# Patient Record
Sex: FEMALE | Race: WHITE | NOT HISPANIC OR LATINO | Employment: OTHER | URBAN - METROPOLITAN AREA
[De-identification: names, ages, dates, MRNs, and addresses within clinical notes are randomized per-mention and may not be internally consistent; named-entity substitution may affect disease eponyms.]

---

## 2017-10-26 ENCOUNTER — OFFICE VISIT - HEALTHEAST (OUTPATIENT)
Dept: UROLOGY | Facility: CLINIC | Age: 72
End: 2017-10-26

## 2017-10-26 DIAGNOSIS — N20.9 URINARY CALCULUS: ICD-10-CM

## 2017-10-26 DIAGNOSIS — N20.9 URINARY TRACT STONES: ICD-10-CM

## 2017-10-26 DIAGNOSIS — N20.0 CALCULUS OF KIDNEY: ICD-10-CM

## 2018-09-17 ENCOUNTER — OFFICE VISIT - HEALTHEAST (OUTPATIENT)
Dept: UROLOGY | Facility: CLINIC | Age: 73
End: 2018-09-17

## 2018-09-17 ENCOUNTER — HOSPITAL ENCOUNTER (OUTPATIENT)
Dept: CT IMAGING | Facility: CLINIC | Age: 73
Discharge: HOME | End: 2018-09-17
Attending: UROLOGY

## 2018-09-17 DIAGNOSIS — N20.9 URINARY TRACT STONES: ICD-10-CM

## 2018-09-17 DIAGNOSIS — N20.9 URINARY CALCULUS: ICD-10-CM

## 2018-09-17 DIAGNOSIS — N20.0 CALCULUS OF KIDNEY: ICD-10-CM

## 2018-09-17 LAB
ALBUMIN UR-MCNC: ABNORMAL MG/DL
APPEARANCE UR: ABNORMAL
BILIRUB UR QL STRIP: NEGATIVE
COLOR UR AUTO: YELLOW
GLUCOSE UR STRIP-MCNC: NEGATIVE MG/DL
HGB UR QL STRIP: ABNORMAL
KETONES UR STRIP-MCNC: NEGATIVE MG/DL
LEUKOCYTE ESTERASE UR QL STRIP: ABNORMAL
NITRATE UR QL: NEGATIVE
PH UR STRIP: 5.5 [PH] (ref 5–8)
SP GR UR STRIP: 1.01 (ref 1–1.03)
UROBILINOGEN UR STRIP-ACNC: ABNORMAL

## 2018-09-19 ENCOUNTER — COMMUNICATION - HEALTHEAST (OUTPATIENT)
Dept: UROLOGY | Facility: CLINIC | Age: 73
End: 2018-09-19

## 2018-09-19 DIAGNOSIS — N39.0 UTI (URINARY TRACT INFECTION): ICD-10-CM

## 2018-09-19 LAB — BACTERIA SPEC CULT: ABNORMAL

## 2018-10-05 ENCOUNTER — COMMUNICATION - HEALTHEAST (OUTPATIENT)
Dept: UROLOGY | Facility: CLINIC | Age: 73
End: 2018-10-05

## 2018-10-05 DIAGNOSIS — N39.0 UTI (URINARY TRACT INFECTION): ICD-10-CM

## 2018-10-05 DIAGNOSIS — N20.0 CALCULUS OF KIDNEY: ICD-10-CM

## 2018-10-12 ENCOUNTER — COMMUNICATION - HEALTHEAST (OUTPATIENT)
Dept: UROLOGY | Facility: CLINIC | Age: 73
End: 2018-10-12

## 2018-10-25 ENCOUNTER — ANESTHESIA - HEALTHEAST (OUTPATIENT)
Dept: SURGERY | Facility: CLINIC | Age: 73
End: 2018-10-25

## 2018-10-26 ENCOUNTER — SURGERY - HEALTHEAST (OUTPATIENT)
Dept: SURGERY | Facility: CLINIC | Age: 73
End: 2018-10-26

## 2018-10-26 ASSESSMENT — MIFFLIN-ST. JEOR: SCORE: 1641.68

## 2018-10-29 ENCOUNTER — COMMUNICATION - HEALTHEAST (OUTPATIENT)
Dept: UROLOGY | Facility: CLINIC | Age: 73
End: 2018-10-29

## 2018-11-05 ENCOUNTER — AMBULATORY - HEALTHEAST (OUTPATIENT)
Dept: UROLOGY | Facility: CLINIC | Age: 73
End: 2018-11-05

## 2018-11-05 DIAGNOSIS — N20.0 CALCULUS OF KIDNEY: ICD-10-CM

## 2018-11-05 DIAGNOSIS — N20.9 CALCULUS OF URINARY TRACT: ICD-10-CM

## 2018-11-05 DIAGNOSIS — N30.01 ACUTE CYSTITIS WITH HEMATURIA: ICD-10-CM

## 2018-11-05 LAB
ALBUMIN UR-MCNC: ABNORMAL MG/DL
APPEARANCE UR: ABNORMAL
BILIRUB UR QL STRIP: NEGATIVE
COLOR UR AUTO: YELLOW
GLUCOSE UR STRIP-MCNC: NEGATIVE MG/DL
HGB UR QL STRIP: ABNORMAL
KETONES UR STRIP-MCNC: NEGATIVE MG/DL
LEUKOCYTE ESTERASE UR QL STRIP: ABNORMAL
NITRATE UR QL: POSITIVE
PH UR STRIP: 5.5 [PH] (ref 5–8)
SP GR UR STRIP: 1.02 (ref 1–1.03)
UROBILINOGEN UR STRIP-ACNC: ABNORMAL

## 2018-11-06 ENCOUNTER — COMMUNICATION - HEALTHEAST (OUTPATIENT)
Dept: UROLOGY | Facility: CLINIC | Age: 73
End: 2018-11-06

## 2018-11-07 LAB — BACTERIA SPEC CULT: ABNORMAL

## 2018-11-12 ENCOUNTER — AMBULATORY - HEALTHEAST (OUTPATIENT)
Dept: UROLOGY | Facility: CLINIC | Age: 73
End: 2018-11-12

## 2018-11-12 DIAGNOSIS — N20.9 URINARY TRACT STONES: ICD-10-CM

## 2018-11-12 DIAGNOSIS — N20.0 CALCULUS OF KIDNEY: ICD-10-CM

## 2018-11-12 LAB
ALBUMIN UR-MCNC: ABNORMAL MG/DL
APPEARANCE UR: ABNORMAL
BILIRUB UR QL STRIP: NEGATIVE
COLOR UR AUTO: YELLOW
GLUCOSE UR STRIP-MCNC: NEGATIVE MG/DL
HGB UR QL STRIP: ABNORMAL
KETONES UR STRIP-MCNC: NEGATIVE MG/DL
LEUKOCYTE ESTERASE UR QL STRIP: ABNORMAL
NITRATE UR QL: NEGATIVE
PH UR STRIP: 5 [PH] (ref 5–8)
SP GR UR STRIP: 1.02 (ref 1–1.03)
UROBILINOGEN UR STRIP-ACNC: ABNORMAL

## 2018-11-13 LAB — BACTERIA SPEC CULT: NORMAL

## 2019-01-10 ENCOUNTER — OFFICE VISIT (OUTPATIENT)
Dept: URBAN - METROPOLITAN AREA CLINIC 88 | Facility: CLINIC | Age: 74
End: 2019-01-10
Payer: MEDICARE

## 2019-01-10 DIAGNOSIS — H25.13 AGE-RELATED NUCLEAR CATARACT, BILATERAL: ICD-10-CM

## 2019-01-10 DIAGNOSIS — E11.9 TYPE 2 DIABETES MELLITUS WITHOUT COMPLICATIONS: Primary | ICD-10-CM

## 2019-01-10 PROCEDURE — 99203 OFFICE O/P NEW LOW 30 MIN: CPT | Performed by: OPHTHALMOLOGY

## 2019-01-10 ASSESSMENT — KERATOMETRY
OD: 44.38
OS: 45.50

## 2019-01-10 ASSESSMENT — VISUAL ACUITY
OD: 20/25
OS: 20/25

## 2019-01-10 ASSESSMENT — INTRAOCULAR PRESSURE
OS: 14
OD: 14

## 2019-01-10 NOTE — IMPRESSION/PLAN
Impression: Age-related nuclear cataract, bilateral: H25.13. OU. Condition: stable. Plan: Discussed diagnosis in detail with patient. No treatment is required at this time. The patient will monitor vision changes and contact us with any decrease in vision.

## 2019-01-10 NOTE — IMPRESSION/PLAN
Impression: Type 2 diabetes mellitus without complications: I20.8. Condition: stable. Symptoms: will continue to monitor. Plan: Diabetes type II: no background retinopathy, no signs of neovascularization noted. Discussed ocular and systemic benefits of blood sugar control.

## 2020-02-01 ENCOUNTER — TRANSFERRED RECORDS (OUTPATIENT)
Dept: HEALTH INFORMATION MANAGEMENT | Facility: CLINIC | Age: 75
End: 2020-02-01

## 2021-01-26 ENCOUNTER — OFFICE VISIT (OUTPATIENT)
Dept: FAMILY MEDICINE | Facility: CLINIC | Age: 76
End: 2021-01-26
Payer: MEDICARE

## 2021-01-26 VITALS
SYSTOLIC BLOOD PRESSURE: 123 MMHG | TEMPERATURE: 99 F | OXYGEN SATURATION: 97 % | WEIGHT: 245 LBS | HEART RATE: 105 BPM | DIASTOLIC BLOOD PRESSURE: 73 MMHG

## 2021-01-26 DIAGNOSIS — E11.9 TYPE 2 DIABETES MELLITUS WITHOUT COMPLICATION, WITHOUT LONG-TERM CURRENT USE OF INSULIN (H): ICD-10-CM

## 2021-01-26 DIAGNOSIS — F33.42 RECURRENT MAJOR DEPRESSIVE DISORDER, IN FULL REMISSION (H): ICD-10-CM

## 2021-01-26 DIAGNOSIS — N39.0 RECURRENT UTI: ICD-10-CM

## 2021-01-26 DIAGNOSIS — K21.9 GASTROESOPHAGEAL REFLUX DISEASE WITHOUT ESOPHAGITIS: ICD-10-CM

## 2021-01-26 DIAGNOSIS — G50.0 TRIGEMINAL NEURALGIA: ICD-10-CM

## 2021-01-26 DIAGNOSIS — R29.6 FALLS FREQUENTLY: ICD-10-CM

## 2021-01-26 DIAGNOSIS — R42 DIZZINESS: Primary | ICD-10-CM

## 2021-01-26 PROBLEM — Z85.42 HISTORY OF UTERINE CANCER: Status: ACTIVE | Noted: 2021-01-26

## 2021-01-26 LAB — HBA1C MFR BLD: 6.2 % (ref 0–5.6)

## 2021-01-26 PROCEDURE — 83036 HEMOGLOBIN GLYCOSYLATED A1C: CPT | Performed by: NURSE PRACTITIONER

## 2021-01-26 PROCEDURE — 99204 OFFICE O/P NEW MOD 45 MIN: CPT | Performed by: NURSE PRACTITIONER

## 2021-01-26 PROCEDURE — 82043 UR ALBUMIN QUANTITATIVE: CPT | Performed by: NURSE PRACTITIONER

## 2021-01-26 PROCEDURE — 80053 COMPREHEN METABOLIC PANEL: CPT | Performed by: NURSE PRACTITIONER

## 2021-01-26 PROCEDURE — 36415 COLL VENOUS BLD VENIPUNCTURE: CPT | Performed by: NURSE PRACTITIONER

## 2021-01-26 RX ORDER — BUPROPION HYDROCHLORIDE 150 MG/1
150 TABLET ORAL EVERY MORNING
COMMUNITY
End: 2021-02-25

## 2021-01-26 RX ORDER — PANTOPRAZOLE SODIUM 40 MG/1
40 TABLET, DELAYED RELEASE ORAL
Qty: 180 TABLET | Status: SHIPPED | OUTPATIENT
Start: 2021-01-26 | End: 2021-09-03

## 2021-01-26 RX ORDER — GLIPIZIDE 5 MG/1
5 TABLET ORAL DAILY
COMMUNITY
End: 2021-05-25

## 2021-01-26 RX ORDER — VENLAFAXINE 75 MG/1
75 TABLET ORAL 2 TIMES DAILY WITH MEALS
COMMUNITY
End: 2021-02-25

## 2021-01-26 RX ORDER — GABAPENTIN 800 MG/1
800 TABLET ORAL 2 TIMES DAILY
Qty: 180 TABLET | Status: ON HOLD | OUTPATIENT
Start: 2021-01-26 | End: 2021-07-03

## 2021-01-26 RX ORDER — GABAPENTIN 800 MG/1
800 TABLET ORAL 2 TIMES DAILY
COMMUNITY
End: 2021-01-26

## 2021-01-26 RX ORDER — ALPRAZOLAM 0.5 MG
0.5 TABLET ORAL 3 TIMES DAILY PRN
COMMUNITY
End: 2021-03-22

## 2021-01-26 RX ORDER — PANTOPRAZOLE SODIUM 40 MG/1
40 TABLET, DELAYED RELEASE ORAL
COMMUNITY
End: 2021-01-26

## 2021-01-26 NOTE — LETTER
January 27, 2021      Zeynep Martinez  [address] [address]   [place] [address]        Dear MsMaria LuisaJuan,    We are writing to inform you of your test results.    A1c looks good.    Resulted Orders   Hemoglobin A1c   Result Value Ref Range    Hemoglobin A1C 6.2 (H) 0 - 5.6 %      Comment:      Normal <5.7% Prediabetes 5.7-6.4%  Diabetes 6.5% or higher - adopted from ADA   consensus guidelines.         If you have any questions or concerns, please call the clinic at the number listed above.       Sincerely,      Shayy Rivas, JAVON/nr

## 2021-01-27 LAB
ALBUMIN SERPL-MCNC: 3.3 G/DL (ref 3.4–5)
ALP SERPL-CCNC: 58 U/L (ref 40–150)
ALT SERPL W P-5'-P-CCNC: 16 U/L (ref 0–50)
ANION GAP SERPL CALCULATED.3IONS-SCNC: 6 MMOL/L (ref 3–14)
AST SERPL W P-5'-P-CCNC: 14 U/L (ref 0–45)
BILIRUB SERPL-MCNC: 0.2 MG/DL (ref 0.2–1.3)
BUN SERPL-MCNC: 27 MG/DL (ref 7–30)
CALCIUM SERPL-MCNC: 9.4 MG/DL (ref 8.5–10.1)
CHLORIDE SERPL-SCNC: 108 MMOL/L (ref 94–109)
CO2 SERPL-SCNC: 24 MMOL/L (ref 20–32)
CREAT SERPL-MCNC: 1.01 MG/DL (ref 0.52–1.04)
CREAT UR-MCNC: 132 MG/DL
GFR SERPL CREATININE-BSD FRML MDRD: 55 ML/MIN/{1.73_M2}
GLUCOSE SERPL-MCNC: 213 MG/DL (ref 70–99)
MICROALBUMIN UR-MCNC: 129 MG/L
MICROALBUMIN/CREAT UR: 97.73 MG/G CR (ref 0–25)
POTASSIUM SERPL-SCNC: 5.2 MMOL/L (ref 3.4–5.3)
PROT SERPL-MCNC: 7.2 G/DL (ref 6.8–8.8)
SODIUM SERPL-SCNC: 138 MMOL/L (ref 133–144)

## 2021-02-03 ENCOUNTER — OFFICE VISIT (OUTPATIENT)
Dept: UROLOGY | Facility: CLINIC | Age: 76
End: 2021-02-03
Attending: OBSTETRICS & GYNECOLOGY
Payer: COMMERCIAL

## 2021-02-03 VITALS
HEART RATE: 94 BPM | BODY MASS INDEX: 41.3 KG/M2 | HEIGHT: 64 IN | SYSTOLIC BLOOD PRESSURE: 130 MMHG | DIASTOLIC BLOOD PRESSURE: 70 MMHG | WEIGHT: 241.9 LBS

## 2021-02-03 DIAGNOSIS — E66.01 MORBID OBESITY (H): ICD-10-CM

## 2021-02-03 DIAGNOSIS — N20.0 KIDNEY STONE: Primary | ICD-10-CM

## 2021-02-03 DIAGNOSIS — N39.0 RECURRENT UTI: ICD-10-CM

## 2021-02-03 PROCEDURE — 99203 OFFICE O/P NEW LOW 30 MIN: CPT | Performed by: OBSTETRICS & GYNECOLOGY

## 2021-02-03 PROCEDURE — G0463 HOSPITAL OUTPT CLINIC VISIT: HCPCS

## 2021-02-03 ASSESSMENT — PAIN SCALES - GENERAL: PAINLEVEL: NO PAIN (0)

## 2021-02-03 ASSESSMENT — MIFFLIN-ST. JEOR: SCORE: 1579.31

## 2021-02-03 NOTE — LETTER
2/3/2021       RE: Zeynep Martinez  [address]  [place] [address]     Dear Colleague,    Thank you for referring your patient, Zeynep Martinez, to the [facility] [place] [facility]. Please see a copy of my visit note below.    February 3, 2021    Referring Provider: Shayy Rivas, JAVON  [address] [place] [address]    Primary Care Provider: Shayy Rivas    CC: recurrent UTIs    HPI:  Zeynep Martinez is a 75 year old female who presents for evaluation of her pelvic floor symptoms.  She has a history recurrent UTIs for the past 18 months. She is on cefoxitin prophylaxis for these, but she states that she will get a UTI every 4 months that will require IV antibiotics. All of her treatment has occurred in [place] and records are not available for review. Her PMHx is significant for a h/o kidney stones that predates her recurrent UTIs that required lithotripsy.    Past Medical History:   Diagnosis Date     Calculus of kidney     multiple, uric acid and calcium     Frequent UTI      Hyperlipidemia LDL goal <100      Type 2 diabetes mellitus without complication, without long-term current use of insulin (H)        Past Surgical History:   Procedure Laterality Date     ABDOMINOPLASTY       APPENDECTOMY       EXTRACORPOREAL SHOCK WAVE LITHOTRIPSY (ESWL)       EXTRACORPOREAL SHOCK WAVE LITHOTRIPSY (ESWL)       galbaldder       GASTRIC BYPASS       HYSTERECTOMY TOTAL ABDOMINAL, BILATERAL SALPINGO-OOPHORECTOMY, COMBINED      no cervix       Social History     Socioeconomic History     Marital status:      Spouse name: Not on file     Number of children: Not on file     Years of education: Not on file     Highest education level: Not on file   Occupational History     Not on file   Social Needs     Financial resource strain: Not on file     Food insecurity     Worry: Not on file     Inability: Not on file  "    Transportation needs     Medical: Not on file     Non-medical: Not on file   Tobacco Use     Smoking status: Former Smoker     Quit date: 1970     Years since quittin.1     Smokeless tobacco: Never Used   Substance and Sexual Activity     Alcohol use: Not Currently     Drug use: Never     Sexual activity: Not Currently   Lifestyle     Physical activity     Days per week: Not on file     Minutes per session: Not on file     Stress: Not on file   Relationships     Social connections     Talks on phone: Not on file     Gets together: Not on file     Attends [employer] service: Not on file     Active member of club or organization: Not on file     Attends meetings of clubs or organizations: Not on file     Relationship status: Not on file     Intimate partner violence     Fear of current or ex partner: Not on file     Emotionally abused: Not on file     Physically abused: Not on file     Forced sexual activity: Not on file   Other Topics Concern     Not on file   Social History Narrative     Not on file       Family History   Problem Relation Age of Onset     Multiple Sclerosis Mother      Cancer Maternal Grandmother      Diabetes Maternal Grandmother      Hypertension Maternal Grandmother      Multiple Sclerosis Cousin      Multiple Sclerosis Cousin        ROS    Allergies   Allergen Reactions     Hmg-Coa-R Inhibitors Other (See Comments)     confusion     Penicillins Rash       Current Outpatient Medications   Medication     ALPRAZolam (XANAX) 0.5 MG tablet     buPROPion (WELLBUTRIN XL) 150 MG 24 hr tablet     cephALEXin (KEFLEX) 250 MG capsule     gabapentin (NEURONTIN) 800 MG tablet     glipiZIDE (GLUCOTROL) 5 MG tablet     metFORMIN (GLUCOPHAGE) 1000 MG tablet     pantoprazole (PROTONIX) 40 MG EC tablet     STATIN NOT PRESCRIBED (INTENTIONAL)     venlafaxine (EFFEXOR) 75 MG tablet     No current facility-administered medications for this visit.        /70   Pulse 94   Ht 1.613 m (5' 3.5\")   Wt " 109.7 kg (241 lb 14.4 oz)   BMI 42.18 kg/m   No LMP recorded. Patient is postmenopausal. Body mass index is 42.18 kg/m .  Ms. Martinez is alert, comfortable in no acute distress, non-labored breathing.     A/P: Zeynep Martinez is a 75 year old F with recurrent UTIs    Pt needs referral to urology to evaluate for possible continued kidney stones. Pt states that she would prefer to see her previous urologist.    A total of 30 minutes were spent with the patient today, > 50% in counseling and coordination of care    Adam Joseph MD  Professor, OB/GYN  Urogynecologist  CC  Patient Care Team:  Shayy Rivas NP as PCP - General (Nurse Practitioner - Family)  Shayy Rivas NP as Assigned PCP  Shayy Rivas NP as Referring Physician (Nurse Practitioner - Family)  Adam Joseph MD as MD (OB/Gyn)  SHAYY RIVAS

## 2021-02-03 NOTE — PROGRESS NOTES
February 3, 2021    Referring Provider: Shayy Rivas, JAVON  [address] [place] [address]    Primary Care Provider: Shayy Rivas    CC: recurrent UTIs    HPI:  Zeynep Martinez is a 75 year old female who presents for evaluation of her pelvic floor symptoms.  She has a history recurrent UTIs for the past 18 months. She is on cefoxitin prophylaxis for these, but she states that she will get a UTI every 4 months that will require IV antibiotics. All of her treatment has occurred in [place] and records are not available for review. Her PMHx is significant for a h/o kidney stones that predates her recurrent UTIs that required lithotripsy.    Past Medical History:   Diagnosis Date     Calculus of kidney     multiple, uric acid and calcium     Frequent UTI      Hyperlipidemia LDL goal <100      Type 2 diabetes mellitus without complication, without long-term current use of insulin (H)        Past Surgical History:   Procedure Laterality Date     ABDOMINOPLASTY       APPENDECTOMY       EXTRACORPOREAL SHOCK WAVE LITHOTRIPSY (ESWL)       EXTRACORPOREAL SHOCK WAVE LITHOTRIPSY (ESWL)       galbaldder       GASTRIC BYPASS       HYSTERECTOMY TOTAL ABDOMINAL, BILATERAL SALPINGO-OOPHORECTOMY, COMBINED      no cervix       Social History     Socioeconomic History     Marital status:      Spouse name: Not on file     Number of children: Not on file     Years of education: Not on file     Highest education level: Not on file   Occupational History     Not on file   Social Needs     Financial resource strain: Not on file     Food insecurity     Worry: Not on file     Inability: Not on file     Transportation needs     Medical: Not on file     Non-medical: Not on file   Tobacco Use     Smoking status: Former Smoker     Quit date: 1970     Years since quittin.1     Smokeless tobacco: Never Used   Substance and Sexual Activity     Alcohol use: Not Currently     Drug use: Never     Sexual  "activity: Not Currently   Lifestyle     Physical activity     Days per week: Not on file     Minutes per session: Not on file     Stress: Not on file   Relationships     Social connections     Talks on phone: Not on file     Gets together: Not on file     Attends [employer] service: Not on file     Active member of club or organization: Not on file     Attends meetings of clubs or organizations: Not on file     Relationship status: Not on file     Intimate partner violence     Fear of current or ex partner: Not on file     Emotionally abused: Not on file     Physically abused: Not on file     Forced sexual activity: Not on file   Other Topics Concern     Not on file   Social History Narrative     Not on file       Family History   Problem Relation Age of Onset     Multiple Sclerosis Mother      Cancer Maternal Grandmother      Diabetes Maternal Grandmother      Hypertension Maternal Grandmother      Multiple Sclerosis Cousin      Multiple Sclerosis Cousin        ROS    Allergies   Allergen Reactions     Hmg-Coa-R Inhibitors Other (See Comments)     confusion     Penicillins Rash       Current Outpatient Medications   Medication     ALPRAZolam (XANAX) 0.5 MG tablet     buPROPion (WELLBUTRIN XL) 150 MG 24 hr tablet     cephALEXin (KEFLEX) 250 MG capsule     gabapentin (NEURONTIN) 800 MG tablet     glipiZIDE (GLUCOTROL) 5 MG tablet     metFORMIN (GLUCOPHAGE) 1000 MG tablet     pantoprazole (PROTONIX) 40 MG EC tablet     STATIN NOT PRESCRIBED (INTENTIONAL)     venlafaxine (EFFEXOR) 75 MG tablet     No current facility-administered medications for this visit.        /70   Pulse 94   Ht 1.613 m (5' 3.5\")   Wt 109.7 kg (241 lb 14.4 oz)   BMI 42.18 kg/m   No LMP recorded. Patient is postmenopausal. Body mass index is 42.18 kg/m .  Ms. Martinez is alert, comfortable in no acute distress, non-labored breathing.     A/P: Zeynep Martinez is a 75 year old F with recurrent UTIs    Pt needs referral to urology to " evaluate for possible continued kidney stones. Pt states that she would prefer to see her previous urologist.    A total of 30 minutes were spent with the patient today, > 50% in counseling and coordination of care    Adam Joseph MD  Professor, OB/GYN  Urogynecologist  CC  Patient Care Team:  Shayy Rivas NP as PCP - General (Nurse Practitioner - Family)  Shayy Rivas NP as Assigned PCP  Shayy Rivas NP as Referring Physician (Nurse Practitioner - Family)  Adam Joseph MD as MD (OB/Gyn)  SHAYY RIVAS

## 2021-02-08 ENCOUNTER — OFFICE VISIT - HEALTHEAST (OUTPATIENT)
Dept: UROLOGY | Facility: CLINIC | Age: 76
End: 2021-02-08

## 2021-02-08 DIAGNOSIS — E66.01 MORBID OBESITY (H): ICD-10-CM

## 2021-02-08 DIAGNOSIS — N20.0 CALCULUS OF KIDNEY: ICD-10-CM

## 2021-02-08 DIAGNOSIS — N39.0 URINARY TRACT INFECTION WITHOUT HEMATURIA, SITE UNSPECIFIED: ICD-10-CM

## 2021-02-15 ENCOUNTER — HOSPITAL ENCOUNTER (OUTPATIENT)
Dept: CT IMAGING | Facility: CLINIC | Age: 76
Discharge: HOME | End: 2021-02-15
Attending: UROLOGY

## 2021-02-15 ENCOUNTER — AMBULATORY - HEALTHEAST (OUTPATIENT)
Dept: LAB | Facility: CLINIC | Age: 76
End: 2021-02-15

## 2021-02-15 ENCOUNTER — ANCILLARY PROCEDURE (OUTPATIENT)
Dept: MRI IMAGING | Facility: CLINIC | Age: 76
End: 2021-02-15
Attending: NURSE PRACTITIONER
Payer: COMMERCIAL

## 2021-02-15 DIAGNOSIS — R29.6 FALLS FREQUENTLY: ICD-10-CM

## 2021-02-15 DIAGNOSIS — N39.0 URINARY TRACT INFECTION WITHOUT HEMATURIA, SITE UNSPECIFIED: ICD-10-CM

## 2021-02-15 DIAGNOSIS — R42 DIZZINESS: ICD-10-CM

## 2021-02-15 PROCEDURE — 70553 MRI BRAIN STEM W/O & W/DYE: CPT | Mod: GC | Performed by: RADIOLOGY

## 2021-02-15 PROCEDURE — A9585 GADOBUTROL INJECTION: HCPCS | Performed by: RADIOLOGY

## 2021-02-15 RX ORDER — GADOBUTROL 604.72 MG/ML
10 INJECTION INTRAVENOUS ONCE
Status: COMPLETED | OUTPATIENT
Start: 2021-02-15 | End: 2021-02-15

## 2021-02-15 RX ADMIN — GADOBUTROL 10 ML: 604.72 INJECTION INTRAVENOUS at 11:38

## 2021-02-15 NOTE — DISCHARGE INSTRUCTIONS
MRI Contrast Discharge Instructions    The IV contrast you received today will pass out of your body in your  urine. This will happen in the next 24 hours. You will not feel this process.  Your urine will not change color.    Drink at least 4 extra glasses of water or juice today (unless your doctor  has restricted your fluids). This reduces the stress on your kidneys.  You may take your regular medicines.    If you are on dialysis: It is best to have dialysis today.    If you have a reaction: Most reactions happen right away. If you have  any new symptoms after leaving the hospital (such as hives or swelling),  call your hospital at the correct number below. Or call your family doctor.  If you have breathing distress or wheezing, call 911.    Special instructions: ***    I have read and understand the above information.    Signature:______________________________________ Date:___________    Staff:__________________________________________ Date:___________     Time:__________    [facility] Departments:    ___[place]: [phone]  ___[facility]: [phone]  ___[place]: [phone] ___[facility]: [phone]  ___[facility]: [phone]  ___[facility]: [phone]  ___[phone]  ___[facility]: [phone]  ___[phone]

## 2021-02-17 ENCOUNTER — TELEPHONE (OUTPATIENT)
Dept: NEUROLOGY | Facility: CLINIC | Age: 76
End: 2021-02-17

## 2021-02-17 ENCOUNTER — OFFICE VISIT (OUTPATIENT)
Dept: NEUROSURGERY | Facility: CLINIC | Age: 76
End: 2021-02-17
Attending: PSYCHIATRY & NEUROLOGY
Payer: COMMERCIAL

## 2021-02-17 VITALS
HEIGHT: 63 IN | OXYGEN SATURATION: 97 % | HEART RATE: 90 BPM | WEIGHT: 240 LBS | BODY MASS INDEX: 42.52 KG/M2 | DIASTOLIC BLOOD PRESSURE: 80 MMHG | SYSTOLIC BLOOD PRESSURE: 130 MMHG | TEMPERATURE: 98.7 F

## 2021-02-17 DIAGNOSIS — R41.9 COGNITIVE COMPLAINTS: Primary | ICD-10-CM

## 2021-02-17 DIAGNOSIS — R29.818 DIFFICULTY BALANCING: ICD-10-CM

## 2021-02-17 DIAGNOSIS — E11.42 DIABETIC PERIPHERAL NEUROPATHY (H): ICD-10-CM

## 2021-02-17 DIAGNOSIS — R29.6 FREQUENT FALLS: ICD-10-CM

## 2021-02-17 DIAGNOSIS — G50.0 TRIGEMINAL NEURALGIA: ICD-10-CM

## 2021-02-17 PROCEDURE — G2212 PROLONG OUTPT/OFFICE VIS: HCPCS | Performed by: PSYCHIATRY & NEUROLOGY

## 2021-02-17 PROCEDURE — 99205 OFFICE O/P NEW HI 60 MIN: CPT | Performed by: PSYCHIATRY & NEUROLOGY

## 2021-02-17 ASSESSMENT — MIFFLIN-ST. JEOR: SCORE: 1562.76

## 2021-02-17 NOTE — PATIENT INSTRUCTIONS
AFTER VISIT SUMMARY (AVS):    At today's visit we thoroughly discussed various diagnostic possibilities for your symptoms, necessary evaluation, and the plan, which includes:  Orders Placed This Encounter   Procedures     PHYSICAL THERAPY REFERRAL     No new medications, but we discussed that Wellbutrin dose should be reduced to see if symptoms improve.    We also discussed occipital nerve block if right-sided occipital pain continues after adjustment of head positioning during sleep.    Additional recommendations after the work-up.    Next follow-up appointment is in the next 4 weeks or earlier if needed.    Please do not hesitate to call me with any questions or concerns.    Thanks.
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
## 2021-02-18 ENCOUNTER — OFFICE VISIT - HEALTHEAST (OUTPATIENT)
Dept: UROLOGY | Facility: CLINIC | Age: 76
End: 2021-02-18

## 2021-02-18 DIAGNOSIS — N30.01 ACUTE CYSTITIS WITH HEMATURIA: ICD-10-CM

## 2021-02-19 ENCOUNTER — AMBULATORY - HEALTHEAST (OUTPATIENT)
Dept: UROLOGY | Facility: CLINIC | Age: 76
End: 2021-02-19

## 2021-02-19 ENCOUNTER — COMMUNICATION - HEALTHEAST (OUTPATIENT)
Dept: UROLOGY | Facility: CLINIC | Age: 76
End: 2021-02-19

## 2021-02-19 DIAGNOSIS — N30.01 ACUTE CYSTITIS WITH HEMATURIA: ICD-10-CM

## 2021-02-19 LAB
BACTERIA SPEC CULT: ABNORMAL
BACTERIA SPEC CULT: ABNORMAL

## 2021-02-24 ENCOUNTER — TELEPHONE (OUTPATIENT)
Dept: FAMILY MEDICINE | Facility: CLINIC | Age: 76
End: 2021-02-24

## 2021-02-24 ENCOUNTER — TELEPHONE (OUTPATIENT)
Dept: CARE COORDINATION | Facility: CLINIC | Age: 76
End: 2021-02-24

## 2021-02-24 NOTE — TELEPHONE ENCOUNTER
Pt called to see what she needed. She was out of her metformin but now did get it at the pharmacy. She did have glipizide at home and was supplementing that.     When asked if she wanted an RN to come out she felt she does not need that as has her BS back in control . She feels she has a good understanding of how to control her meds/diabetes    This relayed to Jeanie at Homecare as well. Therapy going out tomorrow    Naima Bravo, RN, BSN

## 2021-02-24 NOTE — TELEPHONE ENCOUNTER
Reason for Call:  Other call back    Detailed comments: [facility] states the patient was referred for PT due to neuropathy but PT called to schedule and patient did not want a visit as she has been out of her diabetic meds for 5 days and not feeling well. FYI. Wondering about getting her a nursing start of care. Please follow up. Thanks!    Phone Number Patient can be reached at: Jeanie  [phone]    Best Time: Any    Can we leave a detailed message on this number? YES    Call taken on 2/24/2021 at 10:46 AM by Ana Ramirez

## 2021-02-24 NOTE — TELEPHONE ENCOUNTER
Dear Shayy Rivas,  I called the patient and she has her diabetic meds and is doing well.  Her BGL was 120 last evening.  She denies need for Home Care Nurse to come in.    Thank you  Jeanie Holley, RN, BSN  [facility]  RN Care Coordinator  [phone]

## 2021-02-24 NOTE — TELEPHONE ENCOUNTER
Dear Dr. Cordero,  Medicare Home Health regulations requires [facility] to provide an initial assessment visit either within 48 hours of the patient's return home.     There will be a delay in the Initial Assessment for Zeynep Martinez; MRN [id number].  The patient is requesting 2/25 or 2/26.    Please reply to this message as it will be considered a verbal order.    Thank you  Jeanie Holley, RN, BSN  [facility]  RN Care Coordinator  [phone]

## 2021-02-25 ENCOUNTER — VIRTUAL VISIT (OUTPATIENT)
Dept: FAMILY MEDICINE | Facility: CLINIC | Age: 76
End: 2021-02-25
Payer: COMMERCIAL

## 2021-02-25 DIAGNOSIS — F33.42 RECURRENT MAJOR DEPRESSIVE DISORDER, IN FULL REMISSION (H): Primary | ICD-10-CM

## 2021-02-25 PROCEDURE — 99443 PR PHYSICIAN TELEPHONE EVALUATION 21-30 MIN: CPT | Mod: 95 | Performed by: NURSE PRACTITIONER

## 2021-02-25 RX ORDER — VENLAFAXINE HYDROCHLORIDE 75 MG/1
75 TABLET, EXTENDED RELEASE ORAL 2 TIMES DAILY
Qty: 180 TABLET | Refills: 3 | Status: SHIPPED | OUTPATIENT
Start: 2021-02-25 | End: 2021-10-01

## 2021-02-25 RX ORDER — DOXYCYCLINE HYCLATE 100 MG
100 TABLET ORAL
COMMUNITY
Start: 2021-02-19 | End: 2021-03-05

## 2021-02-25 RX ORDER — BUPROPION HYDROCHLORIDE 150 MG/1
150 TABLET ORAL EVERY MORNING
Status: CANCELLED | OUTPATIENT
Start: 2021-02-25

## 2021-02-25 ASSESSMENT — ANXIETY QUESTIONNAIRES
7. FEELING AFRAID AS IF SOMETHING AWFUL MIGHT HAPPEN: NOT AT ALL
1. FEELING NERVOUS, ANXIOUS, OR ON EDGE: SEVERAL DAYS
2. NOT BEING ABLE TO STOP OR CONTROL WORRYING: NOT AT ALL
6. BECOMING EASILY ANNOYED OR IRRITABLE: SEVERAL DAYS
5. BEING SO RESTLESS THAT IT IS HARD TO SIT STILL: NOT AT ALL
3. WORRYING TOO MUCH ABOUT DIFFERENT THINGS: NOT AT ALL
GAD7 TOTAL SCORE: 3

## 2021-02-25 ASSESSMENT — PATIENT HEALTH QUESTIONNAIRE - PHQ9
SUM OF ALL RESPONSES TO PHQ QUESTIONS 1-9: 8
5. POOR APPETITE OR OVEREATING: SEVERAL DAYS

## 2021-02-25 NOTE — PROGRESS NOTES
"Zeynep is a 75 year old who is being evaluated via a billable telephone visit.      What phone number would you like to be contacted at? [phone]  How would you like to obtain your AVS? MyChart    Assessment & Plan     Recurrent major depressive disorder, in full remission (H)  Stop Wellbutrin and continue on current dose of Effexor.  - venlafaxine (EFFEXOR-ER) 75 MG 24 hr tablet; Take 1 tablet (75 mg) by mouth 2 times daily      23 minutes spent on the date of the encounter doing chart review, patient visit and documentation        BMI:   Estimated body mass index is 42.51 kg/m  as calculated from the following:    Height as of 2/17/21: 1.6 m (5' 3\").    Weight as of 2/17/21: 108.9 kg (240 lb).           No follow-ups on file.    Shayy Rivas NP  [facility]    Vic Adhikari is a 75 year old who presents for the following health issues   Patient has been out of buPROPion (WELLBUTRIN XL) 150 MG 24 hr tablet  HPI   Neurologist recommendation to lower buPROPion (WELLBUTRIN XL) 150 MG 24 hr tablet    PHQ 2/25/2021   PHQ-9 Total Score 8   Q9: Thoughts of better off dead/self-harm past 2 weeks Not at all     NELLIE-7 SCORE 2/25/2021   Total Score 3     She was on 150 mg of Wellbutrin, stopped it a couple days ago and is feeling well.  She is having less of the neurological symptoms that she was having.     Her mood is good, feels a lot of her depression was related to her isolation and now that she is moved back up here and near family, this has helped.      Review of Systems         Objective       Mammogram in [place] January 2020: normal     Vitals:  No vitals were obtained today due to virtual visit.    Physical Exam   healthy, alert and no distress  PSYCH: Alert and oriented times 3; coherent speech, normal   rate and volume, able to articulate logical thoughts, able   to abstract reason, no tangential thoughts, no hallucinations   or delusions  Her affect is normal  RESP: " No cough, no audible wheezing, able to talk in full sentences  Remainder of exam unable to be completed due to telephone visits                Phone call duration: 22 minutes

## 2021-02-26 ENCOUNTER — MEDICAL CORRESPONDENCE (OUTPATIENT)
Dept: HEALTH INFORMATION MANAGEMENT | Facility: CLINIC | Age: 76
End: 2021-02-26

## 2021-02-26 ENCOUNTER — COMMUNICATION - HEALTHEAST (OUTPATIENT)
Dept: UROLOGY | Facility: CLINIC | Age: 76
End: 2021-02-26

## 2021-02-26 DIAGNOSIS — N39.0 URINARY TRACT INFECTION WITHOUT HEMATURIA, SITE UNSPECIFIED: ICD-10-CM

## 2021-02-26 ASSESSMENT — ANXIETY QUESTIONNAIRES: GAD7 TOTAL SCORE: 3

## 2021-03-01 ENCOUNTER — TELEPHONE (OUTPATIENT)
Dept: CARE COORDINATION | Facility: CLINIC | Age: 76
End: 2021-03-01

## 2021-03-01 NOTE — TELEPHONE ENCOUNTER
Dear Dr. Cordero,  [facility] process is that all ordered disciplines will be involved in the development of the plan of care.  The following disciplines were unable to see Zeynep Martinez; MRN [id number] within the 5 day evaluation window.  There will be a delay in the evalation visit by PT Start of Care due to difficulty reaching the patient.     Requesting updated orders.  Please respond as this will be verbal order.    Thank you  Jeanie Holley, RN, BSN  [facility]  RN Care Coordinator  [phone]

## 2021-03-02 ENCOUNTER — TELEPHONE (OUTPATIENT)
Dept: CARE COORDINATION | Facility: CLINIC | Age: 76
End: 2021-03-02

## 2021-03-02 NOTE — TELEPHONE ENCOUNTER
Dr Cordero,  After many attempts to schedule a PT Start of Care visit, the patient has declined Home Care services.    Thank you  Jeanie Holley, RN, BSN  [facility]  RN Care Coordinator  [phone]

## 2021-03-14 ENCOUNTER — HEALTH MAINTENANCE LETTER (OUTPATIENT)
Age: 76
End: 2021-03-14

## 2021-03-17 ENCOUNTER — TELEPHONE (OUTPATIENT)
Dept: NEUROLOGY | Facility: CLINIC | Age: 76
End: 2021-03-17

## 2021-03-17 NOTE — TELEPHONE ENCOUNTER
Patient called to cancel appointment, not feeling well.  Patient will call back when feeling better.

## 2021-03-18 ENCOUNTER — VIRTUAL VISIT (OUTPATIENT)
Dept: FAMILY MEDICINE | Facility: CLINIC | Age: 76
End: 2021-03-18
Payer: COMMERCIAL

## 2021-03-18 DIAGNOSIS — M25.562 LEFT KNEE PAIN, UNSPECIFIED CHRONICITY: ICD-10-CM

## 2021-03-18 DIAGNOSIS — Z12.11 SPECIAL SCREENING FOR MALIGNANT NEOPLASMS, COLON: ICD-10-CM

## 2021-03-18 DIAGNOSIS — F33.1 MODERATE EPISODE OF RECURRENT MAJOR DEPRESSIVE DISORDER (H): Primary | ICD-10-CM

## 2021-03-18 DIAGNOSIS — G47.00 INSOMNIA, UNSPECIFIED TYPE: ICD-10-CM

## 2021-03-18 DIAGNOSIS — R26.89 BALANCE PROBLEMS: ICD-10-CM

## 2021-03-18 DIAGNOSIS — R29.6 FALLS FREQUENTLY: ICD-10-CM

## 2021-03-18 PROCEDURE — 99215 OFFICE O/P EST HI 40 MIN: CPT | Mod: 95 | Performed by: NURSE PRACTITIONER

## 2021-03-18 RX ORDER — VENLAFAXINE HYDROCHLORIDE 150 MG/1
150 CAPSULE, EXTENDED RELEASE ORAL DAILY
Qty: 90 CAPSULE | Refills: 1 | Status: SHIPPED | OUTPATIENT
Start: 2021-03-18 | End: 2021-05-10

## 2021-03-18 ASSESSMENT — ANXIETY QUESTIONNAIRES
2. NOT BEING ABLE TO STOP OR CONTROL WORRYING: SEVERAL DAYS
GAD7 TOTAL SCORE: 6
5. BEING SO RESTLESS THAT IT IS HARD TO SIT STILL: NOT AT ALL
7. FEELING AFRAID AS IF SOMETHING AWFUL MIGHT HAPPEN: NOT AT ALL
3. WORRYING TOO MUCH ABOUT DIFFERENT THINGS: SEVERAL DAYS
6. BECOMING EASILY ANNOYED OR IRRITABLE: SEVERAL DAYS
1. FEELING NERVOUS, ANXIOUS, OR ON EDGE: MORE THAN HALF THE DAYS

## 2021-03-18 ASSESSMENT — PATIENT HEALTH QUESTIONNAIRE - PHQ9
SUM OF ALL RESPONSES TO PHQ QUESTIONS 1-9: 22
5. POOR APPETITE OR OVEREATING: SEVERAL DAYS

## 2021-03-18 NOTE — PROGRESS NOTES
Zeynep is a 75 year old who is being evaluated via a billable telephone visit.      What phone number would you like to be contacted at? [phone]  How would you like to obtain your AVS? Tamar    Assessment & Plan     Moderate episode of recurrent major depressive disorder (H)  Will increase her Effexor back to 150, since she has only been taking 75 mg and have her take this in the AM.  Referral for therapist and psychiatrist given.   Depression Screening Follow Up    PHQ 3/18/2021   PHQ-9 Total Score 22   Q9: Thoughts of better off dead/self-harm past 2 weeks Several days                 Follow Up    Follow Up Actions Taken  Crisis resource information provided in the After Visit Summary  Mental Health Referral placed    Discussed the following ways the patient can remain in a safe environment:  be around others      - MENTAL HEALTH REFERRAL  - Adult; Outpatient Treatment, Psychiatry; Individual/Couples/Family/Group Therapy/Health Psychology; [facility]: [facility] [phone]; We will contact you to schedule the appointment or please call with any ...    Insomnia, unspecified type  Will monitor her sleep, since right now insomnia is not bothersome. If this returns, she will follow up with me to discuss medication options.   - MENTAL HEALTH REFERRAL  - Adult; Outpatient Treatment, Psychiatry; Individual/Couples/Family/Group Therapy/Health Psychology; [facility]: [facility] [phone]; We will contact you to schedule the appointment or please call with any ...    Special screening for malignant neoplasms, colon    - COLOGDARCY(EXACT SCIENCES)    Falls frequently  Will refer for home care for PT since her depression is interfering with her ability to leave her home and get services.   - HOME CARE NURSING REFERRAL    Balance problems  See above.   - HOME CARE NURSING REFERRAL    Left knee pain, unspecified chronicity  See above.   - HOME CARE NURSING REFERRAL      46 minutes spent on the  "date of the encounter doing chart review, patient visit and documentation        BMI:   Estimated body mass index is 42.51 kg/m  as calculated from the following:    Height as of 2/17/21: 1.6 m (5' 3\").    Weight as of 2/17/21: 108.9 kg (240 lb).       Depression Screening Follow Up    PHQ 3/18/2021   PHQ-9 Total Score 22   Q9: Thoughts of better off dead/self-harm past 2 weeks Several days       Shayy Rivas NP  [facility]    Vic Adhikari is a 75 year old who presents for the following health issues     HPI   Referral or options to get back into therapy  She feels that her food addiction is out of control.  She thinks she has gained 15# in the past month.  She had gastric bypass in 2003.  She thinks she has regained 30# since her gastric bypass.  Was in therapy in [place].  Her depression has been worse.  She went off Wellbutrin a month ago due to falls, tremor and nightmares.  She does feel her depression has been worse since going off of this medication, but she doesn't want to go back on.   She admits that has only been taking one capsule of Venlaxafine for the past month, to see if this helps with her nightmares, which it has.  She had been prescribed to take Effexor BID when she was in [place].     She has been having difficulty sleeping at night until the past week, now is sleeping 16 hours a day.  She did have her COVID vaccine a week ago.    She tends to get more anxious at night, especially the longer it takes her to fall asleep.    She has had some episodes of nocturnal enuresis.  She will wake up completely soaked, which is new for her (just since she got the vaccine).  She has a history of chronic UTIs, but has never had this as a symptom.  She denies any other symptoms.  She is scheduled for a UC on Monday through her urologist.    PHQ 2/25/2021 3/18/2021   PHQ-9 Total Score 8 22   Q9: Thoughts of better off dead/self-harm past 2 weeks Not at all Several " days     NELLIE-7 SCORE 2/25/2021 3/18/2021   Total Score 3 6         Cologuard discussed, Patient will do. No family history of colon cancer     Mammogram done some time around 2/2020 at [facility] (sent to abstraction)    Diabetic eye exam [facility] eye clinic (sent to abstraction)     MA went over fall risk assessment. Patient reports she has fallen more than 7 times within the past 12 months. (Added fall prevention to patient instructions)           Review of Systems         Objective           Vitals:  No vitals were obtained today due to virtual visit.    Physical Exam   healthy, alert and no distress  PSYCH: Alert and oriented times 3; coherent speech, normal   rate and volume, able to articulate logical thoughts, able   to abstract reason, no tangential thoughts, no hallucinations   or delusions  Her affect is normal  RESP: No cough, no audible wheezing, able to talk in full sentences  Remainder of exam unable to be completed due to telephone visits                Phone call duration: 36 minutes

## 2021-03-18 NOTE — PATIENT INSTRUCTIONS
My Depression Action Plan  Name: Zeynep Martinez   Date of Birth 1945  Date: 3/18/2021    My doctor: Shayy Rivas   My clinic: [facility] [address] [place] [address]  [phone]          GREEN    ZONE   Good Control    What it looks like:     Things are going generally well. You have normal ups and downs. You may even feel depressed from time to time, but bad moods usually last less than a day.   What you need to do:  1. Continue to care for yourself (see self care plan)  2. Check your depression survival kit and update it as needed  3. Follow your physician s recommendations including any medication.  4. Do not stop taking medication unless you consult with your physician first.           YELLOW         ZONE Getting Worse    What it looks like:     Depression is starting to interfere with your life.     It may be hard to get out of bed; you may be starting to isolate yourself from others.    Symptoms of depression are starting to last most all day and this has happened for several days.     You may have suicidal thoughts but they are not constant.   What you need to do:     1. Call your care team. Your response to treatment will improve if you keep your care team informed of your progress. Yellow periods are signs an adjustment may need to be made.     2. Continue your self-care.  Just get dressed and ready for the day.  Don't give yourself time to talk yourself out of it.    3. Talk to someone in your support network.    4. Open up your Depression Self-Care Plan/Wellness Kit.           RED    ZONE Medical Alert - Get Help    What it looks like:     Depression is seriously interfering with your life.     You may experience these or other symptoms: You can t get out of bed most days, can t work or engage in other necessary activities, you have trouble taking care of basic hygiene, or basic responsibilities, thoughts of suicide or death that will not go  away, self-injurious behavior.     What you need to do:  1. Call your care team and request a same-day appointment. If they are not available (weekends or after hours) call your local crisis line, emergency room or 911.          Depression Self-Care Plan / Wellness Kit    Many people find that medication and therapy are helpful treatments for managing depression. In addition, making small changes to your everyday life can help to boost your mood and improve your wellbeing. Below are some tips for you to consider. Be sure to talk with your medical provider and/or behavioral health consultant if your symptoms are worsening or not improving.     Sleep   Sleep hygiene  means all of the habits that support good, restful sleep. It includes maintaining a consistent bedtime and wake time, using your bedroom only for sleeping or sex, and keeping the bedroom dark and free of distractions like a computer, smartphone, or television.     Develop a Healthy Routine  Maintain good hygiene. Get out of bed in the morning, make your bed, brush your teeth, take a shower, and get dressed. Don t spend too much time viewing media that makes you feel stressed. Find time to relax each day.    Exercise  Get some form of exercise every day. This will help reduce pain and release endorphins, the  feel good  chemicals in your brain. It can be as simple as just going for a walk or doing some gardening, anything that will get you moving.      Diet  Strive to eat healthy foods, including fruits and vegetables. Drink plenty of water. Avoid excessive sugar, caffeine, alcohol, and other mood-altering substances.     Stay Connected with Others  Stay in touch with friends and family members.    Manage Your Mood  Try deep breathing, massage therapy, biofeedback, or meditation. Take part in fun activities when you can. Try to find something to smile about each day.     Psychotherapy  Be open to working with a therapist if your provider recommends it.      Medication  Be sure to take your medication as prescribed. Most anti-depressants need to be taken every day. It usually takes several weeks for medications to work. Not all medicines work for all people. It is important to follow-up with your provider to make sure you have a treatment plan that is working for you. Do not stop your medication abruptly without first discussing it with your provider.    Crisis Resources   These hotlines are for both adults and children. They and are open 24 hours a day, 7 days a week unless noted otherwise.      National Suicide Prevention Lifeline   7-199-495-TALK (4074)      Crisis Text Line    www.crisistextline.org  Text HOME to 837270 from anywhere in the [place], anytime, about any type of crisis. A live, trained crisis counselor will receive the text and respond quickly.      [facility] Lifeline for LGBTQ Youth  A national crisis intervention and suicide lifeline for LGBTQ youth under 25. Provides a safe place to talk without judgement. Call 1-695.512.8332; text START to 388197 or visit www.theCloudDockvorproject.org to talk to a trained counselor.      For [facility] crisis numbers, visit the [facility] website at:  https://[place].gov/dhs/people-we-serve/adults/health-care/mental-health/resources/crisis-contacts.jsp                      At your visit today, we discussed your risk for falls and preventive options.    Fall Prevention  Falls often occur due to slipping, tripping or losing your balance. Millions of people fall every year and injure themselves. Here are ways to reduce your risk of falling again.     Think about your fall, was there anything that caused your fall that can be fixed, removed, or replaced?    Make your home safe by keeping walkways clear of objects you may trip over, such as electric cords.    Use non-slip pads under rugs. Don't use area rugs or small throw rugs.    Use non-slip mats in bathtubs and showers.    Install handrails and lights on staircases. The  handrails should be on both sides of the stairs.    Don't walk in poorly lit areas.    Don't stand on chairs or wobbly ladders.    Use caution when reaching overhead or looking upward. This position can cause a loss of balance.    Be sure your shoes fit properly, have non-slip bottoms and are in good condition.     Wear shoes both inside and out. Don't go barefoot or wear slippers.    Be cautious when going up and down stairs, curbs, and when walking on uneven sidewalks.    If your balance is poor, consider using a cane or walker.    If your fall was related to alcohol use, stop or limit alcohol intake.     If your fall was related to use of sleeping medicines, talk to your healthcare provider about this. You may need to reduce your dosage at bedtime if you awaken during the night to go to the bathroom.      To reduce the need for nighttime bathroom trips:  ? Don't drink fluids for several hours before going to bed  ? Empty your bladder before going to bed  ? Men can keep a urinal at the bedside    Stay as active as you can. Balance, flexibility, strength, and endurance all come from exercise. They all play a role in preventing falls. Ask your healthcare provider which types of activity are right for you.    Get your vision checked on a regular basis.    If you have pets, know where they are before you stand up or walk so you don't trip over them.    Use night lights.    Go over all your medicines with a pharmacist or other healthcare provider to see if any of them could make you more likely to fall.  [employer] last reviewed this educational content on 4/1/2018 2000-2020 [employer]. All rights reserved. This information is not intended as a substitute for professional medical care. Always follow your healthcare professional's instructions.

## 2021-03-18 NOTE — Clinical Note
Please abstract the following data from this visit with this patient into the appropriate field in Epic:    Tests that can be patient reported without a hard copy:    Mammogram done on this date: Mammogram done some time around 2/2020 at [facility]. Results were normal    Other Tests found in the patient's chart through Chart Review/Care Everywhere:    Eye exam with ophthalmology on this date: Diabetic eye exam [facility] eye clinic     Note to Abstraction: If this section is blank, no results were found via Chart Review/Care Everywhere.      Kerline Miguel MA

## 2021-03-19 ASSESSMENT — ANXIETY QUESTIONNAIRES: GAD7 TOTAL SCORE: 6

## 2021-03-21 ENCOUNTER — MYC MEDICAL ADVICE (OUTPATIENT)
Dept: FAMILY MEDICINE | Facility: CLINIC | Age: 76
End: 2021-03-21

## 2021-03-22 ENCOUNTER — OFFICE VISIT (OUTPATIENT)
Dept: FAMILY MEDICINE | Facility: CLINIC | Age: 76
End: 2021-03-22
Payer: COMMERCIAL

## 2021-03-22 VITALS
HEART RATE: 106 BPM | DIASTOLIC BLOOD PRESSURE: 88 MMHG | WEIGHT: 259 LBS | BODY MASS INDEX: 45.88 KG/M2 | TEMPERATURE: 98.8 F | SYSTOLIC BLOOD PRESSURE: 153 MMHG | OXYGEN SATURATION: 97 % | RESPIRATION RATE: 20 BRPM

## 2021-03-22 DIAGNOSIS — R60.0 PERIPHERAL EDEMA: Primary | ICD-10-CM

## 2021-03-22 DIAGNOSIS — F41.9 ANXIETY: ICD-10-CM

## 2021-03-22 DIAGNOSIS — N39.0 RECURRENT UTI: ICD-10-CM

## 2021-03-22 LAB
ERYTHROCYTE [DISTWIDTH] IN BLOOD BY AUTOMATED COUNT: 14 % (ref 10–15)
HCT VFR BLD AUTO: 37.1 % (ref 35–47)
HGB BLD-MCNC: 12.3 G/DL (ref 11.7–15.7)
MCH RBC QN AUTO: 28.7 PG (ref 26.5–33)
MCHC RBC AUTO-ENTMCNC: 33.2 G/DL (ref 31.5–36.5)
MCV RBC AUTO: 87 FL (ref 78–100)
NT-PROBNP SERPL-MCNC: 380 PG/ML (ref 0–125)
PLATELET # BLD AUTO: 379 10E9/L (ref 150–450)
RBC # BLD AUTO: 4.28 10E12/L (ref 3.8–5.2)
WBC # BLD AUTO: 12.6 10E9/L (ref 4–11)

## 2021-03-22 PROCEDURE — 85027 COMPLETE CBC AUTOMATED: CPT | Performed by: NURSE PRACTITIONER

## 2021-03-22 PROCEDURE — 87088 URINE BACTERIA CULTURE: CPT | Performed by: NURSE PRACTITIONER

## 2021-03-22 PROCEDURE — 84443 ASSAY THYROID STIM HORMONE: CPT | Performed by: NURSE PRACTITIONER

## 2021-03-22 PROCEDURE — 87186 SC STD MICRODIL/AGAR DIL: CPT | Performed by: NURSE PRACTITIONER

## 2021-03-22 PROCEDURE — 80053 COMPREHEN METABOLIC PANEL: CPT | Performed by: NURSE PRACTITIONER

## 2021-03-22 PROCEDURE — 83880 ASSAY OF NATRIURETIC PEPTIDE: CPT | Performed by: NURSE PRACTITIONER

## 2021-03-22 PROCEDURE — 87086 URINE CULTURE/COLONY COUNT: CPT | Performed by: NURSE PRACTITIONER

## 2021-03-22 PROCEDURE — 99214 OFFICE O/P EST MOD 30 MIN: CPT | Performed by: NURSE PRACTITIONER

## 2021-03-22 PROCEDURE — 36415 COLL VENOUS BLD VENIPUNCTURE: CPT | Performed by: NURSE PRACTITIONER

## 2021-03-22 RX ORDER — ALPRAZOLAM 0.5 MG
0.5 TABLET ORAL 3 TIMES DAILY PRN
Qty: 30 TABLET | Refills: 0 | Status: ON HOLD | OUTPATIENT
Start: 2021-03-22 | End: 2021-07-04

## 2021-03-22 RX ORDER — FUROSEMIDE 20 MG
20 TABLET ORAL DAILY
Qty: 30 TABLET | Refills: 0 | Status: SHIPPED | OUTPATIENT
Start: 2021-03-22 | End: 2021-05-10

## 2021-03-22 NOTE — PROGRESS NOTES
"    Assessment & Plan     Peripheral edema  Differential includes HF, hypothyroidism, renal and hepatic dysfunction, anemia  Will treat with 7  Days of Lasix.  Discussed the use and indication of this medication as well as potential side effects.   - BNP-N terminal pro  - TSH with free T4 reflex  - Comprehensive metabolic panel (BMP + Alb, Alk Phos, ALT, AST, Total. Bili, TP)  - CBC with platelets  - furosemide (LASIX) 20 MG tablet; Take 1 tablet (20 mg) by mouth daily    Anxiety  Refill given.  She is scheduled to see a therapist this week.   - ALPRAZolam (XANAX) 0.5 MG tablet; Take 1 tablet (0.5 mg) by mouth 3 times daily as needed for anxiety    Recurrent UTI  Lab ordered by urology.   - Urine Culture Aerobic Bacterial      35 minutes spent on the date of the encounter doing patient visit and documentation        BMI:   Estimated body mass index is 45.88 kg/m  as calculated from the following:    Height as of 2/17/21: 1.6 m (5' 3\").    Weight as of this encounter: 117.5 kg (259 lb).   Weight management plan: Discussed healthy diet and exercise guidelines        No follow-ups on file.    Shayy Rivas NP  [facility] WYATT Adhikari is a 75 year old who presents for the following health issues     HPI       Musculoskeletal problem/pain  Onset/Duration: Since Friday 3/19  3/11/2021 VIOLETA Covid-vaccine. Sick for 5 days after getting Covid-19 vaccine   Description  Location: Friday night feet started hurting   Saturday morning feet were so swollen pt could not get shoes on.  Hands started burning as well. Went out with daughter and ran some errands Saturday  Came home hands were swollen; could not make a fist   Joint Swelling: YES- Knee pain, hand pain, neck pain   Right thigh pain since last night   Redness: YES- left side, front lower ankle  Pain: YES  Intensity:  severe  Progression of Symptoms:  Improving.   Accompanying signs and symptoms:   Fevers: " "no  Numbness/tingling/weakness: YES  History  Trauma to the area: no  Recent illness:  no  Previous similar problem: no  Previous evaluation:  no  Therapies tried and outcome: elevated feet on Sunday.  tiger balm on joints that hurt  Lotion applied to feet, soaked feet w/sea salt    Both feet were swollen, unable to get shoes on.  Fingers were swollen on both hands, difficult to make a fist.   She is continuing to gain weight.   She denies any shortness of breath.      Blood sugars have been controlled.    Anxiety levels is \"through the roof\".  She has an appointment with a therapist this week.            Review of Systems         Objective    BP (!) 164/80   Pulse 106   Temp 98.8  F (37.1  C) (Tympanic)   Resp 20   Wt 117.5 kg (259 lb)   SpO2 97%   BMI 45.88 kg/m    Body mass index is 45.88 kg/m .  Physical Exam   GENERAL: healthy, alert and no distress  NECK: no adenopathy, no asymmetry, masses, or scars and thyroid normal to palpation  RESP: lungs clear to auscultation - no rales, rhonchi or wheezes  CV: regular rate and rhythm, normal S1 S2, no S3 or S4, no murmur, click or rub, no peripheral edema and peripheral pulses strong  MS: generalized edema of hands, feet and ankles bilaterally  SKIN: no suspicious lesions or rashes  PSYCH: mentation appears normal, affect normal/bright                  "

## 2021-03-22 NOTE — TELEPHONE ENCOUNTER
Attempted to reach, unable. Left message  to call back.  Make her appt at 2:20 to see Daisy.    See note to her    (she does have 3:30 appt in lab today as well)    Naima Bravo RN

## 2021-03-23 LAB
ALBUMIN SERPL-MCNC: 3.5 G/DL (ref 3.4–5)
ALP SERPL-CCNC: 77 U/L (ref 40–150)
ALT SERPL W P-5'-P-CCNC: 19 U/L (ref 0–50)
ANION GAP SERPL CALCULATED.3IONS-SCNC: 7 MMOL/L (ref 3–14)
AST SERPL W P-5'-P-CCNC: 16 U/L (ref 0–45)
BILIRUB SERPL-MCNC: 0.3 MG/DL (ref 0.2–1.3)
BUN SERPL-MCNC: 19 MG/DL (ref 7–30)
CALCIUM SERPL-MCNC: 8.3 MG/DL (ref 8.5–10.1)
CHLORIDE SERPL-SCNC: 106 MMOL/L (ref 94–109)
CO2 SERPL-SCNC: 25 MMOL/L (ref 20–32)
CREAT SERPL-MCNC: 0.95 MG/DL (ref 0.52–1.04)
GFR SERPL CREATININE-BSD FRML MDRD: 59 ML/MIN/{1.73_M2}
GLUCOSE SERPL-MCNC: 126 MG/DL (ref 70–99)
POTASSIUM SERPL-SCNC: 4.9 MMOL/L (ref 3.4–5.3)
PROT SERPL-MCNC: 7.5 G/DL (ref 6.8–8.8)
SODIUM SERPL-SCNC: 138 MMOL/L (ref 133–144)
TSH SERPL DL<=0.005 MIU/L-ACNC: 2.88 MU/L (ref 0.4–4)

## 2021-03-26 ENCOUNTER — COMMUNICATION - HEALTHEAST (OUTPATIENT)
Dept: UROLOGY | Facility: CLINIC | Age: 76
End: 2021-03-26

## 2021-03-26 ENCOUNTER — TELEPHONE (OUTPATIENT)
Dept: FAMILY MEDICINE | Facility: CLINIC | Age: 76
End: 2021-03-26

## 2021-03-26 DIAGNOSIS — N39.0 URINARY TRACT INFECTION WITHOUT HEMATURIA, SITE UNSPECIFIED: ICD-10-CM

## 2021-03-26 NOTE — TELEPHONE ENCOUNTER
[facility] calling for lab results on UA dropped off 3/22/21  Faxed copy of preliminary results [facility] to [facility] at [phone].  Leatha Clemons RN  [facility]

## 2021-03-27 LAB
BACTERIA SPEC CULT: ABNORMAL
Lab: ABNORMAL
SPECIMEN SOURCE: ABNORMAL

## 2021-04-07 ENCOUNTER — MEDICAL CORRESPONDENCE (OUTPATIENT)
Dept: HEALTH INFORMATION MANAGEMENT | Facility: CLINIC | Age: 76
End: 2021-04-07

## 2021-04-08 ENCOUNTER — TELEPHONE (OUTPATIENT)
Dept: FAMILY MEDICINE | Facility: CLINIC | Age: 76
End: 2021-04-08

## 2021-04-08 NOTE — TELEPHONE ENCOUNTER
Home health care forms have been placed in pcp forms folder     Marianne Breaux CMA on 4/8/2021 at 10:26 AM

## 2021-04-09 DIAGNOSIS — Z53.9 DIAGNOSIS NOT YET DEFINED: Primary | ICD-10-CM

## 2021-04-09 PROCEDURE — G0180 MD CERTIFICATION HHA PATIENT: HCPCS | Performed by: NURSE PRACTITIONER

## 2021-04-13 DIAGNOSIS — Z53.9 DIAGNOSIS NOT YET DEFINED: Primary | ICD-10-CM

## 2021-04-13 PROCEDURE — G0180 MD CERTIFICATION HHA PATIENT: HCPCS | Performed by: NURSE PRACTITIONER

## 2021-04-19 NOTE — TELEPHONE ENCOUNTER
Forms faxed and transfer to Shayy Rivas 's faxed folder.    Diana Perez MA on 4/19/2021 at 11:06 AM

## 2021-04-30 ENCOUNTER — COMMUNICATION - HEALTHEAST (OUTPATIENT)
Dept: UROLOGY | Facility: CLINIC | Age: 76
End: 2021-04-30

## 2021-05-06 ENCOUNTER — MYC MEDICAL ADVICE (OUTPATIENT)
Dept: FAMILY MEDICINE | Facility: CLINIC | Age: 76
End: 2021-05-06

## 2021-05-06 ENCOUNTER — AMBULATORY - HEALTHEAST (OUTPATIENT)
Dept: LAB | Facility: CLINIC | Age: 76
End: 2021-05-06

## 2021-05-06 DIAGNOSIS — N39.0 URINARY TRACT INFECTION WITHOUT HEMATURIA, SITE UNSPECIFIED: ICD-10-CM

## 2021-05-06 NOTE — TELEPHONE ENCOUNTER
Writer responded via [employer].    MIRNA LanN, RN  [facility]

## 2021-05-06 NOTE — TELEPHONE ENCOUNTER
Writer responded via [employer].    MIRNA LanN, RN  [facility]

## 2021-05-08 LAB — BACTERIA SPEC CULT: ABNORMAL

## 2021-05-09 ENCOUNTER — HEALTH MAINTENANCE LETTER (OUTPATIENT)
Age: 76
End: 2021-05-09

## 2021-05-10 ENCOUNTER — VIRTUAL VISIT (OUTPATIENT)
Dept: FAMILY MEDICINE | Facility: CLINIC | Age: 76
End: 2021-05-10
Payer: COMMERCIAL

## 2021-05-10 DIAGNOSIS — E11.42 DIABETIC POLYNEUROPATHY ASSOCIATED WITH TYPE 2 DIABETES MELLITUS (H): ICD-10-CM

## 2021-05-10 DIAGNOSIS — F33.1 MODERATE EPISODE OF RECURRENT MAJOR DEPRESSIVE DISORDER (H): ICD-10-CM

## 2021-05-10 PROCEDURE — 99214 OFFICE O/P EST MOD 30 MIN: CPT | Mod: 95 | Performed by: NURSE PRACTITIONER

## 2021-05-10 RX ORDER — VENLAFAXINE HYDROCHLORIDE 75 MG/1
75 CAPSULE, EXTENDED RELEASE ORAL DAILY
Qty: 90 CAPSULE | Refills: 1 | Status: SHIPPED | OUTPATIENT
Start: 2021-05-10 | End: 2021-07-09

## 2021-05-10 ASSESSMENT — ANXIETY QUESTIONNAIRES
6. BECOMING EASILY ANNOYED OR IRRITABLE: NEARLY EVERY DAY
2. NOT BEING ABLE TO STOP OR CONTROL WORRYING: NEARLY EVERY DAY
1. FEELING NERVOUS, ANXIOUS, OR ON EDGE: MORE THAN HALF THE DAYS
3. WORRYING TOO MUCH ABOUT DIFFERENT THINGS: NEARLY EVERY DAY
GAD7 TOTAL SCORE: 14
7. FEELING AFRAID AS IF SOMETHING AWFUL MIGHT HAPPEN: NOT AT ALL
5. BEING SO RESTLESS THAT IT IS HARD TO SIT STILL: NOT AT ALL
IF YOU CHECKED OFF ANY PROBLEMS ON THIS QUESTIONNAIRE, HOW DIFFICULT HAVE THESE PROBLEMS MADE IT FOR YOU TO DO YOUR WORK, TAKE CARE OF THINGS AT HOME, OR GET ALONG WITH OTHER PEOPLE: SOMEWHAT DIFFICULT

## 2021-05-10 ASSESSMENT — PATIENT HEALTH QUESTIONNAIRE - PHQ9
5. POOR APPETITE OR OVEREATING: NEARLY EVERY DAY
SUM OF ALL RESPONSES TO PHQ QUESTIONS 1-9: 21

## 2021-05-10 NOTE — PROGRESS NOTES
Zeynep is a 76 year old who is being evaluated via a billable telephone visit.      What phone number would you like to be contacted at? [phone]  How would you like to obtain your AVS? MyChart    Assessment & Plan     Moderate episode of recurrent major depressive disorder (H)  Worsening  Will increase Venlafaxine to 225 mg and refer to psychiatry.  Follow up in one month.   - venlafaxine (EFFEXOR-XR) 75 MG 24 hr capsule; Take 1 capsule (75 mg) by mouth daily Take in addition to 150 mg  - MENTAL HEALTH REFERRAL  - Adult; Psychiatry; Psychiatry; [facility]: Collaborative Care Psychiatry Service/Bridge to Long-Term Psychiatry as indicated ([phone]); Yes; Chronic Mental Health without improvement; Yes; We will contact you to schedule ...    Diabetic polyneuropathy associated with type 2 diabetes mellitus (H)  Disability parking permit form completed.           No follow-ups on file.    Shayy Rivas NP  [facility]   Zeynep is a 76 year old who presents for the following health issues     HPI     GAD7 and PHQ9 complete.    Depression and Anxiety Follow-Up    How are you doing with your depression since your last visit? Worsened     How are you doing with your anxiety since your last visit?  Worsened     Are you having other symptoms that might be associated with depression or anxiety? No    Have you had a significant life event? No     Do you have any concerns with your use of alcohol or other drugs? No    Social History     Tobacco Use     Smoking status: Former Smoker     Quit date: 1970     Years since quittin.3     Smokeless tobacco: Never Used   Substance Use Topics     Alcohol use: Yes     Comment: once per month      Drug use: Never     PHQ 2021 3/18/2021   PHQ-9 Total Score 8 22   Q9: Thoughts of better off dead/self-harm past 2 weeks Not at all Several days     NELLIE-7 SCORE 2021 3/18/2021   Total Score 3 6         Suicide Assessment  Five-step Evaluation and Treatment (SAFE-T)      How many servings of fruits and vegetables do you eat daily?  2-3    On average, how many sweetened beverages do you drink each day (Examples: soda, juice, sweet tea, etc.  Do NOT count diet or artificially sweetened beverages)?   0    How many days per week do you exercise enough to make your heart beat faster? 3 or less    How many minutes a day do you exercise enough to make your heart beat faster? 9 or less    How many days per week do you miss taking your medication? 0    Her depression has continued to be bad, worse over the past week.  She is seeing a therapist.   Her dose of Effexor was increased to 150 mg of the XR back in March.       She does need a handicap permit.  She has diabetic neuropathy and uses a walker.     Review of Systems         Objective           Vitals:  No vitals were obtained today due to virtual visit.    Physical Exam   healthy, alert and no distress  PSYCH: Alert and oriented times 3; coherent speech, normal   rate and volume, able to articulate logical thoughts, able   to abstract reason, no tangential thoughts, no hallucinations   or delusions  Her affect is normal  RESP: No cough, no audible wheezing, able to talk in full sentences  Remainder of exam unable to be completed due to telephone visits                Phone call duration: 14 minutes

## 2021-05-11 ENCOUNTER — AMBULATORY - HEALTHEAST (OUTPATIENT)
Dept: UROLOGY | Facility: CLINIC | Age: 76
End: 2021-05-11

## 2021-05-11 ENCOUNTER — COMMUNICATION - HEALTHEAST (OUTPATIENT)
Dept: UROLOGY | Facility: CLINIC | Age: 76
End: 2021-05-11

## 2021-05-11 ENCOUNTER — OFFICE VISIT (OUTPATIENT)
Dept: ENDOCRINOLOGY | Facility: CLINIC | Age: 76
End: 2021-05-11
Payer: COMMERCIAL

## 2021-05-11 ENCOUNTER — TELEPHONE (OUTPATIENT)
Dept: ENDOCRINOLOGY | Facility: CLINIC | Age: 76
End: 2021-05-11

## 2021-05-11 VITALS
BODY MASS INDEX: 45.54 KG/M2 | HEART RATE: 85 BPM | TEMPERATURE: 98.3 F | DIASTOLIC BLOOD PRESSURE: 75 MMHG | OXYGEN SATURATION: 96 % | SYSTOLIC BLOOD PRESSURE: 142 MMHG | WEIGHT: 257 LBS | HEIGHT: 63 IN

## 2021-05-11 DIAGNOSIS — N39.0 URINARY TRACT INFECTION WITH HEMATURIA, SITE UNSPECIFIED: ICD-10-CM

## 2021-05-11 DIAGNOSIS — E11.9 TYPE 2 DIABETES MELLITUS WITHOUT COMPLICATION, WITHOUT LONG-TERM CURRENT USE OF INSULIN (H): ICD-10-CM

## 2021-05-11 DIAGNOSIS — R31.9 URINARY TRACT INFECTION WITH HEMATURIA, SITE UNSPECIFIED: ICD-10-CM

## 2021-05-11 DIAGNOSIS — E11.42 DIABETIC POLYNEUROPATHY ASSOCIATED WITH TYPE 2 DIABETES MELLITUS (H): ICD-10-CM

## 2021-05-11 DIAGNOSIS — E66.01 OBESITY, CLASS III, BMI 40-49.9 (MORBID OBESITY) (H): ICD-10-CM

## 2021-05-11 DIAGNOSIS — Z98.84 S/P GASTRIC BYPASS: ICD-10-CM

## 2021-05-11 DIAGNOSIS — N39.0 URINARY TRACT INFECTION WITHOUT HEMATURIA, SITE UNSPECIFIED: ICD-10-CM

## 2021-05-11 DIAGNOSIS — Z98.84 S/P GASTRIC BYPASS: Primary | ICD-10-CM

## 2021-05-11 DIAGNOSIS — K21.9 GASTROESOPHAGEAL REFLUX DISEASE WITHOUT ESOPHAGITIS: ICD-10-CM

## 2021-05-11 DIAGNOSIS — N20.0 CALCULUS OF KIDNEY: ICD-10-CM

## 2021-05-11 LAB
ANION GAP SERPL CALCULATED.3IONS-SCNC: 7 MMOL/L (ref 3–14)
BUN SERPL-MCNC: 17 MG/DL (ref 7–30)
CALCIUM SERPL-MCNC: 8.8 MG/DL (ref 8.5–10.1)
CHLORIDE SERPL-SCNC: 108 MMOL/L (ref 94–109)
CO2 SERPL-SCNC: 25 MMOL/L (ref 20–32)
CREAT SERPL-MCNC: 0.85 MG/DL (ref 0.52–1.04)
FERRITIN SERPL-MCNC: 23 NG/ML (ref 8–252)
GFR SERPL CREATININE-BSD FRML MDRD: 67 ML/MIN/{1.73_M2}
GLUCOSE SERPL-MCNC: 178 MG/DL (ref 70–99)
HBA1C MFR BLD: 7.3 % (ref 0–5.6)
POTASSIUM SERPL-SCNC: 5.2 MMOL/L (ref 3.4–5.3)
PTH-INTACT SERPL-MCNC: 52 PG/ML (ref 18–80)
SODIUM SERPL-SCNC: 140 MMOL/L (ref 133–144)
VIT B12 SERPL-MCNC: 727 PG/ML (ref 193–986)

## 2021-05-11 PROCEDURE — 83970 ASSAY OF PARATHORMONE: CPT | Mod: 90 | Performed by: PATHOLOGY

## 2021-05-11 PROCEDURE — 84590 ASSAY OF VITAMIN A: CPT | Mod: 90 | Performed by: PATHOLOGY

## 2021-05-11 PROCEDURE — 82607 VITAMIN B-12: CPT | Performed by: PATHOLOGY

## 2021-05-11 PROCEDURE — 82728 ASSAY OF FERRITIN: CPT | Performed by: PATHOLOGY

## 2021-05-11 PROCEDURE — 82306 VITAMIN D 25 HYDROXY: CPT | Mod: 90 | Performed by: PATHOLOGY

## 2021-05-11 PROCEDURE — 80048 BASIC METABOLIC PNL TOTAL CA: CPT | Performed by: PATHOLOGY

## 2021-05-11 PROCEDURE — 36415 COLL VENOUS BLD VENIPUNCTURE: CPT | Performed by: PATHOLOGY

## 2021-05-11 PROCEDURE — 83036 HEMOGLOBIN GLYCOSYLATED A1C: CPT | Performed by: PATHOLOGY

## 2021-05-11 PROCEDURE — 99205 OFFICE O/P NEW HI 60 MIN: CPT | Performed by: NURSE PRACTITIONER

## 2021-05-11 ASSESSMENT — PAIN SCALES - GENERAL: PAINLEVEL: NO PAIN (0)

## 2021-05-11 ASSESSMENT — MIFFLIN-ST. JEOR: SCORE: 1634.87

## 2021-05-11 ASSESSMENT — ANXIETY QUESTIONNAIRES: GAD7 TOTAL SCORE: 14

## 2021-05-11 NOTE — ASSESSMENT & PLAN NOTE
Well controlled with protonix daily. Does have hx of ulcer seen on EGD in 2012. No current symptoms. No signs of active bleeding.

## 2021-05-11 NOTE — ASSESSMENT & PLAN NOTE
A1C 6.2 1/2021 with metformin and glipizide. Glipizide does have some weight gaining properties so I would recommending stopping this. Would like to try ozempic in addition to the metformin to help with DMII in addition to earlier satiety and weight loss.

## 2021-05-11 NOTE — TELEPHONE ENCOUNTER
Called and left message for patient to check if she was on her way to the clinic for her in person appointment with Linsey Gay. No answer, left direct call back number.

## 2021-05-11 NOTE — LETTER
"2021       RE: Zeynep Martinez  [address]  [place] [address]     Dear Colleague,    Thank you for referring your patient, Zeynep Martinez, to the [facility] [place] at [facility]. Please see a copy of my visit note below.    New Re-establish Bariatric Surgery Consultation Note    May 11, 2021    RE: Zeynep Martinez  MR#: [id number]  : 1945      Referring provider: No flowsheet data found.    Chief Complaint/Reason for visit: re-establish bariatric care    Dear Shayy Rivas NP (General),    I had the pleasure of seeing your patient, Zeynep Martinez, to re-establish bariatric care. As you know, Zeynep Martinez is 76 year old.  She has a height of 5' 3\", a weight of 257 lbs 0 oz, and calculated Body mass index is 45.53 kg/m .. She has a history of RNY in  at [facility].     Assessment & Plan   Problem List Items Addressed This Visit        Nervous and Auditory    Diabetic polyneuropathy associated with type 2 diabetes mellitus (H)     A1C 6.2 2021 with metformin and glipizide. Glipizide does have some weight gaining properties so I would recommending stopping this. Would like to try ozempic in addition to the metformin to help with DMII in addition to earlier satiety and weight loss.          Relevant Medications    Semaglutide,0.25 or 0.5MG/DOS, 2 MG/1.5ML SOPN       Digestive    Gastroesophageal reflux disease without esophagitis     Well controlled with protonix daily. Does have hx of ulcer seen on EGD in . No current symptoms. No signs of active bleeding.          Obesity, Class III, BMI 40-49.9 (morbid obesity) (H)     Up 27lbs from her lowest weight as an adult. This has occurred with isolation and depression during covid along with a move to [place]. She notes she has been struggling with weight regain for years since surgery. She would ideally like to be at 160 but hasn't been " there in 53 years.     Describes binge episodes triggered by stress and depression where she'll eat very large portions of whatever she can find. These are weekly but not daily. No specific time of day. Not associated with excessive hunger. But, feels like if she couldn't over eat, she could manage them better. Was evaluated by [facility] but the wait for treatment was long. Now, she is working on CBT with a therapist for her depression.     Disorganized eating patterns. Rarely hungry. Will sometimes not eat most of the day and other days will eat portions of her meal every 2 hours all day.     Recent bump in GFR and protein in urine, watching kidney function. Hx of kidney stones. Would like to avoid topiramate. Would like to try ozempic instead of glipizide to help with blood sugars but also restriction.     Discussed high risk and minimal benefits with additional surgery to make stomach smaller again. Patient understand that the recommendation is medical weight management.              Relevant Medications    Semaglutide,0.25 or 0.5MG/DOS, 2 MG/1.5ML SOPN    Other Relevant Orders    Hemoglobin A1c (Completed)    Basic metabolic panel (Completed)    MED THERAPY MANAGE REFERRAL    Ferritin (Completed)    Parathyroid Hormone Intact (Completed)    Vitamin A (Completed)    Vitamin B12 (Completed)    Vitamin D Deficiency (Completed)    S/P gastric bypass - Primary     EGD in 2012 shows dehiscence from pouch to remnant and ulceration. No abdominal pain or reflux when taking PPI. Will get burping and epigastric discomfort without PPI x 4 days. Recommending continuing. Could consider EGD for epigastric pain or reflux in the future.     Diarrhea, not necessarily dumping. Mixed with constipation. Chronically. Also has recurrent UTI. Recommend benefiber to help control constipation.     No hx of hypoglycemia. No recent bariatric labs, will check today     Recommend RD follow up as well.   Bariatric labs ordered today.     "      Relevant Medications    Semaglutide,0.25 or 0.5MG/DOS, 2 MG/1.5ML SOPN    Other Relevant Orders    Ferritin (Completed)    Parathyroid Hormone Intact (Completed)    Vitamin A (Completed)    Vitamin B12 (Completed)    Vitamin D Deficiency (Completed)       Endocrine    RESOLVED: Type 2 diabetes mellitus without complication, without long-term current use of insulin (H)    Relevant Medications    Semaglutide,0.25 or 0.5MG/DOS, 2 MG/1.5ML SOPN    Other Relevant Orders    Hemoglobin A1c (Completed)    Basic metabolic panel (Completed)    MED THERAPY MANAGE REFERRAL           Review of external notes as documented above     61 minutes spent on the date of the encounter doing chart review, history and exam, documentation and further activities per the note    Zeynep HARRISNO Martinez is a 76 year old female who presents to clinic today for the following health issues       HISTORY OF PRESENT ILLNESS:  Weight Loss History Reviewed with Patient 5/6/2021   How long have you been overweight? Since late teens through early 20's   What is the most that you have ever weighed? 363   What is the most weight you have lost? 125   I have tried the following methods to lose weight Watching portions or calories, Weight Watchers, Atkins type diet (low carb/high protein), Pre packaged meals ex: Nutrisystem, OTC Medications, Weight Loss Surgery   I have tried the following weight loss medications? (Check all that apply) None   Have you ever had weight loss surgery? Yes   Please select the type of weight loss surgery you had (select all that apply): gastric bypass / Poli-en-Y     RNYGB 2003   PCP yesterday- worsening depression, referral to psychiatr. Increased venlafaxine to 225mg     G-G fistula at some point after surgery, uses omeprazole for \"burping\" which returns after stopping medicine 3-4 days     Just moved back to [place] - Daughter is a patient with our program. Has family here but when not with them she feels isolate. Worsening " depression as of recent.     Very little restriction now. Feels like she can eat whatever she wants.     High 363  Low after surgery -   Regain-     Low as an adult 230 18 months ago - Nutrisystem   Would love to be 160lbs but hasn't been that weight in 53 years. Was 125lb in highschool       Wondering if she could have surgery again to make stomach smaller so she has some restriction again     No hx of hypoglycemia. Some hx of constipation. Does get some dumping but not regularly associated with immediately after meals.     Frequent UTI    CO-MORBIDITIES OF OBESITY INCLUDE:     5/6/2021   I have the following health issues associated with obesity: Type II Diabetes, High Cholesterol, Stress Incontinence, Cancer     DMII- metformin 1000mg twice daily and glipizide   A1C 6.2 1/26/2021 - monitoring kidney function given lower GFR and protein in urine.     PAST MEDICAL HISTORY:  Past Medical History:   Diagnosis Date     Calculus of kidney     multiple, uric acid and calcium     Frequent UTI      Hyperlipidemia LDL goal <100      Type 2 diabetes mellitus without complication, without long-term current use of insulin (H)        PAST SURGICAL HISTORY:  Past Surgical History:   Procedure Laterality Date     ABDOMINOPLASTY       APPENDECTOMY       EXTRACORPOREAL SHOCK WAVE LITHOTRIPSY (ESWL)       EXTRACORPOREAL SHOCK WAVE LITHOTRIPSY (ESWL)       galbaldder       GASTRIC BYPASS       HYSTERECTOMY TOTAL ABDOMINAL, BILATERAL SALPINGO-OOPHORECTOMY, COMBINED      no cervix       FAMILY HISTORY:   Family History   Problem Relation Age of Onset     Multiple Sclerosis Mother      Cancer Maternal Grandmother      Diabetes Maternal Grandmother      Hypertension Maternal Grandmother      Multiple Sclerosis Cousin      Multiple Sclerosis Cousin        SOCIAL HISTORY:   Social History Questions Reviewed With Patient 5/6/2021   Which best describes your employment status (select all that apply) I am retired   Which best describes your  marital status:    Do you have children? Yes   Who do you have in your support network that can be available to help you for the first 2 weeks after surgery? Family   Can you afford 3 meals a day?  Yes   Can you afford 50-60 dollars a month for vitamins? Yes       HABITS:     5/6/2021   How often do you drink alcohol? Monthly or less   Have you ever used any of the following nicotine products? Cigarettes   If you previously used any of these products, what year did you quit? 1992   Have you or are you currently using street drugs or prescription strength medication for which you do not have a prescription for? No   Do you have a history of chemical dependency (alcohol or drug abuse)? No       PSYCHOLOGICAL HISTORY:   Psychological History Reviewed With Patient 5/6/2021   Have you ever attempted suicide? Never.   Have you had thoughts of suicide in the past year? Yes   Have you ever been hospitalized for mental illness or a suicide attempt? Never.   Do you have a history of chronic pain? Yes   Have you ever been diagnosed with fibromyalgia? No   Are you currently being treated for any of the following? (select all that apply) Depression, Anxiety   Are you currently seeing a therapist or counselor?  Yes   Are you currently seeing a psychiatrist? No     Recently diagnosed with [facility] with [facility] but never started program- long wait   Doing CBT - with a therapist - [facility] - just started 3 weeks ago     Introverted - worn out by constant interaction     ROS:     5/6/2021   Skin:  Skin fold rashes (groin or other folds), Leg swelling   HEENT: Headaches, Dizziness/lightheadedness   Musculoskeletal: Joint Pain, Back pain, Limited mobility, Swelling of legs   Cardiovascular: Shortness of breath with activity   Pulmonary: Shortness of breath with activity, Excessively sleep during the day, Experience morning headaches   Gastrointestinal: Diarrhea, Constipation, Ulcers   Genitourinary: Stress incontinence  (losing urine when coughing, sneezing, etc.)   Hematological: None of the above   Neurological: None of the above   Female only: Post-menopausal       EATING BEHAVIORS:     5/6/2021   Have you or anyone else thought that you had an eating disorder? Yes   If you answered yes to the previous eating disorder question, select the types that apply from this list: Binge Eating   Do you currently binge eat (eat a large amount of food in a short time)? Yes   Are you an emotional eater? Yes   Do you get up to eat after falling asleep? No   10am- breakfast- porkchop/ steak (not breakfast food)   7pm - dinner- fruit     Sometimes will spread meals out so she eats every 2-3 hours. Will have the protein and then the vegetable at the next eating episode.     Never hungry but knows she needs to eat. Something gets triggered and something will sound good and need to eat that.     Binging episodes- when nervous/ depressed - not daily, not at specific times of day     Food has always been comfort zone     Strong cravings and thoughts about food- especially when watching tv, can usually talk herself out of the craving     EXERCISE:     5/6/2021   How often do you exercise? Never   What keeps you from being more active?  Pain, I should be more active but I just have not gotten around to it, Too tired       MEDICATIONS:  Current Outpatient Medications   Medication Sig Dispense Refill     ALPRAZolam (XANAX) 0.5 MG tablet Take 1 tablet (0.5 mg) by mouth 3 times daily as needed for anxiety 30 tablet 0     gabapentin (NEURONTIN) 800 MG tablet Take 1 tablet (800 mg) by mouth 2 times daily 180 tablet PRN     glipiZIDE (GLUCOTROL) 5 MG tablet Take 5 mg by mouth daily        metFORMIN (GLUCOPHAGE) 1000 MG tablet Take 1 tablet (1,000 mg) by mouth 2 times daily (with meals) 180 tablet 3     pantoprazole (PROTONIX) 40 MG EC tablet Take 1 tablet (40 mg) by mouth daily with food 180 tablet PRN     Semaglutide,0.25 or 0.5MG/DOS, 2 MG/1.5ML SOPN  Inject 0.25 mg subcutaneously once weekly for 4 weeks then 0.5 mg once weekly. 1.5 mL 1     venlafaxine (EFFEXOR-XR) 75 MG 24 hr capsule Take 1 capsule (75 mg) by mouth daily Take in addition to 150 mg 90 capsule 1     STATIN NOT PRESCRIBED (INTENTIONAL) Please choose reason not prescribed from choices below. (Patient not taking: Reported on 2/25/2021)         ALLERGIES:  Allergies   Allergen Reactions     Adhesive Tape Rash     Hmg-Coa-R Inhibitors Other (See Comments)     confusion     Penicillins Rash       Office Visit on 03/22/2021   Component Date Value Ref Range Status     N-Terminal Pro Bnp 03/22/2021 380* 0 - 125 pg/mL Final    Comment:    Reference range shown and results flagged as abnormal are for the outpatient,   non acute settings. Establishing a baseline value for each individual patient   is useful for follow-up.  Suggested inpatient cut points for confirming diagnosis of CHF in an acute   setting are:   >450 pg/mL (age 18 to less than 50)   >900 pg/mL (age 50 to less than 75)   >1800 pg/mL (75 yrs and older)  An inpatient or emergency department NT-proPBNP <300 pg/mL effectively rules   out acute CHF, with 99% negative predictive value.        TSH 03/22/2021 2.88  0.40 - 4.00 mU/L Final     Sodium 03/22/2021 138  133 - 144 mmol/L Final     Potassium 03/22/2021 4.9  3.4 - 5.3 mmol/L Final     Chloride 03/22/2021 106  94 - 109 mmol/L Final     Carbon Dioxide 03/22/2021 25  20 - 32 mmol/L Final     Anion Gap 03/22/2021 7  3 - 14 mmol/L Final     Glucose 03/22/2021 126* 70 - 99 mg/dL Final     Urea Nitrogen 03/22/2021 19  7 - 30 mg/dL Final     Creatinine 03/22/2021 0.95  0.52 - 1.04 mg/dL Final     GFR Estimate 03/22/2021 59* >60 mL/min/[1.73_m2] Final    Comment: Non  GFR Calc  Starting 12/18/2018, serum creatinine based estimated GFR (eGFR) will be   calculated using the Chronic Kidney Disease Epidemiology Collaboration   (CKD-EPI) equation.       GFR Estimate If Black 03/22/2021 68   >60 mL/min/[1.73_m2] Final    Comment:  GFR Calc  Starting 12/18/2018, serum creatinine based estimated GFR (eGFR) will be   calculated using the Chronic Kidney Disease Epidemiology Collaboration   (CKD-EPI) equation.       Calcium 03/22/2021 8.3* 8.5 - 10.1 mg/dL Final     Bilirubin Total 03/22/2021 0.3  0.2 - 1.3 mg/dL Final     Albumin 03/22/2021 3.5  3.4 - 5.0 g/dL Final     Protein Total 03/22/2021 7.5  6.8 - 8.8 g/dL Final     Alkaline Phosphatase 03/22/2021 77  40 - 150 U/L Final     ALT 03/22/2021 19  0 - 50 U/L Final     AST 03/22/2021 16  0 - 45 U/L Final     WBC 03/22/2021 12.6* 4.0 - 11.0 10e9/L Final     RBC Count 03/22/2021 4.28  3.8 - 5.2 10e12/L Final     Hemoglobin 03/22/2021 12.3  11.7 - 15.7 g/dL Final     Hematocrit 03/22/2021 37.1  35.0 - 47.0 % Final     MCV 03/22/2021 87  78 - 100 fl Final     MCH 03/22/2021 28.7  26.5 - 33.0 pg Final     MCHC 03/22/2021 33.2  31.5 - 36.5 g/dL Final     RDW 03/22/2021 14.0  10.0 - 15.0 % Final     Platelet Count 03/22/2021 379  150 - 450 10e9/L Final     Specimen Description 03/22/2021 Catheterized Urine   Final     Special Requests 03/22/2021 Specimen received in preservative   Final     Culture Micro 03/22/2021 *  Final                    Value:10,000 to 50,000 colonies/mL  Escherichia coli ESBL  ESBL (extended beta lactamase) producing organisms require contact precautions.       Culture Micro 03/22/2021 *  Final                    Value:<10,000 colonies/mL  Enterococcus faecalis       Culture Micro 03/22/2021 *  Final                    Value:<10,000 colonies/mL  Strain 2  Escherichia coli ESBL  ESBL (extended beta lactamase) producing organisms require contact precautions.       EGD 2012   Impression:          - Normal esophagus.                       - Poli-en-Y gastrojejunostomy. The gastrojejunal                        anastomosis had edema, severe stenosis and ulceration.                       - Poli-en-Y gastrojejunostomy.              "          - Normal examined duodenum. This was biopsied.                       - There was dehiscence in the stomach allowing                        examination of the entire stomach and duodenum                       - The examination was otherwise normal.  Recommendation:      - Await pathology results.                       - Refer to a surgeon at appointment to be scheduled.                       - No aspirin, ibuprofen, naproxen, or other                        non-steroidal anti-inflammatory drugs.                       - Use Prilosec (omeprazole) at 20 mg by mouth twice a                        day.                       - Perform an H. pylori serology today.                       - Do an upper GI series at appointment to be                        scheduled.    PHYSICAL EXAM:  Objective    BP (!) 142/75 (BP Location: Left arm, Patient Position: Chair, Cuff Size: Adult Large)   Pulse 85   Temp 98.3  F (36.8  C)   Ht 1.6 m (5' 3\")   Wt 116.6 kg (257 lb)   SpO2 96%   BMI 45.53 kg/m    BP (!) 142/75 (BP Location: Left arm, Patient Position: Chair, Cuff Size: Adult Large)   Pulse 85   Temp 98.3  F (36.8  C)   Ht 1.6 m (5' 3\")   Wt 116.6 kg (257 lb)   SpO2 96%   BMI 45.53 kg/m    Body mass index is 45.53 kg/m .  Physical Exam   GENERAL: Healthy, alert and no distress  EYES: Eyes grossly normal to inspection.  No discharge or erythema, or obvious scleral/conjunctival abnormalities.  RESP: No audible wheeze, cough, or visible cyanosis.  No visible retractions or increased work of breathing.    SKIN: Visible skin clear. No significant rash, abnormal pigmentation or lesions.  NEURO: Cranial nerves grossly intact.  Mentation and speech appropriate for age.  PSYCH: Mentation appears normal, affect normal/bright, judgement and insight intact, normal speech and appearance well-groomed.        In summary, Zeynep Martinez has Class III obesity with a body mass index of Body mass index is 45.53 kg/m . kg/m2 and " the comorbidities stated above. She has a history of RNY in 2003 and is here to re-establish bariatric care and discuss weight regain and possible revision surgery.    MEDICATIONS:        - Start taking ozempic        - stop glipizide       - Continue other medications without change  CONSULTATION/REFERRAL to [facility] pharmacist (Lauren Bloch) and Dietitian   FUTURE LABS:       - Schedule a non-fasting blood draw - today   FUTURE APPOINTMENTS:       - Follow-up visit in 3 months       - [place]tna in 3-4 weeks       Sincerely,     Linsey Gay NP

## 2021-05-11 NOTE — NURSING NOTE
"Chief Complaint   Patient presents with     New Patient     NRB       Vitals:    05/11/21 1008   BP: (!) 142/75   BP Location: Left arm   Patient Position: Chair   Cuff Size: Adult Large   Pulse: 85   Temp: 98.3  F (36.8  C)   SpO2: 96%   Weight: 116.6 kg (257 lb)   Height: 1.6 m (5' 3\")       Body mass index is 45.53 kg/m .                              " needed assist/raised toilet seat

## 2021-05-11 NOTE — PATIENT INSTRUCTIONS
"It was a pleasure meeting with you today.    Thank you for allowing us the privilege of caring for you. We hope we provided you with the excellent service you deserve.   Please let us know if there is anything else we can do for you so that we can be sure you are leaving completely satisfied with your care experience.    To ensure the quality of our services you may be receiving a patient satisfaction survey from an independent patient satisfaction monitoring company.    The greatest compliment you can give is a \"Likely to Recommend\"      Your visit was with Linsey Gay NP today.     Instructions per today's visit:     MEDICATIONS:        - Start taking ozempic        - stop glipizide       - Continue other medications without change  CONSULTATION/REFERRAL to [facility] pharmacist (Lauren Bloch) and Dietitian   FUTURE LABS:       - Schedule a non-fasting blood draw - today   FUTURE APPOINTMENTS:       - Follow-up visit in 3 months       - [place] in 3-4 weeks       To schedule appointments with our team, please call [phone] option #1    Please call during clinic hours Monday through Friday 8:00a - 4:00p if you have questions or you can contact us via [employer] at anytime.      Nurses: [phone]  Fax: [phone]  Surgery Scheduler: [phone]    Please call the hospital at [phone] to speak with our on call MDs if you have urgent needs after hours, during weekends, or holidays.    **Please note if you need to go to the Emergency Room for any reason in the first 90 days after surgery it is important to go to the [facility] on the [place] on [address] off of the [address] off [address]    *For patients who are currently enrolled in our program and have not yet had weight loss surgery please note*:    You must be at your required goal weight at the time of your Anesthesia clinic visit as well as the day of surgery or your surgery will be canceled. You will not be able to obtain a new surgery " date until you have met your goal weight.    Lab results will be communicated through My Chart or letter (if My Chart not used). Please call the clinic if you have not received communication after 1 week or if you have any questions.?    Main follow-up parameters for all bariatric patients     Patients who have undergone Bariatric surgery:    1. Follow-up interval during the first year: ~1 week, 1 month, 3 month, 6 month,12 months.  Every 6 months until 5 years; and then annually thereafter.  The goal of follow-up sessions is to ensure that food intake behavior (choice of food and eating speed) and exercise/activity level are set appropriately.    2. Yearly lab tests ordered by our clinic.      3. The bariatric team should be aware and potentially evaluate all adverse gastrointestinal symptoms (dysphagia, abdominal pain, nausea, vomiting, diarrhea, heartburn, reflux, etc), which can be a sign of complication.  The bariatric surgeons perform general surgery procedures in addition to bariatric surgery (laparoscopic cholecystectomy, etc.)    4. Inability to tolerate textured food (chicken, steak, fish, eggs, beans) is NOT normal and may need to be evaluated by endoscopy performed by the bariatric surgeon.    _____________________________________________________________________________________________________________________________    Meal Replacement Products:    Here is the link to our new e-store where you can purchase our meal replacement products    [facility] [email or web address]    The one week starter kit is a great way to sample a variety of products and see what works for you.    If you want more information about the product go to: [employer]    Free Shipping for orders over $75     Benefits of meal replacements products:    Portion and calorie control  Improved nutrition  Structured eating  Simplified food choices  Avoid contact with trigger  foods  ______________________________________________________________________________________________________________________________    Healthy Lifestyle Support Group   [facility]  Virtual Support Group Now Available    60 minutes of small group guided discussion, support and resources    Led by Health , Dior Deutsch    For questions, and to receive the invite information, contact Dior at [email or web address]    All our welcome!    One Friday per month, 12:30pm to 1:30pm  SELF COMPASSION: July 31st  CREATING MY WELLNESS VISION: August 28th  MINDFULNESS PRACTICES FOR A CALM BODY & MIND: September 25th  MINDFUL EATING TOOLS: October 30th  HEALTHY& HAPPY HOLIDAYS: November 20th  OPEN FORUM: December 18th  _______________________________________________________________________________________________________________________________    Connections: Bariatric Care support group  Due to the Covid-19 restrictions, we will be doing our support group virtually using [employer] Teams. You will need to get an invitation to the group from Kevin Garza, Ph.D., LP at [email or web address] and to learn about using [employer] Teams. The next meeting is on Tuesday, July 14 between 6:30 and 8 PM and there will be no formal speaker.    This group meets the second Tuesday of each month from 6:30 to 8 PM and will be held again at [facility] in the  [facility] [place] (A-B room) when the Covid-19 restrictions are lifted. The group is led by Kevin Garza, Ph.D., Licensed Psychologist, [facility]. There is no cost for group participation and is open to all [facility] (and those external to this program) pre- and post-operative bariatric surgery patients as well as their support system.    _________________________________________________________________________________________________________________________________      Interested in working with a health ?  Health coaches work with you to improve your overall health and wellbeing.  They look at the whole person, and may involve discussion of different areas of life, including, but not limited to the four pillars of health (sleep, exercise, nutrition, and stress management). Discuss with your care team if you would like to start working a health .     Health Coaching-3 Pack:      $99 for three health coaching visits    Visits may be done in person or via phone    Coaching is a partnership between the  and the client; Coaches do not prescribe or diagnose    Coaching helps inspire the client to reach his/her personal goals   __________________________________________________________________________________________________________________________________    [facility] Get Moving Program    Our team of physical therapists is trained to help you understand and take control of your condition. They will perform a thorough evaluation to determine your ability for activity and develop a customized plan to fit your goals and physical ability.  Scheduling: Unsure if the Get Moving program is right for you? Discuss the program with your medical provider or diabetes educator. You can also call us at [phone] to ask questions or schedule an appointment.   [facility] Get Moving Program     MEDICATION STARTED AT THIS APPOINTMENT    We are starting a GLP-1 (Glucagon-like Peptide-1) medication. Examples of this medication include Byetta, Bydureon, or Victoza, etc. The medication chosen will depend on insurance coverage, and can be changed to the medication that is covered under your plan. These medications work the same, in that they can help you lose weight and control your blood sugar. One of the ways it works is by slowing down  the rate that food leaves your stomach. You feel dominguez and will eat less.  It also helps regulate hormones that can help improve your blood sugars.    Usually short acting insulins or oral agents are stopped or decreased by the doctor at the appointment. Low blood sugars are rare but can happen if patients are on insulin or other oral agents. If you notice consistent low sugars or high sugars, your medication may need to be adjusted after your appointment. If this is the case, please call RN and provide her your blood sugar record from the last 3-4 days. The RN will get in touch with the doctor and call you back/Mychart message with recommendations. We tolerate high sugars for a bit, so if sugars are running 180-200, this is ok. As weight starts dropping the blood sugars should too. If readings are consistently over 200 for 1-2 weeks, then you should call the doctor/nurse.    Types of GLP-1 medications include:    Victoza: Starting dose is 0.6 mg for a week, then 1.2 mg for a week, and then 1.8 mg thereafter (if prescribed by doctor). This medication is given daily. Pen needles need to be ordered also.    Ozempic: Starting dose is 0.25 mg once weekly for 4 weeks, and then increase to 0.5 mg weekly. If after 4 weeks of being on 0.5 mg weekly dosing and still wanting additional weight loss and/or blood sugar benefits, check with your doctor to see if they want to increase the dose to 1 mg weekly. Pen needles come in the Ozempic pen box.    Trulicity: Starting dose is 0.75 mg once weekly.  If after 1-3 months of being on 0.75 mg weekly and still wanting additional weight loss and/or blood sugar benefits, check with your doctor to see if they want to increase the dose to 1.5 mg weekly.    Bydureon: Starting dose is 2 mg and is given weekly. The patient should pick one day a week and give the medication on that day. Pen needles need to be ordered also.    Byetta: Starting dose is 5 mcg twice daily. This is given for the  first month. Check with the doctor after a month to see if they want the dose increased to 10 mcg BID. Pen needles need to be ordered also.     Side effects of GLP- Medications include: The most common side effects are all GI related and consist of: nausea, constipation, diarrhea, burping, or gassiness. Patients are advised to eat slowly and less, and nausea typically passes if people can stick it out.     The risk of pancreatitis (inflammation of the pancreas) has been associated with this type of medication, but is very rare.  If you have had pancreatitis in the past, this medication may not be for you. Please let us know about any past history of pancreas problems.      Symptoms of pancreatitis include: Pain in your upper stomach area which  may travel to your back and be worse after eating. Your stomach area may be tender to the touch.  You may have vomiting or nausea and/or have a fever. If you should develop any of these symptoms, stop the medication and contact your primary care doctor. They will do a blood test to check for pancreatitis.         There is a small chance you may have some low blood sugar after taking the medication.   The signs of low blood sugar are:  o Weakness  o Shaky   o Hungry  o Sweating  o Confusion      See below for ways to treat low blood sugar without adding in lots of extra calories.      Treating Low Blood Sugar    If you have symptoms of low blood sugar (sweating, shaking, dizzy, confused) eat 15 grams of carbs and wait 15 minutes:      Glucose Tabs are best for sugars under 70 -  Dex4 or BD Glucose tablets are good, you will need to take 3-4 of these to equal 15 grams.       One small box of raisins    4 oz fruit juice box or   cup fruit juice    1 small apple    1 small banana      cup canned fruit in water      English muffin or a slice of bread with jelly     1 low fat frozen waffle with sugar-free syrup      cup cottage cheese with   cup frozen or fresh blueberries    1 cup  skim or low-fat milk      cup whole grain cereal    4-6 crackers such as Triscuits      This medication is usually covered by insurance for patients with a diagnosis of Type 2 Diabetes. Depending on insurance coverage, the medication may be changed to a different formulary, but they all work in the same way. Sometimes a prior authorization is required, which may take up to 1-2 weeks for an insurance company to make a decision if they will cover the medication. Please be patient, you will be notified either way.     Contact the nurse via [employer] or call [phone] if you have any questions or concerns. (Do not stop taking it if you don't think it's working. For some people it works without them knowing it.)     In order to get refills of this or any medication we prescribe you must be seen in the medical weight mgmt clinic every 2-4 months.

## 2021-05-11 NOTE — ASSESSMENT & PLAN NOTE
Up 27lbs from her lowest weight as an adult. This has occurred with isolation and depression during covid along with a move to [place]. She notes she has been struggling with weight regain for years since surgery. She would ideally like to be at 160 but hasn't been there in 53 years.     Describes binge episodes triggered by stress and depression where she'll eat very large portions of whatever she can find. These are weekly but not daily. No specific time of day. Not associated with excessive hunger. But, feels like if she couldn't over eat, she could manage them better. Was evaluated by Dior yi but the wait for treatment was long. Now, she is working on CBT with a therapist for her depression.     Disorganized eating patterns. Rarely hungry. Will sometimes not eat most of the day and other days will eat portions of her meal every 2 hours all day.     Recent bump in GFR and protein in urine, watching kidney function. Hx of kidney stones. Would like to avoid topiramate. Would like to try ozempic instead of glipizide to help with blood sugars but also restriction.     Discussed high risk and minimal benefits with additional surgery to make stomach smaller again. Patient understand that the recommendation is medical weight management.

## 2021-05-11 NOTE — PROGRESS NOTES
"New Re-establish Bariatric Surgery Consultation Note    May 11, 2021    RE: Zeynep aMrtinez  MR#: [id number]  : 1945      Referring provider: No flowsheet data found.    Chief Complaint/Reason for visit: re-establish bariatric care    Dear Shayy Rivas NP (General),    I had the pleasure of seeing your patient, Zeynep Martinez, to re-establish bariatric care. As you know, Zeynep Martinez is 76 year old.  She has a height of 5' 3\", a weight of 257 lbs 0 oz, and calculated Body mass index is 45.53 kg/m .. [place] a [place] [facility] [place]     Assessment & Plan   Problem List Items Addressed This Visit        Nervous and Auditory    Diabetic polyneuropathy associated with type 2 diabetes mellitus (H)     A1C 6.2 2021 with metformin and glipizide. Glipizide does have some weight gaining properties so I would recommending stopping this. Would like to try ozempic in addition to the metformin to help with DMII in addition to earlier satiety and weight loss.          Relevant Medications    Semaglutide,0.25 or 0.5MG/DOS, 2 MG/1.5ML SOPN       Digestive    Gastroesophageal reflux disease without esophagitis     Well controlled with protonix daily. Does have hx of ulcer seen on EGD in . No current symptoms. No signs of active bleeding.          Obesity, Class III, BMI 40-49.9 (morbid obesity) (H)     Up 27lbs from her lowest weight as an adult. This has occurred with isolation and depression during covid along with a move to [place]. She notes she has been struggling with weight regain for years since surgery. She would ideally like to be at 160 but hasn't been there in 53 years.     Describes binge episodes triggered by stress and depression where she'll eat very large portions of whatever she can find. These are weekly but not daily. No specific time of day. Not associated with excessive hunger. But, feels like if she couldn't over eat, she could manage them better. Was evaluated by [facility] but the wait for treatment was long. Now, she is working on CBT with a therapist for her depression.     Disorganized eating patterns. Rarely hungry. Will sometimes not eat most of the day and other days will eat portions of her meal every 2 hours all day.     Recent bump in GFR and protein in urine, watching kidney function. Hx of kidney stones. Would like to avoid topiramate. Would like to try ozempic instead of glipizide to help with blood sugars but also restriction.     Discussed high risk and minimal benefits with additional surgery to make stomach smaller again. Patient understand that the recommendation is medical weight management.              Relevant Medications    Semaglutide,0.25 or 0.5MG/DOS, 2 MG/1.5ML SOPN    Other Relevant Orders    Hemoglobin A1c (Completed)    Basic metabolic panel (Completed)    MED THERAPY MANAGE REFERRAL    Ferritin (Completed)    Parathyroid Hormone Intact (Completed)    Vitamin A (Completed)    Vitamin B12 (Completed)    Vitamin D Deficiency (Completed)    S/P gastric bypass - Primary     EGD in 2012 shows dehiscence from pouch to remnant and ulceration. No abdominal pain or reflux when taking PPI. Will get burping and epigastric discomfort without PPI x 4 days. Recommending continuing. Could consider EGD for epigastric pain or reflux in the future.     Diarrhea, not necessarily dumping. Mixed with constipation. Chronically. Also has recurrent UTI. Recommend benefiber to help control constipation.     No hx of hypoglycemia. No recent bariatric labs, will check today     Recommend RD follow up as well.   Bariatric labs ordered today.          Relevant Medications    Semaglutide,0.25 or 0.5MG/DOS, 2 MG/1.5ML SOPN    Other Relevant Orders    Ferritin (Completed)    Parathyroid Hormone Intact (Completed)    Vitamin A (Completed)    Vitamin B12 (Completed)    Vitamin D Deficiency (Completed)       Endocrine    RESOLVED: Type 2 diabetes mellitus without complication,  "without long-term current use of insulin (H)    Relevant Medications    Semaglutide,0.25 or 0.5MG/DOS, 2 MG/1.5ML SOPN    Other Relevant Orders    Hemoglobin A1c (Completed)    Basic metabolic panel (Completed)    MED THERAPY MANAGE REFERRAL           Review of external notes as documented above     61 minutes spent on the date of the encounter doing chart review, history and exam, documentation and further activities per the note    Zeynep HARRISON Martinez is a 76 year old female who presents to clinic today for the following health issues       HISTORY OF PRESENT ILLNESS:  Weight Loss History Reviewed with Patient 5/6/2021   How long have you been overweight? Since late teens through early 20's   What is the most that you have ever weighed? 363   What is the most weight you have lost? 125   I have tried the following methods to lose weight Watching portions or calories, Weight Watchers, Atkins type diet (low carb/high protein), Pre packaged meals ex: Nutrisystem, OTC Medications, Weight Loss Surgery   I have tried the following weight loss medications? (Check all that apply) None   Have you ever had weight loss surgery? Yes   Please select the type of weight loss surgery you had (select all that apply): gastric bypass / Poli-en-Y     RNYGB 2003   PCP yesterday- worsening depression, referral to psychiatr. Increased venlafaxine to 225mg     G-G fistula at some point after surgery, uses omeprazole for \"burping\" which returns after stopping medicine 3-4 days     Just moved back to [place] - Daughter is a patient with our program. Has family here but when not with them she feels isolate. Worsening depression as of recent.     Very little restriction now. Feels like she can eat whatever she wants.     High 363  Low after surgery -   Regain-     Low as an adult 230 18 months ago - Nutrisystem   Would love to be 160lbs but hasn't been that weight in 53 years. Was 125lb in highschool       Wondering if she could have surgery again " to make stomach smaller so she has some restriction again     No hx of hypoglycemia. Some hx of constipation. Does get some dumping but not regularly associated with immediately after meals.     Frequent UTI    CO-MORBIDITIES OF OBESITY INCLUDE:     5/6/2021   I have the following health issues associated with obesity: Type II Diabetes, High Cholesterol, Stress Incontinence, Cancer     DMII- metformin 1000mg twice daily and glipizide   A1C 6.2 1/26/2021 - monitoring kidney function given lower GFR and protein in urine.     PAST MEDICAL HISTORY:  Past Medical History:   Diagnosis Date     Calculus of kidney     multiple, uric acid and calcium     Frequent UTI      Hyperlipidemia LDL goal <100      Type 2 diabetes mellitus without complication, without long-term current use of insulin (H)        PAST SURGICAL HISTORY:  Past Surgical History:   Procedure Laterality Date     ABDOMINOPLASTY       APPENDECTOMY       EXTRACORPOREAL SHOCK WAVE LITHOTRIPSY (ESWL)       EXTRACORPOREAL SHOCK WAVE LITHOTRIPSY (ESWL)       galbaldder       GASTRIC BYPASS       HYSTERECTOMY TOTAL ABDOMINAL, BILATERAL SALPINGO-OOPHORECTOMY, COMBINED      no cervix       FAMILY HISTORY:   Family History   Problem Relation Age of Onset     Multiple Sclerosis Mother      Cancer Maternal Grandmother      Diabetes Maternal Grandmother      Hypertension Maternal Grandmother      Multiple Sclerosis Cousin      Multiple Sclerosis Cousin        SOCIAL HISTORY:   Social History Questions Reviewed With Patient 5/6/2021   Which best describes your employment status (select all that apply) I am retired   Which best describes your marital status:    Do you have children? Yes   Who do you have in your support network that can be available to help you for the first 2 weeks after surgery? Family   Can you afford 3 meals a day?  Yes   Can you afford 50-60 dollars a month for vitamins? Yes       HABITS:     5/6/2021   How often do you drink alcohol? Monthly  or less   Have you ever used any of the following nicotine products? Cigarettes   If you previously used any of these products, what year did you quit? 1992   Have you or are you currently using street drugs or prescription strength medication for which you do not have a prescription for? No   Do you have a history of chemical dependency (alcohol or drug abuse)? No       PSYCHOLOGICAL HISTORY:   Psychological History Reviewed With Patient 5/6/2021   Have you ever attempted suicide? Never.   Have you had thoughts of suicide in the past year? Yes   Have you ever been hospitalized for mental illness or a suicide attempt? Never.   Do you have a history of chronic pain? Yes   Have you ever been diagnosed with fibromyalgia? No   Are you currently being treated for any of the following? (select all that apply) Depression, Anxiety   Are you currently seeing a therapist or counselor?  Yes   Are you currently seeing a psychiatrist? No     Recently diagnosed with [facility] with [facility] but never started program- long wait   Doing CBT - with a therapist - [facility] - just started 3 weeks ago     Introverted - worn out by constant interaction     ROS:     5/6/2021   Skin:  Skin fold rashes (groin or other folds), Leg swelling   HEENT: Headaches, Dizziness/lightheadedness   Musculoskeletal: Joint Pain, Back pain, Limited mobility, Swelling of legs   Cardiovascular: Shortness of breath with activity   Pulmonary: Shortness of breath with activity, Excessively sleep during the day, Experience morning headaches   Gastrointestinal: Diarrhea, Constipation, Ulcers   Genitourinary: Stress incontinence (losing urine when coughing, sneezing, etc.)   Hematological: None of the above   Neurological: None of the above   Female only: Post-menopausal       EATING BEHAVIORS:     5/6/2021   Have you or anyone else thought that you had an eating disorder? Yes   If you answered yes to the previous eating disorder question, select the types that  apply from this list: Binge Eating   Do you currently binge eat (eat a large amount of food in a short time)? Yes   Are you an emotional eater? Yes   Do you get up to eat after falling asleep? No   10am- breakfast- porkchop/ steak (not breakfast food)   7pm - dinner- fruit     Sometimes will spread meals out so she eats every 2-3 hours. Will have the protein and then the vegetable at the next eating episode.     Never hungry but knows she needs to eat. Something gets triggered and something will sound good and need to eat that.     Binging episodes- when nervous/ depressed - not daily, not at specific times of day     Food has always been comfort zone     Strong cravings and thoughts about food- especially when watching tv, can usually talk herself out of the craving     EXERCISE:     5/6/2021   How often do you exercise? Never   What keeps you from being more active?  Pain, I should be more active but I just have not gotten around to it, Too tired       MEDICATIONS:  Current Outpatient Medications   Medication Sig Dispense Refill     ALPRAZolam (XANAX) 0.5 MG tablet Take 1 tablet (0.5 mg) by mouth 3 times daily as needed for anxiety 30 tablet 0     gabapentin (NEURONTIN) 800 MG tablet Take 1 tablet (800 mg) by mouth 2 times daily 180 tablet PRN     glipiZIDE (GLUCOTROL) 5 MG tablet Take 5 mg by mouth daily        metFORMIN (GLUCOPHAGE) 1000 MG tablet Take 1 tablet (1,000 mg) by mouth 2 times daily (with meals) 180 tablet 3     pantoprazole (PROTONIX) 40 MG EC tablet Take 1 tablet (40 mg) by mouth daily with food 180 tablet PRN     Semaglutide,0.25 or 0.5MG/DOS, 2 MG/1.5ML SOPN Inject 0.25 mg subcutaneously once weekly for 4 weeks then 0.5 mg once weekly. 1.5 mL 1     venlafaxine (EFFEXOR-XR) 75 MG 24 hr capsule Take 1 capsule (75 mg) by mouth daily Take in addition to 150 mg 90 capsule 1     STATIN NOT PRESCRIBED (INTENTIONAL) Please choose reason not prescribed from choices below. (Patient not taking: Reported on  2/25/2021)         ALLERGIES:  Allergies   Allergen Reactions     Adhesive Tape Rash     Hmg-Coa-R Inhibitors Other (See Comments)     confusion     Penicillins Rash       Office Visit on 03/22/2021   Component Date Value Ref Range Status     N-Terminal Pro Bnp 03/22/2021 380* 0 - 125 pg/mL Final    Comment:    Reference range shown and results flagged as abnormal are for the outpatient,   non acute settings. Establishing a baseline value for each individual patient   is useful for follow-up.  Suggested inpatient cut points for confirming diagnosis of CHF in an acute   setting are:   >450 pg/mL (age 18 to less than 50)   >900 pg/mL (age 50 to less than 75)   >1800 pg/mL (75 yrs and older)  An inpatient or emergency department NT-proPBNP <300 pg/mL effectively rules   out acute CHF, with 99% negative predictive value.        TSH 03/22/2021 2.88  0.40 - 4.00 mU/L Final     Sodium 03/22/2021 138  133 - 144 mmol/L Final     Potassium 03/22/2021 4.9  3.4 - 5.3 mmol/L Final     Chloride 03/22/2021 106  94 - 109 mmol/L Final     Carbon Dioxide 03/22/2021 25  20 - 32 mmol/L Final     Anion Gap 03/22/2021 7  3 - 14 mmol/L Final     Glucose 03/22/2021 126* 70 - 99 mg/dL Final     Urea Nitrogen 03/22/2021 19  7 - 30 mg/dL Final     Creatinine 03/22/2021 0.95  0.52 - 1.04 mg/dL Final     GFR Estimate 03/22/2021 59* >60 mL/min/[1.73_m2] Final    Comment: Non  GFR Calc  Starting 12/18/2018, serum creatinine based estimated GFR (eGFR) will be   calculated using the Chronic Kidney Disease Epidemiology Collaboration   (CKD-EPI) equation.       GFR Estimate If Black 03/22/2021 68  >60 mL/min/[1.73_m2] Final    Comment:  GFR Calc  Starting 12/18/2018, serum creatinine based estimated GFR (eGFR) will be   calculated using the Chronic Kidney Disease Epidemiology Collaboration   (CKD-EPI) equation.       Calcium 03/22/2021 8.3* 8.5 - 10.1 mg/dL Final     Bilirubin Total 03/22/2021 0.3  0.2 - 1.3 mg/dL  Final     Albumin 03/22/2021 3.5  3.4 - 5.0 g/dL Final     Protein Total 03/22/2021 7.5  6.8 - 8.8 g/dL Final     Alkaline Phosphatase 03/22/2021 77  40 - 150 U/L Final     ALT 03/22/2021 19  0 - 50 U/L Final     AST 03/22/2021 16  0 - 45 U/L Final     WBC 03/22/2021 12.6* 4.0 - 11.0 10e9/L Final     RBC Count 03/22/2021 4.28  3.8 - 5.2 10e12/L Final     Hemoglobin 03/22/2021 12.3  11.7 - 15.7 g/dL Final     Hematocrit 03/22/2021 37.1  35.0 - 47.0 % Final     MCV 03/22/2021 87  78 - 100 fl Final     MCH 03/22/2021 28.7  26.5 - 33.0 pg Final     MCHC 03/22/2021 33.2  31.5 - 36.5 g/dL Final     RDW 03/22/2021 14.0  10.0 - 15.0 % Final     Platelet Count 03/22/2021 379  150 - 450 10e9/L Final     Specimen Description 03/22/2021 Catheterized Urine   Final     Special Requests 03/22/2021 Specimen received in preservative   Final     Culture Micro 03/22/2021 *  Final                    Value:10,000 to 50,000 colonies/mL  Escherichia coli ESBL  ESBL (extended beta lactamase) producing organisms require contact precautions.       Culture Micro 03/22/2021 *  Final                    Value:<10,000 colonies/mL  Enterococcus faecalis       Culture Micro 03/22/2021 *  Final                    Value:<10,000 colonies/mL  Strain 2  Escherichia coli ESBL  ESBL (extended beta lactamase) producing organisms require contact precautions.       EGD 2012   Impression:          - Normal esophagus.                       - Poli-en-Y gastrojejunostomy. The gastrojejunal                        anastomosis had edema, severe stenosis and ulceration.                       - Poli-en-Y gastrojejunostomy.                       - Normal examined duodenum. This was biopsied.                       - There was dehiscence in the stomach allowing                        examination of the entire stomach and duodenum                       - The examination was otherwise normal.  Recommendation:      - Await pathology results.                       - Refer  "to a surgeon at appointment to be scheduled.                       - No aspirin, ibuprofen, naproxen, or other                        non-steroidal anti-inflammatory drugs.                       - Use Prilosec (omeprazole) at 20 mg by mouth twice a                        day.                       - Perform an H. pylori serology today.                       - Do an upper GI series at appointment to be                        scheduled.    PHYSICAL EXAM:  Objective    BP (!) 142/75 (BP Location: Left arm, Patient Position: Chair, Cuff Size: Adult Large)   Pulse 85   Temp 98.3  F (36.8  C)   Ht 1.6 m (5' 3\")   Wt 116.6 kg (257 lb)   SpO2 96%   BMI 45.53 kg/m    BP (!) 142/75 (BP Location: Left arm, Patient Position: Chair, Cuff Size: Adult Large)   Pulse 85   Temp 98.3  F (36.8  C)   Ht 1.6 m (5' 3\")   Wt 116.6 kg (257 lb)   SpO2 96%   BMI 45.53 kg/m    Body mass index is 45.53 kg/m .  Physical Exam   GENERAL: Healthy, alert and no distress  EYES: Eyes grossly normal to inspection.  No discharge or erythema, or obvious scleral/conjunctival abnormalities.  RESP: No audible wheeze, cough, or visible cyanosis.  No visible retractions or increased work of breathing.    SKIN: Visible skin clear. No significant rash, abnormal pigmentation or lesions.  NEURO: Cranial nerves grossly intact.  Mentation and speech appropriate for age.  PSYCH: Mentation appears normal, affect normal/bright, judgement and insight intact, normal speech and appearance well-groomed.        In summary, Zeynep Martinez has Class III obesity with a body mass index of Body mass index is 45.53 kg/m . kg/m2 and the comorbidities stated above. She has a history of RNY in 2003 and is here to re-establish bariatric care and discuss weight regain and possible revision surgery.    MEDICATIONS:        - Start taking ozempic        - stop glipizide       - Continue other medications without change  CONSULTATION/REFERRAL to [facility] pharmacist (Chetna" Bloch) and Dietitian   FUTURE LABS:       - Schedule a non-fasting blood draw - today   FUTURE APPOINTMENTS:       - Follow-up visit in 3 months       - Chetna in 3-4 weeks       Sincerely,     Linsey Gay NP

## 2021-05-11 NOTE — ASSESSMENT & PLAN NOTE
EGD in 2012 shows dehiscence from pouch to remnant and ulceration. No abdominal pain or reflux when taking PPI. Will get burping and epigastric discomfort without PPI x 4 days. Recommending continuing. Could consider EGD for epigastric pain or reflux in the future.     Diarrhea, not necessarily dumping. Mixed with constipation. Chronically. Also has recurrent UTI. Recommend benefiber to help control constipation.     No hx of hypoglycemia. No recent bariatric labs, will check today     Recommend RD follow up as well.   Bariatric labs ordered today.

## 2021-05-12 LAB — DEPRECATED CALCIDIOL+CALCIFEROL SERPL-MC: 23 UG/L (ref 20–75)

## 2021-05-13 ENCOUNTER — HOSPITAL ENCOUNTER (OUTPATIENT)
Dept: CT IMAGING | Facility: CLINIC | Age: 76
Discharge: HOME | End: 2021-05-13
Payer: COMMERCIAL

## 2021-05-13 ENCOUNTER — OFFICE VISIT - HEALTHEAST (OUTPATIENT)
Dept: UROLOGY | Facility: CLINIC | Age: 76
End: 2021-05-13

## 2021-05-13 DIAGNOSIS — R31.9 URINARY TRACT INFECTION WITH HEMATURIA, SITE UNSPECIFIED: ICD-10-CM

## 2021-05-13 DIAGNOSIS — N20.0 CALCULUS OF KIDNEY: ICD-10-CM

## 2021-05-13 DIAGNOSIS — N39.0 URINARY TRACT INFECTION WITH HEMATURIA, SITE UNSPECIFIED: ICD-10-CM

## 2021-05-15 LAB
ANNOTATION COMMENT IMP: NORMAL
RETINYL PALMITATE SERPL-MCNC: 0.03 MG/L (ref 0–0.1)
VIT A SERPL-MCNC: 0.83 MG/L (ref 0.3–1.2)

## 2021-05-17 ENCOUNTER — AMBULATORY - HEALTHEAST (OUTPATIENT)
Dept: SURGERY | Facility: AMBULATORY SURGERY CENTER | Age: 76
End: 2021-05-17

## 2021-05-17 ENCOUNTER — COMMUNICATION - HEALTHEAST (OUTPATIENT)
Dept: UROLOGY | Facility: CLINIC | Age: 76
End: 2021-05-17

## 2021-05-17 DIAGNOSIS — Z11.59 ENCOUNTER FOR SCREENING FOR OTHER VIRAL DISEASES: ICD-10-CM

## 2021-05-18 ENCOUNTER — TELEPHONE (OUTPATIENT)
Dept: ENDOCRINOLOGY | Facility: CLINIC | Age: 76
End: 2021-05-18

## 2021-05-18 ENCOUNTER — MYC MEDICAL ADVICE (OUTPATIENT)
Dept: FAMILY MEDICINE | Facility: CLINIC | Age: 76
End: 2021-05-18

## 2021-05-18 NOTE — TELEPHONE ENCOUNTER
Writer responded via [employer].    MIRNA LanN, RN  [facility], [place] [facility]

## 2021-05-18 NOTE — TELEPHONE ENCOUNTER
Daisy,    This should allow enough time for results. Right? Please see his note    Naima Bravo RN, BSN  [facility]

## 2021-05-18 NOTE — TELEPHONE ENCOUNTER
Reason for call:  Other   Patient called regarding (reason for call): call back  Additional comments: pt started Ozempic last weds and has had terrible diarrhea since, would like a call back to discuss    Phone number to reach patient:  Home number on file [phone] (home)    Best Time:      Can we leave a detailed message on this number?  YES    Travel screening: Not Applicable

## 2021-05-19 NOTE — TELEPHONE ENCOUNTER
Called and spoke with patient in regards to Ozempic. Patient states she took her first dose of Ozempic 0.25mg last Wednesday. Diarrhea started on Friday and she had to take an anti-diarrhea medication to stop it. Patient also reports her sugars were 191 when she checked. Advised patient that diarrhea is a common side effect when first starting the medication and should improve with time. Advised patient to drink plenty of fluids to avoid dehydration and continue to monitor sugars. Patient was instructed to call the clinic if sugars are higher than 200 or if diarrhea persists or gets worse. Patient understands, no further questions at this time. Follow up scheduled with Lauren Bloch on 6/9/21.

## 2021-05-20 DIAGNOSIS — Z11.59 ENCOUNTER FOR SCREENING FOR OTHER VIRAL DISEASES: Primary | ICD-10-CM

## 2021-05-25 ENCOUNTER — OFFICE VISIT (OUTPATIENT)
Dept: FAMILY MEDICINE | Facility: CLINIC | Age: 76
End: 2021-05-25
Payer: COMMERCIAL

## 2021-05-25 VITALS
TEMPERATURE: 97.3 F | WEIGHT: 250 LBS | BODY MASS INDEX: 42.68 KG/M2 | DIASTOLIC BLOOD PRESSURE: 74 MMHG | OXYGEN SATURATION: 97 % | RESPIRATION RATE: 16 BRPM | HEART RATE: 104 BPM | HEIGHT: 64 IN | SYSTOLIC BLOOD PRESSURE: 130 MMHG

## 2021-05-25 DIAGNOSIS — N39.0 RECURRENT UTI: ICD-10-CM

## 2021-05-25 DIAGNOSIS — E11.42 TYPE 2 DIABETES MELLITUS WITH DIABETIC POLYNEUROPATHY, WITHOUT LONG-TERM CURRENT USE OF INSULIN (H): ICD-10-CM

## 2021-05-25 DIAGNOSIS — Z11.59 ENCOUNTER FOR SCREENING FOR OTHER VIRAL DISEASES: ICD-10-CM

## 2021-05-25 DIAGNOSIS — Z01.818 PREOP GENERAL PHYSICAL EXAM: Primary | ICD-10-CM

## 2021-05-25 DIAGNOSIS — N20.0 CALCULUS OF KIDNEY: ICD-10-CM

## 2021-05-25 DIAGNOSIS — E66.01 OBESITY, CLASS III, BMI 40-49.9 (MORBID OBESITY) (H): ICD-10-CM

## 2021-05-25 LAB
HGB BLD-MCNC: 13.1 G/DL (ref 11.7–15.7)
SARS-COV-2 RNA RESP QL NAA+PROBE: NORMAL
SPECIMEN SOURCE: NORMAL

## 2021-05-25 PROCEDURE — 85018 HEMOGLOBIN: CPT | Performed by: NURSE PRACTITIONER

## 2021-05-25 PROCEDURE — U0005 INFEC AGEN DETEC AMPLI PROBE: HCPCS | Performed by: UROLOGY

## 2021-05-25 PROCEDURE — 99214 OFFICE O/P EST MOD 30 MIN: CPT | Performed by: NURSE PRACTITIONER

## 2021-05-25 PROCEDURE — 80061 LIPID PANEL: CPT | Performed by: NURSE PRACTITIONER

## 2021-05-25 PROCEDURE — U0003 INFECTIOUS AGENT DETECTION BY NUCLEIC ACID (DNA OR RNA); SEVERE ACUTE RESPIRATORY SYNDROME CORONAVIRUS 2 (SARS-COV-2) (CORONAVIRUS DISEASE [COVID-19]), AMPLIFIED PROBE TECHNIQUE, MAKING USE OF HIGH THROUGHPUT TECHNOLOGIES AS DESCRIBED BY CMS-2020-01-R: HCPCS | Performed by: UROLOGY

## 2021-05-25 PROCEDURE — 36415 COLL VENOUS BLD VENIPUNCTURE: CPT | Performed by: NURSE PRACTITIONER

## 2021-05-25 PROCEDURE — 93000 ELECTROCARDIOGRAM COMPLETE: CPT | Performed by: NURSE PRACTITIONER

## 2021-05-25 RX ORDER — VENLAFAXINE HYDROCHLORIDE 150 MG/1
150 CAPSULE, EXTENDED RELEASE ORAL DAILY
COMMUNITY
Start: 2021-04-09 | End: 2021-07-09

## 2021-05-25 ASSESSMENT — MIFFLIN-ST. JEOR: SCORE: 1611.05

## 2021-05-25 NOTE — PATIENT INSTRUCTIONS

## 2021-05-25 NOTE — PROGRESS NOTES
[facility] [address] [place] [address]  Phone: [phone]  Primary Provider: Shayy Rivas        PREOPERATIVE EVALUATION:  Today's date: 5/25/2021    Zeynep Martinez is a 76 year old female who presents for a preoperative evaluation.    Surgical Information:  Surgery/Procedure: CYSTOURETEROSCOPY, WITH RETROGRADE PYELOGRAM, HOLMIUM LASER LITHOTRIPSY OF URETERAL CALCULUS, AND STENT INSERTION  Surgery Location: [facility]   Surgeon: Long Mansfield MD  Surgery Date: 5/28/2021  Time of Surgery: TBD  Where patient plans to recover: At home with family  Fax number for surgical facility: Note does not need to be faxed, will be available electronically in Epic.    Type of Anesthesia Anticipated: General    Assessment & Plan     The proposed surgical procedure is considered INTERMEDIATE risk.    Preop general physical exam    - Hemoglobin  - EKG 12-lead complete w/read - Clinics    Calculus of kidney      Recurrent UTI      Type 2 diabetes mellitus with diabetic polyneuropathy, without long-term current use of insulin (H)    - Lipid panel reflex to direct LDL Non-fasting    Obesity, Class III, BMI 40-49.9 (morbid obesity) (H)      Risks and Recommendations:  The patient has the following additional risks and recommendations for perioperative complications:   - Morbid obesity (BMI >40)  Diabetes:  - Patient is not on insulin therapy: regular NPO guidelines can be followed.     Medication Instructions:  Patient is to take all scheduled medications on the day of surgery EXCEPT for modifications listed below:  Hold Metformin and Protonix    RECOMMENDATION:  APPROVAL GIVEN to proceed with proposed procedure, without further diagnostic evaluation.        Subjective     HPI related to upcoming procedure: Recurrent UTI, recent evaluation noted ureteral stones    Preop Questions 5/25/2021   1. Have you ever had a heart attack or stroke? No   2. Have you ever had  surgery on your heart or blood vessels, such as a stent placement, a coronary artery bypass, or surgery on an artery in your head, neck, heart, or legs? No   3. Do you have chest pain with activity? No   4. Do you have a history of  heart failure? No   5. Do you currently have a cold, bronchitis or symptoms of other infection? No   6. Do you have a cough, shortness of breath, or wheezing? No   7. Do you or anyone in your family have previous history of blood clots? No   8. Do you or does anyone in your family have a serious bleeding problem such as prolonged bleeding following surgeries or cuts? No   9. Have you ever had problems with anemia or been told to take iron pills? No   10. Have you had any abnormal blood loss such as black, tarry or bloody stools, or abnormal vaginal bleeding? No   11. Have you ever had a blood transfusion? No   12. Are you willing to have a blood transfusion if it is medically needed before, during, or after your surgery? Yes   13. Have you or any of your relatives ever had problems with anesthesia? No   14. Do you have sleep apnea, excessive snoring or daytime drowsiness? YES - daytime drowsiness   14a. Do you have a CPAP machine? No   15. Do you have any artifical heart valves or other implanted medical devices like a pacemaker, defibrillator, or continuous glucose monitor? No   16. Do you have artificial joints? No   17. Are you allergic to latex? YES       Health Care Directive:  Patient does not have a Health Care Directive or Living Will:     Preoperative Review of :   reviewed - controlled substances reflected in medication list.      Status of Chronic Conditions:  DIABETES - Patient has a longstanding history of Diabetes type II . Patient is being treated with diet, oral agents and exercise and denies significant side effects. Control has been good. Complicating factors include but are not limited to: neuropathy.       Review of Systems  CONSTITUTIONAL: NEGATIVE for fever,  chills, change in weight  INTEGUMENTARY/SKIN: NEGATIVE for worrisome rashes, moles or lesions  EYES: NEGATIVE for vision changes or irritation  ENT/MOUTH: NEGATIVE for ear, mouth and throat problems  RESP: NEGATIVE for significant cough or SOB  BREAST: NEGATIVE for masses, tenderness or discharge  CV: NEGATIVE for chest pain, palpitations or peripheral edema  GI: NEGATIVE for nausea, abdominal pain, heartburn, or change in bowel habits  : NEGATIVE for frequency, dysuria, or hematuria  MUSCULOSKELETAL: NEGATIVE for significant arthralgias or myalgia  NEURO: NEGATIVE for weakness, dizziness or paresthesias  ENDOCRINE: NEGATIVE for temperature intolerance, skin/hair changes  HEME: NEGATIVE for bleeding problems  PSYCHIATRIC: NEGATIVE for changes in mood or affect    Patient Active Problem List    Diagnosis Date Noted     S/P gastric bypass 05/11/2021     Priority: Medium     Pre surgery 363lb   Low weight since surgery was 18mo ago - 230lb   Weight regain since covid        Diabetic polyneuropathy associated with type 2 diabetes mellitus (H) 05/10/2021     Priority: Medium     Obesity, Class III, BMI 40-49.9 (morbid obesity) (H) 02/03/2021     Priority: Medium     257lb consult-  Sp RNYGB 18 years   Weight regain        History of uterine cancer 01/26/2021     Priority: Medium     1991       Recurrent major depressive disorder, in full remission (H) 01/26/2021     Priority: Medium     Trigeminal neuralgia 01/26/2021     Priority: Medium     Gastroesophageal reflux disease without esophagitis 01/26/2021     Priority: Medium     Recurrent UTI 01/26/2021     Priority: Medium     Calculus of kidney      Priority: Medium     multiple, uric acid and calcium        Past Medical History:   Diagnosis Date     Calculus of kidney     multiple, uric acid and calcium     Cancer (H) Uterine 2/2001     Frequent UTI      Hyperlipidemia LDL goal <100      Type 2 diabetes mellitus without complication, without long-term current use of  "insulin (H)      Past Surgical History:   Procedure Laterality Date     ABDOMINOPLASTY       APPENDECTOMY       EXTRACORPOREAL SHOCK WAVE LITHOTRIPSY (ESWL)       EXTRACORPOREAL SHOCK WAVE LITHOTRIPSY (ESWL)       galbaldder       GASTRIC BYPASS       HYSTERECTOMY TOTAL ABDOMINAL, BILATERAL SALPINGO-OOPHORECTOMY, COMBINED      no cervix     Current Outpatient Medications   Medication Sig Dispense Refill     ALPRAZolam (XANAX) 0.5 MG tablet Take 1 tablet (0.5 mg) by mouth 3 times daily as needed for anxiety 30 tablet 0     gabapentin (NEURONTIN) 800 MG tablet Take 1 tablet (800 mg) by mouth 2 times daily 180 tablet PRN     metFORMIN (GLUCOPHAGE) 1000 MG tablet Take 1 tablet (1,000 mg) by mouth 2 times daily (with meals) 180 tablet 3     pantoprazole (PROTONIX) 40 MG EC tablet Take 1 tablet (40 mg) by mouth daily with food 180 tablet PRN     Semaglutide,0.25 or 0.5MG/DOS, 2 MG/1.5ML SOPN Inject 0.25 mg subcutaneously once weekly for 4 weeks then 0.5 mg once weekly. 1.5 mL 1     STATIN NOT PRESCRIBED (INTENTIONAL) Please choose reason not prescribed from choices below.       venlafaxine (EFFEXOR-XR) 75 MG 24 hr capsule Take 1 capsule (75 mg) by mouth daily Take in addition to 150 mg 90 capsule 1     venlafaxine (EFFEXOR-XR) 150 MG 24 hr capsule          Allergies   Allergen Reactions     Adhesive Tape Rash     Hmg-Coa-R Inhibitors Other (See Comments)     confusion     Latex      Penicillins Rash        Social History     Tobacco Use     Smoking status: Former Smoker     Packs/day: 0.00     Years: 5.00     Pack years: 0.00     Types: Cigarettes     Quit date: 1970     Years since quittin.4     Smokeless tobacco: Never Used   Substance Use Topics     Alcohol use: Yes     Comment: once per month        History   Drug Use Unknown         Objective     /74   Pulse 104   Temp 97.3  F (36.3  C) (Tympanic)   Resp 16   Ht 1.613 m (5' 3.5\")   Wt 113.4 kg (250 lb)   LMP  (LMP Unknown)   SpO2 97%   " Breastfeeding No   BMI 43.59 kg/m      Physical Exam    GENERAL APPEARANCE: healthy, alert and no distress     EYES: EOMI, PERRL     HENT: ear canals and TM's normal and nose and mouth without ulcers or lesions     NECK: no adenopathy, no asymmetry, masses, or scars and thyroid normal to palpation     RESP: lungs clear to auscultation - no rales, rhonchi or wheezes     CV: regular rates and rhythm, normal S1 S2, no S3 or S4 and no murmur, click or rub     ABDOMEN:  soft, nontender, no HSM or masses and bowel sounds normal     MS: extremities normal- no gross deformities noted, no evidence of inflammation in joints, FROM in all extremities.     SKIN: no suspicious lesions or rashes     NEURO: Normal strength and tone, sensory exam grossly normal, mentation intact and speech normal     PSYCH: mentation appears normal. and affect normal/bright     LYMPHATICS: No cervical adenopathy    Recent Labs   Lab Test 05/11/21  1152 03/22/21  1515 01/26/21  1427   HGB  --  12.3  --    PLT  --  379  --     138 138   POTASSIUM 5.2 4.9 5.2   CR 0.85 0.95 1.01   A1C 7.3*  --  6.2*        Diagnostics:  Recent Results (from the past 24 hour(s))   Hemoglobin    Collection Time: 05/25/21  5:59 PM   Result Value Ref Range    Hemoglobin 13.1 11.7 - 15.7 g/dL          Revised Cardiac Risk Index (RCRI):  The patient has the following serious cardiovascular risks for perioperative complications:   - No serious cardiac risks = 0 points     RCRI Interpretation: 0 points: Class I (very low risk - 0.4% complication rate)           Signed Electronically by: Shayy Rivas NP  Copy of this evaluation report is provided to requesting physician.

## 2021-05-26 LAB
CHOLEST SERPL-MCNC: 175 MG/DL
HDLC SERPL-MCNC: 46 MG/DL
LABORATORY COMMENT REPORT: NORMAL
LDLC SERPL CALC-MCNC: 77 MG/DL
NONHDLC SERPL-MCNC: 129 MG/DL
SARS-COV-2 RNA RESP QL NAA+PROBE: NEGATIVE
SPECIMEN SOURCE: NORMAL
TRIGL SERPL-MCNC: 262 MG/DL

## 2021-05-27 ENCOUNTER — RECORDS - HEALTHEAST (OUTPATIENT)
Dept: ADMINISTRATIVE | Facility: OTHER | Age: 76
End: 2021-05-27

## 2021-05-27 ENCOUNTER — ANESTHESIA - HEALTHEAST (OUTPATIENT)
Dept: SURGERY | Facility: AMBULATORY SURGERY CENTER | Age: 76
End: 2021-05-27

## 2021-05-27 ASSESSMENT — MIFFLIN-ST. JEOR: SCORE: 1603.12

## 2021-05-28 ENCOUNTER — COMMUNICATION - HEALTHEAST (OUTPATIENT)
Dept: SCHEDULING | Facility: CLINIC | Age: 76
End: 2021-05-28

## 2021-05-28 ENCOUNTER — SURGERY - HEALTHEAST (OUTPATIENT)
Dept: SURGERY | Facility: AMBULATORY SURGERY CENTER | Age: 76
End: 2021-05-28
Payer: COMMERCIAL

## 2021-05-31 ENCOUNTER — RECORDS - HEALTHEAST (OUTPATIENT)
Dept: ADMINISTRATIVE | Facility: CLINIC | Age: 76
End: 2021-05-31

## 2021-06-01 ENCOUNTER — RECORDS - HEALTHEAST (OUTPATIENT)
Dept: ADMINISTRATIVE | Facility: CLINIC | Age: 76
End: 2021-06-01

## 2021-06-01 ENCOUNTER — COMMUNICATION - HEALTHEAST (OUTPATIENT)
Dept: UROLOGY | Facility: CLINIC | Age: 76
End: 2021-06-01

## 2021-06-02 ENCOUNTER — RECORDS - HEALTHEAST (OUTPATIENT)
Dept: ADMINISTRATIVE | Facility: CLINIC | Age: 76
End: 2021-06-02

## 2021-06-02 VITALS — HEIGHT: 63 IN | BODY MASS INDEX: 45.8 KG/M2 | WEIGHT: 258.5 LBS

## 2021-06-03 ENCOUNTER — OFFICE VISIT (OUTPATIENT)
Dept: NEUROLOGY | Facility: CLINIC | Age: 76
End: 2021-06-03
Attending: NURSE PRACTITIONER
Payer: COMMERCIAL

## 2021-06-03 ENCOUNTER — RECORDS - HEALTHEAST (OUTPATIENT)
Dept: ADMINISTRATIVE | Facility: CLINIC | Age: 76
End: 2021-06-03

## 2021-06-03 VITALS
BODY MASS INDEX: 41.32 KG/M2 | SYSTOLIC BLOOD PRESSURE: 102 MMHG | HEART RATE: 110 BPM | TEMPERATURE: 98.6 F | WEIGHT: 242 LBS | HEIGHT: 64 IN | OXYGEN SATURATION: 95 % | DIASTOLIC BLOOD PRESSURE: 69 MMHG

## 2021-06-03 DIAGNOSIS — R25.1 TREMOR: Primary | ICD-10-CM

## 2021-06-03 DIAGNOSIS — R53.1 SUBJECTIVE WEAKNESS: ICD-10-CM

## 2021-06-03 DIAGNOSIS — R29.810 FACIAL DROOP: ICD-10-CM

## 2021-06-03 PROCEDURE — 99215 OFFICE O/P EST HI 40 MIN: CPT | Performed by: PSYCHIATRY & NEUROLOGY

## 2021-06-03 PROCEDURE — G0463 HOSPITAL OUTPT CLINIC VISIT: HCPCS

## 2021-06-03 RX ORDER — DOXYCYCLINE HYCLATE 100 MG
100 TABLET ORAL 2 TIMES DAILY
COMMUNITY
Start: 2021-05-13 | End: 2021-07-02

## 2021-06-03 ASSESSMENT — MIFFLIN-ST. JEOR: SCORE: 1574.76

## 2021-06-03 NOTE — NURSING NOTE
"Zeynep Martinez is a 76 year old female who presents for:  Chief Complaint   Patient presents with     Neurologic Problem     neuralgia.         Initial Vitals:  /69 (BP Location: Left arm, Patient Position: Sitting, Cuff Size: Adult Large)   Pulse 110   Temp 98.6  F (37  C) (Oral)   Ht 1.613 m (5' 3.5\")   Wt 109.8 kg (242 lb)   LMP  (LMP Unknown)   SpO2 95%   BMI 42.20 kg/m   Estimated body mass index is 42.2 kg/m  as calculated from the following:    Height as of this encounter: 1.613 m (5' 3.5\").    Weight as of this encounter: 109.8 kg (242 lb).. Body surface area is 2.22 meters squared. BP completed using cuff size: large  Data Unavailable    Nursing Comments: see chief complaint    [facility], [facility]    "

## 2021-06-03 NOTE — PROGRESS NOTES
ESTABLISHED PATIENT NEUROLOGY NOTE    DATE OF VISIT: 6/3/2021  CLINIC LOCATION: [facility]  MRN: [id number]  PATIENT NAME: Zeynep Martinez  YOB: 1945    PCP: Shayy Rivas NP    REASON FOR VISIT:   Chief Complaint   Patient presents with     Neurologic Problem     neuralgia.      SUBJECTIVE:                                                      HISTORY OF PRESENT ILLNESS: Patient, previously seen once for a constellation of symptoms, including balance difficulty, frequent falls, trigeminal neuralgia, confusion/tremor, and right occipital pain, is here to follow up regarding hand tremor (seen previously on her exam), though wants to discuss a few additional symptoms. Please refer to my initial note from 2/17/2021 for further information.  The patient is accompanied by her daughter, who participates in the interview today.    Since the last visit, the patient reports that approximately 2 weeks ago she had an episode of left-sided frontal headache that spontaneously resolved, but the patient noticed drooping of the upper part of the left side of her face, including her upper eyelid.  She took pictures of it and showed me them today.  I did not see any ptosis or pupillary asymmetry.  Her face looked not totally symmetric, but no apparent facial or eyelid droop was seen.  The frontal pain did not recur, but made her worried about possibility of stroke.    In addition, she reports several-year history of asymmetric, left more pronounced than right, postural and action hand tremor that occasionally affects her ability to hold things in her hands.  She needs to use both hands to hold her phone after while.  She reports that her father and her brother had more pronounced tremor.  She does not believe that stopping Wellbutrin made her tremor better.  She denies any resting tremor or additional parkinsonian features.    The patient also reports 20 to 30-year history of left-sided  "weakness that seems to progress over time.  However, on further questioning she states that both of her hands feel weak at times making it difficult for her to open jars, and left hand seem to be more affected due to tremor.  Speaking of her left leg weakness, she points out mainly to her left knee joint stating that she has a lot of pain there.    Her cognitive difficulties/confusion significantly improved after she switched Wellbutrin to Effexor, which she seems to tolerate well.  She occasionally has mild word finding difficulty and forgetfulness, but her daughter did not notice any significant memory decline.    Her right-sided occipital pain has resolved after adjustment of pillows and sleeping position.    She denies interval development of other new focal neurological symptoms.    Her latest hemoglobin A1C was 7.3, and vitamin B12 was in normal range (727).    On review of systems, patient endorses no additional active complaints. Medications, allergies, family and social history were also reviewed. There are no changes reported by patient.  REVIEW OF SYSTEMS:                                                    10-system review was completed. Pertinent positives are included in HPI. The remainder of ROS is negative.  EXAM:                                                    Physical Exam:   Vitals: /69 (BP Location: Left arm, Patient Position: Sitting, Cuff Size: Adult Large)   Pulse 110   Temp 98.6  F (37  C) (Oral)   Ht 1.613 m (5' 3.5\")   Wt 109.8 kg (242 lb)   LMP  (LMP Unknown)   SpO2 95%   BMI 42.20 kg/m       General: pt is in NAD, cooperative.  Skin: normal turgor, moist mucous membranes, no lesions/rashes noticed.  HEENT: ATNC, white sclera, normal conjunctiva.  Respiratory: Symmetric lung excursion, no accessory respiratory muscle use.  Abdomen: Non distended.  Neurological: awake, cooperative, follows commands, no ptosis, funduscopic exam is normal, pupillary reaction is normal and " symmetric, no anisocoria, face is mildly asymmetric, but no convincing facial droop, tongue is midline, strength is 5/5 bilaterally, she has intermittent mild asymmetric (left worse than right) positional and action hand tremor, no dysmetria, walks with a walker.  ASSESSMENT AND PLAN:                                                    Assessment: 75-year-old female patient seen for confusion/cognitive complaints, balance difficulty and frequent falls, presents to discuss her hand tremor and several additional symptoms.  Her confusion/cognitive complaints resolved after stopping Wellbutrin.    We had an extensive discussion with the patient and her daughter regarding her current complaints.  She reported an episode of left upper face drooping following left-sided frontal headache without recurrence.  When I reviewed patient's provided pictures taken during the episode, I did not appreciate any ptosis or anisocoria to suspect dissection.  It is not also present on today's neurological exam.  She was worried about having a stroke, but I have reassured the patient that I do not see any suggestive neurological findings.    In addition, the patient reports slightly worsening chronic left-sided weakness.  However, on detailed questioning it appears that she subjectively feels weak due to positional and action bilateral hand tremor, that is more pronounced on the left side.  I do not appreciate true weakness on her neurological exam today, though positive family history of tremor makes me suspicious of mild form of essential tremor, though asymmetry would be somewhat atypical.  Additional differential possibilities include enhanced physiologic tremor. I do not see any findings that would make me suspect parkinsonism at this point.    She is already on gabapentin that could be used as a second line to treat her tremor.  We reviewed additional options of propranolol, primidone, and Topamax.  We decided to start with hand  therapy.  Referral was placed.    Diagnoses:    ICD-10-CM    1. Tremor  R25.1 OCCUPATIONAL THERAPY REFERRAL   2. Facial droop  R29.810    3. Subjective weakness  R53.1      Plan: At today's visit we thoroughly discussed various diagnostic possibilities for patient's symptoms, necessary evaluation, and the plan, which includes:  Orders Placed This Encounter   Procedures     OCCUPATIONAL THERAPY REFERRAL     No medication changes for now, though we discussed treatment options for tremor.    Next follow-up appointment is in the next 2-3 months or earlier if needed.    Total Time: 50 minutes spent on the date of the encounter doing chart review, history and exam, documentation and further activities as noted above.    Isael Cordero MD  [facility]  (Chart documentation was completed in part with Dragon voice-recognition software. Even though reviewed, some grammatical, spelling, and word errors may remain.)

## 2021-06-03 NOTE — LETTER
6/3/2021         RE: Zeynep Martinez  [address] [place] [address]  [place] [address]        Dear Colleague,    Thank you for referring your patient, Zeynep Martinez, to the [facility] [place]. Please see a copy of my visit note below.    ESTABLISHED PATIENT NEUROLOGY NOTE    DATE OF VISIT: 6/3/2021  CLINIC LOCATION: [facility]  MRN: [id number]  PATIENT NAME: Zeynep Martinez  YOB: 1945    PCP: Shayy Rivas NP    REASON FOR VISIT:   Chief Complaint   Patient presents with     Neurologic Problem     neuralgia.      SUBJECTIVE:                                                      HISTORY OF PRESENT ILLNESS: Patient, previously seen once for a constellation of symptoms, including balance difficulty, frequent falls, trigeminal neuralgia, confusion/tremor, and right occipital pain, is here to follow up regarding hand tremor (seen previously on her exam), though wants to discuss a few additional symptoms. Please refer to my initial note from 2/17/2021 for further information.  The patient is accompanied by her daughter, who participates in the interview today.    Since the last visit, the patient reports that approximately 2 weeks ago she had an episode of left-sided frontal headache that spontaneously resolved, but the patient noticed drooping of the upper part of the left side of her face, including her upper eyelid.  She took pictures of it and showed me them today.  I did not see any ptosis or pupillary asymmetry.  Her face looked not totally symmetric, but no apparent facial or eyelid droop was seen.  The frontal pain did not recur, but made her worried about possibility of stroke.    In addition, she reports several-year history of asymmetric, left more pronounced than right, postural and action hand tremor that occasionally affects her ability to hold things in her hands.  She needs to use both hands to hold her phone after while.  She  "reports that her father and her brother had more pronounced tremor.  She does not believe that stopping Wellbutrin made her tremor better.  She denies any resting tremor or additional parkinsonian features.    The patient also reports 20 to 30-year history of left-sided weakness that seems to progress over time.  However, on further questioning she states that both of her hands feel weak at times making it difficult for her to open jars, and left hand seem to be more affected due to tremor.  Speaking of her left leg weakness, she points out mainly to her left knee joint stating that she has a lot of pain there.    Her cognitive difficulties/confusion significantly improved after she switched Wellbutrin to Effexor, which she seems to tolerate well.  She occasionally has mild word finding difficulty and forgetfulness, but her daughter did not notice any significant memory decline.    Her right-sided occipital pain has resolved after adjustment of pillows and sleeping position.    She denies interval development of other new focal neurological symptoms.    Her latest hemoglobin A1C was 7.3, and vitamin B12 was in normal range (727).    On review of systems, patient endorses no additional active complaints. Medications, allergies, family and social history were also reviewed. There are no changes reported by patient.  REVIEW OF SYSTEMS:                                                    10-system review was completed. Pertinent positives are included in HPI. The remainder of ROS is negative.  EXAM:                                                    Physical Exam:   Vitals: /69 (BP Location: Left arm, Patient Position: Sitting, Cuff Size: Adult Large)   Pulse 110   Temp 98.6  F (37  C) (Oral)   Ht 1.613 m (5' 3.5\")   Wt 109.8 kg (242 lb)   LMP  (LMP Unknown)   SpO2 95%   BMI 42.20 kg/m       General: pt is in NAD, cooperative.  Skin: normal turgor, moist mucous membranes, no lesions/rashes noticed.  HEENT: " ATNC, white sclera, normal conjunctiva.  Respiratory: Symmetric lung excursion, no accessory respiratory muscle use.  Abdomen: Non distended.  Neurological: awake, cooperative, follows commands, no ptosis, funduscopic exam is normal, pupillary reaction is normal and symmetric, no anisocoria, face is mildly asymmetric, but no convincing facial droop, tongue is midline, strength is 5/5 bilaterally, she has intermittent mild asymmetric (left worse than right) positional and action hand tremor, no dysmetria, walks with a walker.  ASSESSMENT AND PLAN:                                                    Assessment: 75-year-old female patient seen for confusion/cognitive complaints, balance difficulty and frequent falls, presents to discuss her hand tremor and several additional symptoms.  Her confusion/cognitive complaints resolved after stopping Wellbutrin.    We had an extensive discussion with the patient and her daughter regarding her current complaints.  She reported an episode of left upper face drooping following left-sided frontal headache without recurrence.  When I reviewed patient's provided pictures taken during the episode, I did not appreciate any ptosis or anisocoria to suspect dissection.  It is not also present on today's neurological exam.  She was worried about having a stroke, but I have reassured the patient that I do not see any suggestive neurological findings.    In addition, the patient reports slightly worsening chronic left-sided weakness.  However, on detailed questioning it appears that she subjectively feels weak due to positional and action bilateral hand tremor, that is more pronounced on the left side.  I do not appreciate true weakness on her neurological exam today, though positive family history of tremor makes me suspicious of mild form of essential tremor, though asymmetry would be somewhat atypical.  Additional differential possibilities include enhanced physiologic tremor. I do not  see any findings that would make me suspect parkinsonism at this point.    She is already on gabapentin that could be used as a second line to treat her tremor.  We reviewed additional options of propranolol, primidone, and Topamax.  We decided to start with hand therapy.  Referral was placed.    Diagnoses:    ICD-10-CM    1. Tremor  R25.1 OCCUPATIONAL THERAPY REFERRAL   2. Facial droop  R29.810    3. Subjective weakness  R53.1      Plan: At today's visit we thoroughly discussed various diagnostic possibilities for patient's symptoms, necessary evaluation, and the plan, which includes:  Orders Placed This Encounter   Procedures     OCCUPATIONAL THERAPY REFERRAL     No medication changes for now, though we discussed treatment options for tremor.    Next follow-up appointment is in the next 2-3 months or earlier if needed.    Total Time: 50 minutes spent on the date of the encounter doing chart review, history and exam, documentation and further activities as noted above.    Isael Cordero MD  [facility] Neurology  (Chart documentation was completed in part with Dragon voice-recognition software. Even though reviewed, some grammatical, spelling, and word errors may remain.)      Again, thank you for allowing me to participate in the care of your patient.        Sincerely,        Isael Cordero MD

## 2021-06-03 NOTE — PATIENT INSTRUCTIONS
AFTER VISIT SUMMARY (AVS):    At today's visit we thoroughly discussed various diagnostic possibilities for your symptoms, necessary evaluation, and the plan, which includes:  Orders Placed This Encounter   Procedures     OCCUPATIONAL THERAPY REFERRAL     No medication changes for now, though we discussed treatment options for tremor.    Next follow-up appointment is in the next 2-3 months or earlier if needed.    Please do not hesitate to call me with any questions or concerns.    Thanks.

## 2021-06-04 ENCOUNTER — COMMUNICATION - HEALTHEAST (OUTPATIENT)
Dept: LAB | Facility: CLINIC | Age: 76
End: 2021-06-04

## 2021-06-04 ENCOUNTER — AMBULATORY - HEALTHEAST (OUTPATIENT)
Dept: UROLOGY | Facility: CLINIC | Age: 76
End: 2021-06-04

## 2021-06-04 DIAGNOSIS — N20.0 CALCULUS OF KIDNEY: ICD-10-CM

## 2021-06-09 ENCOUNTER — TELEPHONE (OUTPATIENT)
Dept: ENDOCRINOLOGY | Facility: CLINIC | Age: 76
End: 2021-06-09

## 2021-06-09 ENCOUNTER — VIRTUAL VISIT (OUTPATIENT)
Dept: PHARMACY | Facility: CLINIC | Age: 76
End: 2021-06-09
Attending: NURSE PRACTITIONER
Payer: COMMERCIAL

## 2021-06-09 ENCOUNTER — VIRTUAL VISIT (OUTPATIENT)
Dept: FAMILY MEDICINE | Facility: CLINIC | Age: 76
End: 2021-06-09
Payer: COMMERCIAL

## 2021-06-09 DIAGNOSIS — N20.0 CALCULUS OF KIDNEY: ICD-10-CM

## 2021-06-09 DIAGNOSIS — F33.42 RECURRENT MAJOR DEPRESSIVE DISORDER, IN FULL REMISSION (H): ICD-10-CM

## 2021-06-09 DIAGNOSIS — G47.00 INSOMNIA, UNSPECIFIED TYPE: Primary | ICD-10-CM

## 2021-06-09 DIAGNOSIS — Z98.84 S/P GASTRIC BYPASS: ICD-10-CM

## 2021-06-09 DIAGNOSIS — E66.01 OBESITY, CLASS III, BMI 40-49.9 (MORBID OBESITY) (H): ICD-10-CM

## 2021-06-09 DIAGNOSIS — N39.0 RECURRENT UTI: ICD-10-CM

## 2021-06-09 DIAGNOSIS — G50.0 TRIGEMINAL NEURALGIA: ICD-10-CM

## 2021-06-09 DIAGNOSIS — E11.69 TYPE 2 DIABETES MELLITUS WITH OTHER SPECIFIED COMPLICATION, WITHOUT LONG-TERM CURRENT USE OF INSULIN (H): Primary | ICD-10-CM

## 2021-06-09 DIAGNOSIS — G47.00 INSOMNIA, UNSPECIFIED TYPE: ICD-10-CM

## 2021-06-09 DIAGNOSIS — R19.7 DIARRHEA, UNSPECIFIED TYPE: ICD-10-CM

## 2021-06-09 DIAGNOSIS — K21.9 GASTROESOPHAGEAL REFLUX DISEASE WITHOUT ESOPHAGITIS: ICD-10-CM

## 2021-06-09 PROCEDURE — 99213 OFFICE O/P EST LOW 20 MIN: CPT | Mod: 95 | Performed by: NURSE PRACTITIONER

## 2021-06-09 PROCEDURE — 99607 MTMS BY PHARM ADDL 15 MIN: CPT | Performed by: PHARMACIST

## 2021-06-09 PROCEDURE — 99605 MTMS BY PHARM NP 15 MIN: CPT | Performed by: PHARMACIST

## 2021-06-09 RX ORDER — TRAZODONE HYDROCHLORIDE 50 MG/1
50 TABLET, FILM COATED ORAL AT BEDTIME
Qty: 30 TABLET | Refills: 0 | Status: SHIPPED | OUTPATIENT
Start: 2021-06-09 | End: 2021-07-09

## 2021-06-09 RX ORDER — METFORMIN HCL 500 MG
1000 TABLET, EXTENDED RELEASE 24 HR ORAL 2 TIMES DAILY WITH MEALS
Qty: 360 TABLET | Refills: 1 | Status: SHIPPED | OUTPATIENT
Start: 2021-06-09 | End: 2021-10-11

## 2021-06-09 NOTE — PROGRESS NOTES
Medication Therapy Management (MTM) Encounter    ASSESSMENT:                            Medication Adherence/Access: No issues identified    History Gastric Bypass: Needs improvement. May benefit from increasing vitamin D to 1000 to 2000 international unit(s) daily due to vitamin D between 20-30. May benefit from starting other supplementation, such as calcium, multivitamin but will hold off on suggestion for now as labs appear normal.     Type 2 Diabetes/Obesity: A1c not at goal <7%. However, AM fasting at goal  mg/dL. Needs improvement - may benefit from changing metformin from IR to ER to see if assists with somewhat assisting issues of chronic diarrhea. Less is known about ER formulations post gastric bypass, but small open label single dose studies do suggest appropriate metformin ER absorption and maximum plasma concentrations post gastric bypass. Due to extent of historical issues of diarrhea, may be worth trying to determine if assists with improvement in this. Due to progressive worsening over the last month, may benefit from holding Ozempic for now. Will hold off on restarting Glipizide for now.     GERD: Stable.     Chronic Diarrhea: Unimproved. See above. Do not see last colonoscopy within system as may have been outside of health system. May benefit from following up with PCP to determine if due for colonoscopy and potential referral to GI if these issues have been ongoing for that length of time. Hopefully some of the above issues with somewhat assist with the most recent worsening.      Hx Kidney Stone/Frequent UTI: Defer to Urology, doxycycline could be playing minor contributing role with diarrhea but worsened prior to starting.     Depression:  Improved. PHQ9 via mychart.     Trigeminal Neuralgia: stable.     Insomnia: Unimproved. Patient to start trazodone nightly for now for sleep as prescribed.     PLAN:                            1. Hold Ozempic for now. Hold off on restarting glipizide  for now. Let me know if your blood sugars get to consistently greater than 150 mg/dL in morning as we may want to consider restarting glipizide at that point.     2. Change metformin IR to metformin  mg tablet: 2 tablet twice daily with meals.     3. Start vitamin D 1,000 -2,000 international unit(s) daily     4. Start Trazodone 50 mg nightly for sleep as prescribed by Shayy Rivas NP.     5. Consider following up with your primary care provider on if you are due for colonoscopy and potential for Gastroenterology referral    6. Pharmacist to send PHQ9 via [employer].     Follow-up: No follow-ups on file.    SUBJECTIVE/OBJECTIVE:                          Zeynep Martinez is a 76 year old female called for an initial visit. She was referred to me from Linsey Gay CNP.      Reason for visit: Comprehensive review of medication.    Allergies/ADRs: Reviewed in chart  Tobacco: She reports that she quit smoking about 51 years ago. Her smoking use included cigarettes. She smoked 0.00 packs per day for 5.00 years. She has never used smokeless tobacco.  Alcohol: not currently using  Caffeine: 0-1 cups/day of coffee  Past Medical History: Reviewed in chart. Frequent UTIs. HX uterine cancer in 2001, cholecystectomy.     Medication Adherence/Access: no issues reported    History Gastric Bypass:   No medications    Surgery in 2003. High 363 lb. Low as adult was 230 lb - 18 months ago when she did [employer]. She reports she has never taken the supplements that were recommended. However, she did recently buy a bottle of vitamin D, unsure of dosing. Patient reports she drinks 30+ oz water daily. Shedoes not have issues of swallowing food/pills. Gets     Hemoglobin   Date Value Ref Range Status   05/25/2021 13.1 11.7 - 15.7 g/dL Final     Ferritin   Date Value Ref Range Status   05/11/2021 23 8 - 252 ng/mL Final     Lab Results   Component Value Date    VITDT 23 05/11/2021      Lab Results   Component Value Date    PTHI 52  "2021      Lab Results   Component Value Date    B12 727 2021      Lab Results   Component Value Date    TONY 0.83 2021     Type 2 Diabetes/Obesity:   Metformin 1000 mg twice daily    - stopped when Ozempic started  Ozempic 0.5 mg weekly - just went up to this dose today.     Patient is experiencing the following side effects: worsening diarrhea. Diarrhea much worse over the last month since starting Ozempic. Reports that it is so bad to the point where she is wearing Depends due to accidents and sometimes not making it to the bathroom, \"messy\". Has intermittent diarrhea long term but not to this point, see below for more information. Had met with Linsey Gay CNP on 21 for new Re-Establish Bariatric Surgery Consult, Ozempic was started to assist with weight loss and glipizide was stopped.  Finds that she has had 27 lb weight increase due to isolation and depression with COVID-19 along with a move to [place]. Has been struggling with weight regain for years since surgery.   Blood sugar monitorin time(s) daily. Ranges (patient reported): Fasting- 110-120s  Symptoms of low blood sugar? none  Symptoms of high blood sugar? None. Dry mouth is a symptom of high sugar for her   Eye exam: due  Foot exam: due  Diet/Eating Habits: Doesn't feel as restricted as she once did with the surgery. Feels like she can eat \"whatever\" she wants if given the opportunity. She has been doing more frozen dinners - Lean Cuisines and Healthy Choice and watching carbohydrate and sodium contents. Meals are consistent with frozen meal for dinner, salad with lunch and fruit or random foods (today was shrimp) for breakfast.   Exercise/Activity: did not discuss today.   Aspirin: Not taking  Statin: No - Statin Allergy - confusion per patient when she has tried different statins before. Does not wish to trial others.   ACEi/ARB: No.   Urine Albumin:   Lab Results   Component Value Date    UMALCR 97.73 (H) 2021      Lab " "Results   Component Value Date    A1C 7.3 05/11/2021    A1C 6.2 01/26/2021     Wt Readings from Last 4 Encounters:   06/03/21 242 lb (109.8 kg)   05/25/21 250 lb (113.4 kg)   05/11/21 257 lb (116.6 kg)   03/22/21 259 lb (117.5 kg)     Estimated body mass index is 42.2 kg/m  as calculated from the following:    Height as of 6/3/21: 5' 3.5\" (1.613 m).    Weight as of 6/3/21: 242 lb (109.8 kg).    Recent Labs   Lab Test 05/25/21  1759   CHOL 175   HDL 46*   LDL 77   TRIG 262*     BP Readings from Last 3 Encounters:   06/03/21 102/69   05/25/21 130/74   05/11/21 (!) 142/75     GERD:   Protonix (pantoprazole) 40 mg once daily.     Pt reports belching and eructation when she doesn't take it.  Patient feels that current regimen is effective. History of ulcer after bypass.      Chronic Diarrhea:   Loperamide 2 mg as needed    Reports this has been a lifelong issue even prior to bariatric surgery.  Has been occurring for many years, ~ 10 years. However, she does report that her diarrhea has worsened over the last month considerably, has been using loperamide every 2-3 days. See above for more information. She was recommended to try fiber, had not gotten around to this.  Hx cholecystectomy and is aware this may play a role. Has been trying to limit fatty foods at times. Hasn't had a colonoscopy in many years, she thinks she is due.     Hx Kidney Stone/Frequent UTI:   Doxycyline 100 mg twice daily    Was started on since kidney stone and surgery/stenting 5/28/21. Was getting frequent UTIs 3-4 times per year prior to this, patient reports that she wonders if the blockage was a contributing factor. Following with Urology and seeing next week for next steps. Hx of uremic and calcium stones.     Depression:    Venlafaxine  mg once daily, recent increase  Alprazolam 0.5 mg as needed.    Works with psychiatrist. Has been dealing with worsening depression due to COVID-19 and isolation, Recent increase of venlafaxine. Also " "recently moved to [place] to be closer to daughter. Sometimes she will use 1-2 times per month. Pt reports that depression symptoms are improved since dose increase.  PHQ-9 SCORE 2/25/2021 3/18/2021 5/10/2021   PHQ-9 Total Score 8 22 21     Trigeminal Neuralgia:   Gabapentin 800 mg twice daily     She reports she \"cannot live without the medication\". Medication side effects: weight gain?. Every once in a while she will get \"jab\" in her head, but no longer for 24+ hours.     Creatinine   Date Value Ref Range Status   05/11/2021 0.85 0.52 - 1.04 mg/dL Final     GFR Estimate   Date Value Ref Range Status   05/11/2021 67 >60 mL/min/[1.73_m2] Final     Comment:     Non  GFR Calc  Starting 12/18/2018, serum creatinine based estimated GFR (eGFR) will be   calculated using the Chronic Kidney Disease Epidemiology Collaboration   (CKD-EPI) equation.       Insomnia:   Trazodone 50 mg nightly at bedtime - hasn't started yet.     Was just prescribed today, so hasn't started yet. Patient reports trouble falling asleep, especially recently. Started after her recent operation. Every other day sometimes feeling her brain will not shut off and will be awake all morning then take nap for 2 hours then be awake for rest of the day. Has been \"so exhausted\".     ----------------  Post Discharge Medication Reconciliation Status: discharge medications reconciled and changed, per note/orders.    I spent 45 minutes with this patient today (an extra 15 minutes was spent creating the Medication Action Plan). All changes were made via collaborative practice agreement with primary care provider and referring provider. A copy of the visit note was provided to the patient's primary care and referring provider.    The patient was sent via [facility] a summary of these recommendations.     Lauren Bloch, PharmD  Medication Therapy Management Pharmacist   [facility] [place]    Telemedicine Visit Details  Type of " service:  Telephone visit  Start Time: 4:00 PM  End Time: 4:45 PM  Originating Location (patient location): Home  Distant Location (provider location):  [facility]      Medication Therapy Recommendations  S/P gastric bypass    Rationale: Untreated condition - Needs additional medication therapy - Indication   Recommendation: Start Medication - vitamin D3 50 mcg (2000 units) tablet - 1 tablet daily   Status: Accepted - no CPA Needed         Type 2 diabetes mellitus with other specified complication, without long-term current use of insulin (H)    Current Medication: Semaglutide,0.25 or 0.5MG/DOS, 2 MG/1.5ML SOPN   Rationale: Undesirable effect - Adverse medication event - Safety   Recommendation: Discontinue Medication - Hold ozempic for now   Status: Accepted per CPA          Rationale: Dosage form inappropriate - Ineffective medication - Effectiveness   Recommendation: Change Medication - metFORMIN 500 MG 24 hr tablet - 2 tablets twice daily   Status: Accepted per CPA

## 2021-06-09 NOTE — TELEPHONE ENCOUNTER
Patient called and left  about medication concerns. Called and spoke with patient in regards to side effects from recent increase of Ozempic. Patient states she has been taking Ozempic 0.25mg weekly for the last month. Blood sugars ranging from 180-200 initially, diarrhea persistent. Today increased dose to 0.5mg and felt dizzy and having severe diarrhea. Advised patient to increase fluids and electrolytes to avoid dehydration. Patient states she has been staying inside and avoiding the heat, as well as getting in plenty of fluids. She states she has been taking anti-diarrhea OTC medications to keep the side effects at bay. Blood sugar checked today as 183. Explained that if sugars are running 180-200 for the first couple weeks, this is ok. Patient is aware to contact the clinic if readings are consistently over 200. Advised patient to go back down to 0.25mg for the next couple weeks until the diarrhea is less severe. May want to consider other medication options. Patient knows to call the clinic if she has questions or concerns.

## 2021-06-09 NOTE — LETTER
"        Date: 2021    Zeynep Martinez  [address]   [facility] [address]    Dear Ms. Martinez,    Thank you for talking with me on 2021 about your health and medications. Medicare s MTM (Medication Therapy Management) program helps you understand your medications and use them safely.      This letter includes an action plan (Medication Action Plan) and medication list (Personal Medication List). The action plan has steps you should take to help you get the best results from your medications. The medication list will help you keep track of your medications and how to use them the right way.       Have your action plan and medication list with you when you talk with your doctors, pharmacists, and other healthcare providers in your care team.     Ask your doctors, pharmacists, and other healthcare providers to update the action plan and medication list at every visit.     Take your medication list with you if you go to the hospital or emergency room.     Give a copy of the action plan and medication list to your family or caregivers.     If you want to talk about this letter or any of the papers with it, please call   [phone].We look forward to working with you, your doctors, and other healthcare providers to help you stay healthy through the [facility] MTM program.    Sincerely,  Lauren T. Bloch, [facility]    Enclosed: Medication Action Plan and Personal Medication List    MEDICATION ACTION PLAN FOR Zeynep Martinez,  1945     This action plan will help you get the best results from your medications if you:   1. Read \"What we talked about.\"   2. Take the steps listed in the \"What I need to do\" boxes.   3. Fill in \"What I did and when I did it.\"   4. Fill in \"My follow-up plan\" and \"Questions I want to ask.\"     Have this action plan with you when you talk with your doctors, pharmacists, and other healthcare providers in your care team. Share this with your family or caregivers too.  DATE " PREPARED: 2021  What we talked about: You are having undesirable side effects since starting Ozempic.                                                   What I need to do: Hold Ozempic for now. Do not restart Glipizide yet.        What I did and when I did it:                                              What we talked about: Having chronic intermittent diarrhea while on metformin immediate release tablet                                                  What I need to do: Switching metformin to extended release tablet to see if helps to decrease diarrhea.        What I did and when I did it:                                               What we talked about: Vitamin D level is between 20-30                                                   What I need to do: Start vitamin D 1,000 to 2,000 international unit(s) daily.        What I did and when I did it:                                               My follow-up plan:                 Questions I want to ask:              If you have any questions about your action plan, call Lauren T. Bloch, [facility], Phone: [phone] , Monday-Friday 8-4:30pm.           PERSONAL MEDICATION LIST FOR Zeynep Martinez,  1945     This medication list was made for you after we talked. We also used information from your doctor's chart.      Use blank rows to add new medications. Then fill in the dates you started using them.    Cross out medications when you no longer use them. Then write the date and why you stopped using them.    Ask your doctors, pharmacists, and other healthcare providers to update this list at every visit. Keep this list up-to-date with:       Prescription medications    Over the counter drugs     Herbals    Vitamins    Minerals      If you go to the hospital or emergency room, take this list with you. Share this with your family or caregivers too.     DATE PREPARED: 2021  Allergies or side effects: Adhesive tape, Latex, Statin drugs [hmg-coa-r  inhibitors], and Penicillins     Medication:  ALPRAZOLAM 0.5 MG PO TABS      How I use it:  Take 1 tablet (0.5 mg) by mouth 3 times daily as needed for anxiety      Why I use it: Anxiety    Prescriber:  Shayy Rivas NP      Date I started using it:       Date I stopped using it:         Why I stopped using it:            Medication:  DOXYCYCLINE HYCLATE 100 MG PO TABS      How I use it:  Take 100 mg by mouth 2 times daily      Why I use it:  kidney stone/ recurrent UTI    Prescriber:  Dr. Mansfield      Date I started using it:       Date I stopped using it:         Why I stopped using it:            Medication:  GABAPENTIN 800 MG PO TABS      How I use it:  Take 1 tablet (800 mg) by mouth 2 times daily      Why I use it: Trigeminal neuralgia    Prescriber:  Shayy Rivas NP      Date I started using it:       Date I stopped using it:         Why I stopped using it:            Medication:  METFORMIN HCL  MG PO TB24      How I use it:  Take 2 tablets (1,000 mg) by mouth 2 times daily (with meals)      Why I use it: Type 2 diabetes mellitus with other specified complication, without long-term current use of insulin (H)    Prescriber:  Shayy Rivas NP      Date I started using it:       Date I stopped using it:         Why I stopped using it:            Medication:  PANTOPRAZOLE SODIUM 40 MG PO TBEC      How I use it:  Take 1 tablet (40 mg) by mouth daily with food      Why I use it: Gastroesophageal reflux disease without esophagitis    Prescriber:  Shayy Rivas NP      Date I started using it:       Date I stopped using it:         Why I stopped using it:            Medication:  SEMAGLUTIDE(0.25 OR 0.5MG/DOS) 2 MG/1.5ML SC SOPN      How I use it:  Inject 0.25 mg subcutaneously once weekly for 4 weeks then 0.5 mg once weekly.      Why I use it: Type 2 diabetes mellitus without complication, without long-term current use of insulin (H)    Prescriber:  Linsey Gay NP      Date I started using  it:       Date I stopped using it:         Why I stopped using it:               Medication:  TRAZODONE HCL 50 MG PO TABS      How I use it:  Take 1 tablet (50 mg) by mouth At Bedtime      Why I use it: Insomnia, unspecified type    Prescriber:  Shayy Rivas NP      Date I started using it:       Date I stopped using it:         Why I stopped using it:            Medication:  VENLAFAXINE HCL  MG PO CP24      How I use it:  Take 150 mg by mouth daily       Why I use it:  Moderate episode of recurrent major depressive disorder (H)    Prescriber:  Shayy Rivas NP      Date I started using it:       Date I stopped using it:         Why I stopped using it:            Medication:  VENLAFAXINE HCL ER 75 MG PO CP24      How I use it:  Take 1 capsule (75 mg) by mouth daily Take in addition to 150 mg      Why I use it: Moderate episode of recurrent major depressive disorder (H)    Prescriber:  Shayy Rivas NP      Date I started using it:       Date I stopped using it:         Why I stopped using it:            Medication:         How I use it:         Why I use it:      Prescriber:         Date I started using it:       Date I stopped using it:         Why I stopped using it:            Medication:         How I use it:         Why I use it:      Prescriber:         Date I started using it:       Date I stopped using it:         Why I stopped using it:            Medication:         How I use it:         Why I use it:      Prescriber:         Date I started using it:       Date I stopped using it:         Why I stopped using it:              Other Information:     If you have any questions about your medication list, call Lauren T. Bloch, [facility], Phone: [phone] , Monday-Friday 8-4:30pm.    According to the Paperwork Reduction Act of 1995, no persons are required to respond to a collection of information unless it displays a valid OMB control number. The valid OMB number for this information  collection is 8218-2111. The time required to complete this information collection is estimated to average 40 minutes per response, including the time to review instructions, searching existing data resources, gather the data needed, and complete and review the information collection. If you have any comments concerning the accuracy of the time estimate(s) or suggestions for improving this form, please write to: CMS, Attn: [employer] Reports Clearance Officer, [address] [place] [phone].

## 2021-06-09 NOTE — Clinical Note
Edilberto Lucas - was referred by a provider at the [facility] at. Had started Ozempic in her and was helping sugars, but good amount of s/e so having her hold for now. She does have longstanding chronic intermittent diarrhea so I did change metformin IR to ER on off chance that it has some effect. Small evidence/studies have shown ok absorption with metformin ER s/p RYGB. I am following up with her in 2 weeks to check in. See note other items discussed. Thank you! Chetna ARAYAM Pharmacist

## 2021-06-09 NOTE — PROGRESS NOTES
Zeynep is a 76 year old who is being evaluated via a billable telephone visit.      What phone number would you like to be contacted at? [phone]   How would you like to obtain your AVS? Tamar    Assessment & Plan     Insomnia, unspecified type  Will try Trazodone short term to see if we can break this cycle.  Discussed the use and indication of this medication as well as potential side effects.   - traZODone (DESYREL) 50 MG tablet; Take 1 tablet (50 mg) by mouth At Bedtime    She does need to change clinics to one closer to home.  Will establish care with Trinh Bello.      23 minutes spent on the date of the encounter doing patient visit and documentation            No follow-ups on file.    Shayy Rivas NP  [facility]   Zeynep is a 76 year old who presents for the following health issues     HPI     She has had difficulty falling asleep for the past 2 weeks.  This started after her surgery.  She has trouble shutting off her mind.   Has tried music, other sleep hygiene measures.  She has tried melatonin.    Her depression is currently stable.        Review of Systems         Objective           Vitals:  No vitals were obtained today due to virtual visit.    Physical Exam   healthy, alert and no distress  PSYCH: Alert and oriented times 3; coherent speech, normal   rate and volume, able to articulate logical thoughts, able   to abstract reason, no tangential thoughts, no hallucinations   or delusions  Her affect is normal  RESP: No cough, no audible wheezing, able to talk in full sentences  Remainder of exam unable to be completed due to telephone visits                Phone call duration: 13 minutes

## 2021-06-09 NOTE — Clinical Note
She was having severe diarrhea almost every day since starting Ozempic - like bought depends and was having multiple accidents. Hasn't been leaving home due to this. So, having her just totally hold for now. Switched her metformin for ER as am aware she has chronic diarrhea issues. I am okay with trying the ER metformin s/p RYGB due to her current status and small trials showing ok absorption. Let me know if you have folllow up questions. I did encourage her to potentially follow up with PCP if she is due for colonoscopy and if this chronic diarrhea has been a decade+ thing maybe GI referral warranted. Chetna BOSTON

## 2021-06-09 NOTE — PATIENT INSTRUCTIONS
Recommendations from today's MTM visit:                                                    MTM (medication therapy management) is a service provided by a clinical pharmacist designed to help you get the most of out of your medicines.   Today we reviewed what your medicines are for, how to know if they are working, that your medicines are safe and how to make your medicine regimen as easy as possible.      1. Hold Ozempic for now. Hold off on restarting glipizide for now. Let me know if your blood sugars get to consistently greater than 150 mg/dL in morning as we may want to consider restarting glipizide at that point.     2. Change metformin immediate release (IR) to metformin extended release (ER) 500 mg tablet: 2 tablets twice daily with meals.     3. Start vitamin D 1,000 -2,000 international unit(s) daily     4. Start Trazodone 50 mg nightly for sleep as prescribed by Shayy Rivas NP.     5. Consider following up with your primary care provider on if you are due for colonoscopy and potential for Gastroenterology referral    6. Would be good to have you retake the questionnaire to check in on your mood since dose increase of venlafaxine. Please complete attached questionnaire.     Follow-up: Return in about 4 weeks (around 7/7/2021) for Medication Therapy Management Pharmacist Visit, via phone, (scheduled today).    It was great to speak with you today.  I value your experience and would be very thankful for your time with providing feedback on our clinic survey. You may receive a survey via email or text message in the next few days.     My Clinical Pharmacist's contact information:                                                      Please feel free to contact me with any questions or concerns you have.      Lauren Bloch, PharmD  Medication Therapy Management Pharmacist   [facility]

## 2021-06-09 NOTE — PROGRESS NOTES
"Hand Therapy Initial Evaluation    Current Date:  6/18/2021    Diagnosis: Tremor (L>R)  DOI: ~2 years (6/3/2021 MD order date)    Referring provider: Isael Cordero MD    Subjective:  Zeynep Martinez is a 76 year old female.    Answers for HPI/ROS submitted by the patient on 6/16/2021   History Reported by Patient  Reason for Visit:: OT on hands  How problem occurred:: Over the past two years  Number scale: 0/10  General health as reported by patient: good  Please check all that apply to your current or past medical history: changes in bowel/bladder, cancer, depression, diabetes, numbness/tingling, overweight, weakness  Medical allergies: adhesives, other  Other Allergies Detail: Penicillin and statins  Medications you are currently taking: anti-depressants, pain medication, sleep medication, other  Other Meds Detail: Diabetic  Occupation:: Retired    Occupational Profile Information:  Right hand dominant  Prior functional level:  independent-all household chores  Patient reports symptoms of pain, stiffness/loss of motion, weakness/loss of strength, numbness, and tremor  Special tests:  None.    Previous treatment: None  Barriers include:transportation  Mobility: Ambulates with aid of 4-wheeled walker  Transportation: unable to drive d/t cognition per pt report  Currently not working due to being retired  Leisure activities/hobbies: crocheting, has difficulties crocheting beyond 1 hour  Other: Pt has difficulties with writing, chopping vegetables/fruit, buttons, tying shoes    Functional Outcome Measure:   Upper Extremity Functional Index Score:  SCORE:   Column Totals: /80: (P) 43   (A lower score indicates greater disability.)    Objective:  Pain Level (Scale 0-10)   6/18/2021   At Rest 0/10   With Use 0/10     Pain Description  Date 6/18/2021   Location hand   Pain Quality Aching, Numb and \"fuzzy\" d/t neuropathy   Frequency intermittent     Pain is worst  daytime   Exacerbated by  fine motor " tasks, gripping, food preparation, drinking out of an open cup, dressing   Relieved by none   Progression Gradually worsening     Edema  Mild    Sensation   Numbness and tingling throughout hand and fingers, particularly L LF    ROM   WNL, pt broke L SF in high school    Nine-Hole Peg Test   6/18/2021   Hand Time in seconds   Right 49   Left 61, compensation with key pinch     Strength   (Measured in pounds)  Pain Report: - none  + mild    ++ moderate    +++ severe    6/18/2021 6/18/2021   Trials R L   1  2  3 35 26   Average 35 26     Lat Pinch 6/18/2021 6/18/2021   Trials R L   1  2  3 10 8   Average 10 8     3 Pt Pinch 6/18/2021 6/18/2021   Trials R L   1  2  3 6 3   Average 6 3     Assessment:  Patient presents with symptoms consistent with diagnosis of bilateral hand  tremor,  with conservative intervention.     Patient's limitations or Problem List includes:  Decreased ROM/motion, Weakness, Decreased , Decreased pinch and Decreased coordination of the bilateral wrist and hand which interferes with the patient's ability to perform Self Care Tasks (dressing, eating, bathing, hygiene/toileting), Sleep Patterns, Recreational Activities and Household Chores as compared to previous level of function.    Rehab Potential:  Good - Return to full activity, some limitations    Patient will benefit from skilled Occupational Therapy to increase overall strength, coordination and dexterity and decrease numbness, tremor to return to previous activity level and resume normal daily tasks and to reach their rehab potential.    Barriers to Learning:  Cognition    Communication Issues:  Patient appears to be able to clearly communicate and understand verbal and written communication and follow directions correctly.  Family member present for session to facilitate follow through of home program.    Chart Review: Chart Review, Brief history including review of medical and/or therapy records relating to the presenting  problem and Simple history review with patient    Identified Performance Deficits: bathing/showering, toileting, dressing, feeding, functional mobility, hygiene and grooming, driving and community mobility, home establishment and management, meal preparation and cleanup, sleep and leisure activities    Assessment of Occupational Performance:  5 or more Performance Deficits    Clinical Decision Making (Complexity): Moderate complexity    Treatment Explanation:  The following has been discussed with the patient:  RX ordered/plan of care  Anticipated outcomes  Possible risks and side effects    Plan:  Frequency:  1 X week, once daily  Duration:  for 6 weeks    Treatment Plan:   Modalities:  US, Fluidotherapy and Paraffin  Therapeutic Exercise:  AROM, AAROM, PROM, Tendon Gliding, Blocking, Reverse Blocking, Place and Hold, Contract Relax, Extensor Tracking, Isotonics, Isometrics and Stabilization  Neuromuscular re-education:  Nerve Gliding, Coordination/Dexterity, Sensory re-education, Desensitization, Kinesthetic Training, Proprioceptive Training, Posture, Kinesiotaping, Isometrics and Stabilization  Manual Techniques:  Coordination/Dexterity, Joint mobilization, Myofascial release and Manual edema mobilization  Orthotic Fabrication:  Static orthosis  Self Care:  Self Care Tasks, Ergonomic Considerations and Community Transportation    Discharge Plan:  Achieve all LTG.  Independent in home treatment program.  Reach maximal therapeutic benefit.    Home Exercise Program:  Exercise Name: Hand Strengthening Gripping, Sets: 1 - Reps: 10 - Sessions: 2  Exercise Name: Hand Strengthening Key Pinch, Sets: 1 - Reps: 10 - Sessions: 2  Exercise Name: Hand Strengthening 3 Point Pinch, Sets: 1 - Reps: 10 - Sessions: 2    Next Visit:  Review/progress HEP  Therapeutic activities - fine motor tasks  Review adaptive equipment

## 2021-06-13 NOTE — PROGRESS NOTES
Assessment/Plan:        Diagnoses and all orders for this visit:    Urinary calculus  -     Patient Stated Goal: Prevent further stones  -     Calcium Oxalate Stone Prevention Education  -     Foods Rich in Sodium Salt Education  -     Low Oxalate Food List Education  -     CT Abdomen Pelvis Without Oral Without IV Contrast; Future; Expected date: 5/14/18    Urinary tract stones  -     Urinalysis Macroscopic    Calculus of kidney    Other orders  -     JANUVIA 100 mg tablet;   -     venlafaxine (EFFEXOR) 37.5 MG tablet;   -     pantoprazole (PROTONIX) 40 MG tablet;   -     HYDROcodone-acetaminophen 5-325 mg per tablet;       Stone Management Plan  [facility] Stone [facility] 4/2/2015 2/25/2016 10/26/2017   Urinary Tract Infection No suspicion of infection No suspicion of infection No suspicion of infection   Renal Colic Asymptomatic at this time Asymptomatic at this time Asymptomatic at this time   Renal Failure No suspicion of renal failure No suspicion of renal failure No suspicion of renal failure   Current CT date 4/2/2015 2/25/2016 7/12/2017   Right sided stones? No No No   R Stone Event No current event No current event No current event   Left sided stones? Yes Yes No   L Number of ureteral stones No ureteral stones No ureteral stones -   L Number of kidney stones  1 1 -   L GSD of kidney stones 2 - 4 2 - 4 -   L Hydronephrosis None None -   L Stone Event No current event No current event No current event   L Current Plan Observe Observe -   Observe rationale Limited stone burden with good prognosis for spontaneous passage Limited stone burden with good prognosis for spontaneous passage -             Subjective:      HPI  Ms. Zeynep Martinez is a 72 y.o.  female returning to the [facility] for follow up of her stone disease.    She has moved to [place] where she has been recently receiving local attention for mid abdominal pain. It appears that the pain may be associated with a  gastric ulcer. It was atypical for stone disease and she has not had hematuria or voiding symptoms.    Imaging reports from [place] are reviewed and appear to demonstrate that she is stone free and has irregular renal cysts which are not suspicious for malignancy.    She is reassured.    She pl;ans to follow up with us in one year for repate CT to keep an eye on her stone disease.     ROS   Review of systems is negative except for HPI.    Past Medical History:   Diagnosis Date     Cancer     uterine     Diabetes mellitus     type 2     Gout      Kidney stone      Ulcer     Ulcer in the anastomosis       Past Surgical History:   Procedure Laterality Date     APPENDECTOMY       HYSTERECTOMY       CT ERCP W/BIOPSY SINGLE/MULTIPLE       CATHERINE-EN-Y PROCEDURE       SKIN SURGERY  2013    15 lbs of skin/adipose removed     STOMACH SURGERY       URETEROSCOPY         Current Outpatient Prescriptions   Medication Sig Dispense Refill     gabapentin (NEURONTIN) 800 MG tablet Take 800 mg by mouth 3 (three) times a day.       HYDROcodone-acetaminophen 5-325 mg per tablet        JANUVIA 100 mg tablet        metFORMIN (GLUCOPHAGE) 500 MG tablet        pantoprazole (PROTONIX) 40 MG tablet        triamcinolone (KENALOG) 0.1 % ointment Use occasionally       venlafaxine (EFFEXOR) 37.5 MG tablet        No current facility-administered medications for this visit.        Allergies   Allergen Reactions     Statins-Hmg-Coa Reductase Inhibitors Other (See Comments)     Confusion     Penicillins Rash       Social History     Social History     Marital status:      Spouse name: N/A     Number of children: N/A     Years of education: N/A     Occupational History     data analysis Retired     Social History Main Topics     Smoking status: Former Smoker     Smokeless tobacco: Not on file     Alcohol use Yes      Comment: occas     Drug use: Not on file     Sexual activity: Not on file     Other Topics Concern     Not on file     Social  History Narrative       Family History   Problem Relation Age of Onset     Diabetes Maternal Aunt      Diabetes Maternal Grandmother      Cancer Maternal Grandmother      cervical     Heart disease Maternal Grandmother      Objective:      Physical Exam  Vitals:    10/26/17 0953   BP: 135/68   Pulse: 75   Temp: 98.2  F (36.8  C)     General - well developed, well nourished, appropriate for age. Appears no distress at this time  Abdomen - morbidly obese soft, non-tender, no hepatosplenomegaly, no masses.   - no flank tenderness, no suprapubic tenderness, kidney and bladder non-palpable  MSK - normal spinal curvature. no spinal tenderness. normal gait. muscular strength intact.  Psych - oriented to time, place, and person, normal mood and affect.        Labs   Urinalysis POC (Office):  Blood UA   Date Value Ref Range Status   02/25/2016 Trace Negative Final   04/02/2015 Negative Negative Final     Nitrite, UA   Date Value Ref Range Status   10/26/2017 Negative Negative Final   02/25/2016 Negative Negative Final   04/02/2015 Negative Negative Final     Leukocytes, UA    Date Value Ref Range Status   02/25/2016 Moderate (!) Negative Final   04/02/2015 Small (!) Negative Final     pH UA   Date Value Ref Range Status   02/25/2016 5.5 4.5 - 8.0 Final   04/02/2015 5.0 4.5 - 8.0 Final       Lab Urinalysis:  Blood, UA   Date Value Ref Range Status   10/26/2017 Small (!) Negative Final     Nitrite, UA   Date Value Ref Range Status   10/26/2017 Negative Negative Final   02/25/2016 Negative Negative Final   04/02/2015 Negative Negative Final     Leukocytes, UA   Date Value Ref Range Status   10/26/2017 Moderate (!) Negative Final     pH, UA   Date Value Ref Range Status   10/26/2017 5.0 5.0 - 8.0 Final

## 2021-06-14 ENCOUNTER — AMBULATORY - HEALTHEAST (OUTPATIENT)
Dept: UROLOGY | Facility: CLINIC | Age: 76
End: 2021-06-14

## 2021-06-14 DIAGNOSIS — N20.0 CALCULUS OF KIDNEY: ICD-10-CM

## 2021-06-14 LAB
ALBUMIN UR-MCNC: ABNORMAL G/DL
APPEARANCE UR: CLEAR
BACTERIA #/AREA URNS HPF: ABNORMAL /[HPF]
BILIRUB UR QL STRIP: NEGATIVE
COLOR UR AUTO: YELLOW
GLUCOSE UR STRIP-MCNC: NEGATIVE MG/DL
HGB UR QL STRIP: NEGATIVE
KETONES UR STRIP-MCNC: NEGATIVE MG/DL
LEUKOCYTE ESTERASE UR QL STRIP: ABNORMAL
NITRATE UR QL: NEGATIVE
PH UR STRIP: 5 [PH] (ref 5–8)
RBC #/AREA URNS AUTO: ABNORMAL HPF
SP GR UR STRIP: 1.02 (ref 1–1.03)
SQUAMOUS #/AREA URNS AUTO: ABNORMAL LPF
UROBILINOGEN UR STRIP-ACNC: ABNORMAL
WBC #/AREA URNS AUTO: ABNORMAL HPF

## 2021-06-15 LAB — BACTERIA SPEC CULT: NO GROWTH

## 2021-06-15 NOTE — TELEPHONE ENCOUNTER
Spoke with patient who is doing well and is still taking the antibiotic for another week.  She will do a repeat urine culture in one month at [facility] and will call with any issues in the interim.  Orders placed.  Joy Horner RN

## 2021-06-15 NOTE — TELEPHONE ENCOUNTER
Spoke with patient and was updated on her urine culture results and the antibiotic ordered.  She will  the medication and will start it.  She will call to let us know if she starts to have infection symptoms or cannot tolerate the medication.  Joy Horner RN

## 2021-06-15 NOTE — PROGRESS NOTES
Assessment/Plan:    Assessment & Plan   Zeynep was seen today for follow-up.    Diagnoses and all orders for this visit:    Acute cystitis with hematuria        Stone Management Plan  [facility] 11/12/2018 2/8/2021 2/18/2021   Urinary Tract Infection No suspicion of infection Possible Infection Suspected Infection   Renal Colic Well controlled symptoms Well controlled symptoms Well controlled symptoms   Renal Failure No suspicion of renal failure No suspicion of renal failure No suspicion of renal failure   Current CT date - - 2/17/2021   Right sided stones? - - Yes   R Number of ureteral stones - - No ureteral stones   R Number of kidney stones  - - 2   R GSD of kidney stones - - 2 - 4   R Hydronephrosis - - None   R Stone Event No current event - No current event   R Current Plan - - Observe   Observe rationale - - Limited stone burden with good prognosis for spontaneous passage   Left sided stones? - - No   L Number of ureteral stones - - -   L Number of kidney stones  - - -   L GSD of kidney stones - - -   L Hydronephrosis - - -   L Stone Event Established event - No current event   Post-op status Stent Removal - -   L Current Plan - - -   Observe rationale - - -             PLAN    No follow-ups on file.    Phone call duration: 10 minutes  15 minutes spent on the date of the encounter doing chart review, history and exam, documentation and further activities as noted above    Long Mansfield MD  [facility]    Subjective:      HPI  Ms. Zeynep Martinez is a 75 y.o.  female who is being evaluated via a billable telephone visit by [facility] for follow up of her suspected UTI.    She has been doing well. Urine remains cloudy but she has no symptoms -- yet.     CT demonstrated a couple of timy right lower pole stones but no hydronephrosis.    Preliminary urine culture is E coli and it may well end up being ESBL as she has  had in the past.     She is willing to wait for sensitivities which will hopefully be available within the next day or 2. Will respond to that and begin to design ongoing strategy.       ROS   Review of systems is negative except for HPI.    Past Medical History:   Diagnosis Date     Bladder infection      Cancer (H)     uterine     Diabetes mellitus (H)     type 2     Gout      Kidney stone      Ulcer     Ulcer in the anastomosis       Past Surgical History:   Procedure Laterality Date     APPENDECTOMY       HYSTERECTOMY       [place] CYSTO/URETERO W/LITHOTRIPSY &INDWELL STENT INSRT Left 10/26/2018    Procedure:  CYSTOSCOPY, LEFT  URETEROSCOPY, LASER LITHOTRIPSY STENT INSERTION;  Surgeon: Long Mansfield MD;  Location: [facility];  Service: Urology     [place] ERCP W/BIOPSY SINGLE/MULTIPLE       CATHERINE-EN-Y PROCEDURE       SKIN SURGERY  2013    15 lbs of skin/adipose removed     STOMACH SURGERY       URETEROSCOPY         Current Outpatient Medications   Medication Sig Dispense Refill     buPROPion (WELLBUTRIN XL) 150 MG 24 hr tablet Take 150 mg by mouth daily.       cephalexin (KEFLEX) 250 MG capsule Take 250 mg by mouth daily.       gabapentin (NEURONTIN) 800 MG tablet Take 800 mg by mouth 2 (two) times a day.        glipiZIDE (GLUCOTROL) 5 MG tablet        metFORMIN (GLUCOPHAGE) 1000 MG tablet Take 1,000 mg by mouth 2 (two) times a day with meals.       pantoprazole (PROTONIX) 40 MG tablet        triamcinolone (KENALOG) 0.1 % ointment Use occasionally       venlafaxine (EFFEXOR) 100 MG tablet Take 100 mg by mouth 2 (two) times a day.       ALPRAZolam (XANAX) 0.5 MG tablet Take 0.5 mg by mouth.       No current facility-administered medications for this visit.        Allergies   Allergen Reactions     Statins-Hmg-Coa Reductase Inhibitors Other (See Comments)     Confusion     Penicillins Rash       Social History     Socioeconomic History     Marital status:      Spouse name: Not on file     Number of  children: Not on file     Years of education: Not on file     Highest education level: Not on file   Occupational History     Occupation: data analysis     Employer: RETIRED   Social Needs     Financial resource strain: Not on file     Food insecurity     Worry: Not on file     Inability: Not on file     Transportation needs     Medical: Not on file     Non-medical: Not on file   Tobacco Use     Smoking status: Former Smoker     Smokeless tobacco: Never Used   Substance and Sexual Activity     Alcohol use: Yes     Comment: occas     Drug use: No     Sexual activity: Not on file   Lifestyle     Physical activity     Days per week: Not on file     Minutes per session: Not on file     Stress: Not on file   Relationships     Social connections     Talks on phone: Not on file     Gets together: Not on file     Attends Episcopal service: Not on file     Active member of club or organization: Not on file     Attends meetings of clubs or organizations: Not on file     Relationship status: Not on file     Intimate partner violence     Fear of current or ex partner: Not on file     Emotionally abused: Not on file     Physically abused: Not on file     Forced sexual activity: Not on file   Other Topics Concern     Not on file   Social History Narrative     Not on file       Family History   Problem Relation Age of Onset     Diabetes Maternal Aunt      Diabetes Maternal Grandmother      Cancer Maternal Grandmother         cervical     Heart disease Maternal Grandmother        Objective:      No vitals or physical exam obtained due to virtual visit  Labs   Urinalysis POC (Office):  Blood UA   Date Value Ref Range Status   02/25/2016 Trace Negative Final   04/02/2015 Negative Negative Final     Nitrite, UA   Date Value Ref Range Status   11/12/2018 Negative Negative Final   11/05/2018 Positive (!) Negative Final   09/17/2018 Negative Negative Final     Leukocytes, UA    Date Value Ref Range Status   02/25/2016 Moderate (!)  Negative Final   04/02/2015 Small (!) Negative Final     pH UA   Date Value Ref Range Status   02/25/2016 5.5 4.5 - 8.0 Final   04/02/2015 5.0 4.5 - 8.0 Final       Lab Urinalysis:  Blood, UA   Date Value Ref Range Status   11/12/2018 Moderate (!) Negative Final   11/05/2018 Moderate (!) Negative Final   09/17/2018 Moderate (!) Negative Final     Nitrite, UA   Date Value Ref Range Status   11/12/2018 Negative Negative Final   11/05/2018 Positive (!) Negative Final   09/17/2018 Negative Negative Final     Leukocytes, UA   Date Value Ref Range Status   11/12/2018 Small (!) Negative Final   11/05/2018 Small (!) Negative Final   09/17/2018 Large (!) Negative Final     pH, UA   Date Value Ref Range Status   11/12/2018 5.0 5.0 - 8.0 Final   11/05/2018 5.5 5.0 - 8.0 Final   09/17/2018 5.5 5.0 - 8.0 Final    and Acute Labs   Urine Culture    Culture   Date Value Ref Range Status   02/15/2021 >100,000 col/ml Escherichia coli (!)  Preliminary   11/12/2018 Mixture of urogenital organisms  Final   11/05/2018 >100,000 col/ml ESBL producing Escherichia coli (!)  Final

## 2021-06-15 NOTE — PROGRESS NOTES
Patient has been started on doxycycline per Dr. Mansfield for ESBL e. Coli UTI.    Our RN discussed care plan with patient

## 2021-06-15 NOTE — PROGRESS NOTES
Assessment/Plan:    Assessment & Plan   Zeynep was seen today for new problem - self referred.    Diagnoses and all orders for this visit:    Urinary tract infection without hematuria, site unspecified  -     Culture, Urine- Future; Future    Morbid obesity (H)    Calculus of kidney  -     CT Abdomen Pelvis Without Oral Without IV Contrast        Stone Management Plan  [facility] Stone [facility] 11/5/2018 11/12/2018 2/8/2021   Urinary Tract Infection Suspected Infection No suspicion of infection Possible Infection   Renal Colic Well controlled symptoms Well controlled symptoms Well controlled symptoms   Renal Failure No suspicion of renal failure No suspicion of renal failure No suspicion of renal failure   Current CT date - - -   Right sided stones? - - -   R Hydronephrosis - - -   R Stone Event No current event No current event -   Left sided stones? - - -   L Number of ureteral stones - - -   L Number of kidney stones  - - -   L GSD of kidney stones - - -   L Hydronephrosis - - -   L Stone Event Established event Established event -   Post-op status Other Stent Removal -   L Current Plan - - -   Observe rationale - - -             PLAN    Return for CT scan, urine culture follow-up.    Phone call duration: 15 minutes  25 minutes spent on the date of the encounter doing chart review, history and exam, documentation and further activities as noted above    Long Mansfield MD  [facility]    Subjective:      HPI  Ms. Zeynep Martinez is a 75 y.o.  female who is being evaluated via a billable telephone visit by [facility] for follow up of stone disease.    Zeynep is returning to the [place] and seeks to re-establish care. She has been troubled by repeat UTI every 4 months which has been managed by daily low dose Keflex and episodic IV antibiotics. Had one episode of life threatening sepsis when she was not promptly treated. Typical  symptoms begin with cloudy urine progressing to dysuria. Has not been bothered by feve or hematuria. Denies flank pain. Has not had any recent CT scan for stone.    She does have remote history of ESBL E coli and did have existing renal stones at last contact in 2018.    She also appears to have an evolving neurologic condition possibly Parkinson's or MS. Seeing Neurologist next week.    Will obtain current urine culture and CT. Will obtain records from her urologist in [place]. Further encounter once data is assembled.       ROS   Review of systems is negative except for HPI.    Past Medical History:   Diagnosis Date     Bladder infection      Cancer (H)     uterine     Diabetes mellitus (H)     type 2     Gout      Kidney stone      Ulcer     Ulcer in the anastomosis       Past Surgical History:   Procedure Laterality Date     APPENDECTOMY       HYSTERECTOMY       [place] CYSTO/URETERO W/LITHOTRIPSY &INDWELL STENT INSRT Left 10/26/2018    Procedure:  CYSTOSCOPY, LEFT  URETEROSCOPY, LASER LITHOTRIPSY STENT INSERTION;  Surgeon: Long Mansfield MD;  Location: [facility];  Service: Urology     [place] ERCP W/BIOPSY SINGLE/MULTIPLE       CATHERINE-EN-Y PROCEDURE       SKIN SURGERY  2013    15 lbs of skin/adipose removed     STOMACH SURGERY       URETEROSCOPY         Current Outpatient Medications   Medication Sig Dispense Refill     buPROPion (WELLBUTRIN XL) 150 MG 24 hr tablet Take 150 mg by mouth daily.       cephalexin (KEFLEX) 250 MG capsule Take 250 mg by mouth daily.       gabapentin (NEURONTIN) 800 MG tablet Take 800 mg by mouth 2 (two) times a day.        glipiZIDE (GLUCOTROL) 5 MG tablet        metFORMIN (GLUCOPHAGE) 1000 MG tablet Take 1,000 mg by mouth 2 (two) times a day with meals.       pantoprazole (PROTONIX) 40 MG tablet        triamcinolone (KENALOG) 0.1 % ointment Use occasionally       venlafaxine (EFFEXOR) 100 MG tablet Take 100 mg by mouth 2 (two) times a day.       No current facility-administered  medications for this visit.        Allergies   Allergen Reactions     Statins-Hmg-Coa Reductase Inhibitors Other (See Comments)     Confusion     Penicillins Rash       Social History     Socioeconomic History     Marital status:      Spouse name: Not on file     Number of children: Not on file     Years of education: Not on file     Highest education level: Not on file   Occupational History     Occupation: data analysis     Employer: RETIRED   Social Needs     Financial resource strain: Not on file     Food insecurity     Worry: Not on file     Inability: Not on file     Transportation needs     Medical: Not on file     Non-medical: Not on file   Tobacco Use     Smoking status: Former Smoker     Smokeless tobacco: Never Used   Substance and Sexual Activity     Alcohol use: Yes     Comment: occas     Drug use: No     Sexual activity: Not on file   Lifestyle     Physical activity     Days per week: Not on file     Minutes per session: Not on file     Stress: Not on file   Relationships     Social connections     Talks on phone: Not on file     Gets together: Not on file     Attends Adventist service: Not on file     Active member of club or organization: Not on file     Attends meetings of clubs or organizations: Not on file     Relationship status: Not on file     Intimate partner violence     Fear of current or ex partner: Not on file     Emotionally abused: Not on file     Physically abused: Not on file     Forced sexual activity: Not on file   Other Topics Concern     Not on file   Social History Narrative     Not on file       Family History   Problem Relation Age of Onset     Diabetes Maternal Aunt      Diabetes Maternal Grandmother      Cancer Maternal Grandmother         cervical     Heart disease Maternal Grandmother        Objective:      No vitals or physical exam obtained due to virtual visit  Labs   Urinalysis POC (Office):  Blood UA   Date Value Ref Range Status   02/25/2016 Trace Negative Final    04/02/2015 Negative Negative Final     Nitrite, UA   Date Value Ref Range Status   11/12/2018 Negative Negative Final   11/05/2018 Positive (!) Negative Final   09/17/2018 Negative Negative Final     Leukocytes, UA    Date Value Ref Range Status   02/25/2016 Moderate (!) Negative Final   04/02/2015 Small (!) Negative Final     pH UA   Date Value Ref Range Status   02/25/2016 5.5 4.5 - 8.0 Final   04/02/2015 5.0 4.5 - 8.0 Final       Lab Urinalysis:  Blood, UA   Date Value Ref Range Status   11/12/2018 Moderate (!) Negative Final   11/05/2018 Moderate (!) Negative Final   09/17/2018 Moderate (!) Negative Final     Nitrite, UA   Date Value Ref Range Status   11/12/2018 Negative Negative Final   11/05/2018 Positive (!) Negative Final   09/17/2018 Negative Negative Final     Leukocytes, UA   Date Value Ref Range Status   11/12/2018 Small (!) Negative Final   11/05/2018 Small (!) Negative Final   09/17/2018 Large (!) Negative Final     pH, UA   Date Value Ref Range Status   11/12/2018 5.0 5.0 - 8.0 Final   11/05/2018 5.5 5.0 - 8.0 Final   09/17/2018 5.5 5.0 - 8.0 Final    and Acute Labs   Urine Culture    Culture   Date Value Ref Range Status   11/12/2018 Mixture of urogenital organisms  Final   11/05/2018 >100,000 col/ml ESBL producing Escherichia coli (!)  Final   10/26/2018 2+ ESBL producing Escherichia coli (!)  Final

## 2021-06-16 NOTE — TELEPHONE ENCOUNTER
Urine culture results received and per provider, antibiotic was sent to patient's pharmacy.  Patient advised and will start the medication today.  Joy Horner RN

## 2021-06-17 NOTE — PROGRESS NOTES
Assessment/Plan:    Assessment & Plan   Zeynep was seen today for follow-up.    Diagnoses and all orders for this visit:    Calculus of kidney  -     Patient Stated Goal: Know what to expect after surgery  -     Case Request: CYSTOURETEROSCOPY, WITH RETROGRADE PYELOGRAM, HOLMIUM LASER LITHOTRIPSY OF URETERAL CALCULUS, AND STENT INSERTION; Standing  -     Case Request: CYSTOURETEROSCOPY, WITH RETROGRADE PYELOGRAM, HOLMIUM LASER LITHOTRIPSY OF URETERAL CALCULUS, AND STENT INSERTION  -     doxycycline (VIBRA-TABS) 100 MG tablet; Take 1 tablet (100 mg total) by mouth 2 (two) times a day.        Stone Management Plan  [facility] [place] Stone Management 2/8/2021 2/18/2021 5/13/2021   Urinary Tract Infection Possible Infection Suspected Infection Suspected Infection   Renal Colic Well controlled symptoms Well controlled symptoms Well controlled symptoms   Renal Failure No suspicion of renal failure No suspicion of renal failure No suspicion of renal failure   Current CT date - 2/17/2021 5/13/2021   Right sided stones? - Yes Yes   R Number of ureteral stones - No ureteral stones No ureteral stones   R Number of kidney stones  - 2 2   R GSD of kidney stones - 2 - 4 2 - 4   R Hydronephrosis - None None   R Stone Event - No current event New event   Diagnosis date - - 5/13/2021   Initial location of primary symptomatic stone - - Renal   Initial GSD of primary symptomatic stone - - 3   R Current Plan - Observe Clear   Clear rationale - - Associated treated infection   Observe rationale - Limited stone burden with good prognosis for spontaneous passage -   Left sided stones? - No No   L Number of ureteral stones - - -   L Number of kidney stones  - - -   L GSD of kidney stones - - -   L Hydronephrosis - - -   L Stone Event - No current event No current event   Post-op status - - -   L Current Plan - - -   Observe rationale - - -             PLAN    No follow-ups on file.    Phone call duration: 10 minutes  15 minutes spent on the date of the  encounter doing chart review, history and exam, documentation and further activities per the note    Long Mansfield MD  [facility]    Subjective:      HPI  Ms. Zeynep Martinez is a 76 y.o.  female who is being evaluated via a billable telephone visit by [facility] [place] for follow up of her stone disease.    Zeynep has been having symptoms consistent with recurrent UTI. 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 Started on doxycycline yesterday without obvious changes to foul cloudy urine yet.    Fresh CT is reviewed and demonstrates stable right lower pole small stones. Only real target for clearing sanctuary infected locus at this time.    Will proceed with ureteroscopic attempt at stone clearance after at least two more weeks of doxycycline. We review significant risks of precipitating sepsis and possibility that ESBL will persist regardless of efforts to clear.       ROS   Review of systems is negative except for HPI.    Past Medical History:   Diagnosis Date     Bladder infection      Cancer (H)     uterine     Diabetes mellitus (H)     type 2     Gout      Kidney stone      Ulcer     Ulcer in the anastomosis       Past Surgical History:   Procedure Laterality Date     APPENDECTOMY       HYSTERECTOMY       NM CYSTO/URETERO W/LITHOTRIPSY &INDWELL STENT INSRT Left 10/26/2018    Procedure:  CYSTOSCOPY, LEFT  URETEROSCOPY, LASER LITHOTRIPSY STENT INSERTION;  Surgeon: Long Mansfield MD;  Location: [facility];  Service: Urology     NM ERCP W/BIOPSY SINGLE/MULTIPLE       CATHERINE-EN-Y PROCEDURE       SKIN SURGERY  2013    15 lbs of skin/adipose removed     STOMACH SURGERY       URETEROSCOPY         Current Outpatient Medications   Medication Sig Dispense Refill     ALPRAZolam (XANAX) 0.5 MG tablet Take 0.5 mg by mouth.       buPROPion (WELLBUTRIN XL) 150 MG 24 hr tablet Take 150 mg by mouth daily.        cephalexin (KEFLEX) 250 MG capsule Take 250 mg by mouth daily.       doxycycline (MONODOX) 100 MG capsule Take 1 capsule (100 mg total) by mouth 2 (two) times a day. 14 capsule 0     furosemide (LASIX) 20 MG tablet        gabapentin (NEURONTIN) 800 MG tablet Take 800 mg by mouth 2 (two) times a day.        glipiZIDE (GLUCOTROL) 5 MG tablet        metFORMIN (GLUCOPHAGE) 1000 MG tablet Take 1,000 mg by mouth 2 (two) times a day with meals.       OZEMPIC 0.25 mg or 0.5 mg(2 mg/1.5 mL) PnIj        pantoprazole (PROTONIX) 40 MG tablet        triamcinolone (KENALOG) 0.1 % ointment Use occasionally       venlafaxine (EFFEXOR) 100 MG tablet Take 100 mg by mouth 2 (two) times a day.       doxycycline (VIBRA-TABS) 100 MG tablet Take 1 tablet (100 mg total) by mouth 2 (two) times a day. 60 tablet 0     No current facility-administered medications for this visit.        Allergies   Allergen Reactions     Statins-Hmg-Coa Reductase Inhibitors Other (See Comments)     Confusion     Penicillins Rash       Social History     Socioeconomic History     Marital status:      Spouse name: Not on file     Number of children: Not on file     Years of education: Not on file     Highest education level: Not on file   Occupational History     Occupation: data analysis     Employer: RETIRED   Social Needs     Financial resource strain: Not on file     Food insecurity     Worry: Not on file     Inability: Not on file     Transportation needs     Medical: Not on file     Non-medical: Not on file   Tobacco Use     Smoking status: Former Smoker     Smokeless tobacco: Never Used   Substance and Sexual Activity     Alcohol use: Yes     Comment: occas     Drug use: No     Sexual activity: Not on file   Lifestyle     Physical activity     Days per week: Not on file     Minutes per session: Not on file     Stress: Not on file   Relationships     Social connections     Talks on phone: Not on file     Gets together: Not on file     Attends  Voodoo service: Not on file     Active member of club or organization: Not on file     Attends meetings of clubs or organizations: Not on file     Relationship status: Not on file     Intimate partner violence     Fear of current or ex partner: Not on file     Emotionally abused: Not on file     Physically abused: Not on file     Forced sexual activity: Not on file   Other Topics Concern     Not on file   Social History Narrative     Not on file       Family History   Problem Relation Age of Onset     Diabetes Maternal Aunt      Diabetes Maternal Grandmother      Cancer Maternal Grandmother         cervical     Heart disease Maternal Grandmother        Objective:      No vitals or physical exam obtained due to virtual visit  Labs   Urinalysis POC (Office):  Blood UA   Date Value Ref Range Status   02/25/2016 Trace Negative Final   04/02/2015 Negative Negative Final     Nitrite, UA   Date Value Ref Range Status   11/12/2018 Negative Negative Final   11/05/2018 Positive (!) Negative Final   09/17/2018 Negative Negative Final     Leukocytes, UA    Date Value Ref Range Status   02/25/2016 Moderate (!) Negative Final   04/02/2015 Small (!) Negative Final     pH UA   Date Value Ref Range Status   02/25/2016 5.5 4.5 - 8.0 Final   04/02/2015 5.0 4.5 - 8.0 Final       Lab Urinalysis:  Blood, UA   Date Value Ref Range Status   11/12/2018 Moderate (!) Negative Final   11/05/2018 Moderate (!) Negative Final   09/17/2018 Moderate (!) Negative Final     Nitrite, UA   Date Value Ref Range Status   11/12/2018 Negative Negative Final   11/05/2018 Positive (!) Negative Final   09/17/2018 Negative Negative Final     Leukocytes, UA   Date Value Ref Range Status   11/12/2018 Small (!) Negative Final   11/05/2018 Small (!) Negative Final   09/17/2018 Large (!) Negative Final     pH, UA   Date Value Ref Range Status   11/12/2018 5.0 5.0 - 8.0 Final   11/05/2018 5.5 5.0 - 8.0 Final   09/17/2018 5.5 5.0 - 8.0 Final    and Acute Labs    Urine Culture    Culture   Date Value Ref Range Status   05/06/2021 >100,000 col/ml ESBL producing Escherichia coli (!)  Final   02/15/2021 Mixture of urogenital organisms with  Final   02/15/2021 >100,000 col/ml ESBL producing Escherichia coli (!)  Final

## 2021-06-17 NOTE — TELEPHONE ENCOUNTER
Per provider, antibiotic sent to pharmacy to treat uti.  Patient advised and will start medication today.  Joy Horner RN

## 2021-06-18 ENCOUNTER — THERAPY VISIT (OUTPATIENT)
Dept: OCCUPATIONAL THERAPY | Facility: CLINIC | Age: 76
End: 2021-06-18
Attending: PSYCHIATRY & NEUROLOGY
Payer: COMMERCIAL

## 2021-06-18 DIAGNOSIS — R25.1 TREMOR OF BOTH HANDS: ICD-10-CM

## 2021-06-18 DIAGNOSIS — R29.898 HAND WEAKNESS: ICD-10-CM

## 2021-06-18 DIAGNOSIS — R25.1 TREMOR: ICD-10-CM

## 2021-06-18 PROCEDURE — 97110 THERAPEUTIC EXERCISES: CPT | Mod: GO | Performed by: OCCUPATIONAL THERAPIST

## 2021-06-18 PROCEDURE — 97530 THERAPEUTIC ACTIVITIES: CPT | Mod: GO | Performed by: OCCUPATIONAL THERAPIST

## 2021-06-18 PROCEDURE — 97166 OT EVAL MOD COMPLEX 45 MIN: CPT | Mod: GO | Performed by: OCCUPATIONAL THERAPIST

## 2021-06-20 NOTE — PROGRESS NOTES
Assessment/Plan:        Diagnoses and all orders for this visit:    Calculus of kidney  -     CT Abdomen Pelvis Without Oral Without IV Contrast; Future; Expected date: 9/17/19    Urinary tract stones  -     Urinalysis Macroscopic  -     Culture, Urine- Future; Future; Expected date: 10/17/18  -     Culture, Urine- Future    Other orders  -     buPROPion (WELLBUTRIN SR) 150 MG 12 hr tablet;   -     amitriptyline (ELAVIL) 10 MG tablet;   -     VOLTAREN 1 % Gel;   -     glipiZIDE (GLUCOTROL) 5 MG tablet;       Stone Management Plan  [facility] Stone Management 2/25/2016 10/26/2017 9/17/2018   Urinary Tract Infection No suspicion of infection No suspicion of infection No suspicion of infection   Renal Colic Asymptomatic at this time Asymptomatic at this time Well controlled symptoms   Renal Failure No suspicion of renal failure No suspicion of renal failure No suspicion of renal failure   Current CT date 2/25/2016 7/12/2017 9/17/2018   Right sided stones? No No -   R Hydronephrosis - - Mild   R Stone Event No current event No current event No current event   Left sided stones? Yes No Yes   L Number of ureteral stones No ureteral stones - -   L Number of kidney stones  1 - 1   L GSD of kidney stones 2 - 4 - 4 - 10   L Hydronephrosis None - -   L Stone Event No current event No current event No current event   L Current Plan Observe - -   Observe rationale Limited stone burden with good prognosis for spontaneous passage - -             Subjective:      HPI  Ms. Zeynep Martinez is a 73 y.o.  female returning to the [facility] [place] for follow up of her stone disease.    She has come up from [facility] for her follow up appointment. She recently had a UTI and has been on a couple of different antiobtics. No fever or chills. She denies flank pain.     CT from today demonstrates new mild right hydronephrosis without obvious stone. There is a left lower pole stone which has increased to 7 mm for 5 mm  last year.    Urine is suspicious for infection today and is sent for culture.    Will respond to urine culture results. Antibiotics per culture. If negative will presume that findings are secondary to passed stone. If positive will treat specifically and look for resolution. If persistent, she should have the left sided stone cleared. The 7 mm left sided stone should probably be cleared electively while it can still be managed ureteroscopically.      ROS   Review of systems is negative except for HPI.    Past Medical History:   Diagnosis Date     Bladder infection      Cancer (H)     uterine     Diabetes mellitus (H)     type 2     Gout      Kidney stone      Ulcer (H)     Ulcer in the anastomosis       Past Surgical History:   Procedure Laterality Date     APPENDECTOMY       HYSTERECTOMY       CT ERCP W/BIOPSY SINGLE/MULTIPLE       CATHERINE-EN-Y PROCEDURE       SKIN SURGERY  2013    15 lbs of skin/adipose removed     STOMACH SURGERY       URETEROSCOPY         Current Outpatient Prescriptions   Medication Sig Dispense Refill     amitriptyline (ELAVIL) 10 MG tablet        buPROPion (WELLBUTRIN SR) 150 MG 12 hr tablet        gabapentin (NEURONTIN) 800 MG tablet Take 800 mg by mouth 3 (three) times a day.       glipiZIDE (GLUCOTROL) 5 MG tablet        HYDROcodone-acetaminophen 5-325 mg per tablet        metFORMIN (GLUCOPHAGE) 500 MG tablet        pantoprazole (PROTONIX) 40 MG tablet        triamcinolone (KENALOG) 0.1 % ointment Use occasionally       VOLTAREN 1 % Gel        No current facility-administered medications for this visit.        Allergies   Allergen Reactions     Statins-Hmg-Coa Reductase Inhibitors Other (See Comments)     Confusion     Penicillins Rash       Social History     Social History     Marital status:      Spouse name: N/A     Number of children: N/A     Years of education: N/A     Occupational History     data analysis Retired     Social History Main Topics     Smoking status: Former Smoker      Smokeless tobacco: Never Used     Alcohol use Yes      Comment: occas     Drug use: Not on file     Sexual activity: Not on file     Other Topics Concern     Not on file     Social History Narrative       Family History   Problem Relation Age of Onset     Diabetes Maternal Aunt      Diabetes Maternal Grandmother      Cancer Maternal Grandmother      cervical     Heart disease Maternal Grandmother      Objective:      Physical Exam  Vitals:    09/17/18 1029   BP: 130/78   Pulse: 85   Temp: 98.6  F (37  C)     General - well developed, well nourished, appropriate for age. Appears no distress at this time  Abdomen - moderately obese soft, non-tender, no hepatosplenomegaly, no masses.   - no flank tenderness, no suprapubic tenderness, kidney and bladder non-palpable  MSK - normal spinal curvature. no spinal tenderness. normal gait. muscular strength intact.  Psych - oriented to time, place, and person, normal mood and affect.      Labs   Urinalysis POC (Office):  Blood UA   Date Value Ref Range Status   02/25/2016 Trace Negative Final   04/02/2015 Negative Negative Final     Nitrite, UA   Date Value Ref Range Status   09/17/2018 Negative Negative Final   10/26/2017 Negative Negative Final   02/25/2016 Negative Negative Final   04/02/2015 Negative Negative Final     Leukocytes, UA    Date Value Ref Range Status   02/25/2016 Moderate (!) Negative Final   04/02/2015 Small (!) Negative Final     pH UA   Date Value Ref Range Status   02/25/2016 5.5 4.5 - 8.0 Final   04/02/2015 5.0 4.5 - 8.0 Final       Lab Urinalysis:  Blood, UA   Date Value Ref Range Status   09/17/2018 Moderate (!) Negative Final   10/26/2017 Small (!) Negative Final     Nitrite, UA   Date Value Ref Range Status   09/17/2018 Negative Negative Final   10/26/2017 Negative Negative Final   02/25/2016 Negative Negative Final   04/02/2015 Negative Negative Final     Leukocytes, UA   Date Value Ref Range Status   09/17/2018 Large (!) Negative Final    10/26/2017 Moderate (!) Negative Final     pH, UA   Date Value Ref Range Status   09/17/2018 5.5 5.0 - 8.0 Final   10/26/2017 5.0 5.0 - 8.0 Final    and Acute Labs   Urine Culture    Culture   Date Value Ref Range Status   09/17/2018 >100,000 col/ml Escherichia coli (!)  Preliminary

## 2021-06-21 NOTE — ANESTHESIA POSTPROCEDURE EVALUATION
Patient: Zeynep Martinez   CYSTOSCOPY, LEFT  URETEROSCOPY, LASER LITHOTRIPSY STENT INSERTION  Anesthesia type: general    Patient location: PACU  Last vitals:   Vitals:    10/26/18 0830   BP: 130/67   Pulse: 72   Resp: 15   Temp:    SpO2: 95%     Post vital signs: stable  Level of consciousness: awake, alert and oriented  Post-anesthesia pain: pain controlled  Post-anesthesia nausea and vomiting: no  Pulmonary: unassisted, return to baseline  Cardiovascular: stable and blood pressure at baseline  Hydration: adequate  Anesthetic events: no    QCDR Measures:  ASA# 11 - Tammy-op Cardiac Arrest: ASA11B - Patient did NOT experience unanticipated cardiac arrest  ASA# 12 - Tammy-op Mortality Rate: ASA12B - Patient did NOT die  ASA# 13 - PACU Re-Intubation Rate: ASA13B - Patient did NOT require a new airway mgmt  ASA# 10 - Composite Anes Safety: ASA10A - No serious adverse event    Additional Notes:

## 2021-06-21 NOTE — PROGRESS NOTES
Patient in office today for cystoscopy left ureteral stent removal procedure.   Routine urine analysis was nitrate positive.  Stent removal was not performed today.

## 2021-06-21 NOTE — ANESTHESIA CARE TRANSFER NOTE
Last vitals:   Vitals:    10/26/18 0815   BP: 149/81   Pulse: 80   Resp: 18   Temp: 36.6  C (97.8  F)   SpO2: 98%     Patient's level of consciousness is awake  Spontaneous respirations: yes  Maintains airway independently: yes  Dentition unchanged: yes  Oropharynx: oropharynx clear of all foreign objects    QCDR Measures:  ASA# 20 - Surgical Safety Checklist: WHO surgical safety checklist completed prior to induction  PQRS# 430 - Adult PONV Prevention: 4558F - Pt received => 2 anti-emetic agents (different classes) preop & intraop  ASA# 8 - Peds PONV Prevention: NA - Not pediatric patient, not GA or 2 or more risk factors NOT present  PQRS# 424 - Tammy-op Temp Management: 4559F - At least one body temp DOCUMENTED => 35.5C or 95.9F within required timeframe  PQRS# 426 - PACU Transfer Protocol: - Transfer of care checklist used  ASA# 14 - Acute Post-op Pain: ASA14B - Patient did NOT experience pain >= 7 out of 10

## 2021-06-21 NOTE — PROGRESS NOTES
Assessment/Plan:        Diagnoses and all orders for this visit:    Calculus of kidney  -     Cystoscopy with Stent Removal Education  -     Patient Stated Goal: Prevent further stones  -     CT Abdomen Pelvis Without Oral Without IV Contrast; Future; Expected date: 12/5/18  -     Discontinue: ciprofloxacin HCl (CIPRO) 500 MG tablet; Take 1 tablet (500 mg total) by mouth 2 (two) times a day for 10 days.  Dispense: 20 tablet; Refill: 0    Calculus of urinary tract  -     Urinalysis Macroscopic  -     Culture, Urine    Acute cystitis with hematuria  -     Discontinue: ciprofloxacin HCl (CIPRO) 500 MG tablet; Take 1 tablet (500 mg total) by mouth 2 (two) times a day for 10 days.  Dispense: 20 tablet; Refill: 0  -     Discontinue: doxycycline (VIBRA-TABS) 100 MG tablet; Take 1 tablet (100 mg total) by mouth 2 (two) times a day for 14 days.  Dispense: 28 tablet; Refill: 0  -     doxycycline (VIBRA-TABS) 100 MG tablet; Take 1 tablet (100 mg total) by mouth 2 (two) times a day for 14 days.  Dispense: 28 tablet; Refill: 0      Stone Management Plan  [facility] Stone Management 10/26/2017 9/17/2018 11/5/2018   Urinary Tract Infection No suspicion of infection No suspicion of infection Suspected Infection   Renal Colic Asymptomatic at this time Well controlled symptoms Well controlled symptoms   Renal Failure No suspicion of renal failure No suspicion of renal failure No suspicion of renal failure   Current CT date 7/12/2017 9/17/2018 -   Right sided stones? No - -   R Hydronephrosis - Mild -   R Stone Event No current event No current event No current event   Left sided stones? No Yes -   L Number of ureteral stones - - -   L Number of kidney stones  - 1 -   L GSD of kidney stones - 4 - 10 -   L Hydronephrosis - - -   L Stone Event No current event No current event Established event   Post-op status - - Other   L Current Plan - - -   Observe rationale - - -             Subjective:      HPI  Ms. Zeynep TERRY Elmer is a 73 y.o.   female returning to the [facility] [place] for early postoperative follow up for anticipated stent removal.     She returns status post left ureteroscopic laser lithotripsy for renal stone. She has had no unanticipated post-operative events.    She is suspicious that she has a UTI. No fever or chills. Dysuria more typical for UTI than stent. Her urine is nitrite positive. Stone culture was positive.    Because of suspicion of active urinary tract infection, stent extraction was deferred today.    She was prescribed  Doxycycline based on prior sensitivities and will return for stent extraction pending urine culture and sensitivities.    This emergent issue may interrupt her scheduled air travel back to [place]     ROS   Review of systems is negative except for HPI.    Past Medical History:   Diagnosis Date     Bladder infection      Cancer (H)     uterine     Diabetes mellitus (H)     type 2     Gout      Kidney stone      Ulcer     Ulcer in the anastomosis       Past Surgical History:   Procedure Laterality Date     APPENDECTOMY       HYSTERECTOMY       PA CYSTO/URETERO W/LITHOTRIPSY &INDWELL STENT INSRT Left 10/26/2018    Procedure:  CYSTOSCOPY, LEFT  URETEROSCOPY, LASER LITHOTRIPSY STENT INSERTION;  Surgeon: Long Mansfield MD;  Location: [facility];  Service: Urology     PA ERCP W/BIOPSY SINGLE/MULTIPLE       CATHERINE-EN-Y PROCEDURE       SKIN SURGERY  2013    15 lbs of skin/adipose removed     STOMACH SURGERY       URETEROSCOPY         Current Outpatient Prescriptions   Medication Sig Dispense Refill     gabapentin (NEURONTIN) 800 MG tablet Take 800 mg by mouth 2 (two) times a day.        glipiZIDE (GLUCOTROL) 5 MG tablet        metFORMIN (GLUCOPHAGE) 1000 MG tablet Take 1,000 mg by mouth 2 (two) times a day with meals.       pantoprazole (PROTONIX) 40 MG tablet        triamcinolone (KENALOG) 0.1 % ointment Use occasionally       VOLTAREN 1 % Gel        doxycycline (VIBRA-TABS)  100 MG tablet Take 1 tablet (100 mg total) by mouth 2 (two) times a day for 14 days. 28 tablet 0     No current facility-administered medications for this visit.        Allergies   Allergen Reactions     Statins-Hmg-Coa Reductase Inhibitors Other (See Comments)     Confusion     Penicillins Rash       Social History     Social History     Marital status:      Spouse name: N/A     Number of children: N/A     Years of education: N/A     Occupational History     data analysis Retired     Social History Main Topics     Smoking status: Former Smoker     Smokeless tobacco: Never Used     Alcohol use Yes      Comment: occas     Drug use: No     Sexual activity: Not on file     Other Topics Concern     Not on file     Social History Narrative       Family History   Problem Relation Age of Onset     Diabetes Maternal Aunt      Diabetes Maternal Grandmother      Cancer Maternal Grandmother      cervical     Heart disease Maternal Grandmother      Objective:      Physical Exam  Vitals:    11/05/18 1058   BP: 124/77   Pulse: 86   Temp: 98.3  F (36.8  C)     General - well developed, well nourished, appropriate for age. Appears no distress at this time  Abdomen - moderately obese soft, non-tender, no hepatosplenomegaly, no masses.   - no flank tenderness, no suprapubic tenderness, kidney and bladder non-palpable  MSK - normal spinal curvature. no spinal tenderness. normal gait. muscular strength intact.  Psych - oriented to time, place, and person, normal mood and affect.      Labs   Urinalysis POC (Office):  Blood UA   Date Value Ref Range Status   02/25/2016 Trace Negative Final   04/02/2015 Negative Negative Final     Nitrite, UA   Date Value Ref Range Status   11/05/2018 Positive (!) Negative Final   09/17/2018 Negative Negative Final   10/26/2017 Negative Negative Final     Leukocytes, UA    Date Value Ref Range Status   02/25/2016 Moderate (!) Negative Final   04/02/2015 Small (!) Negative Final     pH UA   Date  Value Ref Range Status   02/25/2016 5.5 4.5 - 8.0 Final   04/02/2015 5.0 4.5 - 8.0 Final       Lab Urinalysis:  Blood, UA   Date Value Ref Range Status   11/05/2018 Moderate (!) Negative Final   09/17/2018 Moderate (!) Negative Final   10/26/2017 Small (!) Negative Final     Nitrite, UA   Date Value Ref Range Status   11/05/2018 Positive (!) Negative Final   09/17/2018 Negative Negative Final   10/26/2017 Negative Negative Final     Leukocytes, UA   Date Value Ref Range Status   11/05/2018 Small (!) Negative Final   09/17/2018 Large (!) Negative Final   10/26/2017 Moderate (!) Negative Final     pH, UA   Date Value Ref Range Status   11/05/2018 5.5 5.0 - 8.0 Final   09/17/2018 5.5 5.0 - 8.0 Final   10/26/2017 5.0 5.0 - 8.0 Final    and Acute Labs   Urine Culture    Culture   Date Value Ref Range Status   11/05/2018 >100,000 col/ml Escherichia coli (!)  Preliminary   10/26/2018 2+ ESBL producing Escherichia coli (!)  Final   09/17/2018 >100,000 col/ml ESBL producing Escherichia coli (!)  Final

## 2021-06-21 NOTE — ANESTHESIA PREPROCEDURE EVALUATION
Anesthesia Evaluation      Patient summary reviewed   No history of anesthetic complications     Airway   Mallampati: II  Neck ROM: full   Pulmonary - normal exam    breath sounds clear to auscultation  (-) shortness of breath, not a smoker (Former)                         Cardiovascular - negative ROS and normal exam  Exercise tolerance: > or = 4 METS  (-) murmur  Rhythm: regular  Rate: normal,    no murmur      Neuro/Psych    (+) neuromuscular disease (Peripheral neuropathy from DM),      Endo/Other    (+) diabetes mellitus type 2, obesity (BMI 46),      GI/Hepatic/Renal    (+)   chronic renal disease (Renal stone),   (-) GERD    Comments: S/p daniel-en-y gastric bypass          Dental - normal exam                        Anesthesia Plan  Planned anesthetic: general endotracheal  Glidescope    Contact precautions  ASA 3   Induction: intravenous   Anesthetic plan and risks discussed with: patient and child/children  Anesthesia plan special considerations: video-assisted, antiemetics,   Post-op plan: routine recovery

## 2021-06-21 NOTE — PROGRESS NOTES
Assessment/Plan:        Diagnoses and all orders for this visit:    Calculus of kidney  -     Cystoscopy with Stent Removal Education  -     Patient Stated Goal: Prevent further stones  -     CT Abdomen Pelvis Without Oral Without IV Contrast; Future; Expected date: 12/12/2018    Urinary tract stones  -     Urinalysis Macroscopic  -     Culture, Urine- Future; Future; Expected date: 12/12/2018  -     Culture, Urine- Future      Stone Management Plan  [facility] Stone Management 9/17/2018 11/5/2018 11/12/2018   Urinary Tract Infection No suspicion of infection Suspected Infection No suspicion of infection   Renal Colic Well controlled symptoms Well controlled symptoms Well controlled symptoms   Renal Failure No suspicion of renal failure No suspicion of renal failure No suspicion of renal failure   Current CT date 9/17/2018 - -   Right sided stones? - - -   R Hydronephrosis Mild - -   R Stone Event No current event No current event No current event   Left sided stones? Yes - -   L Number of ureteral stones - - -   L Number of kidney stones  1 - -   L GSD of kidney stones 4 - 10 - -   L Hydronephrosis - - -   L Stone Event No current event Established event Established event   Post-op status - Other Stent Removal   L Current Plan - - -   Observe rationale - - -             Subjective:      HPI  Ms. Zeynep Martinez is a 73 y.o.  female returning to the [facility] [place] for early postoperative follow up for anticipated stent removal.     She returns status post left ureteroscopic laser lithotripsy for renal stone. She has had outpatient management of urinary tract infection.    She has had no symptoms suspicious for infection and stent was mildly symptomatic with typical issues of flank discomfort and lower urinary tract irritation. She is feeling much better since her course of antibiotics.    Flexible cystoscopy is performed and indwelling stent is removed without incident.    She will follow up  in the office in one month with imaging.    She should also have follow up urine culture.     ROS   Review of systems is negative except for HPI.    Past Medical History:   Diagnosis Date     Bladder infection      Cancer (H)     uterine     Diabetes mellitus (H)     type 2     Gout      Kidney stone      Ulcer     Ulcer in the anastomosis       Past Surgical History:   Procedure Laterality Date     APPENDECTOMY       HYSTERECTOMY       [place] CYSTO/URETERO W/LITHOTRIPSY &INDWELL STENT INSRT Left 10/26/2018    Procedure:  CYSTOSCOPY, LEFT  URETEROSCOPY, LASER LITHOTRIPSY STENT INSERTION;  Surgeon: Long Mansfield MD;  Location: [facility];  Service: Urology     CO ERCP W/BIOPSY SINGLE/MULTIPLE       CATHERINE-EN-Y PROCEDURE       SKIN SURGERY  2013    15 lbs of skin/adipose removed     STOMACH SURGERY       URETEROSCOPY         Current Outpatient Medications   Medication Sig Dispense Refill     doxycycline (VIBRA-TABS) 100 MG tablet Take 1 tablet (100 mg total) by mouth 2 (two) times a day for 14 days. 28 tablet 0     gabapentin (NEURONTIN) 800 MG tablet Take 800 mg by mouth 2 (two) times a day.        glipiZIDE (GLUCOTROL) 5 MG tablet        metFORMIN (GLUCOPHAGE) 1000 MG tablet Take 1,000 mg by mouth 2 (two) times a day with meals.       pantoprazole (PROTONIX) 40 MG tablet        triamcinolone (KENALOG) 0.1 % ointment Use occasionally       VOLTAREN 1 % Gel        No current facility-administered medications for this visit.        Allergies   Allergen Reactions     Statins-Hmg-Coa Reductase Inhibitors Other (See Comments)     Confusion     Penicillins Rash       Social History     Socioeconomic History     Marital status:      Spouse name: Not on file     Number of children: Not on file     Years of education: Not on file     Highest education level: Not on file   Social Needs     Financial resource strain: Not on file     Food insecurity - worry: Not on file     Food insecurity - inability: Not on file      Transportation needs - medical: Not on file     Transportation needs - non-medical: Not on file   Occupational History     Occupation: data analysis     Employer: RETIRED   Tobacco Use     Smoking status: Former Smoker     Smokeless tobacco: Never Used   Substance and Sexual Activity     Alcohol use: Yes     Comment: occas     Drug use: No     Sexual activity: Not on file   Other Topics Concern     Not on file   Social History Narrative     Not on file       Family History   Problem Relation Age of Onset     Diabetes Maternal Aunt      Diabetes Maternal Grandmother      Cancer Maternal Grandmother         cervical     Heart disease Maternal Grandmother      Objective:      Physical Exam  Vitals:    11/12/18 0901   BP: 149/80   Pulse: 79   Temp: 97.9  F (36.6  C)     General - well developed, well nourished, appropriate for age. Appears no distress at this time  Abdomen - morbidly obese soft, non-tender, no hepatosplenomegaly, no masses.   - no flank tenderness, no suprapubic tenderness, kidney and bladder non-palpable  MSK - normal spinal curvature. no spinal tenderness. normal gait. muscular strength intact.  Psych - oriented to time, place, and person, normal mood and affect.      Labs   Urinalysis POC (Office):  Blood UA   Date Value Ref Range Status   02/25/2016 Trace Negative Final   04/02/2015 Negative Negative Final     Nitrite, UA   Date Value Ref Range Status   11/12/2018 Negative Negative Final   11/05/2018 Positive (!) Negative Final   09/17/2018 Negative Negative Final     Leukocytes, UA    Date Value Ref Range Status   02/25/2016 Moderate (!) Negative Final   04/02/2015 Small (!) Negative Final     pH UA   Date Value Ref Range Status   02/25/2016 5.5 4.5 - 8.0 Final   04/02/2015 5.0 4.5 - 8.0 Final       Lab Urinalysis:  Blood, UA   Date Value Ref Range Status   11/12/2018 Moderate (!) Negative Final   11/05/2018 Moderate (!) Negative Final   09/17/2018 Moderate (!) Negative Final     Nitrite, UA    Date Value Ref Range Status   11/12/2018 Negative Negative Final   11/05/2018 Positive (!) Negative Final   09/17/2018 Negative Negative Final     Leukocytes, UA   Date Value Ref Range Status   11/12/2018 Small (!) Negative Final   11/05/2018 Small (!) Negative Final   09/17/2018 Large (!) Negative Final     pH, UA   Date Value Ref Range Status   11/12/2018 5.0 5.0 - 8.0 Final   11/05/2018 5.5 5.0 - 8.0 Final   09/17/2018 5.5 5.0 - 8.0 Final

## 2021-06-21 NOTE — PROGRESS NOTES
Patient in office today for cystoscopy left ureteral stent removal procedure.    Patient educated regarding stent removal procedure and possible symptoms after removal.  Patient voiced understanding of information.  Handout given to patient.  Consent form signed.     [facility] Timeout    Correct patient?: Yes  Correct site?:  Yes  Correct procedure?:  Yes  Correct laterality?:  Left  Consents verified?:  Yes  Relevant lab results available?:  Yes

## 2021-06-25 ENCOUNTER — TELEPHONE (OUTPATIENT)
Dept: ENDOCRINOLOGY | Facility: CLINIC | Age: 76
End: 2021-06-25

## 2021-06-25 NOTE — TELEPHONE ENCOUNTER
Zeynep  has an upcoming lab appointment.  No orders have been placed in the chart or faxed to lab.  Please review and place orders if appropriate.  If not ok for labs, please advise patient.    Thanks,     Lab

## 2021-06-25 NOTE — TELEPHONE ENCOUNTER
Called pt to schedule a new phone appt with tres Alaniz to take a new slot,    Left L pod Number.

## 2021-06-26 NOTE — PROGRESS NOTES
Assessment/Plan:      Problem List Items Addressed This Visit        Kidney Stone Problems    Calculus of kidney - Primary    Relevant Orders    Cystoscopy with Stent Removal Education    NM Renogram W Lasix    CULTURE, URINE          Stone Management Plan  [facility] Stone [facility] 2/18/2021 5/13/2021 6/14/2021   Urinary Tract Infection Suspected Infection Suspected Infection Possible Infection   Renal Colic Well controlled symptoms Well controlled symptoms Well controlled symptoms   Renal Failure No suspicion of renal failure No suspicion of renal failure No suspicion of renal failure   Current CT date 2/17/2021 5/13/2021 -   Right sided stones? Yes Yes -   R Number of ureteral stones No ureteral stones No ureteral stones -   R Number of kidney stones  2 2 -   R GSD of kidney stones 2 - 4 2 - 4 -   R Hydronephrosis None None -   R Stone Event No current event New event Established event   Diagnosis date - 5/13/2021 -   Initial location of primary symptomatic stone - Renal -   Initial GSD of primary symptomatic stone - 3 -   R Post-op status - - Stent Removal   R Current Plan Observe Clear -   Clear rationale - Associated treated infection -   Observe rationale Limited stone burden with good prognosis for spontaneous passage - -   Left sided stones? No No -   L Number of ureteral stones - - -   L Number of kidney stones  - - -   L GSD of kidney stones - - -   L Hydronephrosis - - -   L Stone Event No current event No current event No current event   Post-op status - - -   L Current Plan - - -   Observe rationale - - -            Subjective:    HPI  Ms. Zeynep Martinez is a 76 y.o.  female returning to the [facility] [place] for early postoperative follow up for anticipated stent removal.     She returns status post right ureteroscopic extraction for renal stone and dilation of stenotic UPJ. She has had no unanticipated post-operative events.    She has had no symptoms  suspicious for infection and stent was mildly symptomatic with typical issues of flank discomfort and lower urinary tract irritation.     Flexible cystoscopy is performed and indwelling stent is removed without incident.    She will follow up in the office in one month with lasix renogram imaging. She will provide a urine specimen for culture before next encounter.      ROS   Review of systems is negative except for HPI.    Past Medical History:  No date: Anxiety  No date: Arthritis  No date: Bladder infection  No date: Cancer (H)      Comment:  uterine  No date: Depression  No date: Diabetes mellitus (H)      Comment:  type 2  No date: Gout  No date: History of anesthesia complications      Comment:  Slow to wake and gets very anxious  No date: Kidney stone  No date: Ulcer      Comment:  Ulcer in the anastomosis    Past Surgical History:  No date: APPENDECTOMY  No date: HYSTERECTOMY  10/26/2018: [place] CYSTO/URETERO W/LITHOTRIPSY &INDWELL STENT INSRT; Left      Comment:  Procedure:  CYSTOSCOPY, LEFT  URETEROSCOPY, LASER                LITHOTRIPSY STENT INSERTION;  Surgeon: Long Mansfield MD;  Location: [facility];  Service: Urology  5/28/2021: [place] CYSTO/URETERO W/LITHOTRIPSY &INDWELL STENT INSRT; Right      Comment:  Procedure: CYSTOURETEROSCOPY, URETEROSCOPY, URETERAL                DILATION WITH RETROGRADE PYELOGRAM, AND STENT INSERTION                RIGHT;  Surgeon: Long Mansfield MD;  Location:                [facility];  Service: Urology  No date: [place] ERCP W/BIOPSY SINGLE/MULTIPLE  No date: CATHERINE-EN-Y PROCEDURE  2013: SKIN SURGERY      Comment:  15 lbs of skin/adipose removed  No date: STOMACH SURGERY  No date: URETEROSCOPY    Current Outpatient Medications:  ALPRAZolam (XANAX) 0.5 MG tablet, Take 0.5 mg by mouth., Disp: , Rfl:   doxycycline (MONODOX) 100 MG capsule, Take 1 capsule (100 mg total) by mouth 2 (two) times a day., Disp: 14 capsule, Rfl: 0  doxycycline (VIBRA-TABS)  100 MG tablet, Take 1 tablet (100 mg total) by mouth 2 (two) times a day., Disp: 60 tablet, Rfl: 0  furosemide (LASIX) 20 MG tablet, , Disp: , Rfl:   gabapentin (NEURONTIN) 800 MG tablet, Take 800 mg by mouth 2 (two) times a day. , Disp: , Rfl:   metFORMIN (GLUCOPHAGE) 1000 MG tablet, Take 1,000 mg by mouth 2 (two) times a day with meals., Disp: , Rfl:   pantoprazole (PROTONIX) 40 MG tablet, , Disp: , Rfl:   traZODone (DESYREL) 50 MG tablet, , Disp: , Rfl:   triamcinolone (KENALOG) 0.1 % ointment, Use occasionally, Disp: , Rfl:   venlafaxine (EFFEXOR) 100 MG tablet, Take 100 mg by mouth 2 (two) times a day., Disp: , Rfl:   OZEMPIC 0.25 mg or 0.5 mg(2 mg/1.5 mL) PnIj, , Disp: , Rfl:     No current facility-administered medications for this visit.        -- Adhesive Tape-Silicones -- Rash    --  Paper tape is ok             Paper tape is ok   -- Latex    -- Statins-Hmg-Coa Reductase Inhibitors -- Other (See Comments)    --  Confusion   -- Penicillins -- Rash    Social History    Socioeconomic History      Marital status:       Spouse name: Not on file      Number of children: Not on file      Years of education: Not on file      Highest education level: Not on file    Occupational History      Occupation: data analysis        Employer: RETIRED    Social Needs      Financial resource strain: Not on file      Food insecurity        Worry: Not on file        Inability: Not on file      Transportation needs        Medical: Not on file        Non-medical: Not on file    Tobacco Use      Smoking status: Former Smoker      Smokeless tobacco: Never Used    Substance and Sexual Activity      Alcohol use: Yes        Comment: occas      Drug use: No      Sexual activity: Not on file    Lifestyle      Physical activity        Days per week: Not on file        Minutes per session: Not on file      Stress: Not on file    Relationships      Social connections        Talks on phone: Not on file        Gets together: Not on  file        Attends Gnosticism service: Not on file        Active member of club or organization: Not on file        Attends meetings of clubs or organizations: Not on file        Relationship status: Not on file      Intimate partner violence        Fear of current or ex partner: Not on file        Emotionally abused: Not on file        Physically abused: Not on file        Forced sexual activity: Not on file    Other Topics      Concerns:        Not on file    Social History Narrative      Not on file      Review of patient's family history indicates:  Problem: Diabetes      Relation: Maternal Aunt          Age of Onset: (Not Specified)  Problem: Diabetes      Relation: Maternal Grandmother          Age of Onset: (Not Specified)  Problem: Cancer      Relation: Maternal Grandmother          Age of Onset: (Not Specified)          Comment: cervical  Problem: Heart disease      Relation: Maternal Grandmother          Age of Onset: (Not Specified)    Objective:    Physical Exam  ---------------------------               06/14/21                      1111         ---------------------------   BP:          133/73         Pulse:       (!) 117        Temp:   97.3  F (36.3  C)  ---------------------------  General - well developed, well nourished, appropriate for age. Appears no distress at this time  Abdomen - morbidly obese soft, non-tender, no hepatosplenomegaly, no masses.   - no flank tenderness, no suprapubic tenderness, kidney and bladder non-palpable  MSK - normal spinal curvature. no spinal tenderness. normal gait. muscular strength intact.  Psych - oriented to time, place, and person, normal mood and affect.      Labs   Urinalysis POC (Office):  Blood UA   Date Value Ref Range Status   02/25/2016 Trace Negative Final   04/02/2015 Negative Negative Final     Nitrite, UA   Date Value Ref Range Status   06/14/2021 Negative Negative Final   11/12/2018 Negative Negative Final   11/05/2018 Positive  (!) Negative Final     Leukocytes, UA    Date Value Ref Range Status   02/25/2016 Moderate (!) Negative Final   04/02/2015 Small (!) Negative Final     pH UA   Date Value Ref Range Status   02/25/2016 5.5 4.5 - 8.0 Final   04/02/2015 5.0 4.5 - 8.0 Final       Lab Urinalysis:  Blood, UA   Date Value Ref Range Status   06/14/2021 Negative Negative Final   11/12/2018 Moderate (!) Negative Final   11/05/2018 Moderate (!) Negative Final     Nitrite, UA   Date Value Ref Range Status   06/14/2021 Negative Negative Final   11/12/2018 Negative Negative Final   11/05/2018 Positive (!) Negative Final     Leukocytes, UA   Date Value Ref Range Status   06/14/2021 Small (!) Negative Final   11/12/2018 Small (!) Negative Final   11/05/2018 Small (!) Negative Final     pH, UA   Date Value Ref Range Status   06/14/2021 5.0 5.0 - 8.0 Final   11/12/2018 5.0 5.0 - 8.0 Final   11/05/2018 5.5 5.0 - 8.0 Final    and Acute Labs   Urine Culture    Culture   Date Value Ref Range Status   05/06/2021 >100,000 col/ml ESBL producing Escherichia coli (!)  Final   02/15/2021 Mixture of urogenital organisms with  Final   02/15/2021 >100,000 col/ml ESBL producing Escherichia coli (!)  Final

## 2021-06-26 NOTE — PATIENT INSTRUCTIONS - HE
Cystoscopy with Stent Removal    Cystoscopy is used to help diagnose urinary problems, or to remove a ureteral stent.    During a cystoscopy, your doctor examines the inside of your bladder with an instrument called a cystoscope. A cystoscope is a long, thin flexible tube with a camera at the end.    Your doctor will insert the scope into your urethra allowing him to visualize and evaluate the inside of the bladder for possible abnormalities. The urethra is the tube that carries urine to the outside of your body.    How is the stent removed?    Your stent will be removed in the [facility] with a small telescope and a grasping tool.  It usually takes less than 1 minute to remove the stent.    What should I expect after the stent is removed?     You should feel normal by the next day.    Some patients find:      An increase in back pain about an hour after the stent is removed as the kidney fills up with urine before it starts to empty.  It can be as uncomfortable as your initial stone episode.  Taking pain medications before stent removal may be helpful, but you would need someone else to drive you to and from your appointment.    Bladder symptoms usually disappear by the next morning.    Small amounts of blood in the urine may be seen occasionally for up to a week.    At Home:      It is important to drink plenty of fluids after your procedure    You may continue to use your pain medications as prescribed    What symptoms should I watch for?    Fever     Chills    Increasing back pain that is not relieved with pain medications    Large amounts of blood in the urine or large clots    Leakage of urine (incontinence)     Are not able to urinate for 8 hours    These symptoms may mean you have a blockage or infection. Call the [facility] 24 hours a day at [phone] immediately.

## 2021-06-26 NOTE — PROGRESS NOTES
Patient educated regarding stent removal procedure and possible symptoms after removal.  Patient voiced understanding of information.  Handout given to patient.  Consent form signed.  Joy Horner RN    [facility] Timeout    Correct patient?: Yes  Correct site?:  Yes  Correct procedure?:  Yes  Correct laterality?:  Right  Consents verified?:  Yes  Relevant lab results available?:  Yes        Joy Horner RN

## 2021-06-26 NOTE — ANESTHESIA CARE TRANSFER NOTE
Last vitals:   Vitals:    05/28/21 0900   BP: 144/84   Pulse: 74   Resp: 16   Temp: 36.6  C (97.9  F)   SpO2: 96%     Patient's level of consciousness is drowsy  Spontaneous respirations: yes  Maintains airway independently: yes  Dentition unchanged: yes  Oropharynx: oropharynx clear of all foreign objects    QCDR Measures:  ASA# 20 - Surgical Safety Checklist: WHO surgical safety checklist completed prior to induction    PQRS# 430 - Adult PONV Prevention: 4558F - Pt received => 2 anti-emetic agents (different classes) preop & intraop  ASA# 8 - Peds PONV Prevention: NA - Not pediatric patient, not GA or 2 or more risk factors NOT present  PQRS# 424 - Tammy-op Temp Management: 4559F - At least one body temp DOCUMENTED => 35.5C or 95.9F within required timeframe  PQRS# 426 - PACU Transfer Protocol: - Transfer of care checklist used  ASA# 14 - Acute Post-op Pain: ASA14B - Patient did NOT experience pain >= 7 out of 10

## 2021-06-26 NOTE — ANESTHESIA POSTPROCEDURE EVALUATION
Patient: Zeynep Martinez  Procedure(s):  CYSTOURETEROSCOPY, URETEROSCOPY, URETERAL DILATION WITH RETROGRADE PYELOGRAM, AND STENT INSERTION RIGHT (Right)  Anesthesia type: general    Patient location: PACU  Last vitals:   Vitals Value Taken Time   /65 05/28/21 1030   Temp 36.4  C (97.6  F) 05/28/21 1023   Pulse 104 05/28/21 1041   Resp 16 05/28/21 1030   SpO2 93 % 05/28/21 1041   Vitals shown include unvalidated device data.  Post vital signs: stable  Level of consciousness: awake and responds to simple questions  Post-anesthesia pain: pain controlled  Post-anesthesia nausea and vomiting: no  Pulmonary: unassisted, return to baseline  Cardiovascular: stable and blood pressure at baseline  Hydration: adequate  Anesthetic events: no    QCDR Measures:  ASA# 11 - Tammy-op Cardiac Arrest: ASA11B - Patient did NOT experience unanticipated cardiac arrest  ASA# 12 - Tammy-op Mortality Rate: ASA12B - Patient did NOT die  ASA# 13 - PACU Re-Intubation Rate: ASA13B - Patient did NOT require a new airway mgmt  ASA# 10 - Composite Anes Safety: ASA10A - No serious adverse event    Additional Notes:

## 2021-06-28 NOTE — TELEPHONE ENCOUNTER
PT left voicemail on L pod, called pt back and hit voicemail again. Left another voicemail for rescheduling [facility] appt in August. Left both K and L pod number.

## 2021-06-29 ENCOUNTER — COMMUNICATION - HEALTHEAST (OUTPATIENT)
Dept: UROLOGY | Facility: CLINIC | Age: 76
End: 2021-06-29

## 2021-06-29 DIAGNOSIS — N20.0 CALCULUS OF KIDNEY: ICD-10-CM

## 2021-06-29 DIAGNOSIS — N20.0 CALCULUS OF KIDNEY: Primary | ICD-10-CM

## 2021-06-29 LAB
ALBUMIN UR-MCNC: NEGATIVE MG/DL
APPEARANCE UR: CLEAR
BACTERIA #/AREA URNS HPF: ABNORMAL /HPF
BILIRUB UR QL STRIP: NEGATIVE
COLOR UR AUTO: YELLOW
GLUCOSE UR STRIP-MCNC: 100 MG/DL
HGB UR QL STRIP: NEGATIVE
KETONES UR STRIP-MCNC: NEGATIVE MG/DL
LEUKOCYTE ESTERASE UR QL STRIP: ABNORMAL
NITRATE UR QL: NEGATIVE
NON-SQ EPI CELLS #/AREA URNS LPF: ABNORMAL /LPF
PH UR STRIP: 5 PH (ref 5–7)
RBC #/AREA URNS AUTO: ABNORMAL /HPF
SOURCE: ABNORMAL
SP GR UR STRIP: 1.02 (ref 1–1.03)
UROBILINOGEN UR STRIP-ACNC: 0.2 EU/DL (ref 0.2–1)
WBC #/AREA URNS AUTO: ABNORMAL /HPF

## 2021-06-29 PROCEDURE — 87186 SC STD MICRODIL/AGAR DIL: CPT | Performed by: UROLOGY

## 2021-06-29 PROCEDURE — 87088 URINE BACTERIA CULTURE: CPT | Performed by: UROLOGY

## 2021-06-29 PROCEDURE — 81001 URINALYSIS AUTO W/SCOPE: CPT | Performed by: UROLOGY

## 2021-06-29 PROCEDURE — 87086 URINE CULTURE/COLONY COUNT: CPT | Performed by: UROLOGY

## 2021-07-01 ENCOUNTER — COMMUNICATION - HEALTHEAST (OUTPATIENT)
Dept: UROLOGY | Facility: CLINIC | Age: 76
End: 2021-07-01

## 2021-07-01 ENCOUNTER — TELEPHONE (OUTPATIENT)
Dept: NURSING | Facility: CLINIC | Age: 76
End: 2021-07-01

## 2021-07-01 DIAGNOSIS — N39.0 URINARY TRACT INFECTION WITHOUT HEMATURIA, SITE UNSPECIFIED: ICD-10-CM

## 2021-07-01 NOTE — TELEPHONE ENCOUNTER
Pt is calling in after speaking with a nurse from Dr. Mansfield office today, Joy or Mahi. No note in chart from the clinic, and clinic is closed. Pt was suppose to update them if she has a temp. Pt reports temp of 99.0. Pt was advised to call back if fever goes up, or her symptoms worsen.     Alin aZmarripa RN on 7/1/2021 at 6:12 PM

## 2021-07-02 ENCOUNTER — HOSPITAL ENCOUNTER (OUTPATIENT)
Facility: CLINIC | Age: 76
Setting detail: OBSERVATION
Discharge: HOME | End: 2021-07-06
Attending: EMERGENCY MEDICINE | Admitting: INTERNAL MEDICINE
Payer: COMMERCIAL

## 2021-07-02 ENCOUNTER — APPOINTMENT (OUTPATIENT)
Dept: GENERAL RADIOLOGY | Facility: CLINIC | Age: 76
End: 2021-07-02
Attending: EMERGENCY MEDICINE
Payer: COMMERCIAL

## 2021-07-02 DIAGNOSIS — M25.562 ACUTE PAIN OF LEFT KNEE: ICD-10-CM

## 2021-07-02 DIAGNOSIS — E11.65 TYPE 2 DIABETES MELLITUS WITH HYPERGLYCEMIA, WITHOUT LONG-TERM CURRENT USE OF INSULIN (H): Primary | ICD-10-CM

## 2021-07-02 DIAGNOSIS — G50.0 TRIGEMINAL NEURALGIA: ICD-10-CM

## 2021-07-02 DIAGNOSIS — M17.12 OSTEOARTHRITIS OF LEFT KNEE, UNSPECIFIED OSTEOARTHRITIS TYPE: ICD-10-CM

## 2021-07-02 DIAGNOSIS — E11.42 DIABETIC POLYNEUROPATHY ASSOCIATED WITH TYPE 2 DIABETES MELLITUS (H): ICD-10-CM

## 2021-07-02 DIAGNOSIS — F41.9 ANXIETY: ICD-10-CM

## 2021-07-02 LAB
GLUCOSE BLDC GLUCOMTR-MCNC: 297 MG/DL (ref 70–99)
LABORATORY COMMENT REPORT: NORMAL
SARS-COV-2 RNA RESP QL NAA+PROBE: NEGATIVE
SPECIMEN SOURCE: NORMAL

## 2021-07-02 PROCEDURE — 250N000013 HC RX MED GY IP 250 OP 250 PS 637: Performed by: INTERNAL MEDICINE

## 2021-07-02 PROCEDURE — G0378 HOSPITAL OBSERVATION PER HR: HCPCS

## 2021-07-02 PROCEDURE — 250N000013 HC RX MED GY IP 250 OP 250 PS 637: Performed by: PHYSICIAN ASSISTANT

## 2021-07-02 PROCEDURE — C9803 HOPD COVID-19 SPEC COLLECT: HCPCS

## 2021-07-02 PROCEDURE — 73562 X-RAY EXAM OF KNEE 3: CPT | Mod: LT

## 2021-07-02 PROCEDURE — 87635 SARS-COV-2 COVID-19 AMP PRB: CPT | Performed by: EMERGENCY MEDICINE

## 2021-07-02 PROCEDURE — 99285 EMERGENCY DEPT VISIT HI MDM: CPT | Mod: 25

## 2021-07-02 PROCEDURE — 99220 PR INITIAL OBSERVATION CARE,LEVEL III: CPT | Performed by: PHYSICIAN ASSISTANT

## 2021-07-02 PROCEDURE — 999N001017 HC STATISTIC GLUCOSE BY METER IP

## 2021-07-02 PROCEDURE — 250N000013 HC RX MED GY IP 250 OP 250 PS 637: Performed by: EMERGENCY MEDICINE

## 2021-07-02 RX ORDER — OXYCODONE HYDROCHLORIDE 5 MG/1
5 TABLET ORAL ONCE
Status: COMPLETED | OUTPATIENT
Start: 2021-07-02 | End: 2021-07-02

## 2021-07-02 RX ORDER — OXYCODONE HYDROCHLORIDE 5 MG/1
5-10 TABLET ORAL
Status: DISCONTINUED | OUTPATIENT
Start: 2021-07-02 | End: 2021-07-06 | Stop reason: HOSPADM

## 2021-07-02 RX ORDER — VENLAFAXINE HYDROCHLORIDE 75 MG/1
75 CAPSULE, EXTENDED RELEASE ORAL DAILY
Status: DISCONTINUED | OUTPATIENT
Start: 2021-07-03 | End: 2021-07-04

## 2021-07-02 RX ORDER — ACETAMINOPHEN 650 MG/1
650 SUPPOSITORY RECTAL EVERY 4 HOURS PRN
Status: DISCONTINUED | OUTPATIENT
Start: 2021-07-02 | End: 2021-07-06 | Stop reason: HOSPADM

## 2021-07-02 RX ORDER — POLYETHYLENE GLYCOL 3350 17 G/17G
17 POWDER, FOR SOLUTION ORAL DAILY PRN
Status: DISCONTINUED | OUTPATIENT
Start: 2021-07-02 | End: 2021-07-06 | Stop reason: HOSPADM

## 2021-07-02 RX ORDER — NALOXONE HYDROCHLORIDE 0.4 MG/ML
0.4 INJECTION, SOLUTION INTRAMUSCULAR; INTRAVENOUS; SUBCUTANEOUS
Status: DISCONTINUED | OUTPATIENT
Start: 2021-07-02 | End: 2021-07-06 | Stop reason: HOSPADM

## 2021-07-02 RX ORDER — ONDANSETRON 4 MG/1
4 TABLET, ORALLY DISINTEGRATING ORAL EVERY 6 HOURS PRN
Status: DISCONTINUED | OUTPATIENT
Start: 2021-07-02 | End: 2021-07-06 | Stop reason: HOSPADM

## 2021-07-02 RX ORDER — NALOXONE HYDROCHLORIDE 0.4 MG/ML
0.2 INJECTION, SOLUTION INTRAMUSCULAR; INTRAVENOUS; SUBCUTANEOUS
Status: DISCONTINUED | OUTPATIENT
Start: 2021-07-02 | End: 2021-07-06 | Stop reason: HOSPADM

## 2021-07-02 RX ORDER — LIDOCAINE 40 MG/G
CREAM TOPICAL
Status: DISCONTINUED | OUTPATIENT
Start: 2021-07-02 | End: 2021-07-06 | Stop reason: HOSPADM

## 2021-07-02 RX ORDER — PANTOPRAZOLE SODIUM 40 MG/1
40 TABLET, DELAYED RELEASE ORAL
Status: DISCONTINUED | OUTPATIENT
Start: 2021-07-03 | End: 2021-07-06 | Stop reason: HOSPADM

## 2021-07-02 RX ORDER — VENLAFAXINE HYDROCHLORIDE 150 MG/1
150 CAPSULE, EXTENDED RELEASE ORAL DAILY
Status: DISCONTINUED | OUTPATIENT
Start: 2021-07-03 | End: 2021-07-04

## 2021-07-02 RX ORDER — ALPRAZOLAM 0.5 MG
0.5 TABLET ORAL 3 TIMES DAILY PRN
Status: DISCONTINUED | OUTPATIENT
Start: 2021-07-02 | End: 2021-07-06 | Stop reason: HOSPADM

## 2021-07-02 RX ORDER — ONDANSETRON 2 MG/ML
4 INJECTION INTRAMUSCULAR; INTRAVENOUS EVERY 6 HOURS PRN
Status: DISCONTINUED | OUTPATIENT
Start: 2021-07-02 | End: 2021-07-06 | Stop reason: HOSPADM

## 2021-07-02 RX ORDER — TRAZODONE HYDROCHLORIDE 50 MG/1
50 TABLET, FILM COATED ORAL AT BEDTIME
Status: DISCONTINUED | OUTPATIENT
Start: 2021-07-03 | End: 2021-07-06 | Stop reason: HOSPADM

## 2021-07-02 RX ORDER — ACETAMINOPHEN 325 MG/1
975 TABLET ORAL 3 TIMES DAILY
Status: DISCONTINUED | OUTPATIENT
Start: 2021-07-02 | End: 2021-07-03

## 2021-07-02 RX ORDER — TRAZODONE HYDROCHLORIDE 50 MG/1
50 TABLET, FILM COATED ORAL ONCE
Status: COMPLETED | OUTPATIENT
Start: 2021-07-02 | End: 2021-07-02

## 2021-07-02 RX ORDER — GABAPENTIN 400 MG/1
800 CAPSULE ORAL 2 TIMES DAILY
Status: DISCONTINUED | OUTPATIENT
Start: 2021-07-02 | End: 2021-07-06 | Stop reason: HOSPADM

## 2021-07-02 RX ORDER — AMOXICILLIN 250 MG
2 CAPSULE ORAL 2 TIMES DAILY PRN
Status: DISCONTINUED | OUTPATIENT
Start: 2021-07-02 | End: 2021-07-06 | Stop reason: HOSPADM

## 2021-07-02 RX ORDER — AMOXICILLIN 250 MG
1 CAPSULE ORAL 2 TIMES DAILY PRN
Status: DISCONTINUED | OUTPATIENT
Start: 2021-07-02 | End: 2021-07-06 | Stop reason: HOSPADM

## 2021-07-02 RX ORDER — METFORMIN HCL 500 MG
1000 TABLET, EXTENDED RELEASE 24 HR ORAL 2 TIMES DAILY WITH MEALS
Status: DISCONTINUED | OUTPATIENT
Start: 2021-07-02 | End: 2021-07-06 | Stop reason: HOSPADM

## 2021-07-02 RX ORDER — HYDROMORPHONE HYDROCHLORIDE 1 MG/ML
0.3 INJECTION, SOLUTION INTRAMUSCULAR; INTRAVENOUS; SUBCUTANEOUS
Status: DISCONTINUED | OUTPATIENT
Start: 2021-07-02 | End: 2021-07-06 | Stop reason: HOSPADM

## 2021-07-02 RX ADMIN — ALPRAZOLAM 0.5 MG: 0.5 TABLET ORAL at 22:05

## 2021-07-02 RX ADMIN — METFORMIN ER 500 MG 1000 MG: 500 TABLET ORAL at 22:05

## 2021-07-02 RX ADMIN — GABAPENTIN 800 MG: 400 CAPSULE ORAL at 22:05

## 2021-07-02 RX ADMIN — OXYCODONE HYDROCHLORIDE 5 MG: 5 TABLET ORAL at 23:11

## 2021-07-02 RX ADMIN — ACETAMINOPHEN 975 MG: 325 TABLET, FILM COATED ORAL at 22:05

## 2021-07-02 RX ADMIN — OXYCODONE HYDROCHLORIDE 5 MG: 5 TABLET ORAL at 17:33

## 2021-07-02 RX ADMIN — TRAZODONE HYDROCHLORIDE 50 MG: 50 TABLET ORAL at 23:11

## 2021-07-02 ASSESSMENT — ENCOUNTER SYMPTOMS
WEAKNESS: 0
NUMBNESS: 0
MYALGIAS: 1

## 2021-07-02 ASSESSMENT — MIFFLIN-ST. JEOR: SCORE: 1648.94

## 2021-07-02 NOTE — ED TRIAGE NOTES
Pt here after feeling a ripping pain in her L knee. Hx of gout and neuropathy in that knee. Able to bear weight with help. A+Ox4, no acute signs of dsitress. CMS intact

## 2021-07-02 NOTE — ED PROVIDER NOTES
"  History   Chief Complaint:  Knee Injury       The history is provided by the patient.      Zeynep Martinez is a 76 year old female with history of gout who presents with left knee pain. Zeynep mentions a fall to her left knee that occurred a year ago. She reports that on Monday, in her opinion she had gout on her left leg accompanied with a bad pain. She reports the pain improved however throughout the week and she was able to ambulate with her canee. Zeynep reports that while seating herself at a restaurant table today, however prior to arrival, she had planted her left foot and pivoted her left leg outward which was followed with a \"ripping\" sensation throughout her left leg. She reports pain with ambulation and inability to bear weight. No paresthesias, fever or other symptoms.  She typically uses a cane/walker and lives alone.      Review of Systems   Musculoskeletal: Positive for myalgias.   Neurological: Negative for weakness and numbness.   All other systems reviewed and are negative.    Allergies:  Adhesive Tape  Latex  Statin Drugs   Penicillins    Medications:  Alprazolam  Doxycycline hyclate  Gabapentin  Metformin  Pantoprazole  Trazodone  Venlafaxine    Past Medical History:    Calculus of kidney  Cancer Uterine  Frequent UTI  Hyperlipidemia  Type 2 Diabetes  Tremor of both hands  Major depressive disorder  Trigeminal neuralgia  GERD    Past Surgical History:    Abdominoplasty  Appendectomy  Extracorporeal shock wave lithotripsy  Gastric bypass  Hysterectomy, total abdominal bilateral   LA Cysto/uretero w/lithotripsy and INDWELL stent insert  Revision daniel-en-y  Skin surgery  Stomach surgery    Family History:    Mother - multiple sclerosis    Social History:  The patient is accompanied by her daughter today in the ED.     Physical Exam     Patient Vitals for the past 24 hrs:   BP Temp Temp src Pulse Resp SpO2   07/02/21 1528 (!) 187/98 98.9  F (37.2  C) Temporal 89 16 98 %       Physical Exam "   Nursing note and vitals reviewed.  Constitutional: Well nourished.   Eyes: Conjunctiva normal.    ENT: Nose normal. Mucous membranes pink and moist.    Neck: Normal range of motion.  CVS: Normal rate, regular rhythm.    Pulmonary: Lungs clear to auscultation bilaterally.   GI: Abdomen soft. Nontender  MSK:   L. Knee Exam: Exam limited somewhat 2/2 to girth  Palpation: TTP over the L. Lateral knee; unable to actively flex/ext knee  2/2 to pain  Patellar motions:  normal  Sensation: Intact to light touch distally  Cap refil:   < 2 seconds  DP/PT Pulses normal.  L. hip: full flex/ext w/o pain, no ttp.  L. ankle/foot: Able to flex/ext toes and DF/PF ankle actively. No TTP.   Neuro: Alert. Follows simple commands.  Skin: Skin is warm and dry. No rash noted.   Psychiatric: Normal affect.       Emergency Department Course     Imaging:  XR Knee Left 3 Views  Final Result  IMPRESSION: Mild loss of medial joint space on the frontal radiograph.  No suprapatellar effusion. The patellofemoral space is maintained on  the sunrise radiograph. A radiopaque loose body noted in the posterior  capsule.  Per Radiology    Laboratory:  Asymptomatic COVID19 Virus PCR by nasopharyngeal swab pending  Glucose by Meter (Collected 1726): 297 (H)      Emergency Department Course:    Reviewed:  1645 I reviewed nursing notes, vitals, past medical history and care everywhere    Assessments:  1651 I obtained history and examined the patient as noted above.     1800  I rechecked the patient        Consults:   1726 I consulted with Ortho, Dr Phillips.   1907   I spoke to NAHUM Chung who is accepting for Dr. Vernon    Interventions:  1733 Oxycodone 5 mg PO    Disposition:  The patient was admitted to the hospital under the care of Dr. Vernon.       Impression & Plan     Medical Decision Making:  Patient is a 76-year-old female presenting with reported left knee pain.  She is neurovascularly intact on arrival.  X-ray without focal fracture or  dislocation.  I do have concern for more ligamentous injury.  Unfortunately due to patient habitus a knee immobilizer is unable to be placed on the patient.  She was placed in an Ace wrap.  We did attempt ambulation trial with walker though she was unable to tolerate this.  There is no evidence to suggest septic joint or other areas of injury on exam though I do feel she would benefit from likely MRI and admission for pain control with possible PT.  Ortho is aware of the patient.  Remainder of exam is without trauma.    Covid-19  Zeynep Martinez was evaluated during a global COVID-19 pandemic, which necessitated consideration that the patient might be at risk for infection with the SARS-CoV-2 virus that causes COVID-19.   Applicable protocols for evaluation were followed during the patient's care.   COVID-19 was considered as part of the patient's evaluation. The plan for testing is:  a test was obtained during this visit.    Diagnosis:    ICD-10-CM    1. Acute pain of left knee  M25.562        Discharge Medications:  New Prescriptions    No medications on file       Scribe Disclosure:  Edin TERRY, am serving as a scribe at 4:51 PM on 7/2/2021 to document services personally performed by Ana Cristina Jhaveri DO based on my observations and the provider's statements to me.            Ana Cristina Jhaveri DO  07/02/21 0960

## 2021-07-03 ENCOUNTER — APPOINTMENT (OUTPATIENT)
Dept: PHYSICAL THERAPY | Facility: CLINIC | Age: 76
End: 2021-07-03
Attending: PHYSICIAN ASSISTANT
Payer: COMMERCIAL

## 2021-07-03 LAB
ANION GAP SERPL CALCULATED.3IONS-SCNC: 7 MMOL/L (ref 3–14)
BASOPHILS # BLD AUTO: 0.1 10E9/L (ref 0–0.2)
BASOPHILS NFR BLD AUTO: 0.5 %
BUN SERPL-MCNC: 17 MG/DL (ref 7–30)
CALCIUM SERPL-MCNC: 8.4 MG/DL (ref 8.5–10.1)
CHLORIDE SERPL-SCNC: 104 MMOL/L (ref 94–109)
CO2 SERPL-SCNC: 25 MMOL/L (ref 20–32)
CREAT SERPL-MCNC: 0.89 MG/DL (ref 0.52–1.04)
DIFFERENTIAL METHOD BLD: NORMAL
EOSINOPHIL # BLD AUTO: 0.2 10E9/L (ref 0–0.7)
EOSINOPHIL NFR BLD AUTO: 1.7 %
ERYTHROCYTE [DISTWIDTH] IN BLOOD BY AUTOMATED COUNT: 14.2 % (ref 10–15)
GFR SERPL CREATININE-BSD FRML MDRD: 64 ML/MIN/{1.73_M2}
GLUCOSE BLDC GLUCOMTR-MCNC: 166 MG/DL (ref 70–99)
GLUCOSE BLDC GLUCOMTR-MCNC: 217 MG/DL (ref 70–99)
GLUCOSE SERPL-MCNC: 209 MG/DL (ref 70–99)
HCT VFR BLD AUTO: 37.3 % (ref 35–47)
HGB BLD-MCNC: 12 G/DL (ref 11.7–15.7)
IMM GRANULOCYTES # BLD: 0.1 10E9/L (ref 0–0.4)
IMM GRANULOCYTES NFR BLD: 0.8 %
LYMPHOCYTES # BLD AUTO: 2.2 10E9/L (ref 0.8–5.3)
LYMPHOCYTES NFR BLD AUTO: 22.9 %
MCH RBC QN AUTO: 29.2 PG (ref 26.5–33)
MCHC RBC AUTO-ENTMCNC: 32.2 G/DL (ref 31.5–36.5)
MCV RBC AUTO: 91 FL (ref 78–100)
MONOCYTES # BLD AUTO: 0.4 10E9/L (ref 0–1.3)
MONOCYTES NFR BLD AUTO: 4.3 %
NEUTROPHILS # BLD AUTO: 6.8 10E9/L (ref 1.6–8.3)
NEUTROPHILS NFR BLD AUTO: 69.8 %
NRBC # BLD AUTO: 0 10*3/UL
NRBC BLD AUTO-RTO: 0 /100
PLATELET # BLD AUTO: 306 10E9/L (ref 150–450)
POTASSIUM SERPL-SCNC: 5 MMOL/L (ref 3.4–5.3)
RBC # BLD AUTO: 4.11 10E12/L (ref 3.8–5.2)
SODIUM SERPL-SCNC: 136 MMOL/L (ref 133–144)
WBC # BLD AUTO: 9.8 10E9/L (ref 4–11)

## 2021-07-03 PROCEDURE — 97530 THERAPEUTIC ACTIVITIES: CPT | Mod: GP | Performed by: PHYSICAL THERAPIST

## 2021-07-03 PROCEDURE — G0378 HOSPITAL OBSERVATION PER HR: HCPCS

## 2021-07-03 PROCEDURE — 999N001017 HC STATISTIC GLUCOSE BY METER IP

## 2021-07-03 PROCEDURE — 250N000013 HC RX MED GY IP 250 OP 250 PS 637: Performed by: PHYSICIAN ASSISTANT

## 2021-07-03 PROCEDURE — 97161 PT EVAL LOW COMPLEX 20 MIN: CPT | Mod: GP | Performed by: PHYSICAL THERAPIST

## 2021-07-03 PROCEDURE — 83036 HEMOGLOBIN GLYCOSYLATED A1C: CPT | Performed by: PHYSICIAN ASSISTANT

## 2021-07-03 PROCEDURE — 36415 COLL VENOUS BLD VENIPUNCTURE: CPT | Performed by: PHYSICIAN ASSISTANT

## 2021-07-03 PROCEDURE — 80048 BASIC METABOLIC PNL TOTAL CA: CPT | Performed by: PHYSICIAN ASSISTANT

## 2021-07-03 PROCEDURE — 99225 PR SUBSEQUENT OBSERVATION CARE,LEVEL II: CPT | Performed by: PHYSICIAN ASSISTANT

## 2021-07-03 PROCEDURE — 85025 COMPLETE CBC W/AUTO DIFF WBC: CPT | Performed by: PHYSICIAN ASSISTANT

## 2021-07-03 RX ORDER — ACETAMINOPHEN 500 MG
1000 TABLET ORAL 3 TIMES DAILY
Status: DISCONTINUED | OUTPATIENT
Start: 2021-07-03 | End: 2021-07-06 | Stop reason: HOSPADM

## 2021-07-03 RX ORDER — POLYETHYLENE GLYCOL 3350 17 G/17G
17 POWDER, FOR SOLUTION ORAL DAILY
Qty: 510 G | DISCHARGE
Start: 2021-07-03 | End: 2021-07-06

## 2021-07-03 RX ORDER — ACETAMINOPHEN 500 MG
1000 TABLET ORAL 3 TIMES DAILY
DISCHARGE
Start: 2021-07-03 | End: 2021-07-06

## 2021-07-03 RX ORDER — OXYCODONE HYDROCHLORIDE 5 MG/1
2.5-5 TABLET ORAL EVERY 4 HOURS PRN
Qty: 10 TABLET | Refills: 0 | Status: SHIPPED | DISCHARGE
Start: 2021-07-03 | End: 2021-07-06

## 2021-07-03 RX ORDER — OXYCODONE HYDROCHLORIDE 5 MG/1
2.5-5 TABLET ORAL
Qty: 10 TABLET | Refills: 0 | Status: SHIPPED | OUTPATIENT
Start: 2021-07-03 | End: 2021-07-03

## 2021-07-03 RX ORDER — GABAPENTIN 800 MG/1
800 TABLET ORAL 2 TIMES DAILY
Qty: 40 TABLET | Status: SHIPPED | OUTPATIENT
Start: 2021-07-03 | End: 2021-07-04

## 2021-07-03 RX ORDER — AMOXICILLIN 250 MG
1-2 CAPSULE ORAL 2 TIMES DAILY PRN
DISCHARGE
Start: 2021-07-03 | End: 2021-07-06

## 2021-07-03 RX ADMIN — GABAPENTIN 800 MG: 400 CAPSULE ORAL at 20:24

## 2021-07-03 RX ADMIN — METFORMIN ER 500 MG 1000 MG: 500 TABLET ORAL at 08:46

## 2021-07-03 RX ADMIN — OXYCODONE HYDROCHLORIDE 5 MG: 5 TABLET ORAL at 11:12

## 2021-07-03 RX ADMIN — ACETAMINOPHEN 1000 MG: 500 TABLET, FILM COATED ORAL at 22:55

## 2021-07-03 RX ADMIN — VENLAFAXINE HYDROCHLORIDE 75 MG: 75 CAPSULE, EXTENDED RELEASE ORAL at 08:46

## 2021-07-03 RX ADMIN — ACETAMINOPHEN 975 MG: 325 TABLET, FILM COATED ORAL at 08:46

## 2021-07-03 RX ADMIN — PANTOPRAZOLE SODIUM 40 MG: 40 TABLET, DELAYED RELEASE ORAL at 06:35

## 2021-07-03 RX ADMIN — VENLAFAXINE HYDROCHLORIDE 150 MG: 150 CAPSULE, EXTENDED RELEASE ORAL at 08:46

## 2021-07-03 RX ADMIN — TRAZODONE HYDROCHLORIDE 50 MG: 50 TABLET ORAL at 22:55

## 2021-07-03 RX ADMIN — METFORMIN ER 500 MG 1000 MG: 500 TABLET ORAL at 17:12

## 2021-07-03 RX ADMIN — OXYCODONE HYDROCHLORIDE 5 MG: 5 TABLET ORAL at 22:55

## 2021-07-03 RX ADMIN — GABAPENTIN 800 MG: 400 CAPSULE ORAL at 08:45

## 2021-07-03 RX ADMIN — OXYCODONE HYDROCHLORIDE 5 MG: 5 TABLET ORAL at 06:35

## 2021-07-03 RX ADMIN — ACETAMINOPHEN 1000 MG: 500 TABLET, FILM COATED ORAL at 17:12

## 2021-07-03 NOTE — PROGRESS NOTES
ROOM # [id number]    Living Situation: Lives alone in apartment  Facility name:  : Mini(daughter) [phone]    Activity level at baseline: Ind with walker/cane  Activity level on admit: assist x1      Patient registered to observation; given Patient Bill of Rights; given the opportunity to ask questions about observation status and their plan of care.  Patient has been oriented to the observation room, bathroom and call light is in place.    Discussed discharge goals and expectations with patient/family.

## 2021-07-03 NOTE — PLAN OF CARE
PRIMARY DIAGNOSIS: ACUTE PAIN  OUTPATIENT/OBSERVATION GOALS TO BE MET BEFORE DISCHARGE:  1. Pain Status: Improved-controlled with oral pain medications.    2. Return to near baseline physical activity: Yes    3. Cleared for discharge by consultants (if involved): No    Discharge Planner Nurse   Safe discharge environment identified: No  Barriers to discharge: Yes       Entered by: Bhumika Cedillo 07/03/2021      Please review provider order for any additional goals.   Nurse to notify provider when observation goals have been met and patient is ready for discharge.

## 2021-07-03 NOTE — PROGRESS NOTES
[facility]  Medicine Progress Note - Hospitalist Service       Date of Admission:  7/2/2021    Assessment & Plan   Zeynep Martinez is a 77 yo female with NIDDM2, trigeminal neuralgia, uterine cancer 2001, ESBL UTIs, h/o RXYGB, and depression who presented to [facility] ED on 7/2/2021 after developing knee pain while touring correction facilities with her daughter.      Left knee pain  Atraumatic.  Pain with weightbearing and movement suggesting possible meniscal injury with consideration for LCL sprain.  Nothing to suggesting instability.  XR Knee suggested degenerative changes but no acute process or effusion.    - Unable to ambulate.  Pain even with standing.  PT recommending TCU  - Immobilize leg as able  - WBAT  - Ortho consulted  - Pain control  - Ice, elevate  - Defer MRI for now (unless Ortho wants in-house)     H/o ESBL EColi UTI  UA 6/29 bland but UA growing ESBL Ecoli.  Asymptomatic.  No white count.  Suspect colonization rather than true infection.   - Defer Abx at this time.    - Low threshold to add Abx if fever, weakness, AMS, or urinary symptoms arise.       NIDDM2  Well controlled with A1c 7.3.    - Continue metformin     Trigeminal neuralgia  - Continue PTA gabapentin     Depression  - Continue PTA Effexor-XR and trazodone     COVID status: Negative  Diet: Moderate Consistent CHO Diet  Advance Diet as Tolerated    DVT Prophylaxis: Enoxaparin (Lovenox) SQ  Code Status: Full Code      DISPO:  DC to TCU when bed available    NAHUM Champion  Hospitalist Service  [facility]     Text Page (7AM - 5PM, M-F)  ______________________________________________________________________    Interval History   Ongoing pain with even just standing.  Better at rest.  Pain is along the lateral aspect of the knee and posterior.  No trauma.      Data reviewed today: I reviewed all medications, new labs and imaging results over the last 24 hours. I personally reviewed no images or  EKG's today.    Physical Exam   Vital Signs: Temp: 97.5  F (36.4  C) Temp src: Oral BP: (!) 155/59 Pulse: 87   Resp: 18 SpO2: 95 % O2 Device: None (Room air)    Weight: 260 lbs 1.6 oz    GENERAL:  Pleasant, cooperative, alert. Well developed, well nourished.   HEENT: Normocephalic, atraumatic.  Extra occular mm intact.  Sclera clear. PERRL.  Mucous membranes moist.   PULMONOLOGY: Clear to auscultation bilaterally    CARDIAC: Regular rate and rhythm.  No appreciated murmur.    ABDOMEN: Soft, nontender non distended.   MUSCULOSKELETAL:  Moving x 4 spontaneously with CMS intact x4.  Normal bulk and tone.  No LE edema.   NEURO: Alert and oriented x3.  CN II-XII grossly intact and symmetric.  No ataxia or tremor.  Nonfocal.     Data   No lab results found in last 7 days.    Recent Results (from the past 24 hour(s))   XR Knee Left 3 Views    Narrative    XR KNEE LEFT 3 VIEWS 7/2/2021 4:12 PM    HISTORY: ripping pain    COMPARISON: None.      Impression    IMPRESSION: Mild loss of medial joint space on the frontal radiograph.  No suprapatellar effusion. The patellofemoral space is maintained on  the sunrise radiograph. A radiopaque loose body noted in the posterior  capsule.    MISAEL GUERRA MD          SYSTEM ID:  [id number]     Medications       acetaminophen  1,000 mg Oral TID     gabapentin  800 mg Oral BID     metFORMIN  1,000 mg Oral BID w/meals     pantoprazole  40 mg Oral QAM AC     sodium chloride (PF)  3 mL Intracatheter Q8H     traZODone  50 mg Oral At Bedtime     venlafaxine  150 mg Oral Daily     venlafaxine  75 mg Oral Daily

## 2021-07-03 NOTE — PROGRESS NOTES
07/03/21 1127   Quick Adds   Quick Adds Certification   Type of Visit Initial PT Evaluation   Living Environment   People in home alone   Current Living Arrangements apartment   Living Environment Comments Pt looking into an JOHN   Self-Care   Equipment Currently Used at Home walker, rolling;cane, straight  (4WW)   Activity/Exercise/Self-Care Comment Cane in the home, 4WW    Disability/Function   Fall history within last six months no  (but back in AUGUST did fall on the L knee)   General Information   Onset of Illness/Injury or Date of Surgery 07/02/21   Referring Physician Bertha Ambriz PA-C   Pertinent History of Current Problem (include personal factors and/or comorbidities that impact the POC) Zeynep Martinez is a 76 year old female with a PMH significant for type 2 diabetes, trigeminal neuralgia, history gastric bypass with known ulcer, depression and insomnia who presents with acute left knee pain.   General Observations L knee exam: Pain with Lateral ligmentous testing in the L lateral ligament, firm end feel. Pain with posterior cruciate ligament testing. Pain with quad set as well   Cognition   Orientation Status (Cognition) oriented x 3   Affect/Mental Status (Cognition) WNL   Follows Commands (Cognition) WNL   Pain Assessment   Patient Currently in Pain Yes, see Vital Sign flowsheet  (L knee pain)   Integumentary/Edema   Integumentary/Edema Comments no edema   Range of Motion (ROM)   ROM Comment pain with flexion and extension   Strength   Strength Comments able to perform SAQ, Partial LAQ   Bed Mobility   Comment (Bed Mobility) SBA, mild pain with mobilizing   Transfers   Transfer Safety Comments CGA with 4WW, painful with attempts to WB    Gait/Stairs (Locomotion)   Comment (Gait/Stairs) With offloading pt able to take steps but severe pain and only able to perform 3 feet of ambulation   Clinical Impression   Criteria for Skilled Therapeutic Intervention yes, treatment indicated   PT  Diagnosis (PT) decreased functional mobility   Influenced by the following impairments decreased ROM, pain, decreased strength   Functional limitations due to impairments decreased transfers, ambulation   Clinical Presentation Stable/Uncomplicated   Clinical Presentation Rationale decreased transfers, ambulation   Clinical Decision Making (Complexity) low complexity   Therapy Frequency (PT) 5x/week   Predicted Duration of Therapy Intervention (days/wks) 3 days   Planned Therapy Interventions (PT) gait training;strengthening;transfer training;home exercise program;patient/family education;ROM (range of motion)   Risk & Benefits of therapy have been explained evaluation/treatment results reviewed;care plan/treatment goals reviewed;risks/benefits reviewed;current/potential barriers reviewed;participants voiced agreement with care plan;participants included;patient   PT Discharge Planning    PT Discharge Recommendation (DC Rec) Transitional Care Facility   PT Rationale for DC Rec Pt unable to ambulate more than 3 feet of ambulation due to L knee pain. Pt lives alone and perform all IADLs and ADLs at baseline   Therapy Certification   Start of care date 07/03/21   Certification date from 07/03/21   Certification date to 07/05/21   Total Evaluation Time   Total Evaluation Time (Minutes) 10

## 2021-07-03 NOTE — PLAN OF CARE
Acute Traumatic Pain : Acute Left Knee Pain    1.  Pain controlled with oral analgesia: Yes      2.  Vital signs stable: No - Hypertension      3.  Diagnotic testing complete: Yes - X-Ray imaging negative      4.  Cleared from consultants (if applicable): No - PT and SW      5. Return to near baseline physical activity: No    Pt is alert and oriented x4. On a mod cho diet. Ambulating with Ax1 with GB and walker. Patient is currently bearing weight on left leg as tolerated and pivoting to bedside commode. Declined use of purewick. Pt reports pain in left knee to be constant, but dull and 2/10 when at rest. Pain increases to 10/10 with movement. Left knee is currently ace wrapped, elevated on a pillow, and T-pump in place. On metformin. Checking blood sugars per pt request. PT and SW consulted.

## 2021-07-03 NOTE — PLAN OF CARE
[facility]      OUTPATIENT PHYSICAL THERAPY EVALUATION  PLAN OF TREATMENT FOR OUTPATIENT REHABILITATION  (COMPLETE FOR INITIAL CLAIMS ONLY)  Patient's Last Name, First Name, M.I.  YOB: 1945  Zeynep Martinez                        Provider's Name  [facility] Medical Record No.  [id number]                               Onset Date:  07/02/21   Start of Care Date:  07/03/21      Type:     _X_PT   ___OT   ___SLP Medical Diagnosis:                           PT Diagnosis:  decreased functional mobility   Visits from SOC:  1   _________________________________________________________________________________  Plan of Treatment/Functional Goals    Planned Interventions: gait training, strengthening, transfer training, home exercise program, patient/family education, ROM (range of motion)     Goals: See Physical Therapy Goals on Care Plan in [employer] electronic health record.    Therapy Frequency: 5x/week  Predicted Duration of Therapy Intervention: 3 days  _________________________________________________________________________________    I CERTIFY THE NEED FOR THESE SERVICES FURNISHED UNDER        THIS PLAN OF TREATMENT AND WHILE UNDER MY CARE     (Physician co-signature of this document indicates review and certification of the therapy plan).              Certification date from: 07/03/21, Certification date to: 07/05/21    Referring Physician: Bertha Ambriz PA-C            Initial Assessment        See Physical Therapy evaluation dated 07/03/21 in Epic electronic health record.

## 2021-07-03 NOTE — ED NOTES
[facility]  ED Nurse Handoff Report    Zeynep Martinez is a 76 year old female   ED Chief complaint: Knee Injury  . ED Diagnosis:   Final diagnoses:   Acute pain of left knee     Allergies:   Allergies   Allergen Reactions     Adhesive Tape Rash     Paper tape is ok      Latex      Statin Drugs [Hmg-Coa-R Inhibitors] Other (See Comments)     confusion     Penicillins Rash       Code Status: Full Code  Activity level - Baseline/Home:  Assist X 1. Activity Level - Current:   Assist X 2. Lift room needed: No. Bariatric: Yes   Needed: No   Isolation: No. Infection: Not Applicable.     Vital Signs:   Vitals:    07/02/21 1815 07/02/21 1830 07/02/21 1845 07/02/21 1900   BP: (!) 141/79      Pulse: 83      Resp:       Temp:       TempSrc:       SpO2: 96% 95% 96% 94%       Cardiac Rhythm:  ,      Pain level:    Patient confused: No. Patient Falls Risk: Yes.   Elimination Status: Has voided   Patient Report - Initial Complaint: Knee injury. Focused Assessment: Musculoskeletal - Musculoskeletal WDL: .WDL except  General Mobility: significantly impaired  Pain Body Location: knee (left) LLE Weight-Bearing Status: toe touch weight-bearing  RLE Weight-Bearing Status: weight-bearing as tolerated  CMS Intact: Yes  Musculoskeletal Comment: Pt reports left knee pain but unable to ambulate with walker independently.    Tests Performed: Imaging. Abnormal Results:   Labs Ordered and Resulted from Time of ED Arrival Up to the Time of Departure from the ED   GLUCOSE BY METER - Abnormal; Notable for the following components:       Result Value    Glucose 297 (*)     All other components within normal limits   SARS-COV-2 (COVID-19) VIRUS RT-PCR   KNEE IMMOBILIZER   PERIPHERAL IV CATHETER     XR Knee Left 3 Views   Final Result   IMPRESSION: Mild loss of medial joint space on the frontal radiograph.   No suprapatellar effusion. The patellofemoral space is maintained on   the sunrise radiograph. A radiopaque loose  body noted in the posterior   capsule.      MISAEL GUERRA MD             SYSTEM ID:  [id number]         Treatments provided: PO oxycodone  Family Comments: At bedside.  OBS brochure/video discussed/provided to patient:  Yes  ED Medications:   Medications   oxyCODONE (ROXICODONE) tablet 5 mg (5 mg Oral Given 7/2/21 0840)     Drips infusing:  No  For the majority of the shift, the patient's behavior Green. Interventions performed were None.    Sepsis treatment initiated: No     Patient tested for COVID 19 prior to admission: YES    ED Nurse Name/Phone Number: Dior Andrade RN,   7:02 PM    RECEIVING UNIT ED HANDOFF REVIEW    Above ED Nurse Handoff Report was reviewed: Yes  Reviewed by: Coleen Gurrola RN on July 2, 2021 at 8:20 PM

## 2021-07-03 NOTE — PHARMACY-ADMISSION MEDICATION HISTORY
Admission medication history interview status for this patient is complete. See [facility] admission navigator for allergy information, prior to admission medications and immunization status.     Medication history interview done, indicate source(s): Patient  Medication history resources (including written lists, pill bottles, clinic record):None  Pharmacy: -    Changes made to PTA medication list:  Added: -  Deleted: doxycycline, semaglutide  Changed: -    Actions taken by pharmacist (provider contacted, etc):None     Additional medication history information:None    Medication reconciliation/reorder completed by provider prior to medication history?  no   (Y/N)     For patients on insulin therapy:   Do you use sliding scale insulin based on blood sugars?   What is your pre-meal insulin coverage?    Do you typically eat three meals a day?   How many times do you check your blood glucose per day?   How many episodes of hypoglycemia do you typically have per month?   Do you have a Continuous Glucose Monitor (CGM)?      Prior to Admission medications    Medication Sig Last Dose Taking? Auth Provider   ALPRAZolam (XANAX) 0.5 MG tablet Take 1 tablet (0.5 mg) by mouth 3 times daily as needed for anxiety  Yes Shayy Rivas NP   gabapentin (NEURONTIN) 800 MG tablet Take 1 tablet (800 mg) by mouth 2 times daily 7/2/2021 at x1 Yes Shayy iRvas NP   metFORMIN (GLUCOPHAGE-XR) 500 MG 24 hr tablet Take 2 tablets (1,000 mg) by mouth 2 times daily (with meals) 7/2/2021 at x1 Yes Shayy Rivas NP   pantoprazole (PROTONIX) 40 MG EC tablet Take 1 tablet (40 mg) by mouth daily with food 7/2/2021 at Unknown time Yes Shayy Rivas NP   traZODone (DESYREL) 50 MG tablet Take 1 tablet (50 mg) by mouth At Bedtime 7/2/2021 at Unknown time Yes Shayy Rivas NP   venlafaxine (EFFEXOR-XR) 150 MG 24 hr capsule Take 150 mg by mouth daily Takes with 75 mg for atotal of 225 mg daily 7/2/2021 at Unknown time Yes Reported,  Patient   venlafaxine (EFFEXOR-XR) 75 MG 24 hr capsule Take 1 capsule (75 mg) by mouth daily Take in addition to 150 mg 7/2/2021 at Unknown time Yes Shayy Rivas NP   STATIN NOT PRESCRIBED (INTENTIONAL) Please choose reason not prescribed from choices below.   Shayy Rivas, NP

## 2021-07-03 NOTE — PLAN OF CARE
"PRIMARY DIAGNOSIS: ACUTE PAIN  OUTPATIENT/OBSERVATION GOALS TO BE MET BEFORE DISCHARGE:  1. Pain Status: Improved-controlled with oral pain medications.    2. Return to near baseline physical activity: No- SBA stand/pivot to commode- WBAT on LLE    3. Cleared for discharge by consultants (if involved):Yes- PT recommending TCU    Discharge Planner Nurse   Safe discharge environment identified: No  Barriers to discharge: Yes       Entered by: Bhumika Cedillo 07/03/2021      Please review provider order for any additional goals.   Nurse to notify provider when observation goals have been met and patient is ready for discharge.    AOx4. Up SBA to stand pivot to bedside commode. Pain 2-3/10 in L knee and L ankle. PRN oxy given x1 this shift o/w scheduled tylenol and ice. Tolerating mod carb diet. SL. PT recommending TCU- SW following. /70 (BP Location: Right arm)   Pulse 87   Temp 98.2  F (36.8  C) (Oral)   Resp 18   Ht 1.6 m (5' 3\")   Wt 118 kg (260 lb 1.6 oz)   LMP  (LMP Unknown)   SpO2 96%   BMI 46.07 kg/m    "

## 2021-07-03 NOTE — PLAN OF CARE
Acute Traumatic Pain : Acute Left Knee Pain    1.  Pain controlled with oral analgesia: Yes      2.  Vital signs stable: No - Hypertension      3.  Diagnotic testing complete: Yes - X-Ray imaging negative      4.  Cleared from consultants (if applicable): No - PT and SW      5. Return to near baseline physical activity: No    Pt is alert and oriented x4. On a mod cho diet. Ambulating with Ax1 with GB and walker. Patient is currently bearing weight on left leg as tolerated and pivoting to bedside commode. Declined use of purewick. Pt reports pain in left knee to be constant, but dull and 2/10 when at rest. Pain increases to 10/10 with movement. Left knee is currently ace wrapped, elevated on a pillow, and T-pump in place. On metformin. Checking blood sugars per pt request. . PT and SW consulted.     Pt requested for pain relieving ointment for intermittent shooting pain in left ankle. States this is chronic for her. Provider paged for order. Awaiting reply.

## 2021-07-03 NOTE — H&P
History and Physical     Zeynep Martinez MRN# [id number]   YOB: 1945 Age: 76 year old      Date of Admission:  7/2/2021    Primary care provider: Shayy Rivas          Assessment and Plan:   Zeynep Martinez is a 76 year old female with a PMH significant for type 2 diabetes, trigeminal neuralgia, history gastric bypass with known ulcer, depression and insomnia who presents with acute left knee pain.    Patient was discussed with Dr. Jhaveri, who was provider in ED. Chart review of ED work up was reviewed as well as chart review of Care Everywhere, previous visits and admissions.    #Acute left knee pain  Patient generally ambulates with a walker and was at a restaurant today when she believes her foot got twisted under a table causing her knee to twist at an odd angle with shooting pain into her knee down her leg.  Since this time she has been unable to ambulate and complaining of pain with any movement in her knee.  She believes she tore or rip something in her knee.  X-ray imaging does not show fracture.  ED provider spoke to orthopedics who recommended a knee immobilizer but unfortunately they do not have anything in house that would fit her leg.  She is unable to stand or walk to go home.  -Schedule Tylenol.  Unable to use ibuprofen due to history of gastric bypass and ulcer related to this  -Wrap left knee, elevate left leg and ice frequently  -Orthopedics consult to consider further imaging and help with immobilization  -PT and social work consults  -Patient will likely need TCU    #Type 2 diabetes  Most recent A1c was 7.3 on May 11  -Continue metformin    #Trigeminal neuralgia  -Continue gabapentin    #Depression  #Insomnia  -Continue trazodone and venlafaxine    #History of gastric bypass with ulcer  -Continue Protonix      Social: Lives independently  Code: Discussed with patient and they have chosen full code  VTE prophylaxis: Lovenox  Disposition: Observation                     Chief Complaint:   Left knee pain         History of Present Illness:   Zeynep Martinez is a 76 year old female who presents with left knee pain.  She states for the past week or so she has been struggling with what she thought was gout in her left leg but this improved.  Today she was out to lunch with her daughter when she believes her foot got caught under a table leg twisting her knee and she felt horrible pain shooting down her knee and leg.  She was able to walk with her walker outside but had to come to the emergency room following this.  She now has pain in her knee every time she moves it.  She denies recent illness including cough, shortness of breath, fever, chest discomfort, abdominal pain, nausea, vomiting, diarrhea and urinary symptoms.  She is taking all of her medicines as prescribed without anything new recently.  She does not drink alcohol daily or smoke cigarettes.  She admits that she is having a difficult time caring for herself at home and is considering moving to assisted living.             Past Medical History:     Past Medical History:   Diagnosis Date     Calculus of kidney     multiple, uric acid and calcium     Cancer (H) Uterine 2/2001     Frequent UTI      Hyperlipidemia LDL goal <100      Type 2 diabetes mellitus without complication, without long-term current use of insulin (H)                Past Surgical History:     Past Surgical History:   Procedure Laterality Date     ABDOMINOPLASTY       APPENDECTOMY       APPENDECTOMY       EXTRACORPOREAL SHOCK WAVE LITHOTRIPSY (ESWL)       EXTRACORPOREAL SHOCK WAVE LITHOTRIPSY (ESWL)       galbaldder       GASTRIC BYPASS       HYSTERECTOMY       HYSTERECTOMY TOTAL ABDOMINAL, BILATERAL SALPINGO-OOPHORECTOMY, COMBINED      no cervix     IR MISCELLANEOUS PROCEDURE  3/18/2008     IR MISCELLANEOUS PROCEDURE  3/18/2008     IR MISCELLANEOUS PROCEDURE  6/10/2008     IR MISCELLANEOUS PROCEDURE  1/24/2012     FL CYSTO/URETERO W/LITHOTRIPSY &INDWELL  STENT INSRT Left 10/26/2018    Procedure:  CYSTOSCOPY, LEFT  URETEROSCOPY, LASER LITHOTRIPSY STENT INSERTION;  Surgeon: Long Mansfield MD;  Location: [facility];  Service: Urology     FL CYSTO/URETERO W/LITHOTRIPSY &INDWELL STENT INSRT Right 2021    Procedure: CYSTOURETEROSCOPY, URETEROSCOPY, URETERAL DILATION WITH RETROGRADE PYELOGRAM, AND STENT INSERTION RIGHT;  Surgeon: Long Mansfield MD;  Location: [facility] OR;  Service: Urology     FL ERCP W/BIOPSY SINGLE/MULTIPLE       REVISION CATHERINE-EN-Y       SKIN SURGERY      15 lbs of skin/adipose removed     STOMACH SURGERY       URETEROSCOPY                 Social History:     Social History     Socioeconomic History     Marital status:      Spouse name: Not on file     Number of children: Not on file     Years of education: Not on file     Highest education level: Not on file   Occupational History     Not on file   Social Needs     Financial resource strain: Not on file     Food insecurity     Worry: Not on file     Inability: Not on file     Transportation needs     Medical: Not on file     Non-medical: Not on file   Tobacco Use     Smoking status: Former Smoker     Packs/day: 0.00     Years: 5.00     Pack years: 0.00     Types: Cigarettes     Quit date: 1970     Years since quittin.5     Smokeless tobacco: Never Used   Substance and Sexual Activity     Alcohol use: Yes     Comment: once per month      Drug use: Never     Sexual activity: Not Currently     Partners: Female     Birth control/protection: Post-menopausal   Lifestyle     Physical activity     Days per week: Not on file     Minutes per session: Not on file     Stress: Not on file   Relationships     Social connections     Talks on phone: Not on file     Gets together: Not on file     Attends Congregational service: Not on file     Active member of club or organization: Not on file     Attends meetings of clubs or organizations: Not on file     Relationship status:  Not on file     Intimate partner violence     Fear of current or ex partner: Not on file     Emotionally abused: Not on file     Physically abused: Not on file     Forced sexual activity: Not on file   Other Topics Concern     Parent/sibling w/ CABG, MI or angioplasty before 65F 55M? No   Social History Narrative     Not on file               Family History:     Family History   Problem Relation Age of Onset     Multiple Sclerosis Mother      Cancer Maternal Grandmother      Diabetes Maternal Grandmother      Hypertension Maternal Grandmother      Multiple Sclerosis Cousin      Multiple Sclerosis Cousin               Allergies:      Allergies   Allergen Reactions     Adhesive Tape Rash     Paper tape is ok      Latex      Statin Drugs [Hmg-Coa-R Inhibitors] Other (See Comments)     confusion     Penicillins Rash               Medications:     Prior to Admission medications    Medication Sig Last Dose Taking? Auth Provider   ALPRAZolam (XANAX) 0.5 MG tablet Take 1 tablet (0.5 mg) by mouth 3 times daily as needed for anxiety  Yes Shayy Rivas NP   gabapentin (NEURONTIN) 800 MG tablet Take 1 tablet (800 mg) by mouth 2 times daily 7/2/2021 at x1 Yes Shayy Rivas NP   metFORMIN (GLUCOPHAGE-XR) 500 MG 24 hr tablet Take 2 tablets (1,000 mg) by mouth 2 times daily (with meals) 7/2/2021 at x1 Yes Shayy Rivas NP   pantoprazole (PROTONIX) 40 MG EC tablet Take 1 tablet (40 mg) by mouth daily with food 7/2/2021 at Unknown time Yes Shayy Rivas NP   traZODone (DESYREL) 50 MG tablet Take 1 tablet (50 mg) by mouth At Bedtime 7/2/2021 at Unknown time Yes Shayy Rivas NP   venlafaxine (EFFEXOR-XR) 150 MG 24 hr capsule Take 150 mg by mouth daily Takes with 75 mg for atotal of 225 mg daily 7/2/2021 at Unknown time Yes Reported, Patient   venlafaxine (EFFEXOR-XR) 75 MG 24 hr capsule Take 1 capsule (75 mg) by mouth daily Take in addition to 150 mg 7/2/2021 at Unknown time Yes Shayy Rivas NP    STATIN NOT PRESCRIBED (INTENTIONAL) Please choose reason not prescribed from choices below.   Shayy Rivas, NP              Review of Systems:   A Comprehensive greater than 10 system review of systems was carried out.  Pertinent positives and negatives are noted above.  Otherwise negative for contributory information.            Physical Exam:   Blood pressure 138/66, pulse 91, temperature 98.9  F (37.2  C), temperature source Temporal, resp. rate 16, SpO2 95 %, not currently breastfeeding.  Exam:  GENERAL:  Comfortable.  PSYCH: pleasant, oriented, No acute distress.  HEENT:  PERRLA. Normal conjunctiva, normal hearing, nasal mucosa and Oropharynx are normal.  NECK:  Supple, no neck vein distention, adenopathy or bruits, normal thyroid.  HEART:  Normal S1, S2 with no murmur, no pericardial rub, gallops or S3 or S4.  LUNGS:  Clear to auscultation, normal Respiratory effort. No wheezing, rales or ronchi.  ABDOMEN:  Soft, no hepatosplenomegaly, normal bowel sounds. Non-tender, non distended.   EXTREMITIES:  No pedal edema, +2 pulses bilateral and equal. Left knee is wrapped in ace wrap.  SKIN:  Dry to touch, No rash, wound or ulcerations.  NEUROLOGIC:  CN 2-12 grossly intact,  sensation is intact with no focal deficits.               Data:     No results for input(s): WBC, HGB, HCT, MCV, PLT in the last 168 hours.       Lab Results   Component Value Date     05/11/2021     03/22/2021     01/26/2021    Lab Results   Component Value Date    CHLORIDE 108 05/11/2021    CHLORIDE 106 03/22/2021    CHLORIDE 108 01/26/2021    Lab Results   Component Value Date    BUN 17 05/11/2021    BUN 19 03/22/2021    BUN 27 01/26/2021      Lab Results   Component Value Date    POTASSIUM 5.2 05/11/2021    POTASSIUM 4.9 03/22/2021    POTASSIUM 5.2 01/26/2021    Lab Results   Component Value Date    CO2 25 05/11/2021    CO2 25 03/22/2021    CO2 24 01/26/2021    Lab Results   Component Value Date    CR 0.85 05/11/2021     CR 0.95 03/22/2021    CR 1.01 01/26/2021            Recent Results (from the past 24 hour(s))   XR Knee Left 3 Views    Narrative    XR KNEE LEFT 3 VIEWS 7/2/2021 4:12 PM    HISTORY: ripping pain    COMPARISON: None.      Impression    IMPRESSION: Mild loss of medial joint space on the frontal radiograph.  No suprapatellar effusion. The patellofemoral space is maintained on  the sunrise radiograph. A radiopaque loose body noted in the posterior  capsule.    MISAEL GUERRA MD          SYSTEM ID:  [id number]         Bertha Ambriz PA-C

## 2021-07-04 LAB
GLUCOSE BLDC GLUCOMTR-MCNC: 203 MG/DL (ref 70–99)
HBA1C MFR BLD: 8.1 % (ref 0–5.6)

## 2021-07-04 PROCEDURE — 99225 PR SUBSEQUENT OBSERVATION CARE,LEVEL II: CPT | Performed by: PHYSICIAN ASSISTANT

## 2021-07-04 PROCEDURE — 96372 THER/PROPH/DIAG INJ SC/IM: CPT | Performed by: PHYSICIAN ASSISTANT

## 2021-07-04 PROCEDURE — 999N001017 HC STATISTIC GLUCOSE BY METER IP

## 2021-07-04 PROCEDURE — 250N000011 HC RX IP 250 OP 636: Performed by: PHYSICIAN ASSISTANT

## 2021-07-04 PROCEDURE — 250N000013 HC RX MED GY IP 250 OP 250 PS 637: Performed by: PHYSICIAN ASSISTANT

## 2021-07-04 PROCEDURE — G0378 HOSPITAL OBSERVATION PER HR: HCPCS

## 2021-07-04 RX ORDER — GLIPIZIDE 5 MG/1
5 TABLET, FILM COATED, EXTENDED RELEASE ORAL
Status: DISCONTINUED | OUTPATIENT
Start: 2021-07-04 | End: 2021-07-06 | Stop reason: HOSPADM

## 2021-07-04 RX ORDER — ALPRAZOLAM 0.5 MG
0.5 TABLET ORAL 3 TIMES DAILY PRN
Qty: 6 TABLET | Refills: 0 | Status: SHIPPED | OUTPATIENT
Start: 2021-07-04 | End: 2021-07-06

## 2021-07-04 RX ORDER — GABAPENTIN 800 MG/1
800 TABLET ORAL 2 TIMES DAILY
Qty: 40 TABLET | Refills: 0 | Status: SHIPPED | OUTPATIENT
Start: 2021-07-04

## 2021-07-04 RX ORDER — GABAPENTIN 800 MG/1
800 TABLET ORAL 2 TIMES DAILY
Qty: 40 TABLET | Refills: 0 | Status: SHIPPED | OUTPATIENT
Start: 2021-07-04 | End: 2021-07-04

## 2021-07-04 RX ORDER — ALPRAZOLAM 0.5 MG
0.5 TABLET ORAL 3 TIMES DAILY PRN
Qty: 2 TABLET | Refills: 0 | Status: SHIPPED | OUTPATIENT
Start: 2021-07-04 | End: 2021-07-04

## 2021-07-04 RX ORDER — NICOTINE POLACRILEX 4 MG
15-30 LOZENGE BUCCAL
Status: DISCONTINUED | OUTPATIENT
Start: 2021-07-04 | End: 2021-07-06 | Stop reason: HOSPADM

## 2021-07-04 RX ORDER — DEXTROSE MONOHYDRATE 25 G/50ML
25-50 INJECTION, SOLUTION INTRAVENOUS
Status: DISCONTINUED | OUTPATIENT
Start: 2021-07-04 | End: 2021-07-06 | Stop reason: HOSPADM

## 2021-07-04 RX ORDER — GLIPIZIDE 5 MG/1
5 TABLET, FILM COATED, EXTENDED RELEASE ORAL
DISCHARGE
Start: 2021-07-04 | End: 2021-07-06

## 2021-07-04 RX ADMIN — VENLAFAXINE HYDROCHLORIDE 75 MG: 75 CAPSULE, EXTENDED RELEASE ORAL at 08:48

## 2021-07-04 RX ADMIN — TRAZODONE HYDROCHLORIDE 50 MG: 50 TABLET ORAL at 21:13

## 2021-07-04 RX ADMIN — ACETAMINOPHEN 1000 MG: 500 TABLET, FILM COATED ORAL at 14:08

## 2021-07-04 RX ADMIN — PANTOPRAZOLE SODIUM 40 MG: 40 TABLET, DELAYED RELEASE ORAL at 06:38

## 2021-07-04 RX ADMIN — GABAPENTIN 800 MG: 400 CAPSULE ORAL at 20:42

## 2021-07-04 RX ADMIN — ENOXAPARIN SODIUM 40 MG: 40 INJECTION SUBCUTANEOUS at 14:08

## 2021-07-04 RX ADMIN — ACETAMINOPHEN 1000 MG: 500 TABLET, FILM COATED ORAL at 06:38

## 2021-07-04 RX ADMIN — VENLAFAXINE HYDROCHLORIDE 150 MG: 150 CAPSULE, EXTENDED RELEASE ORAL at 08:48

## 2021-07-04 RX ADMIN — METFORMIN ER 500 MG 1000 MG: 500 TABLET ORAL at 08:48

## 2021-07-04 RX ADMIN — ACETAMINOPHEN 1000 MG: 500 TABLET, FILM COATED ORAL at 21:13

## 2021-07-04 RX ADMIN — GABAPENTIN 800 MG: 400 CAPSULE ORAL at 08:48

## 2021-07-04 RX ADMIN — METFORMIN ER 500 MG 1000 MG: 500 TABLET ORAL at 17:35

## 2021-07-04 RX ADMIN — GLIPIZIDE 5 MG: 5 TABLET, EXTENDED RELEASE ORAL at 14:08

## 2021-07-04 ASSESSMENT — MIFFLIN-ST. JEOR: SCORE: 1657.55

## 2021-07-04 NOTE — PLAN OF CARE
PRIMARY DIAGNOSIS: ACUTE PAIN of L knee  OUTPATIENT/OBSERVATION GOALS TO BE MET BEFORE DISCHARGE:  1. Pain Status: Improved-controlled with oral pain medications.    2. Return to near baseline physical activity: No- SBA stand/pivot to commode- WBAT on LLE    3. Cleared for discharge by consultants (if involved):Yes- PT recommending TCU    Discharge Planner Nurse   Safe discharge environment identified: No  Barriers to discharge: Yes       Entered by: Bhumika Cedillo 07/04/2021      Please review provider order for any additional goals.   Nurse to notify provider when observation goals have been met and patient is ready for discharge.

## 2021-07-04 NOTE — PROGRESS NOTES
[facility]  Medicine Progress Note - Hospitalist Service       Date of Admission:  7/2/2021    Assessment & Plan   Zeynep Martinez is a 77 yo female with NIDDM2, trigeminal neuralgia, uterine cancer 2001, ESBL UTIs, h/o RXYGB, and depression [facility] after developing knee pain while touring correction facilities with her daughter.      Left knee pain  Atraumatic.  Pain with weightbearing and movement suggesting possible meniscal injury with consideration for LCL sprain.  Nothing to suggesting instability.  XR Knee suggested degenerative changes but no acute process or effusion.    - Unable to ambulate.  Pain even with standing.  PT recommending TCU  - Immobilize leg as able  - WBAT  - Ortho consulted  - Pain control  - Ice, elevate  - Defer MRI for now (unless Ortho wants in-house)     H/o ESBL EColi UTI  UA 6/29 bland but UA growing ESBL Ecoli.  Asymptomatic - denies fever, chills, urgency, or dysuria.  No white count.  Suspect colonization rather than true infection.   - Defer Abx at this time.    - Low threshold to add Abx if fever, weakness, AMS, or urinary symptoms arise.       NIDDM2  HgbA1c 7.3 (up from 6.2 in Jan 2021).    - Add on A1c  - Continue metformin.  - BG suboptimally controlled in-house.  Would like to avoid insulin due to cost.  Add glipizide 5 mg XL.      Trigeminal neuralgia  - Continue PTA gabapentin     Depression  - Continue PTA Effexor-XR and trazodone     COVID status: Negative  Diet: Moderate Consistent CHO Diet  Advance Diet as Tolerated    DVT Prophylaxis: Enoxaparin (Lovenox) SQ  Code Status: Full Code      DISPO:  DC to TCU when bed available    NAHUM Champion  Hospitalist Service  [facility]     Text Page (7AM - 5PM, M-F)  ______________________________________________________________________    Interval History   Pain better today but still needing assistance with some activities and ADL.      Data reviewed  today: I reviewed all medications, new labs and imaging results over the last 24 hours. I personally reviewed no images or EKG's today.    Physical Exam   Vital Signs: Temp: 97.9  F (36.6  C) Temp src: Oral BP: (!) 145/55 Pulse: 75   Resp: 20 SpO2: 96 % O2 Device: None (Room air)    Weight: 262 lbs 0 oz    GENERAL:  Pleasant, cooperative, alert. Well developed, well nourished.  Appear comfortable.  HEENT: Normocephalic, atraumatic.  Extra occular mm intact.  Sclera clear. PERR  PULMONOLOGY: Clear   CARDIAC: Regular   ABDOMEN: Soft, nontender.   MUSCULOSKELETAL:  Moving x 4 spontaneously with CMS intact x4.  Normal bulk and tone.  5/5 strength BLE flexors/extensors  NEURO: Alert and oriented x3.  CN II-XII grossly intact and symmetric.  No ataxia or tremor.  Nonfocal.     Data   Recent Labs   Lab 07/03/21  1316   WBC 9.8   HGB 12.0   MCV 91         POTASSIUM 5.0   CHLORIDE 104   CO2 25   BUN 17   CR 0.89   ANIONGAP 7   SHAUN 8.4*   *       No results found for this or any previous visit (from the past 24 hour(s)).  Medications       acetaminophen  1,000 mg Oral TID     gabapentin  800 mg Oral BID     metFORMIN  1,000 mg Oral BID w/meals     pantoprazole  40 mg Oral QAM AC     sodium chloride (PF)  3 mL Intracatheter Q8H     traZODone  50 mg Oral At Bedtime     [START ON 7/5/2021] venlafaxine  225 mg Oral Daily with breakfast

## 2021-07-04 NOTE — PLAN OF CARE
PRIMARY DIAGNOSIS: ACUTE PAIN  OUTPATIENT/OBSERVATION GOALS TO BE MET BEFORE DISCHARGE:  1. Pain Status: Improved-controlled with oral pain medications.    2. Return to near baseline physical activity: No- SBA stand/pivot to commode- WBAT on LLE    3. Cleared for discharge by consultants (if involved):Yes- PT recommending TCU    Discharge Planner Nurse   Safe discharge environment identified: No  Barriers to discharge: Yes       Entered by: Coleen Gurrola 07/04/2021      Please review provider order for any additional goals.   Nurse to notify provider when observation goals have been met and patient is ready for discharge.    Pt is alert and oriented x4. Tolerating a mod carb diet. Up with SBA stand and pivot to BSC - WBAT on LLE. Pain is currently 0/10 at rest. Scheduled tylenol effective. Reported that pain was gone with the last pivot to commode. SL. Pt concerned with UTI as her My Chart indicated that she had a UTI. Pt believes her incontinence is a result of the UTI. Notified provider. PT recommending TCU. SW following.

## 2021-07-04 NOTE — TELEPHONE ENCOUNTER
Patient calling regarding urinary symptoms.  She has been having incontinence and is leaking urine.  Denies any pain or fever.  She will drop off a urine specimen at the [facility] today for testing.  Joy Horner RN

## 2021-07-04 NOTE — CONSULTS
Care Management Follow Up    Length of Stay (days): 0    Expected Discharge Date: 07/04/21     Concerns to be Addressed: care coordination/care conferences, discharge planning     Patient plan of care discussed at interdisciplinary rounds: Yes    Anticipated Discharge Disposition: Transitional Care     Anticipated Discharge Services: None  Anticipated Discharge DME: None    Patient/family educated on Medicare website which has current facility and service quality ratings: yes  Education Provided on the Discharge Plan:  Yes  Patient/Family in Agreement with the Plan: yes    Referrals Placed by CM/SW: Post Acute Facilities  Private pay costs discussed: private room/amenity fees    Additional Information:  Met with patient at bedside. Discussed recommendation for TCU, TCU coverage, and TCU choice. Patient is agreeable and understanding of TCU. Patient given list of TCU's in [place], per patient request as this is close to her daughter.  Patient prefers a private room. Patient has been vaccinated for Covid.    CM will follow up with patient once TCU choice is determined.     Patient states that family will provide transportation upon discharge.     Update 1405: TCU choices provided by the patient and referrals sent.   #1 [facility]  #2 [facility]  #3 [facility]      Nataliya Garcia, RN      Nataliya Garcia RN Case Manager  Inpatient Care Coordination  [facility]   [phone]

## 2021-07-04 NOTE — PLAN OF CARE
"PRIMARY DIAGNOSIS: ACUTE PAIN of L knee  OUTPATIENT/OBSERVATION GOALS TO BE MET BEFORE DISCHARGE:  1. Pain Status: Improved-controlled with oral pain medications.    2. Return to near baseline physical activity: No- SBA stand/pivot to commode- WBAT on LLE    3. Cleared for discharge by consultants (if involved):Yes- PT recommending TCU    Discharge Planner Nurse   Safe discharge environment identified: No  Barriers to discharge: Yes       Entered by: Bhumika Cedillo 07/04/2021      Please review provider order for any additional goals.   Nurse to notify provider when observation goals have been met and patient is ready for discharge.    AOx4. Denies pain. SBA to stand/pivot to commode. Spoke with provider about patient's concerns about BG in 200s and possible UTI- incontinence and recent UA/cultures.  Per provider, no indication to treat at this time- notify if new urinary sx present. Plan for TCU per PT recs, SW following. BP (!) 145/55 (BP Location: Right arm)   Pulse 75   Temp 97.9  F (36.6  C) (Oral)   Resp 20   Ht 1.6 m (5' 3\")   Wt 118.8 kg (262 lb)   LMP  (LMP Unknown)   SpO2 96%   BMI 46.41 kg/m    "

## 2021-07-04 NOTE — TELEPHONE ENCOUNTER
Telephone Encounter by Joy Horner RN at 7/1/2021 10:33 AM     Author: Joy Horner RN Service: -- Author Type: Registered Nurse    Filed: 7/6/2021  9:12 AM Encounter Date: 7/1/2021 Status: Addendum    : Joy Horner RN (Registered Nurse)    Related Notes: Original Note by Joy Horner RN (Registered Nurse) filed at 7/1/2021 10:36 AM       Call to ID microbiology lab at [facility].  They will speciate the organisms in patients recent multiple organism result.  Await results.  Joy Horner RN    Results received, per provider, antibiotic ordered for a 2 week course.  Medication ordered, message left for pt to call back to let us know that she did  the medication.  Joy Horner RN

## 2021-07-04 NOTE — CONSULTS
[facility]    Orthopedics Consultation    Date of Admission:  7/2/2021    Assessment & Plan   Zeynep Martinez is a 76 year old female who was admitted on 7/2/2021. I was asked to see the patient for left knee pain.  She has left knee osteoarthritis especially in the medial compartment.  There is no evidence of ligamentous injury currently.  She is stable on my physical exam today.  Her pain is much improved.  She can be weightbearing as tolerated on the left lower extremity with a knee immobilizer in place.  Follow-up with one of our knee surgeons at [facility] ([place]  The phone number is [phone].       Francisco Phillips MD    Code Status    Full Code    Reason for Consult   Reason for consult: Left knee pain    Primary Care Physician   Shayy Rivas    History of Present Illness   Zeynep Martinez is a 76 year old female who presents with a chief complaint of left knee pain.  She had noticed significant pain since Monday and she thought she had another gout flare.  She was able to ambulate with a cane.  She did some sort of pivoting movement and had a ripping sensation in the left leg.  She was unable to bear weight and ambulate following this episode.  She was seen in our ER and x-rays did not demonstrate any fractures.  She was admitted to the observation unit as she was unable to mobilize.  She denies any numbness or tingling.    MEDS:   Current Outpatient Medications   Medication Sig Dispense Refill     acetaminophen (TYLENOL) 500 MG tablet Take 2 tablets (1,000 mg) by mouth 3 times daily       ALPRAZolam (XANAX) 0.5 MG tablet Take 1 tablet (0.5 mg) by mouth 3 times daily as needed for anxiety 2 tablet 0     diclofenac (VOLTAREN) 1 % topical gel Apply 2 g topically 4 times daily as needed for moderate pain (apply to knee)       gabapentin (NEURONTIN) 800 MG tablet Take 1 tablet (800 mg) by mouth 2 times daily 40 tablet 0     melatonin 1 MG TABS tablet Take 3 tablets (3 mg) by mouth  nightly as needed for sleep       oxyCODONE (ROXICODONE) 5 MG tablet Take 0.5-1 tablets (2.5-5 mg) by mouth every 4 hours as needed for moderate to severe pain 10 tablet 0     polyethylene glycol (MIRALAX) 17 GM/Dose powder Take 17 g by mouth daily 510 g      senna-docusate (SENOKOT-S/PERICOLACE) 8.6-50 MG tablet Take 1-2 tablets by mouth 2 times daily as needed for constipation         PAST MEDICAL HISTORY:   Past Medical History:   Diagnosis Date     Calculus of kidney     multiple, uric acid and calcium     Cancer (H) Uterine 2/2001     Frequent UTI      Hyperlipidemia LDL goal <100      Type 2 diabetes mellitus without complication, without long-term current use of insulin (H)        PAST SURGICAL HISTORY:   Past Surgical History:   Procedure Laterality Date     ABDOMINOPLASTY       APPENDECTOMY       APPENDECTOMY       EXTRACORPOREAL SHOCK WAVE LITHOTRIPSY (ESWL)       EXTRACORPOREAL SHOCK WAVE LITHOTRIPSY (ESWL)       galbaldder       GASTRIC BYPASS       HYSTERECTOMY       HYSTERECTOMY TOTAL ABDOMINAL, BILATERAL SALPINGO-OOPHORECTOMY, COMBINED      no cervix     IR MISCELLANEOUS PROCEDURE  3/18/2008     IR MISCELLANEOUS PROCEDURE  3/18/2008     IR MISCELLANEOUS PROCEDURE  6/10/2008     IR MISCELLANEOUS PROCEDURE  1/24/2012     ME CYSTO/URETERO W/LITHOTRIPSY &INDWELL STENT INSRT Left 10/26/2018    Procedure:  CYSTOSCOPY, LEFT  URETEROSCOPY, LASER LITHOTRIPSY STENT INSERTION;  Surgeon: Long Mansfield MD;  Location: [facility];  Service: Urology     ME CYSTO/URETERO W/LITHOTRIPSY &INDWELL STENT INSRT Right 5/28/2021    Procedure: CYSTOURETEROSCOPY, URETEROSCOPY, URETERAL DILATION WITH RETROGRADE PYELOGRAM, AND STENT INSERTION RIGHT;  Surgeon: Long Mansfield MD;  Location: [facility];  Service: Urology     ME ERCP W/BIOPSY SINGLE/MULTIPLE       REVISION CATHERINE-EN-Y       SKIN SURGERY  2013    15 lbs of skin/adipose removed     STOMACH SURGERY       URETEROSCOPY         FAMILY HISTORY:   Family  History   Problem Relation Age of Onset     Multiple Sclerosis Mother      Cancer Maternal Grandmother      Diabetes Maternal Grandmother      Hypertension Maternal Grandmother      Multiple Sclerosis Cousin      Multiple Sclerosis Cousin        SOCIAL HISTORY:   Social History     Tobacco Use     Smoking status: Former Smoker     Packs/day: 0.00     Years: 5.00     Pack years: 0.00     Types: Cigarettes     Quit date: 1970     Years since quittin.5     Smokeless tobacco: Never Used   Substance Use Topics     Alcohol use: Yes     Comment: once per month        ALLERGIES:    Allergies   Allergen Reactions     Adhesive Tape Rash     Paper tape is ok      Latex      Statin Drugs [Hmg-Coa-R Inhibitors] Other (See Comments)     confusion     Penicillins Rash       ROS:  10 point ROS neg other than the symptoms noted above in the HPI.    Physical Exam   Temp: 97.9  F (36.6  C) Temp src: Oral BP: (!) 145/55 Pulse: 75   Resp: 20 SpO2: 96 % O2 Device: None (Room air)    Vital Signs with Ranges  Temp:  [97.2  F (36.2  C)-98.2  F (36.8  C)] 97.9  F (36.6  C)  Pulse:  [75-88] 75  Resp:  [18-20] 20  BP: (136-170)/(55-81) 145/55  SpO2:  [94 %-97 %] 96 %  262 lbs 0 oz    Constitutional: Pleasant, alert, appropriate, following commands.  HEENT: Head atraumatic normocephalic. Pupils equal round and reactive to light.  Respiratory: Unlabored breathing no audible wheeze  Cardiovascular: Regular rate and rhythm  GI: Abdomen soft nontender nondistended.  Lymph/Hematologic: No lymphadenopathy in areas examined  Genitourinary:  No mccarty  Skin: No rashes, no cyanosis, no edema.  Musculoskeletal: Focused examination of the left lower extremity demonstrates a full extensor mechanism with 5 out of 5 strength.  She is stable to valgus and varus stress.  There is some mild pain with varus stress along the lateral side of the knee joint.  There is a nice firm endpoint however.  She is stable to anterior drawer and posterior drawer.  She  has 5-5 strength with dorsiflexion plantarflexion inversion and eversion of her foot foot.  She has normal sensation distally.  She has a 2+ DP and PT pulses.  Neurologic: normal without focal findings, mental status, speech normal, alert and oriented x iii, MAYRA, reflexes normal and symmetric        Data   Results for orders placed or performed during the hospital encounter of 07/02/21 (from the past 24 hour(s))   Basic metabolic panel   Result Value Ref Range    Sodium 136 133 - 144 mmol/L    Potassium 5.0 3.4 - 5.3 mmol/L    Chloride 104 94 - 109 mmol/L    Carbon Dioxide 25 20 - 32 mmol/L    Anion Gap 7 3 - 14 mmol/L    Glucose 209 (H) 70 - 99 mg/dL    Urea Nitrogen 17 7 - 30 mg/dL    Creatinine 0.89 0.52 - 1.04 mg/dL    GFR Estimate 64 >60 mL/min/[1.73_m2]    GFR Estimate If Black 74 >60 mL/min/[1.73_m2]    Calcium 8.4 (L) 8.5 - 10.1 mg/dL   CBC with platelets differential   Result Value Ref Range    WBC 9.8 4.0 - 11.0 10e9/L    RBC Count 4.11 3.8 - 5.2 10e12/L    Hemoglobin 12.0 11.7 - 15.7 g/dL    Hematocrit 37.3 35.0 - 47.0 %    MCV 91 78 - 100 fl    MCH 29.2 26.5 - 33.0 pg    MCHC 32.2 31.5 - 36.5 g/dL    RDW 14.2 10.0 - 15.0 %    Platelet Count 306 150 - 450 10e9/L    Diff Method Automated Method     % Neutrophils 69.8 %    % Lymphocytes 22.9 %    % Monocytes 4.3 %    % Eosinophils 1.7 %    % Basophils 0.5 %    % Immature Granulocytes 0.8 %    Nucleated RBCs 0 0 /100    Absolute Neutrophil 6.8 1.6 - 8.3 10e9/L    Absolute Lymphocytes 2.2 0.8 - 5.3 10e9/L    Absolute Monocytes 0.4 0.0 - 1.3 10e9/L    Absolute Eosinophils 0.2 0.0 - 0.7 10e9/L    Absolute Basophils 0.1 0.0 - 0.2 10e9/L    Abs Immature Granulocytes 0.1 0 - 0.4 10e9/L    Absolute Nucleated RBC 0.0    Glucose by meter   Result Value Ref Range    Glucose 203 (H) 70 - 99 mg/dL       XR KNEE LEFT 3 VIEWS 7/2/2021 4:12 PM     HISTORY: ripping pain     COMPARISON: None.                                                                      IMPRESSION:  Mild loss of medial joint space on the frontal radiograph.  No suprapatellar effusion. The patellofemoral space is maintained on  the sunrise radiograph. A radiopaque loose body noted in the posterior  capsule.

## 2021-07-04 NOTE — PLAN OF CARE
"PRIMARY DIAGNOSIS: ACUTE PAIN of L knee  OUTPATIENT/OBSERVATION GOALS TO BE MET BEFORE DISCHARGE:  1. Pain Status: Improved-controlled with oral pain medications.    2. Return to near baseline physical activity:Yes- SBA to bathroom and back    3. Cleared for discharge by consultants (if involved):Yes- PT recommending TCU    Discharge Planner Nurse   Safe discharge environment identified: No  Barriers to discharge: Yes       Entered by: Bhumika Cedillo 07/04/2021      Please review provider order for any additional goals.   Nurse to notify provider when observation goals have been met and patient is ready for discharge.    AOx4. Denies pain. SBA to bathroom.  Plan for TCU per PT recs, SW following. BP (!) 144/72 (BP Location: Right arm)   Pulse 79   Temp 97.8  F (36.6  C) (Oral)   Resp 20   Ht 1.6 m (5' 3\")   Wt 118.8 kg (262 lb)   LMP  (LMP Unknown)   SpO2 93%   BMI 46.41 kg/m    "

## 2021-07-04 NOTE — PLAN OF CARE
PRIMARY DIAGNOSIS: ACUTE PAIN  OUTPATIENT/OBSERVATION GOALS TO BE MET BEFORE DISCHARGE:  1. Pain Status: Improved-controlled with oral pain medications.    2. Return to near baseline physical activity: No- SBA stand/pivot to commode- WBAT on LLE    3. Cleared for discharge by consultants (if involved):Yes- PT recommending TCU    Discharge Planner Nurse   Safe discharge environment identified: No  Barriers to discharge: Yes       Entered by: Coleen Gurrola 07/04/2021      Please review provider order for any additional goals.   Nurse to notify provider when observation goals have been met and patient is ready for discharge.    Pt is alert and oriented x4. Tolerating a mod carb diet. Up with SBA stand and pivot to BSC - WBAT on LLE. Pain is currently 0/10 at rest. Scheduled tylenol effective. Reports increased pain with movement of LLE. Requesting PRN oxy 5mg to cover her overnight. SL. Pt concerned with UTI as her My Chart indicated that she had a UTI. Pt believes her incontinence is a result of the UTI. Notified provider.  PT recommending TCU. SW following.

## 2021-07-04 NOTE — PLAN OF CARE
PRIMARY DIAGNOSIS: ACUTE PAIN  OUTPATIENT/OBSERVATION GOALS TO BE MET BEFORE DISCHARGE:  1. Pain Status: Improved-controlled with oral pain medications.    2. Return to near baseline physical activity: No- SBA stand/pivot to commode- WBAT on LLE    3. Cleared for discharge by consultants (if involved):Yes- PT recommending TCU    Discharge Planner Nurse   Safe discharge environment identified: No  Barriers to discharge: Yes       Entered by: Coleen Gurrola 07/03/2021      Please review provider order for any additional goals.   Nurse to notify provider when observation goals have been met and patient is ready for discharge.    Pt is alert and oriented x4. Tolerating a mod carb diet. Up with SBA stand and pivot to BSC - WBAT on LLE. Pain is currently 0/10 at rest. Scheduled tylenol effective. Reports increased pain with movement of LLE. Requesting PRN oxy 5mg to cover her overnight. SL. Pt concerned with UTI as her My Chart indicated that she had a UTI. Pt believes her incontinence is a result of the UTI. Notified provider.  PT recommending TCU. SW following.

## 2021-07-05 LAB
ANION GAP SERPL CALCULATED.3IONS-SCNC: 4 MMOL/L (ref 3–14)
BACTERIA SPEC CULT: ABNORMAL
BUN SERPL-MCNC: 20 MG/DL (ref 7–30)
CALCIUM SERPL-MCNC: 9.1 MG/DL (ref 8.5–10.1)
CHLORIDE SERPL-SCNC: 104 MMOL/L (ref 94–109)
CO2 SERPL-SCNC: 27 MMOL/L (ref 20–32)
CREAT SERPL-MCNC: 0.87 MG/DL (ref 0.52–1.04)
GFR SERPL CREATININE-BSD FRML MDRD: 66 ML/MIN/{1.73_M2}
GLUCOSE SERPL-MCNC: 163 MG/DL (ref 70–99)
Lab: ABNORMAL
POTASSIUM SERPL-SCNC: 4.4 MMOL/L (ref 3.4–5.3)
SODIUM SERPL-SCNC: 135 MMOL/L (ref 133–144)
SPECIMEN SOURCE: ABNORMAL

## 2021-07-05 PROCEDURE — 250N000013 HC RX MED GY IP 250 OP 250 PS 637: Performed by: PHYSICIAN ASSISTANT

## 2021-07-05 PROCEDURE — 250N000011 HC RX IP 250 OP 636: Performed by: PHYSICIAN ASSISTANT

## 2021-07-05 PROCEDURE — 80048 BASIC METABOLIC PNL TOTAL CA: CPT | Performed by: PHYSICIAN ASSISTANT

## 2021-07-05 PROCEDURE — 36415 COLL VENOUS BLD VENIPUNCTURE: CPT | Performed by: PHYSICIAN ASSISTANT

## 2021-07-05 PROCEDURE — G0378 HOSPITAL OBSERVATION PER HR: HCPCS

## 2021-07-05 PROCEDURE — 96372 THER/PROPH/DIAG INJ SC/IM: CPT | Performed by: PHYSICIAN ASSISTANT

## 2021-07-05 PROCEDURE — 99225 PR SUBSEQUENT OBSERVATION CARE,LEVEL II: CPT | Performed by: PHYSICIAN ASSISTANT

## 2021-07-05 RX ADMIN — GLIPIZIDE 5 MG: 5 TABLET, EXTENDED RELEASE ORAL at 08:36

## 2021-07-05 RX ADMIN — OXYCODONE HYDROCHLORIDE 5 MG: 5 TABLET ORAL at 13:21

## 2021-07-05 RX ADMIN — ALPRAZOLAM 0.5 MG: 0.5 TABLET ORAL at 19:39

## 2021-07-05 RX ADMIN — GABAPENTIN 800 MG: 400 CAPSULE ORAL at 08:36

## 2021-07-05 RX ADMIN — ENOXAPARIN SODIUM 40 MG: 40 INJECTION SUBCUTANEOUS at 13:21

## 2021-07-05 RX ADMIN — METFORMIN ER 500 MG 1000 MG: 500 TABLET ORAL at 17:47

## 2021-07-05 RX ADMIN — METFORMIN ER 500 MG 1000 MG: 500 TABLET ORAL at 08:36

## 2021-07-05 RX ADMIN — OXYCODONE HYDROCHLORIDE 5 MG: 5 TABLET ORAL at 08:36

## 2021-07-05 RX ADMIN — ACETAMINOPHEN 1000 MG: 500 TABLET, FILM COATED ORAL at 13:21

## 2021-07-05 RX ADMIN — GABAPENTIN 800 MG: 400 CAPSULE ORAL at 19:40

## 2021-07-05 RX ADMIN — TRAZODONE HYDROCHLORIDE 50 MG: 50 TABLET ORAL at 22:07

## 2021-07-05 RX ADMIN — ACETAMINOPHEN 1000 MG: 500 TABLET, FILM COATED ORAL at 22:07

## 2021-07-05 RX ADMIN — ACETAMINOPHEN 1000 MG: 500 TABLET, FILM COATED ORAL at 06:46

## 2021-07-05 RX ADMIN — VENLAFAXINE HYDROCHLORIDE 225 MG: 150 CAPSULE, EXTENDED RELEASE ORAL at 08:36

## 2021-07-05 RX ADMIN — PANTOPRAZOLE SODIUM 40 MG: 40 TABLET, DELAYED RELEASE ORAL at 06:46

## 2021-07-05 NOTE — PLAN OF CARE
Pt was ambulating with staff to the bathroom. Pt reports that when she was washing her hands she felt a little dizzy and took two steps backwards. She reports stepping on her leg wrong and now her knee is hurting again. Pain rated 8/10. Medication administered. Knee elevated and T-Pump in use. Dizziness has resolved and pt reports that she has fallen at home as a result.

## 2021-07-05 NOTE — PLAN OF CARE
PRIMARY DIAGNOSIS: ACUTE PAIN, L KNEE  OUTPATIENT/OBSERVATION GOALS TO BE MET BEFORE DISCHARGE:  1. Pain Status: Pain free.    2. Return to near baseline physical activity:Yes- SBA to bathroom and back    3. Cleared for discharge by consultants (if involved):Yes- PT recommending TCU    Discharge Planner Nurse   Safe discharge environment identified: No  Barriers to discharge: Yes       Entered by: Coleen Gurrola 07/04/2021 08:30 PM    Please review provider order for any additional goals.   Nurse to notify provider when observation goals have been met and patient is ready for discharge.

## 2021-07-05 NOTE — PLAN OF CARE
"PRIMARY DIAGNOSIS: ACUTE PAIN, L KNEE  OUTPATIENT/OBSERVATION GOALS TO BE MET BEFORE DISCHARGE:  1. Pain Status: 8/10 in L knee    2. Return to near baseline physical activity:No- SBA stand/pivot to commode    3. Cleared for discharge by consultants (if involved):Yes- PT recommending TCU    Discharge Planner Nurse   Safe discharge environment identified: No  Barriers to discharge: Yes       Entered by: Bhumika Cedillo 07/05/2021      Please review provider order for any additional goals.   Nurse to notify provider when observation goals have been met and patient is ready for discharge.    BP (!) 154/63 (BP Location: Right arm)   Pulse 84   Temp 98.6  F (37  C) (Oral)   Resp 16   Ht 1.6 m (5' 3\")   Wt 118.8 kg (262 lb)   LMP  (LMP Unknown)   SpO2 93%   BMI 46.41 kg/m      A&Ox4. Pain 8/10 in L knee. PRN oxy given- will re-assess. Awaiting TCU placement, SW following.   "

## 2021-07-05 NOTE — PROGRESS NOTES
[facility]    Medicine Progress Note - Hospitalist Service       Date of Admission:  7/2/2021    Assessment & Plan           Zeynep Martinez is a 77 yo female with NIDDM2, trigeminal neuralgia, uterine cancer 2001, ESBL UTIs, h/o RXYGB, and depression who presented to [facility] ED on 7/2/2021 after developing knee pain while touring [facility] facilities with her daughter.      Left knee pain  Atraumatic.  Pain with weightbearing and lateral and anterior tenderness. XR Knee suggested degenerative changes but no acute process or effusion.   - Ortho consulted, recommended weightbearing as tolerated and knee immobilizer.     - Unable to fit knee immobilizer  - Pain overall improved although exacerbated this AM    - PT recommending TCU  - WBAT  - Pain control  - Ice, elevate  - Defer advanced imaging at this time     H/o ESBL EColi UTI  UA 6/29 bland but UA growing ESBL Ecoli.  Asymptomatic - denies fever, chills, urgency, or dysuria.  No white count.  Suspect colonization rather than true infection.   - Defer Abx at this time.    - Low threshold to add Abx if fever, weakness, AMS, or urinary symptoms arise.       NIDDM2  HgbA1c 7.3 in May (up from 6.2 in Jan 2021).    - repeat A1c here is 8.1  - Continue PTA metformin.  - Glipizide 5 mg XL added here. Will need follow up with PCP for further management.      Trigeminal neuralgia  - Continue PTA gabapentin     Depression  - Continue PTA Effexor-XR and trazodone    Covid-19 negative       Diet: Moderate Consistent CHO Diet  Advance Diet as Tolerated    DVT Prophylaxis: Enoxaparin (Lovenox) SQ  Holland Catheter: Not present  Central Lines: None  Code Status: Full Code      Disposition Plan   Expected discharge: Tomorrow, recommended to transitional care unit once safe disposition plan/ TCU bed available.     The patient's care was discussed with the Attending Physician, Dr. Vernon, Bedside Nurse, Care Coordinator/ and Patient.    Caridad STRICKLAND  DULCE Breen  Hospitalist Service  [facility]  Securely message with the [employer] (learn more here)  Text page via [facility] Paging/Directory      Risk Factors Present on Admission                   ______________________________________________________________________    Interval History   Pt notes pain was minimal yesterday, this morning she was in the bathroom, started to fall backwards which caused her to step back and twist her knee causing an increase in pain. She notes her pain is 3/10, which is better than prior to arrival to hospital, still able to ambulate short distance to bathroom with assistance.     Data reviewed today: I reviewed all medications, new labs and imaging results over the last 24 hours. I personally reviewed no images or EKG's today.    Physical Exam   Vital Signs: Temp: 98.6  F (37  C) Temp src: Oral BP: (!) 154/63 Pulse: 84   Resp: 16 SpO2: 93 % O2 Device: None (Room air)    Weight: 262 lbs 0 oz    GENERAL:  Comfortable.  PSYCH: pleasant, oriented, No acute distress.  HEART:  RRR  LUNGS:  Normal Respiratory effort.  EXTREMITIES:  Lateral and anterior tenderness to left knee.  SKIN:  Dry to touch, No rash, wound or ulcerations.  NEUROLOGIC:  Grossly intact.      Data   Recent Labs   Lab 07/05/21  0627 07/03/21  1316   WBC  --  9.8   HGB  --  12.0   MCV  --  91   PLT  --  306    136   POTASSIUM 4.4 5.0   CHLORIDE 104 104   CO2 27 25   BUN 20 17   CR 0.87 0.89   ANIONGAP 4 7   SHAUN 9.1 8.4*   * 209*     No results found for this or any previous visit (from the past 24 hour(s)).  Medications       acetaminophen  1,000 mg Oral TID     enoxaparin ANTICOAGULANT  40 mg Subcutaneous Q24H     gabapentin  800 mg Oral BID     glipiZIDE  5 mg Oral Daily with breakfast     metFORMIN  1,000 mg Oral BID w/meals     pantoprazole  40 mg Oral QAM AC     sodium chloride (PF)  3 mL Intracatheter Q8H     traZODone  50 mg Oral At Bedtime     venlafaxine  225 mg Oral  Daily with breakfast

## 2021-07-05 NOTE — PLAN OF CARE
"PRIMARY DIAGNOSIS: ACUTE PAIN, L KNEE  OUTPATIENT/OBSERVATION GOALS TO BE MET BEFORE DISCHARGE:  1. Pain Status: 8/10 in L knee    2. Return to near baseline physical activity:No- SBA stand/pivot to commode    3. Cleared for discharge by consultants (if involved):Yes- PT recommending TCU    Discharge Planner Nurse   Safe discharge environment identified: No  Barriers to discharge: Yes       Entered by: Bhumika Cedillo 07/05/2021      Please review provider order for any additional goals.   Nurse to notify provider when observation goals have been met and patient is ready for discharge.    BP (!) 154/63 (BP Location: Right arm)   Pulse 84   Temp 98.6  F (37  C) (Oral)   Resp 16   Ht 1.6 m (5' 3\")   Wt 118.8 kg (262 lb)   LMP  (LMP Unknown)   SpO2 93%   BMI 46.41 kg/m      A&Ox4. Pain 6/10 in L knee. PRN oxy given- with stated relief. Awaiting TCU placement, SW following.   "

## 2021-07-05 NOTE — UTILIZATION REVIEW
Concurrent stay review; Secondary Review Determination    Under the authority of the Utilization Management Committee, the utilization review process indicated a secondary review on the above patient. The review outcome is based on review of the medical records, discussions with staff, and applying clinical experience noted on the date of the review.    (x) Observation Status Appropriate - Concurrent stay review        RATIONALE FOR DETERMINATION: 76-year-old female with type 2 diabetes who developed acute onset of knee pain while touring the assisted living facility.  No signs of acute inflammatory process or fracture.  Observation care appropriate for initial pain control, mobility assessment in arranging appropriate disposition due to patient's poor mobility secondary to acute knee pain.    Patient is clinically improving and there is no clear indication to change patient's status to inpatient. The severity of illness, intensity of service provided, expected LOS and risk for adverse outcome make the care appropriate for observation.    This document was produced using voice recognition software    The information on this document is developed by the utilization review team in order for the business office to ensure compliance. This only denotes the appropriateness of proper admission status and does not reflect the quality of care rendered.    The definitions of Inpatient Status and Observation Status used in making the determination above are those provided in the CMS Coverage Manual, Chapter 1 and Chapter 6, section 70.4.    Sincerely,    Bry Tyler MD  Utilization Review  Physician Advisor  [facility].

## 2021-07-05 NOTE — PLAN OF CARE
"PRIMARY DIAGNOSIS: ACUTE PAIN, L KNEE  OUTPATIENT/OBSERVATION GOALS TO BE MET BEFORE DISCHARGE:  1. Pain Status: Pain free.    2. Return to near baseline physical activity:Yes- SBA to bathroom and back    3. Cleared for discharge by consultants (if involved):Yes- PT recommending TCU    Discharge Planner Nurse   Safe discharge environment identified: No  Barriers to discharge: Yes       Entered by: Coleen Gurrola 07/05/2021      Please review provider order for any additional goals.   Nurse to notify provider when observation goals have been met and patient is ready for discharge.    BP (!) 169/67 (BP Location: Right arm)   Pulse 85   Temp 98.2  F (36.8  C) (Oral)   Resp 16   Ht 1.6 m (5' 3\")   Wt 118.8 kg (262 lb)   LMP  (LMP Unknown)   SpO2 96%   BMI 46.41 kg/m      A&Ox4. Denies any pain. Ambulating with SBA to bathroom with no pain. SL. Plan: TCU placement, per PT. SW following.   "

## 2021-07-05 NOTE — PLAN OF CARE
PRIMARY DIAGNOSIS: ACUTE LEFT KNEE PAIN  OUTPATIENT/OBSERVATION GOALS TO BE MET BEFORE DISCHARGE:  1. Pain Status: denied pain at rest. Minimal pain with walking    2. Return to near baseline physical activity: Yes    3. Cleared for discharge by consultants (if involved): Yes    Discharge Planner Nurse   Safe discharge environment identified: Yes  Barriers to discharge: Yes       Entered by: JOSUÉ COHEN 07/05/2021   Vital signs:  Temp: 98.4  F (36.9  C) Temp src: Oral BP: (!) 144/86 Pulse: 96   Resp: 20 SpO2: 94 % O2 Device: None (Room air)  Alert and oriented x4. Ambulated to the bathroom stand by assist with a walker. Pt walked with no assist or pain. Pt said they are feeling better this afternoon. Left knee ACE wrapped. Denies numbness/tingling. PT recommended TCU. SW following with placement. Will continue to monitor.      Please review provider order for any additional goals.   Nurse to notify provider when observation goals have been met and patient is ready for discharge.

## 2021-07-05 NOTE — PLAN OF CARE
PRIMARY DIAGNOSIS: ACUTE PAIN, L KNEE  OUTPATIENT/OBSERVATION GOALS TO BE MET BEFORE DISCHARGE:  1. Pain Status: Pain free.    2. Return to near baseline physical activity:Yes- SBA to bathroom and back    3. Cleared for discharge by consultants (if involved):Yes- PT recommending TCU    Discharge Planner Nurse   Safe discharge environment identified: No  Barriers to discharge: Yes       Entered by: Coleen Gurrola 07/05/2021       A&Ox4. Denies any pain. Ambulating with SBA to bathroom with no pain. SL. Plan: TCU placement, per PT. SW following.     Please review provider order for any additional goals.   Nurse to notify provider when observation goals have been met and patient is ready for discharge.

## 2021-07-06 ENCOUNTER — APPOINTMENT (OUTPATIENT)
Dept: PHYSICAL THERAPY | Facility: CLINIC | Age: 76
End: 2021-07-06
Payer: COMMERCIAL

## 2021-07-06 ENCOUNTER — TELEPHONE (OUTPATIENT)
Dept: PHARMACY | Facility: CLINIC | Age: 76
End: 2021-07-06

## 2021-07-06 VITALS — HEIGHT: 63 IN | BODY MASS INDEX: 44.29 KG/M2

## 2021-07-06 VITALS
SYSTOLIC BLOOD PRESSURE: 151 MMHG | TEMPERATURE: 98.4 F | DIASTOLIC BLOOD PRESSURE: 78 MMHG | RESPIRATION RATE: 16 BRPM | HEIGHT: 63 IN | BODY MASS INDEX: 44.92 KG/M2 | OXYGEN SATURATION: 96 % | WEIGHT: 253.5 LBS | HEART RATE: 115 BPM

## 2021-07-06 VITALS — WEIGHT: 250 LBS | BODY MASS INDEX: 43.59 KG/M2

## 2021-07-06 LAB
ANION GAP SERPL CALCULATED.3IONS-SCNC: 3 MMOL/L (ref 3–14)
BASOPHILS # BLD AUTO: 0 10E9/L (ref 0–0.2)
BASOPHILS NFR BLD AUTO: 0.4 %
BUN SERPL-MCNC: 23 MG/DL (ref 7–30)
CALCIUM SERPL-MCNC: 8.4 MG/DL (ref 8.5–10.1)
CHLORIDE SERPL-SCNC: 106 MMOL/L (ref 94–109)
CO2 SERPL-SCNC: 29 MMOL/L (ref 20–32)
CREAT SERPL-MCNC: 0.95 MG/DL (ref 0.52–1.04)
DIFFERENTIAL METHOD BLD: NORMAL
EOSINOPHIL # BLD AUTO: 0.2 10E9/L (ref 0–0.7)
EOSINOPHIL NFR BLD AUTO: 1.8 %
ERYTHROCYTE [DISTWIDTH] IN BLOOD BY AUTOMATED COUNT: 14.1 % (ref 10–15)
GFR SERPL CREATININE-BSD FRML MDRD: 59 ML/MIN/{1.73_M2}
GLUCOSE SERPL-MCNC: 171 MG/DL (ref 70–99)
HCT VFR BLD AUTO: 36.5 % (ref 35–47)
HGB BLD-MCNC: 11.7 G/DL (ref 11.7–15.7)
IMM GRANULOCYTES # BLD: 0.1 10E9/L (ref 0–0.4)
IMM GRANULOCYTES NFR BLD: 0.6 %
LYMPHOCYTES # BLD AUTO: 2.3 10E9/L (ref 0.8–5.3)
LYMPHOCYTES NFR BLD AUTO: 24.7 %
MCH RBC QN AUTO: 28.5 PG (ref 26.5–33)
MCHC RBC AUTO-ENTMCNC: 32.1 G/DL (ref 31.5–36.5)
MCV RBC AUTO: 89 FL (ref 78–100)
MONOCYTES # BLD AUTO: 0.6 10E9/L (ref 0–1.3)
MONOCYTES NFR BLD AUTO: 6.3 %
NEUTROPHILS # BLD AUTO: 6.2 10E9/L (ref 1.6–8.3)
NEUTROPHILS NFR BLD AUTO: 66.2 %
NRBC # BLD AUTO: 0 10*3/UL
NRBC BLD AUTO-RTO: 0 /100
PLATELET # BLD AUTO: 268 10E9/L (ref 150–450)
POTASSIUM SERPL-SCNC: 4.1 MMOL/L (ref 3.4–5.3)
RBC # BLD AUTO: 4.11 10E12/L (ref 3.8–5.2)
SODIUM SERPL-SCNC: 138 MMOL/L (ref 133–144)
WBC # BLD AUTO: 9.3 10E9/L (ref 4–11)

## 2021-07-06 PROCEDURE — 80048 BASIC METABOLIC PNL TOTAL CA: CPT | Performed by: PHYSICIAN ASSISTANT

## 2021-07-06 PROCEDURE — 85025 COMPLETE CBC W/AUTO DIFF WBC: CPT | Performed by: PHYSICIAN ASSISTANT

## 2021-07-06 PROCEDURE — 99217 PR OBSERVATION CARE DISCHARGE: CPT | Performed by: PHYSICIAN ASSISTANT

## 2021-07-06 PROCEDURE — 36415 COLL VENOUS BLD VENIPUNCTURE: CPT | Performed by: PHYSICIAN ASSISTANT

## 2021-07-06 PROCEDURE — 250N000013 HC RX MED GY IP 250 OP 250 PS 637: Performed by: PHYSICIAN ASSISTANT

## 2021-07-06 PROCEDURE — G0378 HOSPITAL OBSERVATION PER HR: HCPCS

## 2021-07-06 PROCEDURE — 97530 THERAPEUTIC ACTIVITIES: CPT | Mod: GP | Performed by: PHYSICAL THERAPIST

## 2021-07-06 RX ORDER — POLYETHYLENE GLYCOL 3350 17 G/17G
17 POWDER, FOR SOLUTION ORAL DAILY
Qty: 510 G | COMMUNITY
Start: 2021-07-06 | End: 2021-09-03

## 2021-07-06 RX ORDER — OXYCODONE HYDROCHLORIDE 5 MG/1
2.5-5 TABLET ORAL EVERY 4 HOURS PRN
Qty: 10 TABLET | Refills: 0 | Status: SHIPPED | OUTPATIENT
Start: 2021-07-06 | End: 2021-07-09

## 2021-07-06 RX ORDER — ALPRAZOLAM 0.5 MG
0.5 TABLET ORAL 3 TIMES DAILY PRN
Qty: 6 TABLET | Refills: 0
Start: 2021-07-06 | End: 2021-10-07

## 2021-07-06 RX ORDER — AMOXICILLIN 250 MG
1-2 CAPSULE ORAL 2 TIMES DAILY PRN
COMMUNITY
Start: 2021-07-06 | End: 2021-09-03

## 2021-07-06 RX ORDER — GLIPIZIDE 5 MG/1
5 TABLET, FILM COATED, EXTENDED RELEASE ORAL
Qty: 30 TABLET | Refills: 0 | Status: SHIPPED | OUTPATIENT
Start: 2021-07-06 | End: 2021-10-01

## 2021-07-06 RX ORDER — ACETAMINOPHEN 500 MG
1000 TABLET ORAL 3 TIMES DAILY
COMMUNITY
Start: 2021-07-06 | End: 2021-09-03

## 2021-07-06 RX ADMIN — PANTOPRAZOLE SODIUM 40 MG: 40 TABLET, DELAYED RELEASE ORAL at 06:34

## 2021-07-06 RX ADMIN — ACETAMINOPHEN 1000 MG: 500 TABLET, FILM COATED ORAL at 06:34

## 2021-07-06 RX ADMIN — VENLAFAXINE HYDROCHLORIDE 225 MG: 150 CAPSULE, EXTENDED RELEASE ORAL at 08:58

## 2021-07-06 RX ADMIN — GABAPENTIN 800 MG: 400 CAPSULE ORAL at 08:58

## 2021-07-06 RX ADMIN — GLIPIZIDE 5 MG: 5 TABLET, EXTENDED RELEASE ORAL at 08:58

## 2021-07-06 RX ADMIN — METFORMIN ER 500 MG 1000 MG: 500 TABLET ORAL at 08:58

## 2021-07-06 ASSESSMENT — MIFFLIN-ST. JEOR: SCORE: 1619

## 2021-07-06 NOTE — PLAN OF CARE
Patient's After Visit Summary was reviewed with patient and/or daughter.   Patient verbalized understanding of After Visit Summary, recommended follow up and was given an opportunity to ask questions.   Discharge medications sent home with patient/family: YES   Discharged with daughter.    OBSERVATION patient END time: 4:16 PM

## 2021-07-06 NOTE — PLAN OF CARE
PRIMARY DIAGNOSIS: ACUTE LEFT KNEE PAIN  OUTPATIENT/OBSERVATION GOALS TO BE MET BEFORE DISCHARGE:  1. Pain Status: Pain free.    2. Return to near baseline physical activity: Yes    3. Cleared for discharge by consultants (if involved): Yes    Discharge Planner Nurse   Safe discharge environment identified: Yes  Barriers to discharge: Yes       Entered by: JOSUÉ COHEN 07/05/2021   Vital signs:  Temp: 98.6  F (37  C) Temp src: Oral BP: 132/70 Pulse: 97   Resp: 16 SpO2: 93 % O2 Device: None (Room air) Alert and oriented x4. Ambulated to the bathroom stand by assist with a walker. Pt walked with no assist or pain. Pt said they are feeling better this afternoon. Left knee ACE wrapped. Denies numbness/tingling. PT recommended TCU. SW following with placement. Contact precaution maintained for ESBL in urine. Prn Xanax given per pt request for anxiety. Will continue to monitor     Please review provider order for any additional goals.   Nurse to notify provider when observation goals have been met and patient is ready for discharge.

## 2021-07-06 NOTE — DISCHARGE INSTRUCTIONS
Your home care referral was sent to [facility]  If you haven't heard from them within the next 24-48 hours,  Please call them at [phone].

## 2021-07-06 NOTE — PLAN OF CARE
"PRIMARY DIAGNOSIS: ACUTE LEFT KNEE PAIN  OUTPATIENT/OBSERVATION GOALS TO BE MET BEFORE DISCHARGE:  1. Pain Status: Pain free.    2. Return to near baseline physical activity: Yes    3. Cleared for discharge by consultants (if involved): Yes    Discharge Planner Nurse   Safe discharge environment identified: Yes  Barriers to discharge: Yes       Entered by: Bertha Mckay 07/06/2021    BP (!) 154/76 (BP Location: Right arm)   Pulse 90   Temp 98  F (36.7  C) (Oral)   Resp 16   Ht 1.6 m (5' 3\")   Wt 115 kg (253 lb 8 oz)   LMP  (LMP Unknown)   SpO2 94%   BMI 44.91 kg/m    A&Ox4. Tolerating a Mod CHO diet. Ambulating SBA with walker to bathroom. Scheduled tylenol effective. Denies pain, numbness, or tingling. Left knee elevated. PT recommending TCU. SW following for placement. Contact precautions maintained for ESBL.    Please review provider order for any additional goals. Nurse to notify provider when observation goals have been met and patient is ready for discharge.  "

## 2021-07-06 NOTE — TELEPHONE ENCOUNTER
Patient currently hospitalized. Therefore, cancelling today's appointment and sending mychart to reschedule.     Lauren Bloch, PharmD  Medication Therapy Management Pharmacist   [facility] Weight Management [place]

## 2021-07-06 NOTE — PLAN OF CARE
Physical Therapy Discharge Summary    Reason for therapy discharge:    Discharged to home with home therapy.    Progress towards therapy goal(s). See goals on Care Plan in [facility] electronic health record for goal details.  Goals partially met.  Barriers to achieving goals:   discharge from facility.    Therapy recommendation(s):    Continued therapy is recommended.  Rationale/Recommendations:  HHPT to maximize safety and indep at home.

## 2021-07-06 NOTE — DISCHARGE SUMMARY
[facility]  Hospitalist Discharge Summary      Date of Admission:  7/2/2021  Date of Discharge:  7/6/2021  Discharging Provider: Caridad Breen PA-C      Discharge Diagnoses   L knee pain    Follow-ups Needed After Discharge   Follow-up Appointments     Follow-up and recommended labs and tests       Please call the clinic to schedule an appointment. [facility] [address] [place] [id number] Phone: [phone].    [facility] has Walk-In Clinics daily from 8am-8pm 7days a   week.         Follow-up and recommended labs and tests       Follow up with primary care provider, Shayy Rivas, within 7 days   for hospital follow- up.  No follow up labs or test are needed.  Follow up with Orthopedics as needed for further work up.             Unresulted Labs Ordered in the Past 30 Days of this Admission     No orders found from 6/2/2021 to 7/3/2021.      These results will be followed up by PCP    Discharge Disposition   Discharged to home with home care services  Condition at discharge: Stable      Hospital Course            Zeynep Martinez is a 77 yo female with NIDDM2, trigeminal neuralgia, uterine cancer 2001, ESBL UTIs, h/o RXYGB, and depression who presented to [facility] ED on 7/2/2021 after developing knee pain while touring [facility] facilities with her daughter. XR of the left knee is negative for acute fx. Pain improved then flared again after twisting it. PT consulted and initially recommended TCU. Pain improved again and mobility improved and pt opted to return home with Home Care Services, which is reasonable. She will follow up with PCP and with Ortho as needed.     Left knee pain  Atraumatic.  Pain with weightbearing and lateral and anterior tenderness. XR Knee suggested degenerative changes but no acute process or effusion.   - Ortho consulted, recommended weightbearing as tolerated and knee immobilizer.     - Unable to fit knee immobilizer  - Pain  overall improved, able to ambulate   - PT recommending TCU but mobility improved with improvement in pain and pt opted to return home with home care services.  - follow up with PCP and Ortho as needed     H/o ESBL EColi UTI  UA 6/29 bland but UA growing ESBL Ecoli.  Asymptomatic - denies fever, chills, urgency, or dysuria.  No white count.  Suspect colonization rather than true infection.   - Defer Abx at this time.    - remained afebrile and asymptomatic      NIDDM2  HgbA1c 7.3 in May (up from 6.2 in Jan 2021).    - repeat A1c here is 8.1  - Continue PTA metformin.  - Glipizide 5 mg XL added here. Will need follow up with PCP for further management.       Covid-19 negative      Consultations This Hospital Stay   CARE MANAGEMENT / SOCIAL WORK IP CONSULT  PHYSICAL THERAPY ADULT IP CONSULT  PHYSICAL THERAPY ADULT IP CONSULT  OCCUPATIONAL THERAPY ADULT IP CONSULT  ORTHOPEDIC SURGERY IP CONSULT    Code Status   Full Code    Time Spent on this Encounter   I, Caridad Breen PA-C, personally saw the patient today and spent greater than 30 minutes discharging this patient.       Caridad Breen PA-C  [facility] OBSERVATION DEPT  [address] [place] [address]  Phone: [phone]  ______________________________________________________________________    Physical Exam   Vital Signs: Temp: 98  F (36.7  C) Temp src: Oral BP: (!) 154/76 Pulse: 90   Resp: 16 SpO2: 94 % O2 Device: None (Room air)    Weight: 253 lbs 8 oz    GENERAL:  Comfortable.  PSYCH: pleasant, oriented, No acute distress.  HEART:  RRR  LUNGS:  Normal Respiratory effort  EXTREMITIES:  Able to ambulate with stand by assist  SKIN:  Dry to touch, No rash, wound or ulcerations.  NEUROLOGIC:  Grossly intact       Primary Care Physician   Shayy Rivas    Discharge Orders      Home Care PT Referral for Hospital Discharge      Home Care OT Referral for Hospital Discharge      Mantoux instructions    Give two-step Mantoux  (PPD) Per Facility Policy Yes     Weight bearing status    WBAT LLE     Follow-up and recommended labs and tests     Please call the clinic to schedule an appointment. [facility] [address] [place] [address] Phone: [phone].    [facility] has Walk-In Clinics daily from 8am-8pm 7days a week.     Reason for your hospital stay    Left knee pain. No acute fracture or process noted on imaging. Pain did improve with supportive cares. Recommend ACE wrap to left knee when up out of bed. PT consulted and initially recommended TCU but as pain improved, your mobility improved.     Follow-up and recommended labs and tests     Follow up with primary care provider, Shayy Rivas, within 7 days for hospital follow- up.  No follow up labs or test are needed.  Follow up with Orthopedics as needed for further work up.     Activity    Your activity upon discharge: activity as tolerated     When to contact your care team    Call your primary doctor if you have any of the following: temperature greater than 101, increased swelling, increased pain or inability to ambulate.     MD face to face encounter    Documentation of Face to Face and Certification for Home Health Services    I certify that patient: Zeynep Martinez is under my care and that I, or a nurse practitioner or physician's assistant working with me, had a face-to-face encounter that meets the physician face-to-face encounter requirements with this patient on: 7/6/2021.    This encounter with the patient was in whole, or in part, for the following medical condition, which is the primary reason for home health care: L knee pain, impaired mobility.    I certify that, based on my findings, the following services are medically necessary home health services: Occupational Therapy and Physical Therapy.    My clinical findings support the need for the above services because: Occupational Therapy Services are needed to assess and treat  cognitive ability and address ADL safety due to impairment in L knee pain. and Physical Therapy Services are needed to assess and treat the following functional impairments: L knee pain.    Further, I certify that my clinical findings support that this patient is homebound (i.e. absences from home require considerable and taxing effort and are for medical reasons or Denominational services or infrequently or of short duration when for other reasons) because: Requires assistance of another person or specialized equipment to access medical services because patient: Has prohibitive pain during ambulation., Is unable to exit home safely on own due to: L knee pain. and Requires supervision of another for safe transfer...    Based on the above findings. I certify that this patient is confined to the home and needs intermittent skilled nursing care, physical therapy and/or speech therapy.  The patient is under my care, and I have initiated the establishment of the plan of care.  This patient will be followed by a physician who will periodically review the plan of care.  Physician/Provider to provide follow up care: Shayy Rivas    Attending hospital physician (the Medicare certified [place] provider): Caridad Breen PA-C  Physician Signature: See electronic signature associated with these discharge orders.  Date: 7/6/2021     Full Code     Advance Diet as Tolerated    Follow this diet upon discharge: mod CHO     Diet    Follow this diet upon discharge: Regular       Significant Results and Procedures   Most Recent 3 CBC's:  Recent Labs   Lab Test 07/06/21  0610 07/03/21  1316 05/25/21  1759 03/22/21  1515   WBC 9.3 9.8  --  12.6*   HGB 11.7 12.0 13.1 12.3   MCV 89 91  --  87    306  --  379     Most Recent 3 BMP's:  Recent Labs   Lab Test 07/06/21  0610 07/05/21  0627 07/03/21  1316    135 136   POTASSIUM 4.1 4.4 5.0   CHLORIDE 106 104 104   CO2 29 27 25   BUN 23 20 17   CR 0.95 0.87 0.89   ANIONGAP 3 4 7    SHAUN 8.4* 9.1 8.4*   * 163* 209*     Most Recent Hemoglobin A1c:  Recent Labs   Lab Test 07/03/21  1316   A1C 8.1*     Most Recent 6 glucoses:  Recent Labs   Lab Test 07/06/21  0610 07/05/21  0627 07/03/21  1316 05/11/21  1152 03/22/21  1515 01/26/21  1427   * 163* 209* 178* 126* 213*   ,   Results for orders placed or performed during the hospital encounter of 07/02/21   XR Knee Left 3 Views    Narrative    XR KNEE LEFT 3 VIEWS 7/2/2021 4:12 PM    HISTORY: ripping pain    COMPARISON: None.      Impression    IMPRESSION: Mild loss of medial joint space on the frontal radiograph.  No suprapatellar effusion. The patellofemoral space is maintained on  the sunrise radiograph. A radiopaque loose body noted in the posterior  capsule.    MISAEL GUERRA MD          SYSTEM ID:  [id number]       Discharge Medications   Current Discharge Medication List      START taking these medications    Details   acetaminophen (TYLENOL) 500 MG tablet Take 2 tablets (1,000 mg) by mouth 3 times daily  Qty:      Associated Diagnoses: Acute pain of left knee      diclofenac (VOLTAREN) 1 % topical gel Apply 2 g topically 4 times daily as needed for moderate pain (apply to knee)  Qty: 50 g, Refills: 0    Associated Diagnoses: Acute pain of left knee      glipiZIDE (GLUCOTROL XL) 5 MG 24 hr tablet Take 1 tablet (5 mg) by mouth daily (with breakfast)  Qty: 30 tablet, Refills: 0    Associated Diagnoses: Type 2 diabetes mellitus with hyperglycemia, without long-term current use of insulin (H); Diabetic polyneuropathy associated with type 2 diabetes mellitus (H)      oxyCODONE (ROXICODONE) 5 MG tablet Take 0.5-1 tablets (2.5-5 mg) by mouth every 4 hours as needed for moderate to severe pain  Qty: 10 tablet, Refills: 0    Associated Diagnoses: Acute pain of left knee      polyethylene glycol (MIRALAX) 17 GM/Dose powder Take 17 g by mouth daily  Qty: 510 g    Associated Diagnoses: Acute pain of left knee      senna-docusate  (SENOKOT-S/PERICOLACE) 8.6-50 MG tablet Take 1-2 tablets by mouth 2 times daily as needed for constipation  Qty:      Associated Diagnoses: Acute pain of left knee         CONTINUE these medications which have CHANGED    Details   ALPRAZolam (XANAX) 0.5 MG tablet Take 1 tablet (0.5 mg) by mouth 3 times daily as needed for anxiety  Qty: 6 tablet, Refills: 0    Associated Diagnoses: Anxiety      gabapentin (NEURONTIN) 800 MG tablet Take 1 tablet (800 mg) by mouth 2 times daily  Qty: 40 tablet, Refills: 0    Associated Diagnoses: Trigeminal neuralgia         CONTINUE these medications which have NOT CHANGED    Details   melatonin 1 MG TABS tablet Take 3 tablets (3 mg) by mouth nightly as needed for sleep    Associated Diagnoses: Acute pain of left knee      metFORMIN (GLUCOPHAGE-XR) 500 MG 24 hr tablet Take 2 tablets (1,000 mg) by mouth 2 times daily (with meals)  Qty: 360 tablet, Refills: 1    Associated Diagnoses: Type 2 diabetes mellitus with other specified complication, without long-term current use of insulin (H)      pantoprazole (PROTONIX) 40 MG EC tablet Take 1 tablet (40 mg) by mouth daily with food  Qty: 180 tablet, Refills: PRN    Associated Diagnoses: Gastroesophageal reflux disease without esophagitis      traZODone (DESYREL) 50 MG tablet Take 1 tablet (50 mg) by mouth At Bedtime  Qty: 30 tablet, Refills: 0    Associated Diagnoses: Insomnia, unspecified type      !! venlafaxine (EFFEXOR-XR) 150 MG 24 hr capsule Take 150 mg by mouth daily Takes with 75 mg for atotal of 225 mg daily      !! venlafaxine (EFFEXOR-XR) 75 MG 24 hr capsule Take 1 capsule (75 mg) by mouth daily Take in addition to 150 mg  Qty: 90 capsule, Refills: 1    Associated Diagnoses: Moderate episode of recurrent major depressive disorder (H)      STATIN NOT PRESCRIBED (INTENTIONAL) Please choose reason not prescribed from choices below.  Qty:      Associated Diagnoses: Type 2 diabetes mellitus with diabetic polyneuropathy, without  long-term current use of insulin (H)       !! - Potential duplicate medications found. Please discuss with provider.        Allergies   Allergies   Allergen Reactions     Adhesive Tape Rash     Paper tape is ok      Latex      Statin Drugs [Hmg-Coa-R Inhibitors] Other (See Comments)     confusion     Penicillins Rash

## 2021-07-06 NOTE — PROGRESS NOTES
Care Management Discharge Note    Discharge Date: 07/06/21     Discharge Disposition: Home with Home Care    Discharge Services: None    Discharge DME: None    Discharge Transportation: family or friend will provide    Private pay costs discussed: Not applicable     Patient/family educated on Medicare website which has current facility and service quality ratings: yes    Education Provided on the Discharge Plan:  Yes  Persons Notified of Discharge Plans: Patient  Patient/Family in Agreement with the Plan: yes    Handoff Referral Completed: No    Additional Information:  Pt now ambulating better and is hopeful to discharge home. Pt has discharge orders for HH PT/OT. Met with pt at bedside to further discuss. She has had HC in the past and prefers to use [facility]. Referral sent 7/6, discharge AVS updated. Start of care 7/12 for HC. Her PCP is Dr. Shayy Rivas at [facility]. She has an apt 7/9 at [facility] to establish care with PCP Marlene MAIN.     Pt has DME script for wheelchair. Provided DME list to patient. Pt plans to discuss with dtr and place call to [facility] to  a wheelchair to rent. SW to send script for insurance coverage.     Daughter to transport. SW will remain available as needed.     ISRAEL Estevez

## 2021-07-06 NOTE — PLAN OF CARE
"PRIMARY DIAGNOSIS: ACUTE LEFT KNEE PAIN  OUTPATIENT/OBSERVATION GOALS TO BE MET BEFORE DISCHARGE:  1. Pain Status: Pain free.    2. Return to near baseline physical activity: Yes    3. Cleared for discharge by consultants (if involved): Yes    Discharge Planner Nurse   Safe discharge environment identified: Yes  Barriers to discharge: Yes       Entered by: Coleen Gurrola 07/06/2021     BP (!) 141/74 (BP Location: Right arm)   Pulse 91   Temp 98.8  F (37.1  C) (Oral)   Resp 16   Ht 1.6 m (5' 3\")   Wt 118.8 kg (262 lb)   LMP  (LMP Unknown)   SpO2 93%   BMI 46.41 kg/m    A&Ox4. Tolerating a Mod CHO diet. Ambulating SBA with walker to bathroom. Scheduled tylenol effective. Denies pain, numbness, or tingling. Left knee ACE wrapped and elevated. PT recommending TCU. SW following for placement. Contact precautions maintained.     Please review provider order for any additional goals.   Nurse to notify provider when observation goals have been met and patient is ready for discharge.  "

## 2021-07-06 NOTE — PLAN OF CARE
"PRIMARY DIAGNOSIS: ACUTE LEFT KNEE PAIN  OUTPATIENT/OBSERVATION GOALS TO BE MET BEFORE DISCHARGE:  1. Pain Status: Pain free.    2. Return to near baseline physical activity: Yes    3. Cleared for discharge by consultants (if involved): Yes    Discharge Planner Nurse   Safe discharge environment identified: Yes  Barriers to discharge: Yes       Entered by: Coleen Gurrola 07/06/2021 04:06 AM    /83 (BP Location: Right arm)   Pulse 83   Temp 97.7  F (36.5  C) (Oral)   Resp 16   Ht 1.6 m (5' 3\")   Wt 115 kg (253 lb 8 oz)   LMP  (LMP Unknown)   SpO2 93%   BMI 44.91 kg/m    A&Ox4. Tolerating a Mod CHO diet. Ambulating SBA with walker to bathroom. Scheduled tylenol effective. Denies pain, numbness, or tingling. Left knee ACE wrapped and elevated. PT recommending TCU. SW following for placement. Contact precautions maintained.    Please review provider order for any additional goals.   Nurse to notify provider when observation goals have been met and patient is ready for discharge.  "

## 2021-07-07 ENCOUNTER — PATIENT OUTREACH (OUTPATIENT)
Dept: CARE COORDINATION | Facility: CLINIC | Age: 76
End: 2021-07-07

## 2021-07-07 ASSESSMENT — PATIENT HEALTH QUESTIONNAIRE - PHQ9
SUM OF ALL RESPONSES TO PHQ QUESTIONS 1-9: 17
SUM OF ALL RESPONSES TO PHQ QUESTIONS 1-9: 17
10. IF YOU CHECKED OFF ANY PROBLEMS, HOW DIFFICULT HAVE THESE PROBLEMS MADE IT FOR YOU TO DO YOUR WORK, TAKE CARE OF THINGS AT HOME, OR GET ALONG WITH OTHER PEOPLE: SOMEWHAT DIFFICULT

## 2021-07-07 ASSESSMENT — ANXIETY QUESTIONNAIRES
1. FEELING NERVOUS, ANXIOUS, OR ON EDGE: MORE THAN HALF THE DAYS
GAD7 TOTAL SCORE: 9
GAD7 TOTAL SCORE: 9
2. NOT BEING ABLE TO STOP OR CONTROL WORRYING: MORE THAN HALF THE DAYS
GAD7 TOTAL SCORE: 9
4. TROUBLE RELAXING: MORE THAN HALF THE DAYS
3. WORRYING TOO MUCH ABOUT DIFFERENT THINGS: MORE THAN HALF THE DAYS
6. BECOMING EASILY ANNOYED OR IRRITABLE: SEVERAL DAYS
5. BEING SO RESTLESS THAT IT IS HARD TO SIT STILL: NOT AT ALL
7. FEELING AFRAID AS IF SOMETHING AWFUL MIGHT HAPPEN: NOT AT ALL
7. FEELING AFRAID AS IF SOMETHING AWFUL MIGHT HAPPEN: NOT AT ALL

## 2021-07-07 NOTE — PROGRESS NOTES
Clinic Care Coordination Contact  [facility]/Voicemail    Referral Source: IP Handoff    Clinical Data: Care Coordinator Outreach    Outreach attempted x 1.  Left message on patient's voicemail with call back information and requested return call.    Plan: Care Coordinator will try to reach patient again in 1-2 business days.  Miguelina Falcon, RN Care Coordinator     [facility] Ambulatory Care Management  [facility] and   [email or web address]    Office: [phone]

## 2021-07-08 ENCOUNTER — PATIENT OUTREACH (OUTPATIENT)
Dept: CARE COORDINATION | Facility: CLINIC | Age: 76
End: 2021-07-08

## 2021-07-08 ENCOUNTER — VIRTUAL VISIT (OUTPATIENT)
Dept: PHARMACY | Facility: CLINIC | Age: 76
End: 2021-07-08
Payer: COMMERCIAL

## 2021-07-08 DIAGNOSIS — E11.69 TYPE 2 DIABETES MELLITUS WITH OTHER SPECIFIED COMPLICATION, WITHOUT LONG-TERM CURRENT USE OF INSULIN (H): ICD-10-CM

## 2021-07-08 DIAGNOSIS — R03.0 ELEVATED BLOOD PRESSURE READING WITHOUT DIAGNOSIS OF HYPERTENSION: ICD-10-CM

## 2021-07-08 DIAGNOSIS — G47.00 INSOMNIA, UNSPECIFIED TYPE: ICD-10-CM

## 2021-07-08 DIAGNOSIS — E66.01 OBESITY, CLASS III, BMI 40-49.9 (MORBID OBESITY) (H): ICD-10-CM

## 2021-07-08 DIAGNOSIS — N39.0 RECURRENT UTI: ICD-10-CM

## 2021-07-08 DIAGNOSIS — M25.562 ACUTE PAIN OF LEFT KNEE: Primary | ICD-10-CM

## 2021-07-08 DIAGNOSIS — R19.7 DIARRHEA, UNSPECIFIED TYPE: ICD-10-CM

## 2021-07-08 PROCEDURE — 99606 MTMS BY PHARM EST 15 MIN: CPT | Performed by: PHARMACIST

## 2021-07-08 PROCEDURE — 99607 MTMS BY PHARM ADDL 15 MIN: CPT | Performed by: PHARMACIST

## 2021-07-08 ASSESSMENT — ANXIETY QUESTIONNAIRES: GAD7 TOTAL SCORE: 9

## 2021-07-08 ASSESSMENT — PATIENT HEALTH QUESTIONNAIRE - PHQ9: SUM OF ALL RESPONSES TO PHQ QUESTIONS 1-9: 17

## 2021-07-08 NOTE — PROGRESS NOTES
Clinic Care Coordination Contact  [facility]/Voicemail    Referral Source: IP Handoff    Clinical Data: Care Coordinator Outreach    Outreach attempted x 2.  Left message on patient's voicemail with call back information and requested return call.    Plan: Care Coordinator will try to reach patient again in 1-2 business days.    Miguelina Falcon, RN Care Coordinator     [facility] Ambulatory Care Management  [facility] and   [email or web address]    Office: [phone]

## 2021-07-08 NOTE — PROGRESS NOTES
Medication Therapy Management (MTM) Encounter    ASSESSMENT:                            Medication Adherence/Access: No issues identified    Left Knee Pain: Improved.     Type 2 Diabetes/Obesity: A1c not at goal of less than 8%.  Would benefit from continuation of glipizide.  Would benefit from restarting testing blood sugars at least once daily in the morning and bedtime a couple times per week.      Elevated Blood Pressure without Diagnosis of Hypertension: Unimproved.  BP not at goal of less than 140/90 mmHg.  Recommended patient start testing blood pressure at home with automatic blood pressure cuff.     Chronic Diarrhea: Unimproved. There could be consideration for adding in fiber supplementation to bulk stool but as this has been a longstanding issue and due likely for colonoscopy defer to PCP and potentially gastroenterology for plan moving forward.    Recurrent ESBL UTIs: Patient to start antibiotic as planned.  As creatinine clearance greater than 60 reasonable to continue with plan of nitrofurantoin. follow-up with urology as planned.    Insomnia: Stable.  Discussed if the patient chooses to go off trazodone for now that is her choice.  Discussed relative safety of trazodone if required for assistance of sleep compared to other options that are available.     PLAN:                            1.  Start testing blood sugars it again at least once daily in the morning and a couple times a week 2 hours after dinner for now.  -Try to get in a couple readings for your appointment with Trinh Bello tomorrow.     2.  Start testing blood pressure.  See if you can get a reading before tomorrow's appointment.    3.  Start nitrofurantoin as prescribed by urology.    4. Reasonable to request establishing care with Gastroenterology and following up with Bariatric Clinic.     5. Steer clear of high fat/greasy foods if these are triggers for you.     Follow-up: Return in about 4 weeks (around 8/5/2021) for  "Medication Therapy Management Pharmacist Visit, (scheduled today).    SUBJECTIVE/OBJECTIVE:                          Zeynep Martinez is a 76 year old female called for a transitions of care visit and follow up.  Patient's last [facility] appointment was 6/09/2021.  She was discharged from 7/6/2021 on [facility] for fall, left knee pain.      Reason for visit: 1) blood sugars since stopping Ozempic and changing to Metformin ER.    Allergies/ADRs: Reviewed in chart  Tobacco: She reports that she quit smoking about 51 years ago. Her smoking use included cigarettes. She smoked 0.00 packs per day for 5.00 years. She has never used smokeless tobacco.  Alcohol: none  Past Medical History: Reviewed in chart    Medication Adherence/Access: no issues reported    Left Knee Pain:   Oxycodone 2.5-5 mg as needed  APAP 500 mg as needed    Not using Oxycodone per patient. Developed knee pain while touring assisted living facilities with her daughter.  Was hospitalized from 7/2/21 to 7/6/21.  X-ray of left knee was unremarkable.  TCU was initially recommended but pain had improved and mobility improved so patient preferred to return home.  She reports that she is feeling \"really well\" and that her pain is \"much better\".     Type 2 Diabetes/Obesity:   Metformin ER 1000 mg twice daily   Glipizide XL 5 mg daily in AM - started during admission        Patient is experiencing the following side effects: none. Diarrhea has improved since stopping Ozempic and transitioning to Metformin IR to ER. She is still having intermittent diarrhea still which is reported to be her baseline (see below).  She was surprised at her elevated A1c in the hospital and of her weight gain.  She plans to make dietary modifications because she feels like that is the reason why her blood sugars and weight has gone up, has been talking to her daughter about this.  Ordered [employer] Diabetic food that will be delivered on Monday. Has cleaned out " "her refrigerator and pantry of those more unhealthier foods.   Failed medications: Ozempic: unmanageable diarrhea at low dose.  Blood sugar monitoring: not testing.   Symptoms of low blood sugar? none  Symptoms of high blood sugar? None. Dry mouth is a symptom of high sugar for her   Eye exam: due  Foot exam: due     Aspirin: Not taking  Statin: No - Statin Allergy - confusion per patient when she has tried different statins before. Does not wish to trial others.   ACEi/ARB: No.   Urine Albumin:   Hemoglobin A1C   Date Value Ref Range Status   07/03/2021 8.1 (H) 0 - 5.6 % Final     Comment:     Normal <5.7% Prediabetes 5.7-6.4%  Diabetes 6.5% or higher - adopted from ADA   consensus guidelines.       Lab Results   Component Value Date    A1C 8.1 07/03/2021    A1C 7.3 05/11/2021    A1C 6.2 01/26/2021     Wt Readings from Last 4 Encounters:   07/06/21 253 lb 8 oz (115 kg)   06/03/21 242 lb (109.8 kg)   05/27/21 250 lb (113.4 kg)   05/25/21 250 lb (113.4 kg)     Estimated body mass index is 44.91 kg/m  as calculated from the following:    Height as of 7/2/21: 5' 3\" (1.6 m).    Weight as of 7/6/21: 253 lb 8 oz (115 kg).    Recent Labs   Lab Test 05/25/21  1759   CHOL 175   HDL 46*   LDL 77   TRIG 262*     BP Readings from Last 3 Encounters:   07/06/21 (!) 151/78   06/14/21 133/73   06/03/21 102/69     Elevated Blood Pressure without Diagnosis of Hypertension:   No medications    Patient does not self-monitor blood pressure. Has a blood pressure cuff at home. Patient reports no current medication side effects.  She feels the reason that her blood pressure was higher was because she was in the hospital and did find out that she has had some weight gain.  She would prefer to try and assist with weight loss prior to starting a blood pressure medication because she would like to minimize meds.  BP Readings from Last 3 Encounters:   07/06/21 (!) 151/78   06/14/21 133/73   06/03/21 102/69     Chronic Diarrhea:   Loperamide 2 " mg as needed    She is not using Miralax or senna-docusate. Patient remains interested in following up with gastroenterologist that was discussed last visit as she has not done so in a long time.  Reports this has been a lifelong issue even prior to bariatric surgery.  Has been occurring for many years, ~ 10 years.  Diarrhea is intermittent.  It could be every day or over a week since last occurrence. She is no longer having to wear depends for fecal incontinence as she was last month since stopping Ozempic.  Does find loperamide helpful.  Does try to keep up with water intake.  Does find it is largely food related - greasy and fattier foods have a large impact.  For example yesterday she made clam chowder and that is all she ate all day and had issues of diarrhea throughout the day. But now today it has not been an issue.  Hasn't had a colonoscopy in many years, she thinks she is due.  She feels like she is tried fiber supplementation before but feels like it made it worse, but cannot remember.     Recurrent ESBL UTIs:   Nitrofurantoin -prescribed yesterday, not started yet.    UA from while in hospital did show colonization of ESBL E. coli.  Patient has been having issues of incontinence and urine leaking for the past couple of months, and has been needing to wear depends for this reason.  In hospital day deferred antibiotics, but when followed up with urology, antibiotics for 2 week course was prescribed.  Uterine Cancer in 2001.   Specialist: Urology, Dr. Mansfield, next appointment 7/15/2021    Creatinine   Date Value Ref Range Status   07/06/2021 0.95 0.52 - 1.04 mg/dL Final     GFR Estimate   Date Value Ref Range Status   07/06/2021 59 (L) >60 mL/min/[1.73_m2] Final     Comment:     Non  GFR Calc  Starting 12/18/2018, serum creatinine based estimated GFR (eGFR) will be   calculated using the Chronic Kidney Disease Epidemiology Collaboration   (CKD-EPI) equation.       7/3/21  Creatinine 0.89    GFR 64    Insomnia:   Trazodone 50 mg nightly at bedtime  Melatonin 3 mg nightly at bedtime     She had found trazodone helpful for sleep but doesn't like being on another medication.  She wants to try going off of the trazodone to see if it something she truly needs.  She has a difficult time falling asleep and staying asleep.  She does still sign that with the trazodone she does at times still wake up later but does report more sound sleep and less frequent awakenings through the night.     ----------------  Post Discharge Medication Reconciliation Status: discharge medications reconciled, continue medications without change.    I spent 30 minutes with this patient today. A copy of the visit note was provided to the patient's primary care and referring provider.    The patient was sent via [employer] a summary of these recommendations.     Lauren Bloch, PharmD  Medication Therapy Management Pharmacist   [facility]    Telemedicine Visit Details  Type of service:  Telephone visit  Start Time: 3:10 PM  End Time: 3:40 PM  Originating Location (patient location): Home  Distant Location (provider location):  [facility]        Medication Therapy Recommendations  Elevated blood pressure reading without diagnosis of hypertension    Rationale: Medication requires monitoring - Needs additional monitoring   Recommendation: Self-Monitoring - Check BP at home   Status: Patient Agreed - Adherence/Education         Type 2 diabetes mellitus with other specified complication, without long-term current use of insulin (H)    Current Medication: metFORMIN (GLUCOPHAGE-XR) 500 MG 24 hr tablet   Rationale: Medication requires monitoring - Needs additional monitoring   Recommendation: Self-Monitoring - Test AM fasting every day and 2 hours after dinner a couple times per week.   Status: Patient Agreed - Adherence/Education

## 2021-07-08 NOTE — Clinical Note
Edilberto Tsang - I have now seen Zeynep a couple times now. See attached for my follow up visit with her. Let me know if you have follow up questions or concerns. You see her today. Chetna BOSTON

## 2021-07-08 NOTE — Clinical Note
This is the patient that had severe diarrhea with starting ozempic. This has then stopped with stopping ozempic and now having her baseline diarrheal symptoms. She is seeing you in a couple weeks. She is also going to be asking about GI referral. She was put back on glipizide while she was in hospital and with everythign she has going on I think reasonable just to continue for now to get sugars under better control. She can't do GLP1 agonist and no SGLT2 because of recurrent ESBL UTIs. Chetna BOSTON

## 2021-07-09 ENCOUNTER — VIRTUAL VISIT (OUTPATIENT)
Dept: PEDIATRICS | Facility: CLINIC | Age: 76
End: 2021-07-09
Payer: COMMERCIAL

## 2021-07-09 ENCOUNTER — PATIENT OUTREACH (OUTPATIENT)
Dept: CARE COORDINATION | Facility: CLINIC | Age: 76
End: 2021-07-09

## 2021-07-09 DIAGNOSIS — F50.819 BINGE EATING DISORDER: ICD-10-CM

## 2021-07-09 DIAGNOSIS — E11.42 TYPE 2 DIABETES MELLITUS WITH DIABETIC POLYNEUROPATHY, WITHOUT LONG-TERM CURRENT USE OF INSULIN (H): ICD-10-CM

## 2021-07-09 DIAGNOSIS — K52.9 CHRONIC DIARRHEA: ICD-10-CM

## 2021-07-09 DIAGNOSIS — F33.42 RECURRENT MAJOR DEPRESSIVE DISORDER, IN FULL REMISSION (H): ICD-10-CM

## 2021-07-09 DIAGNOSIS — G50.0 TRIGEMINAL NEURALGIA: ICD-10-CM

## 2021-07-09 DIAGNOSIS — F33.1 MODERATE EPISODE OF RECURRENT MAJOR DEPRESSIVE DISORDER (H): ICD-10-CM

## 2021-07-09 DIAGNOSIS — N18.31 STAGE 3A CHRONIC KIDNEY DISEASE (H): ICD-10-CM

## 2021-07-09 DIAGNOSIS — G47.00 INSOMNIA, UNSPECIFIED TYPE: ICD-10-CM

## 2021-07-09 DIAGNOSIS — M25.562 ACUTE PAIN OF LEFT KNEE: Primary | ICD-10-CM

## 2021-07-09 DIAGNOSIS — Z98.84 S/P GASTRIC BYPASS: ICD-10-CM

## 2021-07-09 PROBLEM — N18.30 CHRONIC KIDNEY DISEASE, STAGE 3 (H): Status: ACTIVE | Noted: 2021-07-09

## 2021-07-09 PROCEDURE — 99443 PR PHYSICIAN TELEPHONE EVALUATION 21-30 MIN: CPT | Mod: 95 | Performed by: NURSE PRACTITIONER

## 2021-07-09 RX ORDER — VENLAFAXINE HYDROCHLORIDE 150 MG/1
150 CAPSULE, EXTENDED RELEASE ORAL DAILY
Qty: 90 CAPSULE | Refills: 1 | Status: SHIPPED | OUTPATIENT
Start: 2021-07-09 | End: 2021-09-03

## 2021-07-09 RX ORDER — TRAZODONE HYDROCHLORIDE 50 MG/1
50 TABLET, FILM COATED ORAL AT BEDTIME
Qty: 90 TABLET | Refills: 1 | Status: SHIPPED | OUTPATIENT
Start: 2021-07-09 | End: 2021-10-01

## 2021-07-09 RX ORDER — NITROFURANTOIN 25; 75 MG/1; MG/1
100 CAPSULE ORAL 2 TIMES DAILY
COMMUNITY
Start: 2021-07-06 | End: 2021-07-20

## 2021-07-09 RX ORDER — QUETIAPINE FUMARATE 25 MG/1
25 TABLET, FILM COATED ORAL
Qty: 90 TABLET | Refills: 0 | Status: SHIPPED | OUTPATIENT
Start: 2021-07-09 | End: 2021-10-01

## 2021-07-09 RX ORDER — VENLAFAXINE HYDROCHLORIDE 75 MG/1
75 CAPSULE, EXTENDED RELEASE ORAL DAILY
Qty: 90 CAPSULE | Refills: 1 | Status: SHIPPED | OUTPATIENT
Start: 2021-07-09 | End: 2021-09-03

## 2021-07-09 NOTE — LETTER
[facility] COORDIN[facility]  [address]  [place] [address]  July 9, 2021    Zeynep TERRY [place]  [address] [place] [address]   [place] [address]      Dear Zeynep,    I am a clinic care coordinator who works with Shayy Rivas NP at [facility]. I wanted to introduce myself and provide you with my contact information for you to be able to call me with any questions or concerns. Below is a description of clinic care coordination and how I can further assist you.      The clinic care coordination team is made up of a registered nurse,  and community health worker who understand the health care system. The goal of clinic care coordination is to help you manage your health and improve access to the health care system in the most efficient manner. The team can assist you in meeting your health care goals by providing education, coordinating services, strengthening the communication among your providers and supporting you with any resource needs.    Please feel free to contact me at [phone] with any questions or concerns. We are focused on providing you with the highest-quality healthcare experience possible and that all starts with you.     Sincerely,     Miguelina Falcon, RN Care Coordinator     [facility] Ambulatory Care Management  [facility]   [email or web address]    Office: [phone]

## 2021-07-09 NOTE — Clinical Note
Please be sure she gets connected with [facility] for therapy, would like virtual option, doesn't drive.

## 2021-07-09 NOTE — PROGRESS NOTES
Clinic Care Coordination Contact  [facility]/Voicemail    Referral Source: IP Handoff    Clinical Data: Care Coordinator Outreach    Outreach attempted x 3.  Left message on patient's voicemail with call back information and requested return call.    Plan: Care Coordinator sent care coordination introduction letter on 7/9/2021  via [employer]. Care Coordinator will do no further outreaches at this time.    Miguelina Falcon, RN Care Coordinator     [facility] Ambulatory Care Management  [facility]  [email or web address]    Office: [phone]

## 2021-07-09 NOTE — PROGRESS NOTES
Zeynep is a 76 year old who is being evaluated via a billable telephone visit.      What phone number would you like to be contacted at? [phone]  How would you like to obtain your AVS? Tamar    Assessment & Plan     Acute pain of left knee  This visit was to establish care. She had a recent fall, since then she has been using a cane or walker, and doesn't have any mobility limitations of her home. She does have an upcoming appointment with PT to help with pain.   - DX Hip/Pelvis/Spine; Future    Stage 3a chronic kidney disease    - DX Hip/Pelvis/Spine; Future    Type 2 diabetes mellitus with diabetic polyneuropathy, without long-term current use of insulin (H)  Stable, otivated to work on diet and exercise.   - DX Hip/Pelvis/Spine; Future    Recurrent major depressive disorder, in full remission (H)  Stable, she does note she has an eating disorder and would like to see a therapist more familiar with this.   - MENTAL HEALTH REFERRAL  - Adult; Outpatient Treatment; Individual/Couples/Family/Group Therapy/Health Psychology; Other: [facility] [phone]; We will contact you to schedule the appointment or please call with any questions  - DX Hip/Pelvis/Spine; Future    Trigeminal neuralgia  Stable on neurontin  - DX Hip/Pelvis/Spine; Future    Binge eating disorder    - MENTAL HEALTH REFERRAL  - Adult; Outpatient Treatment; Individual/Couples/Family/Group Therapy/Health Psychology; Other: [facility] [phone]; We will contact you to schedule the appointment or please call with any questions  - DX Hip/Pelvis/Spine; Future    Insomnia, unspecified type  Not well controlled per trazodone. Will try seroquel, considered coming off trazodoone, but we do wonder if her symptoms of mood disorder are better maanged per trazodone.   - traZODone (DESYREL) 50 MG tablet; Take 1 tablet (50 mg) by mouth At Bedtime  - QUEtiapine (SEROQUEL) 25 MG tablet; Take 1 tablet (25 mg) by mouth nightly as needed  (insomnia)  - DX Hip/Pelvis/Spine; Future    Moderate episode of recurrent major depressive disorder (H)    - venlafaxine (EFFEXOR-XR) 150 MG 24 hr capsule; Take 1 capsule (150 mg) by mouth daily Takes with 75 mg for atotal of 225 mg daily  - venlafaxine (EFFEXOR-XR) 75 MG 24 hr capsule; Take 1 capsule (75 mg) by mouth daily Take in addition to 150 mg  - DX Hip/Pelvis/Spine; Future    Chronic diarrhea  This has been an ongoing issue for her. Has tried dietary changes. Will consult GI.   - GASTROENTEROLOGY ADULT REF CONSULT ONLY; Future    S/P gastric bypass  Overdue for labs  - GASTROENTEROLOGY ADULT REF CONSULT ONLY; Future  - Vitamin B1 whole blood; Future  - Phosphorus; Future  - Magnesium; Future  - Ferritin; Future  - CBC with platelets; Future  - Vitamin B12; Future  - Vitamin B6; Future  - Vitamin D Deficiency; Future  - Zinc; Future  - Comprehensive metabolic panel (BMP + Alb, Alk Phos, ALT, AST, Total. Bili, TP); Future  - Hemoglobin A1c; Future  - Hepatitis C antibody; Future         Depression Screening Follow Up    PHQ 7/7/2021   PHQ-9 Total Score 17   Q9: Thoughts of better off dead/self-harm past 2 weeks Several days   F/U: Thoughts of suicide or self-harm No   F/U: Safety concerns No     Last PHQ-9 7/7/2021   1.  Little interest or pleasure in doing things 2   2.  Feeling down, depressed, or hopeless 2   3.  Trouble falling or staying asleep, or sleeping too much 1   4.  Feeling tired or having little energy 2   5.  Poor appetite or overeating 3   6.  Feeling bad about yourself 3   7.  Trouble concentrating 2   8.  Moving slowly or restless 1   Q9: Thoughts of better off dead/self-harm past 2 weeks 1   PHQ-9 Total Score 17   Difficulty at work, home, or with people -   In the past two weeks have you had thoughts of suicide or self harm? No   Do you have concerns about your personal safety or the safety of others? No             Follow Up      Follow Up Actions Taken  Crisis resource information  provided in the After Visit Summary    Discussed the following ways the patient can remain in a safe environment:  be around others    Return in about 3 months (around 10/9/2021) for Annual Preventative Exam.    BENEDICTO Martin CNP  [facility] [place] Adhikari is a 76 year old who presents for the following health issues     HPI       Answers for HPI/ROS submitted by the patient on 7/7/2021   Chronic problems general questions HPI Form  If you checked off any problems, how difficult have these problems made it for you to do your work, take care of things at home, or get along with other people?: Somewhat difficult  PHQ9 TOTAL SCORE: 17  NELLIE 7 TOTAL SCORE: 9    Establish Care-       Hospital Follow-up Visit:    Hospital/Nursing Home/ Rehab Facility: [facility]  Date of Admission: 7/2/21  Date of Discharge: 7/6/21  Reason(s) for Admission: left knee pain      Was your hospitalization related to COVID-19? No   Problems taking medications regularly:  None  Medication changes since discharge: None  Problems adhering to non-medication therapy:  None    Summary of hospitalization:  [facility] discharge summary reviewed  Diagnostic Tests/Treatments reviewed.  Follow up needed: none  Other Healthcare Providers Involved in Patient s Care:         None  Update since discharge: improved.       Post Discharge Medication Reconciliation: discharge medications reconciled, continue medications without change.  Plan of care communicated with patient                Had a fall last wk when she misstepped and twisted her knee. She had no fractures, but d/c'ed to TCU for PT for which she stayed one day and has in [facility] PT starting Tuesday. SHe is using walking and cane. She was also found to have asymptomatic ecoli, did not start antibiotics. She has a history of diab, glipizide added at that time. She was seen by MTM yesterday. Will start a food program.     Lab  Results   Component Value Date    A1C 8.1 07/03/2021    A1C 7.3 05/11/2021    A1C 6.2 01/26/2021     Moved from [place] last jan, had established with , but lives in , and wants to establish in .     She has a history of trigeminal neuralgia and well controlled per gabapentin.     History of depression and anxiety, is on effexor. Currently, Denies thoughts of self harm or harming others and denies suicidal ideation. She used to see therapist, hasn't in a while. It was found that she has binge eating disorder.  Uses trazodone for sleep, works about 50% of the time.     PHQ 6/10/2021 7/7/2021 7/7/2021   PHQ-9 Total Score 10 17 17   Q9: Thoughts of better off dead/self-harm past 2 weeks Not at all Several days Several days   F/U: Thoughts of suicide or self-harm - No No   F/U: Safety concerns - No No     NELLIE-7 SCORE 5/10/2021 7/7/2021 7/7/2021   Total Score - - 9 (mild anxiety)   Total Score 14 9 9     Has chronic diarrhea. Had a colonoscopy in [place] 2 yrs ago, noted history of ulcer. History of bypass.     Wt Readings from Last 4 Encounters:   07/06/21 115 kg (253 lb 8 oz)   06/03/21 109.8 kg (242 lb)   05/27/21 113.4 kg (250 lb)   05/25/21 113.4 kg (250 lb)       Review of Systems   Constitutional, HEENT, cardiovascular, pulmonary, GI, , musculoskeletal, neuro, skin, endocrine and psych systems are negative, except as otherwise noted.      Objective           Vitals:  No vitals were obtained today due to virtual visit.    Physical Exam   healthy, alert and no distress  PSYCH: Alert and oriented times 3; coherent speech, normal   rate and volume, able to articulate logical thoughts, able   to abstract reason, no tangential thoughts, no hallucinations   or delusions  Her affect is normal  RESP: No cough, no audible wheezing, able to talk in full sentences  Remainder of exam unable to be completed due to telephone visits                Phone call duration: 32 minutes

## 2021-07-09 NOTE — PATIENT INSTRUCTIONS
Recommendations from today's MTM visit:                                                       1.  Start testing blood sugars it again at least once daily in the morning and a couple times a week 2 hours after dinner for now.  -Try to get in a couple readings for your appointment with Trinh Bello tomorrow.     2.  Start testing blood pressure.  See if you can get a reading before tomorrow's appointment.    3.  Start nitrofurantoin as prescribed by urology.    4. Reasonable to request establishing care with Gastroenterology    Follow-up: Return in about 4 weeks (around 8/5/2021) for Medication Therapy Management Pharmacist Visit, (scheduled today).    It was great to speak with you today.  I value your experience and would be very thankful for your time with providing feedback on our clinic survey. You may receive a survey via email or text message in the next few days.       My Clinical Pharmacist's contact information:                                                      Please feel free to contact me with any questions or concerns you have.      Lauren Bloch, PharmD  Medication Therapy Management Pharmacist   [facility] [place]

## 2021-07-12 ENCOUNTER — PATIENT OUTREACH (OUTPATIENT)
Dept: PEDIATRICS | Facility: CLINIC | Age: 76
End: 2021-07-12

## 2021-07-12 NOTE — TELEPHONE ENCOUNTER
"Per PCP: \"Please be sure she gets connected with [facility] for therapy, would like virtual option, doesn't drive.\"    Called pt. Pt has not been scheduled with [facility] as scheduling has not reach out to her yet. Provided number to call if they do not reach out soon: [phone]. Pt reports she will try to get scheduled this week and would appreciate a call next week to check on status of scheduling with [facility] for virtual therapy.    Encouraged pt to reach out with any questions or concerns. Pt agreeable to plan and verbalized understanding.    Eliot Barber, EMT at 9:50 AM on July 12, 2021   [facility] Guide   [phone]    "

## 2021-07-13 ENCOUNTER — TELEPHONE (OUTPATIENT)
Dept: PEDIATRICS | Facility: CLINIC | Age: 76
End: 2021-07-13

## 2021-07-13 NOTE — TELEPHONE ENCOUNTER
"Received call from [facility] PT, Sariah ([phone]).    Seeking verbal orders for PT 2x week for 4 weeks for gait and balance for knee pain.    Seeking verbal orders for  eval, transportation and community resources.    Verbal orders given.      - Jd \"Lobito\" SAVANNA Padilla - Patient Advocate Liason (PAL)  [facility]    "

## 2021-07-14 ENCOUNTER — TELEPHONE (OUTPATIENT)
Dept: UROLOGY | Facility: CLINIC | Age: 76
End: 2021-07-14

## 2021-07-14 DIAGNOSIS — N20.0 CALCULUS OF KIDNEY: ICD-10-CM

## 2021-07-14 DIAGNOSIS — N39.0 URINARY TRACT INFECTION WITHOUT HEMATURIA, SITE UNSPECIFIED: Primary | ICD-10-CM

## 2021-07-14 NOTE — TELEPHONE ENCOUNTER
Spoke with pt re her missed renogram. She had an accident and had to have surgery. She will call when she is able to get around.

## 2021-07-16 ENCOUNTER — ALLIED HEALTH/NURSE VISIT (OUTPATIENT)
Dept: PEDIATRICS | Facility: CLINIC | Age: 76
End: 2021-07-16
Payer: COMMERCIAL

## 2021-07-16 VITALS — HEART RATE: 89 BPM | DIASTOLIC BLOOD PRESSURE: 76 MMHG | SYSTOLIC BLOOD PRESSURE: 130 MMHG

## 2021-07-16 DIAGNOSIS — Z01.30 BP CHECK: Primary | ICD-10-CM

## 2021-07-16 PROCEDURE — 99207 PR NO CHARGE NURSE ONLY: CPT

## 2021-07-16 NOTE — PROGRESS NOTES
Zeynep Martinez is a 76 year old patient who comes in today for a Blood Pressure check.  Initial BP:  /76 (BP Location: Right arm, Patient Position: Chair, Cuff Size: Adult Large)   Pulse 89   LMP  (LMP Unknown)      Disposition: results routed to provider      Patient is not on any BP medication at this time, BP readings were high during ED visit 10 days ago.        No complaints and No symptoms    Gabbie Yusuf MA// July 16, 2021 2:07 PM

## 2021-07-16 NOTE — Clinical Note
/76 (BP Location: Right arm, Patient Position: Chair, Cuff Size: Adult Large)   Pulse 89   LMP  (LMP Unknown)

## 2021-07-19 NOTE — TELEPHONE ENCOUNTER
Called pt. Pt reports she has not contacted referral number to schedule with [facility] for virtual therapy. Pt would appreciate PAL assistance for scheduling. Has an open schedule starting in August. Will call referral number to assist in scheduling.    [facility]. They have no openings for pt's with Medicare for over a year and no providers currently available to treat binge eating disorders.    Called referral at [phone]. Scheduled video visit with oNe Meeks on Thursday, 10/28/21 at 10 AM.    Eliot Barber, EMT at 9:36 AM on July 19, 2021   [facility] Health Guide   [phone]

## 2021-07-19 NOTE — TELEPHONE ENCOUNTER
Called pt. Informed of therapy appt on 10/28/21. Scheduled VV with C on 8/3/2021. Pt has no further questions or concerns at this time.    Encouraged pt to reach out with any further questions or concerns. Pt agreeable to plan and verbalized understanding.    Eliot Barber, EMT at 10:31 AM on July 19, 2021   [facility] Health Guide   [phone]

## 2021-07-23 ENCOUNTER — MYC MEDICAL ADVICE (OUTPATIENT)
Dept: PEDIATRICS | Facility: CLINIC | Age: 76
End: 2021-07-23

## 2021-07-23 NOTE — TELEPHONE ENCOUNTER
"Per PCP: \"Can you call patient and write the letter and forward it to me to review, edit and sign? I want to ensure it's written exactly how she needs it.\"    Called pt. She noted she needs it to say that it is medically necessary for her to live in an assisted living facility and that it is not appropriate for her to live in an independent living situation. Pended letter and route to PCP to review, edit and sign if appropriate.    Eliot Barber, EMT at 3:04 PM on July 23, 2021   [facility] Health Guide   [phone]  "

## 2021-07-23 NOTE — LETTER
To Whom It May Concern,        Zeynep Martinez ( 1945) is a patient under my care. It is my medical opinion that it is medically necessary for her to live in an assisted living facility. It is not appropriate for her to live in an independent living situation. If you have any questions or concerns, please call [phone].    Thank you for choosing [facility]!        All the best,        Trinh Bello, BENEDICTO CNP

## 2021-07-23 NOTE — CONFIDENTIAL NOTE
Can you call patient and write the letter and forward it to me to review, edit and sign? I want to ensur it's written exactly how she needs it.

## 2021-07-26 ENCOUNTER — TELEPHONE (OUTPATIENT)
Dept: PEDIATRICS | Facility: CLINIC | Age: 76
End: 2021-07-26

## 2021-07-26 NOTE — TELEPHONE ENCOUNTER
Symptoms    Describe your symptoms: call from [facility].    Pt was sitting on the couch and flipped over and fell onto the floor.    Any pain: Yes: both knees are sore    How long have you been having symptoms: today      Have you been seen for this:      Appointment offered?: No    Triage offered?: No    Home remedies tried:     Requested Pharmacy:     Okay to leave a detailed message? [phone] Sariah from PT

## 2021-07-29 DIAGNOSIS — Z53.9 DIAGNOSIS NOT YET DEFINED: Primary | ICD-10-CM

## 2021-07-30 NOTE — TELEPHONE ENCOUNTER
Sent letter via mail per pt request. Closing encounter.    Eliot Barber, EMT at 10:51 AM on July 30, 2021   [facility] Health Guide   [phone]

## 2021-08-02 PROBLEM — R29.898 HAND WEAKNESS: Status: RESOLVED | Noted: 2021-06-18 | Resolved: 2021-08-02

## 2021-08-02 PROBLEM — R25.1 TREMOR OF BOTH HANDS: Status: RESOLVED | Noted: 2021-06-18 | Resolved: 2021-08-02

## 2021-08-03 NOTE — TELEPHONE ENCOUNTER
Called Sariah (second attempt) to discuss below. No answer, LM. Waiting callback.     Delphine Sung, RN   [facility] -- Triage Nurse

## 2021-08-06 NOTE — TELEPHONE ENCOUNTER
Called patient.    Patient reports she sat on the edge of the couch and just slipped down onto her knees. This was unwittnessed but pt reported it to her PT who came that day and PT was just notifying clinic. Patient was able to get herself back on to the couch. Denies any injuries or other concerns at this time.

## 2021-08-11 ASSESSMENT — PATIENT HEALTH QUESTIONNAIRE - PHQ9
SUM OF ALL RESPONSES TO PHQ QUESTIONS 1-9: 11
10. IF YOU CHECKED OFF ANY PROBLEMS, HOW DIFFICULT HAVE THESE PROBLEMS MADE IT FOR YOU TO DO YOUR WORK, TAKE CARE OF THINGS AT HOME, OR GET ALONG WITH OTHER PEOPLE: SOMEWHAT DIFFICULT
SUM OF ALL RESPONSES TO PHQ QUESTIONS 1-9: 11

## 2021-08-11 ASSESSMENT — ANXIETY QUESTIONNAIRES
2. NOT BEING ABLE TO STOP OR CONTROL WORRYING: SEVERAL DAYS
3. WORRYING TOO MUCH ABOUT DIFFERENT THINGS: SEVERAL DAYS
8. IF YOU CHECKED OFF ANY PROBLEMS, HOW DIFFICULT HAVE THESE MADE IT FOR YOU TO DO YOUR WORK, TAKE CARE OF THINGS AT HOME, OR GET ALONG WITH OTHER PEOPLE?: SOMEWHAT DIFFICULT
5. BEING SO RESTLESS THAT IT IS HARD TO SIT STILL: SEVERAL DAYS
1. FEELING NERVOUS, ANXIOUS, OR ON EDGE: SEVERAL DAYS
6. BECOMING EASILY ANNOYED OR IRRITABLE: MORE THAN HALF THE DAYS
7. FEELING AFRAID AS IF SOMETHING AWFUL MIGHT HAPPEN: NOT AT ALL
GAD7 TOTAL SCORE: 6
GAD7 TOTAL SCORE: 6
4. TROUBLE RELAXING: NOT AT ALL
GAD7 TOTAL SCORE: 6
7. FEELING AFRAID AS IF SOMETHING AWFUL MIGHT HAPPEN: NOT AT ALL

## 2021-08-12 ENCOUNTER — VIRTUAL VISIT (OUTPATIENT)
Dept: PSYCHOLOGY | Facility: CLINIC | Age: 76
End: 2021-08-12
Payer: COMMERCIAL

## 2021-08-12 DIAGNOSIS — F41.1 GAD (GENERALIZED ANXIETY DISORDER): ICD-10-CM

## 2021-08-12 DIAGNOSIS — F33.1 MAJOR DEPRESSIVE DISORDER, RECURRENT EPISODE, MODERATE WITH ANXIOUS DISTRESS (H): Primary | ICD-10-CM

## 2021-08-12 PROCEDURE — 90791 PSYCH DIAGNOSTIC EVALUATION: CPT | Mod: 95 | Performed by: SOCIAL WORKER

## 2021-08-12 ASSESSMENT — COLUMBIA-SUICIDE SEVERITY RATING SCALE - C-SSRS
TOTAL  NUMBER OF ABORTED OR SELF INTERRUPTED ATTEMPTS PAST LIFETIME: NO
2. HAVE YOU ACTUALLY HAD ANY THOUGHTS OF KILLING YOURSELF LIFETIME?: NO
4. HAVE YOU HAD THESE THOUGHTS AND HAD SOME INTENTION OF ACTING ON THEM?: NO
5. HAVE YOU STARTED TO WORK OUT OR WORKED OUT THE DETAILS OF HOW TO KILL YOURSELF? DO YOU INTEND TO CARRY OUT THIS PLAN?: NO
6. HAVE YOU EVER DONE ANYTHING, STARTED TO DO ANYTHING, OR PREPARED TO DO ANYTHING TO END YOUR LIFE?: NO
6. HAVE YOU EVER DONE ANYTHING, STARTED TO DO ANYTHING, OR PREPARED TO DO ANYTHING TO END YOUR LIFE?: NO
5. HAVE YOU STARTED TO WORK OUT OR WORKED OUT THE DETAILS OF HOW TO KILL YOURSELF? DO YOU INTEND TO CARRY OUT THIS PLAN?: NO
3. HAVE YOU BEEN THINKING ABOUT HOW YOU MIGHT KILL YOURSELF?: NO
2. HAVE YOU ACTUALLY HAD ANY THOUGHTS OF KILLING YOURSELF?: NO
TOTAL  NUMBER OF INTERRUPTED ATTEMPTS PAST 3 MONTHS: NO
1. IN THE PAST MONTH, HAVE YOU WISHED YOU WERE DEAD OR WISHED YOU COULD GO TO SLEEP AND NOT WAKE UP?: SEE ABOVE
TOTAL  NUMBER OF ABORTED OR SELF INTERRUPTED ATTEMPTS PAST 3 MONTHS: NO
4. HAVE YOU HAD THESE THOUGHTS AND HAD SOME INTENTION OF ACTING ON THEM?: NO
ATTEMPT LIFETIME: NO
1. IN THE PAST MONTH, HAVE YOU WISHED YOU WERE DEAD OR WISHED YOU COULD GO TO SLEEP AND NOT WAKE UP?: YES
TOTAL  NUMBER OF INTERRUPTED ATTEMPTS LIFETIME: NO
1. IN THE PAST MONTH, HAVE YOU WISHED YOU WERE DEAD OR WISHED YOU COULD GO TO SLEEP AND NOT WAKE UP?: YES
ATTEMPT PAST THREE MONTHS: NO

## 2021-08-12 ASSESSMENT — PATIENT HEALTH QUESTIONNAIRE - PHQ9: SUM OF ALL RESPONSES TO PHQ QUESTIONS 1-9: 11

## 2021-08-12 ASSESSMENT — ANXIETY QUESTIONNAIRES: GAD7 TOTAL SCORE: 6
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
## 2021-08-17 ENCOUNTER — MEDICAL CORRESPONDENCE (OUTPATIENT)
Dept: HEALTH INFORMATION MANAGEMENT | Facility: CLINIC | Age: 76
End: 2021-08-17

## 2021-08-19 ENCOUNTER — MEDICAL CORRESPONDENCE (OUTPATIENT)
Dept: HEALTH INFORMATION MANAGEMENT | Facility: CLINIC | Age: 76
End: 2021-08-19

## 2021-08-19 ENCOUNTER — VIRTUAL VISIT (OUTPATIENT)
Dept: PSYCHOLOGY | Facility: CLINIC | Age: 76
End: 2021-08-19
Payer: COMMERCIAL

## 2021-08-19 DIAGNOSIS — F33.1 MAJOR DEPRESSIVE DISORDER, RECURRENT EPISODE, MODERATE WITH ANXIOUS DISTRESS (H): Primary | ICD-10-CM

## 2021-08-19 DIAGNOSIS — F41.1 GAD (GENERALIZED ANXIETY DISORDER): ICD-10-CM

## 2021-08-19 PROCEDURE — 90834 PSYTX W PT 45 MINUTES: CPT | Mod: 95 | Performed by: SOCIAL WORKER

## 2021-08-19 NOTE — PROGRESS NOTES
Progress Note    Patient Name: Zeynep Martinez  Date: 21         Service Type: Individual      Session Start Time: 11:01  Session End Time: 11:46     Session Length: 45    Session #: 2    Attendees: Client    Service Modality:  Video Visit:      Provider verified identity through the following two step process.  Patient provided:  Patient  and Patient address    Telemedicine Visit: The patient's condition can be safely assessed and treated via synchronous audio and visual telemedicine encounter.      Reason for Telemedicine Visit: Services only offered telehealth    Originating Site (Patient Location): Patient's home    Distant Site (Provider Location): Provider Remote Setting- Home Office    Consent:  The patient/guardian has verbally consented to: the potential risks and benefits of telemedicine (video visit) versus in person care; bill my insurance or make self-payment for services provided; and responsibility for payment of non-covered services.     Patient would like the video invitation sent by:  My Chart    Mode of Communication:  Video Conference via [employer]    As the provider I attest to compliance with applicable laws and regulations related to telemedicine.     Treatment Plan Last Reviewed: Started 21  PHQ-9 / NELLIE-7 : Complete next session    DATA  Interactive Complexity: No  Crisis: No       Progress Since Last Session (Related to Symptoms / Goals / Homework):   Symptoms: No change continued anxiety and depression symptoms    Homework: Did not complete      Episode of Care Goals: No improvement - PREPARATION (Decided to change - considering how); Intervened by negotiating a change plan and determining options / strategies for behavior change, identifying triggers, exploring social supports, and working towards setting a date to begin behavior change     Current / Ongoing Stressors and Concerns:   Family stress, difficult childhood, ego despair  issues     Treatment Objective(s) Addressed in This Session:   Connect to gratitude and ego integrity  Use GAP-stay grounded; attuned and present  Connect tpo positive affirmations and gratitude     Intervention:   Motivational Interviewing: OARS  Strength based therapy        ASSESSMENT: Current Emotional / Mental Status (status of significant symptoms):   Risk status (Self / Other harm or suicidal ideation)   Patient denies current fears or concerns for personal safety.   Patient denies current or recent suicidal ideation or behaviors.   Patient denies current or recent homicidal ideation or behaviors.   Patient denies current or recent self injurious behavior or ideation.   Patient denies other safety concerns.   Patient reports there has been no change in risk factors since their last session.     Patient reports there has been no change in protective factors since their last session.     Recommended that patient call 911 or go to the local ED should there be a change in any of these risk factors.     Appearance:   Appropriate    Eye Contact:   Good    Psychomotor Behavior: Normal    Attitude:   Cooperative  Interested Pleasant   Orientation:   All   Speech    Rate / Production: Normal/ Responsive Normal     Volume:  Normal    Mood:    Anxious  Depressed  Sad  Expansive   Affect:    Appropriate  Bright  Worrisome    Thought Content:  Clear    Thought Form:  Coherent  Goal Directed  Logical    Insight:    Fair      Medication Review:   No changes to current psychiatric medication(s)     Medication Compliance:   Yes     Changes in Health Issues:   None reported     Chemical Use Review:   Substance Use: Chemical use reviewed, no active concerns identified      Tobacco Use: No current tobacco use.      Diagnosis:  1. Major depressive disorder, recurrent episode, moderate with anxious distress (H)    2. NELLIE (generalized anxiety disorder)        Collateral Reports Completed:   Not Applicable    PLAN: (Patient Tasks /  Therapist Tasks / Other)  Patient will bring her lady bust to her room so she can look at each morning when she get's up; stay in her GAP, Connect to thoughts become things and connect to positive affirmations and gratitude.         Noe Meeks, [facility]                                                         ______________________________________________________________________    Treatment Plan    Patient's Name: Zeynep Martinez  YOB: 1945    Date: 8-19-21    DSM5 Diagnoses: 296.32 (F33.1) Major Depressive Disorder, Recurrent Episode, Moderate _ and With anxious distress or 300.02 (F41.1) Generalized Anxiety Disorder  Psychosocial / Contextual Factors: Family stress, difficult childhood, ego despair issues    WHODAS: Completed first session    Referral / Collaboration:  Was/were discussed and client will pursue.  Patient stated that she would enroll in an eating disorder program if her binge eating persists.    Anticipated number of session or this episode of care: 12      MeasurableTreatment Goal(s) related to diagnosis / functional impairment(s)  Goal 1: Patient will decrease her depressive and anxious thoughts and return cordell normal daily living.     I will know I've met my goal when I know who I am authentically and act appropriately.      Objective #A (Patient Action)    Patient will work toward stronger ego intergrity vs despair.  Determine authentic self and feel good about this. .  Status: Continued - Date(s): 8-19-21    Intervention(s)  Therapist will process origins of negative self image and esteem and concentrate on celebrating authenticity and improving overall self esteem and worth.    Objective #B  Patient will use cognitive strategies identified in therapy to challenge anxious thoughts.  Status: Continued - Date(s): 8-19-21    Intervention(s)  Therapist will teach thought stopping process and CBT skills and practice skills as needed until they become automatic .    Objective  #C  Patient will eat healthy and limit binge eating.  Status: Continued - Date(s): 8-19-21    Intervention(s)  Therapist will check-in on eating behaior, determine reasons for binge eating and reinforce healthy eating habits and behavior.         Patient has reviewed and agreed to the above plan.      Noe Meeks, [facility]  August 19, 2021

## 2021-08-27 ENCOUNTER — TRANSFERRED RECORDS (OUTPATIENT)
Dept: HEALTH INFORMATION MANAGEMENT | Facility: CLINIC | Age: 76
End: 2021-08-27

## 2021-09-03 ENCOUNTER — OFFICE VISIT (OUTPATIENT)
Dept: NEUROLOGY | Facility: CLINIC | Age: 76
End: 2021-09-03
Attending: PSYCHIATRY & NEUROLOGY
Payer: COMMERCIAL

## 2021-09-03 VITALS — OXYGEN SATURATION: 95 % | HEART RATE: 90 BPM | DIASTOLIC BLOOD PRESSURE: 67 MMHG | SYSTOLIC BLOOD PRESSURE: 122 MMHG

## 2021-09-03 DIAGNOSIS — R53.1 SUBJECTIVE WEAKNESS: ICD-10-CM

## 2021-09-03 DIAGNOSIS — R20.0 NUMBNESS AND TINGLING IN LEFT HAND: ICD-10-CM

## 2021-09-03 DIAGNOSIS — R25.1 TREMOR: Primary | ICD-10-CM

## 2021-09-03 DIAGNOSIS — M79.642 PAIN OF LEFT HAND: ICD-10-CM

## 2021-09-03 DIAGNOSIS — R20.2 NUMBNESS AND TINGLING IN LEFT HAND: ICD-10-CM

## 2021-09-03 PROCEDURE — 99215 OFFICE O/P EST HI 40 MIN: CPT | Performed by: PSYCHIATRY & NEUROLOGY

## 2021-09-03 PROCEDURE — G0463 HOSPITAL OUTPT CLINIC VISIT: HCPCS

## 2021-09-03 NOTE — PROGRESS NOTES
ESTABLISHED PATIENT NEUROLOGY NOTE    DATE OF VISIT: 9/3/2021  CLINIC LOCATION: [facility]  MRN: [id number]  PATIENT NAME: Zeynep Martinez  YOB: 1945    PCP: BENEDICTO Martin CNP    REASON FOR VISIT:   Chief Complaint   Patient presents with     Tremors     follow up      SUBJECTIVE:                                                      HISTORY OF PRESENT ILLNESS: Patient is here to follow up regarding hand tremor.  The last visit was on 6/3/2021.  At that time a referral to occupational therapy was made.  We also discussed additional medication treatment options.  Please refer to my initial/other prior notes for further information.  The patient is accompanied by her daughter, who participates in the interview.    Since the last visit, the patient reports that her tremor is improved with occupational therapy.  It is only noticeable when she overexerts herself, which happens often because she has such tendency.  It does not affect her daily activities much.  At the same time, the patient reports that her subjective left-sided weakness continues, although she is able to move furniture at home without any difficulty.  She reports that her left hand is periodically achy, numb and tingly, especially when she holds her phone for a long time and in the morning.  Several years ago she had carpal tunnel release surgery on the right side and was scheduled to have it done on the left side, but then her symptoms improved with improvement economics at work and regular use of the splint.  She does not use it now, but still has the splint at home.  Denies any additional new focal neurological symptoms.    On review of systems, patient endorses recent hospitalization related to left knee pain in July 2021, but no other active complaints. Medications, allergies, family and social history were also reviewed. There are no changes reported by patient.  REVIEW OF SYSTEMS:                                                     10-system review was completed. Pertinent positives are included in HPI. The remainder of ROS is negative.  EXAM:                                                    Physical Exam:   Vitals: /67   Pulse 90   LMP  (LMP Unknown)   SpO2 95%     General: pt is in NAD, cooperative.  Skin: normal turgor, moist mucous membranes, no lesions/rashes noticed.  HEENT: ATNC, white sclera, normal conjunctiva.  Respiratory: Symmetric lung excursion, no accessory respiratory muscle use.  Abdomen: Non distended.  Neurological: awake, cooperative, follows commands, she has positive Phalen's test on the left and negative on the right, Tinel's tests are negative bilaterally, no other exam changes compared to the last visit.  Pronator drift is negative bilaterally.  ASSESSMENT AND PLAN:                                                    Assessment: 76-year-old female patient with bilateral postural hand tremor presents for follow-up.    She feels that occupational therapy was helpful, and tremor is currently manageable.  Previously we discussed that dose of gabapentin could be further increased.  She currently takes 800 mg twice daily.  Additional treatment options include propranolol, primidone, and Topamax.  Today we reviewed these options again.  Propranolol could be used in caution in concomitant used with quetiapine.  We discussed possible interaction and associated hypotension.  We decided not to make any medication adjustments now.    Previously we discussed that subjective weakness is more likely due to diminished left hand sensation and associated tremor.  Her pronator drift is negative bilaterally today.  I do not appreciate significant weakness on her exam.  Her recent brain MRI from February 2021 was unrevealing.  I do not think that we need to proceed with any additional work-up regarding that.    Regarding her ongoing numbness, tingling, and pain in the left hand, I suspect  that it might be due to worsening of left carpal tunnel syndrome.  I placed an order for EMG to evaluate it further.  Meanwhile, I advised her to use the left wrist splint regularly.  We will discuss additional treatment options after EMG.    Diagnoses:    ICD-10-CM    1. Tremor  R25.1    2. Subjective weakness  R53.1    3. Numbness and tingling in left hand  R20.0     R20.2    4. Pain of left hand  M79.642      Plan: At today's visit we thoroughly discussed current symptoms, available treatment options, and the plan.    We decided to proceed with EMG to check for left carpal tunnel syndrome.  I counseled the patient to consistently wear her left wrist splint every night and during the day with repetitive wrist movements.    Next follow-up appointment is in the next 4 weeks or earlier if needed.    Total Time: 41 minutes spent on the date of the encounter doing chart review, history and exam, documentation and further activities per the note.    Isael Cordero MD  [facility]  (Chart documentation was completed in part with Dragon voice-recognition software. Even though reviewed, some grammatical, spelling, and word errors may remain.)

## 2021-09-03 NOTE — PATIENT INSTRUCTIONS
AFTER VISIT SUMMARY (AVS):    At today's visit we thoroughly discussed current symptoms, available treatment options, and the plan.    We decided to proceed with EMG to check for left carpal tunnel syndrome.  Meanwhile, please consistently wear your left wrist splint every night and during the day with repetitive wrist movements.    Next follow-up appointment is in the next 4 weeks or earlier if needed.    Please do not hesitate to call me with any questions or concerns.    Thanks.

## 2021-09-03 NOTE — LETTER
9/3/2021         RE: Zeynep Martinez  [address]  [place] [address]        Dear Colleague,    Thank you for referring your patient, Zeynep Martinez, to the [facility] [place]. Please see a copy of my visit note below.    ESTABLISHED PATIENT NEUROLOGY NOTE    DATE OF VISIT: 9/3/2021  CLINIC LOCATION: [facility]  MRN: [id number]  PATIENT NAME: Zeynep Martinez  YOB: 1945    PCP: BENEDICTO Martin CNP    REASON FOR VISIT:   Chief Complaint   Patient presents with     Tremors     follow up      SUBJECTIVE:                                                      HISTORY OF PRESENT ILLNESS: Patient is here to follow up regarding hand tremor.  The last visit was on 6/3/2021.  At that time a referral to occupational therapy was made.  We also discussed additional medication treatment options.  Please refer to my initial/other prior notes for further information.  The patient is accompanied by her daughter, who participates in the interview.    Since the last visit, the patient reports that her tremor is improved with occupational therapy.  It is only noticeable when she overexerts herself, which happens often because she has such tendency.  It does not affect her daily activities much.  At the same time, the patient reports that her subjective left-sided weakness continues, although she is able to move furniture at home without any difficulty.  She reports that her left hand is periodically achy, numb and tingly, especially when she holds her phone for a long time and in the morning.  Several years ago she had carpal tunnel release surgery on the right side and was scheduled to have it done on the left side, but then her symptoms improved with improvement economics at work and regular use of the splint.  She does not use it now, but still has the splint at home.  Denies any additional new focal neurological symptoms.    On review of  systems, patient endorses recent hospitalization related to left knee pain in July 2021, but no other active complaints. Medications, allergies, family and social history were also reviewed. There are no changes reported by patient.  REVIEW OF SYSTEMS:                                                    10-system review was completed. Pertinent positives are included in HPI. The remainder of ROS is negative.  EXAM:                                                    Physical Exam:   Vitals: /67   Pulse 90   LMP  (LMP Unknown)   SpO2 95%     General: pt is in NAD, cooperative.  Skin: normal turgor, moist mucous membranes, no lesions/rashes noticed.  HEENT: ATNC, white sclera, normal conjunctiva.  Respiratory: Symmetric lung excursion, no accessory respiratory muscle use.  Abdomen: Non distended.  Neurological: awake, cooperative, follows commands, she has positive Phalen's test on the left and negative on the right, Tinel's tests are negative bilaterally, no other exam changes compared to the last visit.  Pronator drift is negative bilaterally.  ASSESSMENT AND PLAN:                                                    Assessment: 76-year-old female patient with bilateral postural hand tremor presents for follow-up.    She feels that occupational therapy was helpful, and tremor is currently manageable.  Previously we discussed that dose of gabapentin could be further increased.  She currently takes 800 mg twice daily.  Additional treatment options include propranolol, primidone, and Topamax.  Today we reviewed these options again.  Propranolol could be used in caution in concomitant used with quetiapine.  We discussed possible interaction and associated hypotension.  We decided not to make any medication adjustments now.    Previously we discussed that subjective weakness is more likely due to diminished left hand sensation and associated tremor.  Her pronator drift is negative bilaterally today.  I do not  appreciate significant weakness on her exam.  Her recent brain MRI from February 2021 was unrevealing.  I do not think that we need to proceed with any additional work-up regarding that.    Regarding her ongoing numbness, tingling, and pain in the left hand, I suspect that it might be due to worsening of left carpal tunnel syndrome.  I placed an order for EMG to evaluate it further.  Meanwhile, I advised her to use the left wrist splint regularly.  We will discuss additional treatment options after EMG.    Diagnoses:    ICD-10-CM    1. Tremor  R25.1    2. Subjective weakness  R53.1    3. Numbness and tingling in left hand  R20.0     R20.2    4. Pain of left hand  M79.642      Plan: At today's visit we thoroughly discussed current symptoms, available treatment options, and the plan.    We decided to proceed with EMG to check for left carpal tunnel syndrome.  I counseled the patient to consistently wear her left wrist splint every night and during the day with repetitive wrist movements.    Next follow-up appointment is in the next 4 weeks or earlier if needed.    Total Time: 41 minutes spent on the date of the encounter doing chart review, history and exam, documentation and further activities per the note.    Isael Cordero MD  [facility]  (Chart documentation was completed in part with Dragon voice-recognition software. Even though reviewed, some grammatical, spelling, and word errors may remain.)      Again, thank you for allowing me to participate in the care of your patient.        Sincerely,        Isael Cordero MD

## 2021-09-07 ENCOUNTER — VIRTUAL VISIT (OUTPATIENT)
Dept: PSYCHOLOGY | Facility: CLINIC | Age: 76
End: 2021-09-07
Payer: COMMERCIAL

## 2021-09-07 DIAGNOSIS — F41.1 GAD (GENERALIZED ANXIETY DISORDER): ICD-10-CM

## 2021-09-07 DIAGNOSIS — F33.1 MAJOR DEPRESSIVE DISORDER, RECURRENT EPISODE, MODERATE WITH ANXIOUS DISTRESS (H): Primary | ICD-10-CM

## 2021-09-07 PROCEDURE — 90834 PSYTX W PT 45 MINUTES: CPT | Mod: 95 | Performed by: SOCIAL WORKER

## 2021-09-07 NOTE — PROGRESS NOTES
Progress Note    Patient Name: Zeynep Martinez  Date: 21         Service Type: Individual      Session Start Time: 3:01  Session End Time: 3:46     Session Length: 45    Session #: 3    Attendees: Client    Service Modality:  Video Visit:      Provider verified identity through the following two step process.  Patient provided:  Patient  and Patient address    Telemedicine Visit: The patient's condition can be safely assessed and treated via synchronous audio and visual telemedicine encounter.      Reason for Telemedicine Visit: Services only offered telehealth    Originating Site (Patient Location): Patient's home    Distant Site (Provider Location): Provider Remote Setting- Home Office    Consent:  The patient/guardian has verbally consented to: the potential risks and benefits of telemedicine (video visit) versus in person care; bill my insurance or make self-payment for services provided; and responsibility for payment of non-covered services.     Patient would like the video invitation sent by:  My Chart    Mode of Communication:  Video Conference via [employer]    As the provider I attest to compliance with applicable laws and regulations related to telemedicine.     Treatment Plan Last Reviewed: Started 21  PHQ-9 / NELLIE-7 : Complete next session    DATA  Interactive Complexity: No  Crisis: No       Progress Since Last Session (Related to Symptoms / Goals / Homework):   Symptoms: Improved anxiety and depression symptoms    Homework: Achieved / completed to satisfaction;  Put female bust next to her television so she can admire this on a more regular basis.  Created a positive affirmation picture and hung it by her TV also to remind herself of positives about self.  Feels better about herself and engaging with family and friends on a regular basis.       Episode of Care Goals: Satisfactory progress - ACTION (Actively working towards change); Intervened by  reinforcing change plan / affirming steps taken     Current / Ongoing Stressors and Concerns:   Family stress, difficult childhood, ego despair issues     Treatment Objective(s) Addressed in This Session:   Connect to gratitude and ego integrity  Use GAP-stay grounded; attuned and present  Connect to positive affirmations and gratitude  Create meals and freeze them, save money and put in bank, start realistic exercise program, schedule doctor appointment to check on ulcer, skin issues and continue work with other rheumatologist  on wrist and tremor issues. Continue creative projects that she enjoys.     Intervention:   Motivational Interviewing: OARS  Strength based therapy        ASSESSMENT: Current Emotional / Mental Status (status of significant symptoms):   Risk status (Self / Other harm or suicidal ideation)   Patient denies current fears or concerns for personal safety.   Patient denies current or recent suicidal ideation or behaviors.   Patient denies current or recent homicidal ideation or behaviors.   Patient denies current or recent self injurious behavior or ideation.   Patient denies other safety concerns.   Patient reports there has been no change in risk factors since their last session.     Patient reports there has been no change in protective factors since their last session.     Recommended that patient call 911 or go to the local ED should there be a change in any of these risk factors.     Appearance:   Appropriate    Eye Contact:   Good    Psychomotor Behavior: Normal    Attitude:   Cooperative  Interested Pleasant   Orientation:   All   Speech    Rate / Production: Normal/ Responsive Normal     Volume:  Normal    Mood:    Anxious  Depressed  Expansive   Affect:    Appropriate  Bright    Thought Content:  Clear    Thought Form:  Coherent  Goal Directed  Logical    Insight:    Fair      Medication Review:   No changes to current psychiatric medication(s)     Medication  Compliance:   Yes     Changes in Health Issues:   None reported     Chemical Use Review:   Substance Use: Chemical use reviewed, no active concerns identified      Tobacco Use: No current tobacco use.      Diagnosis:  1. Major depressive disorder, recurrent episode, moderate with anxious distress (H)    2. NELLIE (generalized anxiety disorder)        Collateral Reports Completed:   Not Applicable    PLAN: (Patient Tasks / Therapist Tasks / Other)  Previous sessions: Patient will bring her lady bust to her room so she can look at each morning when she get's up; stay in her GAP, Connect to positive thoughts affirmations and gratitude. Current session:  Follow rheumatologist recommendations for hands and wrists, contact PCP about ulcer and skin issues,  Start healthy meal plan and freeze meals that she can eat later, save money from meals and put in savings, continue outings with friends and family, join lunch group when she can, start realistic exercise program, and continue creative projects with limits.  Continue to connect to gratitude, positive affirmations and strengths. Set limits with granddaughter and continue to build on relationship when appropriate.         Noe Meeks, [facility]                                                         ______________________________________________________________________    Treatment Plan    Patient's Name: Zeynep Martinez  YOB: 1945    Date: 8-19-21    DSM5 Diagnoses: 296.32 (F33.1) Major Depressive Disorder, Recurrent Episode, Moderate _ and With anxious distress or 300.02 (F41.1) Generalized Anxiety Disorder  Psychosocial / Contextual Factors: Family stress, difficult childhood, ego despair issues    WHODAS: Completed first session    Referral / Collaboration:  Was/were discussed and client will pursue.  Patient stated that she would enroll in an eating disorder program if her binge eating persists.    Anticipated number of session or this episode of care:  12      MeasurableTreatment Goal(s) related to diagnosis / functional impairment(s)  Goal 1: Patient will decrease her depressive and anxious thoughts and return cordell normal daily living.     I will know I've met my goal when I know who I am authentically and act appropriately.      Objective #A (Patient Action)    Patient will work toward stronger ego intergrity vs despair.  Determine authentic self and feel good about this. .  Status: Continued - Date(s): 8-19-21    Intervention(s)  Therapist will process origins of negative self image and esteem and concentrate on celebrating authenticity and improving overall self esteem and worth.    Objective #B  Patient will use cognitive strategies identified in therapy to challenge anxious thoughts.  Status: Continued - Date(s): 8-19-21    Intervention(s)  Therapist will teach thought stopping process and CBT skills and practice skills as needed until they become automatic .    Objective #C  Patient will eat healthy and limit binge eating.  Status: Continued - Date(s): 8-19-21    Intervention(s)  Therapist will check-in on eating behaior, determine reasons for binge eating and reinforce healthy eating habits and behavior.         Patient has reviewed and agreed to the above plan.      Noe Meeks, [facility]  August 19, 2021

## 2021-09-08 ENCOUNTER — LAB (OUTPATIENT)
Dept: LAB | Facility: CLINIC | Age: 76
End: 2021-09-08
Attending: PHYSICIAN ASSISTANT
Payer: COMMERCIAL

## 2021-09-08 ENCOUNTER — HOSPITAL ENCOUNTER (OUTPATIENT)
Dept: NUCLEAR MEDICINE | Facility: CLINIC | Age: 76
End: 2021-09-08
Attending: PHYSICIAN ASSISTANT
Payer: COMMERCIAL

## 2021-09-08 DIAGNOSIS — N20.0 CALCULUS OF KIDNEY: ICD-10-CM

## 2021-09-08 DIAGNOSIS — N39.0 URINARY TRACT INFECTION WITHOUT HEMATURIA, SITE UNSPECIFIED: ICD-10-CM

## 2021-09-08 PROCEDURE — 343N000001 HC RX 343: Performed by: PHYSICIAN ASSISTANT

## 2021-09-08 PROCEDURE — 87086 URINE CULTURE/COLONY COUNT: CPT

## 2021-09-08 PROCEDURE — A9562 TC99M MERTIATIDE: HCPCS | Performed by: PHYSICIAN ASSISTANT

## 2021-09-08 PROCEDURE — 78708 K FLOW/FUNCT IMAGE W/DRUG: CPT

## 2021-09-08 PROCEDURE — 250N000011 HC RX IP 250 OP 636: Performed by: PHYSICIAN ASSISTANT

## 2021-09-08 RX ORDER — FUROSEMIDE 10 MG/ML
40 INJECTION INTRAMUSCULAR; INTRAVENOUS ONCE
Status: COMPLETED | OUTPATIENT
Start: 2021-09-08 | End: 2021-09-08

## 2021-09-08 RX ADMIN — FUROSEMIDE 40 MG: 10 INJECTION, SOLUTION INTRAMUSCULAR; INTRAVENOUS at 15:15

## 2021-09-08 RX ADMIN — TECHNESCAN TC 99M MERTIATIDE 8.56 MILLICURIE: 1 INJECTION, POWDER, LYOPHILIZED, FOR SOLUTION INTRAVENOUS at 15:30

## 2021-09-10 ENCOUNTER — TELEPHONE (OUTPATIENT)
Dept: UROLOGY | Facility: CLINIC | Age: 76
End: 2021-09-10

## 2021-09-10 DIAGNOSIS — N39.0 RECURRENT UTI: ICD-10-CM

## 2021-09-10 DIAGNOSIS — N20.0 CALCULUS OF KIDNEY: Primary | ICD-10-CM

## 2021-09-10 LAB — BACTERIA UR CULT: ABNORMAL

## 2021-09-10 RX ORDER — NITROFURANTOIN MACROCRYSTAL 100 MG
100 CAPSULE ORAL 4 TIMES DAILY
Qty: 28 CAPSULE | Refills: 0 | Status: SHIPPED | OUTPATIENT
Start: 2021-09-10 | End: 2021-09-17

## 2021-09-10 NOTE — TELEPHONE ENCOUNTER
Message left for patient to call back to [facility] regarding her symptoms and urine culture results.  Joy Horner RN

## 2021-09-13 ENCOUNTER — VIRTUAL VISIT (OUTPATIENT)
Dept: UROLOGY | Facility: CLINIC | Age: 76
End: 2021-09-13
Payer: COMMERCIAL

## 2021-09-13 DIAGNOSIS — R31.9 URINARY TRACT INFECTION WITH HEMATURIA, SITE UNSPECIFIED: Primary | ICD-10-CM

## 2021-09-13 DIAGNOSIS — N39.0 URINARY TRACT INFECTION WITH HEMATURIA, SITE UNSPECIFIED: Primary | ICD-10-CM

## 2021-09-13 PROCEDURE — 99442 PR PHYSICIAN TELEPHONE EVALUATION 11-20 MIN: CPT | Performed by: UROLOGY

## 2021-09-13 ASSESSMENT — PAIN SCALES - GENERAL: PAINLEVEL: NO PAIN (0)

## 2021-09-13 NOTE — PROGRESS NOTES
Assessment/Plan:    Assessment & Plan   Zeynep was seen today for follow up.    Diagnoses and all orders for this visit:    Urinary tract infection with hematuria, site unspecified  -     Urine Culture Aerobic Bacterial [RYG702]; Future  -     NM Renogram; Future        Stone Management Plan  Stone Management 2/18/2021 5/13/2021 6/14/2021   Urinary Tract Infection Suspected Infection Suspected Infection Possible Infection   Renal Colic Well controlled symptoms Well controlled symptoms Well controlled symptoms   Renal Failure No suspicion of renal failure No suspicion of renal failure No suspicion of renal failure   Current CT date 2/17/2021 5/13/2021 -   Right sided stones? Yes Yes -   R Number of ureteral stones No ureteral stones No ureteral stones -   R Number of kidney stones  2 2 -   R GSD of kidney stones 2 - 4 2 - 4 -   R Hydronephrosis None None -   R Stone Event No current event New event Established event   Diagnosis date - 5/13/2021 -   Initial location of primary symptomatic stone - Renal -   Initial GSD of primary symptomatic stone - 3 -   R Current Plan Observe Clear -   Clear rationale - Associated treated infection -   Observe rationale Limited stone burden with good prognosis for spontaneous passage - -   Left sided stones? No No -   L Stone Event No current event No current event No current event             PLAN    Phone call duration: 10 minutes  15 minutes spent on the date of the encounter doing chart review, history and exam, documentation and further activities per the note    RICH ARANA MD  [facility]      HPI  Ms. Zeynep Martinez is a 76 year old  female who is being evaluated via a billable video visit by [facility] for follow up of her recurrent UTI and hydronephrosis.    She had been doing well until late last week when symptoms of cloudy urine and dysuria re-emerged. No fever or chills.    Urine  culture demonstrated ESBL E coli sensitive to macrobid -- prescribed 100 mg BID x 14 days.    Lasix renogram after right ureteral dilation demonstrated 33% function on the right  And relatively delayed t 1/2 at 27.5 minutes.    Will recheck a urine culture a month after the 2 weeks of macrobid and placed an order for repeat renogram in 6 months.    May require repeat ureteral dilation or change in antibiotic regimen.    ROS   Review of systems is negative except for HPI.    Past Medical History:   Diagnosis Date     Anxiety      Arthritis      Bladder infection      Calculus of kidney     multiple, uric acid and calcium     Cancer (H) Uterine 2/2001     Cancer (H)     uterine     Depression      Diabetes mellitus (H)     type 2     Frequent UTI      Gout      History of anesthesia complications     Slow to wake and gets very anxious     Hyperlipidemia LDL goal <100      Kidney stone      Type 2 diabetes mellitus without complication, without long-term current use of insulin (H)      Ulcer     Ulcer in the anastomosis       Past Surgical History:   Procedure Laterality Date     ABDOMINOPLASTY       APPENDECTOMY       APPENDECTOMY       EXTRACORPOREAL SHOCK WAVE LITHOTRIPSY (ESWL)       EXTRACORPOREAL SHOCK WAVE LITHOTRIPSY (ESWL)       galbaldder       GASTRIC BYPASS       HYSTERECTOMY       HYSTERECTOMY TOTAL ABDOMINAL, BILATERAL SALPINGO-OOPHORECTOMY, COMBINED      no cervix     IR MISCELLANEOUS PROCEDURE  3/18/2008     IR MISCELLANEOUS PROCEDURE  3/18/2008     IR MISCELLANEOUS PROCEDURE  6/10/2008     IR MISCELLANEOUS PROCEDURE  1/24/2012     IR URETER DILATION BILATERAL  1/24/2012     IR URETER DILATION BILATERAL  3/18/2008     OR CYSTO/URETERO W/LITHOTRIPSY &INDWELL STENT INSRT Left 10/26/2018    Procedure:  CYSTOSCOPY, LEFT  URETEROSCOPY, LASER LITHOTRIPSY STENT INSERTION;  Surgeon: Long Mansfield MD;  Location: [facility];  Service: Urology     OR CYSTO/URETERO W/LITHOTRIPSY &INDWELL STENT INSRT  Right 2021    Procedure: CYSTOURETEROSCOPY, URETEROSCOPY, URETERAL DILATION WITH RETROGRADE PYELOGRAM, AND STENT INSERTION RIGHT;  Surgeon: Long Mansfield MD;  Location: [facility];  Service: Urology     [place] ERCP W/BIOPSY SINGLE/MULTIPLE       REVISION CATHERINE-EN-Y       SKIN SURGERY      15 lbs of skin/adipose removed     STOMACH SURGERY       URETEROSCOPY         Current Outpatient Medications   Medication Sig Dispense Refill     ALPRAZolam (XANAX) 0.5 MG tablet Take 1 tablet (0.5 mg) by mouth 3 times daily as needed for anxiety 6 tablet 0     gabapentin (NEURONTIN) 800 MG tablet Take 1 tablet (800 mg) by mouth 2 times daily 40 tablet 0     glipiZIDE (GLUCOTROL XL) 5 MG 24 hr tablet Take 1 tablet (5 mg) by mouth daily (with breakfast) 30 tablet 0     metFORMIN (GLUCOPHAGE-XR) 500 MG 24 hr tablet Take 2 tablets (1,000 mg) by mouth 2 times daily (with meals) 360 tablet 1     nitroFURantoin macrocrystal (MACRODANTIN) 100 MG capsule Take 1 capsule (100 mg) by mouth 4 times daily for 7 days 28 capsule 0     QUEtiapine (SEROQUEL) 25 MG tablet Take 1 tablet (25 mg) by mouth nightly as needed (insomnia) 90 tablet 0     STATIN NOT PRESCRIBED (INTENTIONAL) Please choose reason not prescribed from choices below.       traZODone (DESYREL) 50 MG tablet Take 1 tablet (50 mg) by mouth At Bedtime 90 tablet 1       Allergies   Allergen Reactions     Adhesive Tape Rash     Paper tape is ok      Statin Drugs [Hmg-Coa-R Inhibitors] Other (See Comments)     confusion     Penicillins Rash       Social History     Socioeconomic History     Marital status:      Spouse name: Not on file     Number of children: Not on file     Years of education: Not on file     Highest education level: Not on file   Occupational History     Not on file   Tobacco Use     Smoking status: Former Smoker     Packs/day: 0.00     Years: 5.00     Pack years: 0.00     Types: Cigarettes     Quit date: 1970     Years since quittin.7      Smokeless tobacco: Never Used   Substance and Sexual Activity     Alcohol use: Yes     Comment: once per month      Drug use: Never     Sexual activity: Not Currently     Partners: Female     Birth control/protection: Post-menopausal   Other Topics Concern     Parent/sibling w/ CABG, MI or angioplasty before 65F 55M? No   Social History Narrative     Not on file     Social Determinants of Health     Financial Resource Strain:      Difficulty of Paying Living Expenses:    Food Insecurity:      Worried About Running Out of Food in the Last Year:      Ran Out of Food in the Last Year:    Transportation Needs:      Lack of Transportation (Medical):      Lack of Transportation (Non-Medical):    Physical Activity:      Days of Exercise per Week:      Minutes of Exercise per Session:    Stress:      Feeling of Stress :    Social Connections:      Frequency of Communication with Friends and Family:      Frequency of Social Gatherings with Friends and Family:      Attends Mu-ism Services:      Active Member of Clubs or Organizations:      Attends Club or Organization Meetings:      Marital Status:    Intimate Partner Violence:      Fear of Current or Ex-Partner:      Emotionally Abused:      Physically Abused:      Sexually Abused:        Family History   Problem Relation Age of Onset     Multiple Sclerosis Mother      Cancer Maternal Grandmother         cervical     Diabetes Maternal Grandmother      Hypertension Maternal Grandmother      Multiple Sclerosis Cousin      Multiple Sclerosis Cousin      Diabetes Maternal Aunt      Heart Disease Maternal Grandmother        Objective:     Appears AAO x 3  No vitals obtained due to virtual visit    Labs   Urinalysis POC (Office):  Nitrite Urine   Date Value Ref Range Status   06/29/2021 Negative NEG^Negative Final   06/14/2021 Negative Negative Final   11/12/2018 Negative Negative Final   11/05/2018 Positive (A) Negative Final       Lab Urinalysis:  Nitrite Urine   Date  Value Ref Range Status   06/29/2021 Negative NEG^Negative Final   06/14/2021 Negative Negative Final   11/12/2018 Negative Negative Final   11/05/2018 Positive (A) Negative Final    and Acute Labs   Urine Culture    Culture   Date Value Ref Range Status   09/08/2021 >100,000 CFU/mL Escherichia coli ESBL (A)  Final   06/14/2021 No Growth  Final   05/06/2021 ESBL PRODUCING ESCHERICHIA COLI (A)  Final     Comment:     >100,000 col/ml ESBL producing Escherichia coli

## 2021-09-13 NOTE — PROGRESS NOTES
Patient is roomed via telephone for a virtual visit.  Patient confirmed she is in the [facility] at the time of this appointment.  Patient understands that this virtual visit is billable and agree to proceed with appointment.

## 2021-09-16 ENCOUNTER — HOSPITAL ENCOUNTER (EMERGENCY)
Facility: CLINIC | Age: 76
Discharge: HOME | End: 2021-09-16
Admitting: EMERGENCY MEDICINE
Payer: COMMERCIAL

## 2021-09-16 ENCOUNTER — OFFICE VISIT (OUTPATIENT)
Dept: URGENT CARE | Facility: URGENT CARE | Age: 76
End: 2021-09-16
Payer: COMMERCIAL

## 2021-09-16 VITALS
OXYGEN SATURATION: 98 % | BODY MASS INDEX: 44.3 KG/M2 | SYSTOLIC BLOOD PRESSURE: 141 MMHG | HEIGHT: 63 IN | RESPIRATION RATE: 16 BRPM | TEMPERATURE: 98 F | DIASTOLIC BLOOD PRESSURE: 77 MMHG | WEIGHT: 250 LBS | HEART RATE: 75 BPM

## 2021-09-16 VITALS
HEART RATE: 82 BPM | OXYGEN SATURATION: 98 % | TEMPERATURE: 97.2 F | DIASTOLIC BLOOD PRESSURE: 64 MMHG | SYSTOLIC BLOOD PRESSURE: 132 MMHG

## 2021-09-16 DIAGNOSIS — N18.31 STAGE 3A CHRONIC KIDNEY DISEASE (H): ICD-10-CM

## 2021-09-16 DIAGNOSIS — E11.42 DIABETIC POLYNEUROPATHY ASSOCIATED WITH TYPE 2 DIABETES MELLITUS (H): Primary | ICD-10-CM

## 2021-09-16 PROCEDURE — 99214 OFFICE O/P EST MOD 30 MIN: CPT | Performed by: PHYSICIAN ASSISTANT

## 2021-09-16 PROCEDURE — 999N000104 HC STATISTIC NO CHARGE

## 2021-09-16 RX ORDER — CAPSAICIN 0.025 %
CREAM (GRAM) TOPICAL
Qty: 50 G | Refills: 0 | Status: ON HOLD | OUTPATIENT
Start: 2021-09-16 | End: 2021-11-24

## 2021-09-16 ASSESSMENT — MIFFLIN-ST. JEOR: SCORE: 1603.12

## 2021-09-16 NOTE — PATIENT INSTRUCTIONS
Patient Education     Peripheral Neuropathy  Peripheral neuropathy is the result of damage to the peripheral nerves. It usually affects the arms or legs, and causes a change in physical feeling. Sometimes it causes weakness in the muscles. You may feel tingling, numbness or shooting pains. Symptoms may be more common at night. Skin may be extra sensitive to light touch or temperature changes.  Neuropathy may be caused by a complication of a chronic disease such as diabetes, virus or bacterial infections, or physical injury. A ruptured disk with pressure on the spinal nerve may also lead to the problem. Certain vitamin deficiencies may also lead to it. It may also be caused by exposure to certain drugs or chemicals.  Home care    Tell the healthcare provider about all medicines you take. This includes prescription and over-the-counter medicines, vitamins, and herbs. Ask if any of the medicines may be causing your problems. Don't make any changes to prescription medicines without talking to your healthcare provider first.    You may be prescribed medicines to help relieve the tingling feeling or for pain. Take all medicines as directed.    A numb hand or foot may be more prone to injury. To help protect it:  ? Always use oven mitts.  ? Test water with an unaffected hand or foot.  ? Use caution when trimming nails. File sharp areas.  ? Wear shoes that fit well to avoid pressure points, blisters, and ulcers.  ? Inspect your hands and feet carefully (including the soles of your feet and between your toes) at least once a week. If you see red areas, sores, or other problems, tell your healthcare provider.    Follow-up care  Follow up with your doctor or as advised by our staff. You may need further testing or evaluation.  When to seek medical advice  Call your healthcare provider right away if any of the following occur:    Redness, swelling, cracking, or ulcer on any numb area, especially the feet    New symptoms of  numbness or muscle weakness numbness    Loss of bowel or bladder control    Slurred speech, confusion, or trouble speaking, walking, or seeing  Mtime last reviewed this educational content on 3/1/2018    7728-2195 [employer]. All rights reserved. This information is not intended as a substitute for professional medical care. Always follow your healthcare professional's instructions.

## 2021-09-16 NOTE — ED TRIAGE NOTES
Right ankle pain. Reports she woke up at 4am with sharp, shooting nerve pain to right ankle. Hx of trigeminal neuralgia which she takes Neurontin BID for. Did not take her dose yet this morning. She reports she has neuropathy in her feet but states this pain feels different. She denies injury to area. Foot/ankle warm and dry to touch.

## 2021-09-16 NOTE — PROGRESS NOTES
Assessment/Plan:    Suspect symptoms due to neuropathy. No swelling, erythema or tenderness c/w DVT, gout, cellulitis, or septic arthritis. Will not increase gabapentin dose due to hx of CKD. Rx capsaicin. Advised f/u with PCP if symptoms persist.  See patient instructions below.    At the end of the encounter, I discussed results, diagnosis, medications. Discussed red flags for immediate return to clinic/ER, as well as indications for follow up if no improvement. Patient understood and agreed to plan. Patient was stable for discharge.      ICD-10-CM    1. Diabetic polyneuropathy associated with type 2 diabetes mellitus (H)  E11.42 capsaicin (ZOSTRIX) 0.025 % external cream   2. Stage 3a chronic kidney disease  N18.31          Return in about 1 week (around 9/23/2021) for Follow up w/ primary care provider if not better.    ESTRELLA Campbell, DULCE  [facility]  -----------------------------------------------------------------------------------------------------------------------------------------------------    HPI:  Zeynep Martinez is a 76 year old female with history of type 2 DM, stage 3 CKD, & gout who presents for evaluation of intermittent, sharp shooting pain to lateral L ankle onset this AM. She has tried Bengay and Tylenol without relief, although symptoms have improved since onset this AM. No injury to her ankle/foot. She takes gabapentin 800 mg twice daily due to hx of trigeminal neuralgia, also has a hx of diabetic neuropathy. Patient reports no fever/chills, chest pain, shortness of breath, warmth, erythema, swelling, rash, or any other symptoms.     Most recent A1c was 8.1% on 7/3/21.    Past Medical History:   Diagnosis Date     Anxiety      Arthritis      Bladder infection      Calculus of kidney     multiple, uric acid and calcium     Cancer (H) Uterine 2/2001     Cancer (H)     uterine     Depression      Diabetes mellitus (H)     type 2     Frequent UTI      Gout       History of anesthesia complications     Slow to wake and gets very anxious     Hyperlipidemia LDL goal <100      Kidney stone      Type 2 diabetes mellitus without complication, without long-term current use of insulin (H)      Ulcer     Ulcer in the anastomosis       Vitals:    09/16/21 1320   BP: 132/64   Pulse: 82   Temp: 97.2  F (36.2  C)   SpO2: 98%       Physical Exam  Vitals and nursing note reviewed.   Pulmonary:      Effort: Pulmonary effort is normal.   Musculoskeletal:      Left ankle: Normal. No swelling or ecchymosis. No tenderness. Normal range of motion.   Neurological:      Mental Status: She is alert.      Comments: Decreased sensation to bilateral feet         Labs/Imaging:  No results found for this or any previous visit (from the past 24 hour(s)).      Patient Instructions   Patient Education     Peripheral Neuropathy  Peripheral neuropathy is the result of damage to the peripheral nerves. It usually affects the arms or legs, and causes a change in physical feeling. Sometimes it causes weakness in the muscles. You may feel tingling, numbness or shooting pains. Symptoms may be more common at night. Skin may be extra sensitive to light touch or temperature changes.  Neuropathy may be caused by a complication of a chronic disease such as diabetes, virus or bacterial infections, or physical injury. A ruptured disk with pressure on the spinal nerve may also lead to the problem. Certain vitamin deficiencies may also lead to it. It may also be caused by exposure to certain drugs or chemicals.  Home care    Tell the healthcare provider about all medicines you take. This includes prescription and over-the-counter medicines, vitamins, and herbs. Ask if any of the medicines may be causing your problems. Don't make any changes to prescription medicines without talking to your healthcare provider first.    You may be prescribed medicines to help relieve the tingling feeling or for pain. Take all medicines  as directed.    A numb hand or foot may be more prone to injury. To help protect it:  ? Always use oven mitts.  ? Test water with an unaffected hand or foot.  ? Use caution when trimming nails. File sharp areas.  ? Wear shoes that fit well to avoid pressure points, blisters, and ulcers.  ? Inspect your hands and feet carefully (including the soles of your feet and between your toes) at least once a week. If you see red areas, sores, or other problems, tell your healthcare provider.    Follow-up care  Follow up with your doctor or as advised by our staff. You may need further testing or evaluation.  When to seek medical advice  Call your healthcare provider right away if any of the following occur:    Redness, swelling, cracking, or ulcer on any numb area, especially the feet    New symptoms of numbness or muscle weakness numbness    Loss of bowel or bladder control    Slurred speech, confusion, or trouble speaking, walking, or seeing  Tyrese last reviewed this educational content on 3/1/2018    6901-1682 [employer]. All rights reserved. This information is not intended as a substitute for professional medical care. Always follow your healthcare professional's instructions.

## 2021-09-27 ENCOUNTER — OFFICE VISIT (OUTPATIENT)
Dept: NEUROLOGY | Facility: CLINIC | Age: 76
End: 2021-09-27
Attending: PSYCHIATRY & NEUROLOGY
Payer: COMMERCIAL

## 2021-09-27 DIAGNOSIS — R20.0 NUMBNESS AND TINGLING IN LEFT HAND: ICD-10-CM

## 2021-09-27 DIAGNOSIS — R20.2 NUMBNESS AND TINGLING IN LEFT HAND: ICD-10-CM

## 2021-09-27 DIAGNOSIS — M79.642 PAIN OF LEFT HAND: ICD-10-CM

## 2021-09-27 PROCEDURE — 95886 MUSC TEST DONE W/N TEST COMP: CPT | Performed by: PSYCHIATRY & NEUROLOGY

## 2021-09-27 PROCEDURE — 95911 NRV CNDJ TEST 9-10 STUDIES: CPT | Mod: GC | Performed by: PSYCHIATRY & NEUROLOGY

## 2021-09-27 NOTE — LETTER
2021       RE: Zeynep Martinez  [address]  [facility] [address]     Dear Colleague,    Thank you for referring your patient, Zeynep Martinez, to the [facility] EMG CLINIC [place] at [facility]. Please see a copy of my visit note below.         [facility]  Electrodiagnostic Laboratory                 Department of Neurology  Test Date:  2021    Patient: Zeynep Martinez : 1945 Physician: Pascual Painter MD   Sex: Male AGE: 76 year Ref Phys: Dr. Cordero   ID#: [id number]   Technician: Veronika Keller     Clinical Information:  Ms Martinez is a 77 yo woman with history of right sided carpal tunnel syndrome s/p carpal tunnel release surgery with numbness and tingling of the left hand including all fingers. She has had diabetes for 20+ years. This study is performed to evaluate for focal neuropathy or radiculopathy affecting the left upper limb.     Techniques:  Motor and sensory conduction studies were done with surface recording electrodes. EMG was done with a concentric needle electrode.     Results:  Nerve conduction studies:  1. Left median-D2, ulnar-D5 and radial sensory responses are absent.   2. Left median and ulnar palmar responses are absent.   3. Left median-APB motor response shows normal DL, bordelrine reduced amplitude and normal CV.   4. Left ulnar- ADM motor response is normal.   5. Left median-lumbrical vs palmar-interosseous latency difference is normal.    Needle EMG of selected proximal and distal left upper limb muscles was performed as tabulated below. No abnormal spontaneous activity was observed in the sampled muscles. Motor unit potential morphology and recruitment patterns were normal.     Impression:  This is an abnormal study. 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 See comment. There is no evidence of a focal neuropathy  including median neuropathy at the wrist or cervical radiculopathy affecting the left upper limb on the basis of this study. Clinical correlation is recommended.     Comment:  This study was performed to assess for focal left upper limb neuropathies, and as such the polyneuropathy was not otherwise characterized. If clinically indicated, a follow up assessment of the lower limbs may add qualitative information to the polyneuropathy, which is presumably secondary to diabeties.     Heriberto Posada MD  Neurophysiology Fellow    I was present for all key portions of the NCS/EMG study. All data and waveforms personally reviewed. I agree with the results and interpretation as above.    Pascual Painter MD  Department of Neurology      Nerve Conduction Studies  Motor Sites      Latency Amplitude Neg. Amp Diff Segment Distance Velocity Neg. Dur Neg Area Diff Temperature Comment   Site (ms) Norm (mV) Norm %  cm m/s Norm ms %  C    Left Median (APB) Motor   Wrist 3.9  < 4.2 *5.0  > 5.0  Wrist-APB 8   5.5  31.9    Elbow 8.0 - 4.0 - -20.0 Elbow-Wrist 20 49  > 48 5.7 -14.0 31.9    Left Median/Ulnar (Lumb-Dors Int II) Motor        Median (Lumb I)   Wrist 3.2 - 2.0 -      5.3  31.8         Ulnar (Dorsal Int (manus))   Wrist 3.0 - 4.6 -      3.6  31.9    Left Ulnar (ADM) Motor   Wrist 3.2  < 3.5 6.3  > 3.0  Wrist-ADM 8   4.2  31    Bel Elbow 5.8 - 5.0 - -20.6 Bel Elbow-Wrist 15 58  > 48 5.0 -7.5 31.5    Abv Elbow 7.1 - 5.1 - 2.0 Abv Elbow-Bel Elbow 9 69  > 48 5.3 8.1 31.5      Sensory Sites      Onset Lat Neg Peak Lat Amp (O-P) Amp (P-P) Segment Distance Velocity Temperature Comment   Site ms ms  V Norm  V  cm m/s Norm  C    Left Median Sensory   Wrist-Dig II NR NR NR  > 10 NR Wrist-Dig II 14 NR  > 48 32.8    Left Median (Ortho) Sensory   Palm-Wrist *NR *NR *NR - *NR Palm-Wrist - *NR - 31.9    Left Median-Ulnar Palmar Sensory        Median   Palm-Wrist *NR *NR *NR - *NR Palm-Wrist - *NR - 31.9         Ulnar   Palm-Wrist *NR *NR  *NR - *NR Palm-Wrist - *NR - 31.4    Left Radial Sensory   Forearm-Wrist *NR *NR *NR  > 15 *NR Forearm-Wrist 10 *NR - 31.8    Left Ulnar Sensory   Wrist-Dig V *NR *NR *NR  > 8 *NR Wrist-Dig V 12.5 *NR  > 48 33.2    Left Ulnar (Ortho) Sensory   Palm-Wrist *NR *NR *NR - *NR Palm-Wrist - *NR - 31.4      Inter-Nerve Comparisons     Nerve 1 Value 1 Nerve 2 Value 2 Parameter Result Normal   Sensory Sites   L Median Palm-Wrist 3.2 L Ulnar Palm-Wrist 3.0 Peak Lat Diff 0.20 <0.30       Electromyography     Side Muscle Ins Act Fibs/PSW Fasc HF Amp Dur Poly Recrt Int Pat Comment   Left Deltoid Nml None Nml 0 Nml Nml 0 Nml Nml    Left Biceps Nml None Nml 0 Nml Nml 0 Nml Nml    Left Triceps Nml None Nml 0 Nml Nml 0 Nml Nml    Left Pronator Teres Nml None Nml 0 Nml Nml 0 Nml Nml    Left FDI Nml None Nml 0 Nml Nml 0 Nml Nml          NCS Waveforms:    Motor           Sensory                      Again, thank you for allowing me to participate in the care of your patient.      Sincerely,    Pascual Painter MD

## 2021-09-27 NOTE — PROGRESS NOTES
[facility]  Electrodiagnostic Laboratory                 Department of Neurology                                                                                                         Test Date:  2021    Patient: Zeynep Martinez : 1945 Physician: Pascual Painter MD   Sex: Male AGE: 76 year Ref Phys: Dr. Crodero   ID#: [id number]   Technician: Veronika Keller     Clinical Information:  Ms Martinez is a 77 yo woman with history of right sided carpal tunnel syndrome s/p carpal tunnel release surgery with numbness and tingling of the left hand including all fingers. She has had diabetes for 20+ years. This study is performed to evaluate for focal neuropathy or radiculopathy affecting the left upper limb.     Techniques:  Motor and sensory conduction studies were done with surface recording electrodes. EMG was done with a concentric needle electrode.     Results:  Nerve conduction studies:  1. Left median-D2, ulnar-D5 and radial sensory responses are absent.   2. Left median and ulnar palmar responses are absent.   3. Left median-APB motor response shows normal DL, bordelrine reduced amplitude and normal CV.   4. Left ulnar- ADM motor response is normal.   5. Left median-lumbrical vs palmar-interosseous latency difference is normal.    Needle EMG of selected proximal and distal left upper limb muscles was performed as tabulated below. No abnormal spontaneous activity was observed in the sampled muscles. Motor unit potential morphology and recruitment patterns were normal.     Impression:  This is an abnormal study. 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 See comment. There is no evidence of a focal neuropathy including median neuropathy at the wrist or cervical radiculopathy affecting the left upper limb on the basis of this study. Clinical correlation is recommended.     Comment:  This study was  performed to assess for focal left upper limb neuropathies, and as such the polyneuropathy was not otherwise characterized. If clinically indicated, a follow up assessment of the lower limbs may add qualitative information to the polyneuropathy, which is presumably secondary to diabeties.     Heriberto Posada MD  Neurophysiology Fellow    I was present for all key portions of the NCS/EMG study. All data and waveforms personally reviewed. I agree with the results and interpretation as above.    Pascual Painter MD  Department of Neurology      Nerve Conduction Studies  Motor Sites      Latency Amplitude Neg. Amp Diff Segment Distance Velocity Neg. Dur Neg Area Diff Temperature Comment   Site (ms) Norm (mV) Norm %  cm m/s Norm ms %  C    Left Median (APB) Motor   Wrist 3.9  < 4.2 *5.0  > 5.0  Wrist-APB 8   5.5  31.9    Elbow 8.0 - 4.0 - -20.0 Elbow-Wrist 20 49  > 48 5.7 -14.0 31.9    Left Median/Ulnar (Lumb-Dors Int II) Motor        Median (Lumb I)   Wrist 3.2 - 2.0 -      5.3  31.8         Ulnar (Dorsal Int (manus))   Wrist 3.0 - 4.6 -      3.6  31.9    Left Ulnar (ADM) Motor   Wrist 3.2  < 3.5 6.3  > 3.0  Wrist-ADM 8   4.2  31    Bel Elbow 5.8 - 5.0 - -20.6 Bel Elbow-Wrist 15 58  > 48 5.0 -7.5 31.5    Abv Elbow 7.1 - 5.1 - 2.0 Abv Elbow-Bel Elbow 9 69  > 48 5.3 8.1 31.5      Sensory Sites      Onset Lat Neg Peak Lat Amp (O-P) Amp (P-P) Segment Distance Velocity Temperature Comment   Site ms ms  V Norm  V  cm m/s Norm  C    Left Median Sensory   Wrist-Dig II NR NR NR  > 10 NR Wrist-Dig II 14 NR  > 48 32.8    Left Median (Ortho) Sensory   Palm-Wrist *NR *NR *NR - *NR Palm-Wrist - *NR - 31.9    Left Median-Ulnar Palmar Sensory        Median   Palm-Wrist *NR *NR *NR - *NR Palm-Wrist - *NR - 31.9         Ulnar   Palm-Wrist *NR *NR *NR - *NR Palm-Wrist - *NR - 31.4    Left Radial Sensory   Forearm-Wrist *NR *NR *NR  > 15 *NR Forearm-Wrist 10 *NR - 31.8    Left Ulnar Sensory   Wrist-Dig V *NR *NR *NR  > 8 *NR Wrist-Dig V  12.5 *NR  > 48 33.2    Left Ulnar (Ortho) Sensory   Palm-Wrist *NR *NR *NR - *NR Palm-Wrist - *NR - 31.4      Inter-Nerve Comparisons     Nerve 1 Value 1 Nerve 2 Value 2 Parameter Result Normal   Sensory Sites   L Median Palm-Wrist 3.2 L Ulnar Palm-Wrist 3.0 Peak Lat Diff 0.20 <0.30       Electromyography     Side Muscle Ins Act Fibs/PSW Fasc HF Amp Dur Poly Recrt Int Pat Comment   Left Deltoid Nml None Nml 0 Nml Nml 0 Nml Nml    Left Biceps Nml None Nml 0 Nml Nml 0 Nml Nml    Left Triceps Nml None Nml 0 Nml Nml 0 Nml Nml    Left Pronator Teres Nml None Nml 0 Nml Nml 0 Nml Nml    Left FDI Nml None Nml 0 Nml Nml 0 Nml Nml          NCS Waveforms:    Motor           Sensory

## 2021-09-28 ASSESSMENT — ANXIETY QUESTIONNAIRES
GAD7 TOTAL SCORE: 4
GAD7 TOTAL SCORE: 4
2. NOT BEING ABLE TO STOP OR CONTROL WORRYING: SEVERAL DAYS
4. TROUBLE RELAXING: NOT AT ALL
GAD7 TOTAL SCORE: 4
7. FEELING AFRAID AS IF SOMETHING AWFUL MIGHT HAPPEN: NOT AT ALL
3. WORRYING TOO MUCH ABOUT DIFFERENT THINGS: NOT AT ALL
5. BEING SO RESTLESS THAT IT IS HARD TO SIT STILL: NOT AT ALL
6. BECOMING EASILY ANNOYED OR IRRITABLE: SEVERAL DAYS
7. FEELING AFRAID AS IF SOMETHING AWFUL MIGHT HAPPEN: NOT AT ALL
1. FEELING NERVOUS, ANXIOUS, OR ON EDGE: MORE THAN HALF THE DAYS
8. IF YOU CHECKED OFF ANY PROBLEMS, HOW DIFFICULT HAVE THESE MADE IT FOR YOU TO DO YOUR WORK, TAKE CARE OF THINGS AT HOME, OR GET ALONG WITH OTHER PEOPLE?: SOMEWHAT DIFFICULT

## 2021-09-28 ASSESSMENT — PATIENT HEALTH QUESTIONNAIRE - PHQ9
10. IF YOU CHECKED OFF ANY PROBLEMS, HOW DIFFICULT HAVE THESE PROBLEMS MADE IT FOR YOU TO DO YOUR WORK, TAKE CARE OF THINGS AT HOME, OR GET ALONG WITH OTHER PEOPLE: SOMEWHAT DIFFICULT
SUM OF ALL RESPONSES TO PHQ QUESTIONS 1-9: 20
SUM OF ALL RESPONSES TO PHQ QUESTIONS 1-9: 20

## 2021-09-29 ENCOUNTER — VIRTUAL VISIT (OUTPATIENT)
Dept: PSYCHOLOGY | Facility: CLINIC | Age: 76
End: 2021-09-29
Payer: COMMERCIAL

## 2021-09-29 DIAGNOSIS — F33.1 MAJOR DEPRESSIVE DISORDER, RECURRENT EPISODE, MODERATE WITH ANXIOUS DISTRESS (H): Primary | ICD-10-CM

## 2021-09-29 DIAGNOSIS — F41.1 GAD (GENERALIZED ANXIETY DISORDER): ICD-10-CM

## 2021-09-29 PROCEDURE — 90834 PSYTX W PT 45 MINUTES: CPT | Mod: 95 | Performed by: SOCIAL WORKER

## 2021-09-29 ASSESSMENT — ANXIETY QUESTIONNAIRES: GAD7 TOTAL SCORE: 4

## 2021-09-29 ASSESSMENT — PATIENT HEALTH QUESTIONNAIRE - PHQ9: SUM OF ALL RESPONSES TO PHQ QUESTIONS 1-9: 20

## 2021-09-29 NOTE — PROGRESS NOTES
Progress Note    Patient Name: Zeynep Martinez  Date: 21         Service Type: Individual      Session Start Time: 3:02  Session End Time: 3:47     Session Length: 45    Session #: 4    Attendees: Client    Service Modality:  Video Visit:      Provider verified identity through the following two step process.  Patient provided:  Patient  and Patient address    Telemedicine Visit: The patient's condition can be safely assessed and treated via synchronous audio and visual telemedicine encounter.      Reason for Telemedicine Visit: Services only offered telehealth    Originating Site (Patient Location): Patient's home    Distant Site (Provider Location): Provider Remote Setting- Home Office    Consent:  The patient/guardian has verbally consented to: the potential risks and benefits of telemedicine (video visit) versus in person care; bill my insurance or make self-payment for services provided; and responsibility for payment of non-covered services.     Patient would like the video invitation sent by:  My Chart    Mode of Communication:  Video Conference via Amwell    As the provider I attest to compliance with applicable laws and regulations related to telemedicine.     Treatment Plan Last Reviewed: Started 21  PHQ-9 / NELLIE-7 : Completed yesterday at  Appointment:  Increased PHQ9 score and improved GAD7 screening sores this session    DATA  Interactive Complexity: No  Crisis: No       Progress Since Last Session (Related to Symptoms / Goals / Homework):   Symptoms: Improved anxiety and increased depression symptoms    Homework: Achieved / completed to satisfaction; Previous sessions:  Put female bust next to her television so she can admire this on a more regular basis.  Created a positive affirmation picture and hung it by her TV also to remind herself of positives about self.  Feels better about herself and engaging with family and friends on a regular  basis. Current session: Client feeling more isolated and alone this session and feeling more depressed.  We talked in session what she can engage in and how to improve her overall mood. Did have work friends over for a brunch which she enjoyed and also engaing in coloring with affrimations and putting this on her wall.        Episode of Care Goals: Minimal progress - ACTION (Actively working towards change); Intervened by reinforcing change plan / affirming steps taken     Current / Ongoing Stressors and Concerns:   Family stress, difficult childhood, ego despair issues     Treatment Objective(s) Addressed in This Session:   Connect to gratitude and ego integrity  Use GAP-stay grounded; attuned and present  Connect to positive affirmations and gratitude  Create meals and freeze them, save money and put in bank, start realistic exercise program, schedule doctor appointment to check on ulcer, skin issues and continue work with other rheumatologist  on wrist and tremor issues. Continue creative projects that she enjoys. Look into eating disorder group or program. Concentrate on thought stopping process and CBT skills.     Intervention:   Motivational Interviewing: OARS  Strength based therapy  CBT Therapy     ASSESSMENT: Current Emotional / Mental Status (status of significant symptoms):   Risk status (Self / Other harm or suicidal ideation)   Patient denies current fears or concerns for personal safety.   Patient denies current or recent suicidal ideation or behaviors.   Patient denies current or recent homicidal ideation or behaviors.   Patient denies current or recent self injurious behavior or ideation.   Patient denies other safety concerns.   Patient reports there has been no change in risk factors since their last session.     Patient reports there has been no change in protective factors since their last session.     Recommended that patient call 911 or go to the local ED should there be a change in any of  these risk factors.     Appearance:   Appropriate    Eye Contact:   Good    Psychomotor Behavior: Normal    Attitude:   Cooperative  Interested Pleasant   Orientation:   All   Speech    Rate / Production: Normal/ Responsive Normal     Volume:  Normal    Mood:    Anxious  Depressed  Expansive   Affect:    Appropriate  Bright    Thought Content:  Clear    Thought Form:  Coherent  Goal Directed  Logical    Insight:    Fair      Medication Review:   No changes to current psychiatric medication(s)     Medication Compliance:   Yes     Changes in Health Issues:   None reported     Chemical Use Review:   Substance Use: Chemical use reviewed, no active concerns identified      Tobacco Use: No current tobacco use.      Diagnosis:  1. Major depressive disorder, recurrent episode, moderate with anxious distress (H)    2. NELLIE (generalized anxiety disorder)        Collateral Reports Completed:   Not Applicable    PLAN: (Patient Tasks / Therapist Tasks / Other)  Previous sessions: Patient will bring her lady bust to her room so she can look at each morning when she get's up; stay in her GAP, Connect to positive thoughts affirmations and gratitude.   Follow rheumatologist recommendations for hands and wrists, contact PCP about ulcer and skin issues,  Start healthy meal plan and freeze meals that she can eat later, save money from meals and put in savings, continue outings with friends and family, join lunch group when she can, start realistic exercise program, and continue creative projects with limits.  Continue to connect to gratitude, positive affirmations and strengths. Set limits with granddaughter and continue to build on relationship when appropriate. Current session: Ask PCP for referral for an eating disorder group or program, watch and try and control binge eating, continue coloring, connecting to strengths and positive affirmations on her wall daily.  Use thought stopping process and use FOff as her mantra.  Reframe  thinking to positive thinking and possibilities in her life.  Continue building and outside activities, connect and get together with friends for brunch next month and support from family.         Noe DENIMurray Meeks, [facility] HospitalSW                                                         ______________________________________________________________________    Treatment Plan    Patient's Name: Zeynep Martinez  YOB: 1945    Date: 8-19-21    DSM5 Diagnoses: 296.32 (F33.1) Major Depressive Disorder, Recurrent Episode, Moderate _ and With anxious distress or 300.02 (F41.1) Generalized Anxiety Disorder  Psychosocial / Contextual Factors: Family stress, difficult childhood, ego despair issues    WHODAS: Completed first session    Referral / Collaboration:  Was/were discussed and client will pursue.  Patient stated that she would enroll in an eating disorder program if her binge eating persists.    Anticipated number of session or this episode of care: 12      MeasurableTreatment Goal(s) related to diagnosis / functional impairment(s)  Goal 1: Patient will decrease her depressive and anxious thoughts and return cordell normal daily living.     I will know I've met my goal when I know who I am authentically and act appropriately.      Objective #A (Patient Action)    Patient will work toward stronger ego intergrity vs despair.  Determine authentic self and feel good about this. .  Status: Continued - Date(s): 8-19-21    Intervention(s)  Therapist will process origins of negative self image and esteem and concentrate on celebrating authenticity and improving overall self esteem and worth.    Objective #B  Patient will use cognitive strategies identified in therapy to challenge anxious thoughts.  Status: Continued - Date(s): 8-19-21    Intervention(s)  Therapist will teach thought stopping process and CBT skills and practice skills as needed until they become automatic .    Objective #C  Patient will eat healthy and limit  binge eating.  Status: Continued - Date(s): 8-19-21    Intervention(s)  Therapist will check-in on eating behaior, determine reasons for binge eating and reinforce healthy eating habits and behavior.         Patient has reviewed and agreed to the above plan.      Noe Meeks, [facility]  August 19, 2021

## 2021-10-01 ENCOUNTER — VIRTUAL VISIT (OUTPATIENT)
Dept: PEDIATRICS | Facility: CLINIC | Age: 76
End: 2021-10-01
Payer: COMMERCIAL

## 2021-10-01 DIAGNOSIS — E11.42 TYPE 2 DIABETES MELLITUS WITH DIABETIC POLYNEUROPATHY, WITHOUT LONG-TERM CURRENT USE OF INSULIN (H): Primary | ICD-10-CM

## 2021-10-01 DIAGNOSIS — E11.42 DIABETIC POLYNEUROPATHY ASSOCIATED WITH TYPE 2 DIABETES MELLITUS (H): ICD-10-CM

## 2021-10-01 DIAGNOSIS — F33.1 MODERATE EPISODE OF RECURRENT MAJOR DEPRESSIVE DISORDER (H): ICD-10-CM

## 2021-10-01 DIAGNOSIS — G47.00 INSOMNIA, UNSPECIFIED TYPE: ICD-10-CM

## 2021-10-01 DIAGNOSIS — G62.9 NEUROPATHY: ICD-10-CM

## 2021-10-01 DIAGNOSIS — E11.65 TYPE 2 DIABETES MELLITUS WITH HYPERGLYCEMIA, WITHOUT LONG-TERM CURRENT USE OF INSULIN (H): ICD-10-CM

## 2021-10-01 PROBLEM — F33.42 RECURRENT MAJOR DEPRESSIVE DISORDER, IN FULL REMISSION (H): Status: RESOLVED | Noted: 2021-01-26 | Resolved: 2021-10-01

## 2021-10-01 PROCEDURE — 99214 OFFICE O/P EST MOD 30 MIN: CPT | Mod: 95 | Performed by: NURSE PRACTITIONER

## 2021-10-01 RX ORDER — TRAZODONE HYDROCHLORIDE 50 MG/1
50 TABLET, FILM COATED ORAL AT BEDTIME
Qty: 90 TABLET | Refills: 1 | Status: SHIPPED | OUTPATIENT
Start: 2021-10-01 | End: 2021-10-13

## 2021-10-01 RX ORDER — VENLAFAXINE HYDROCHLORIDE 225 MG/1
225 TABLET, EXTENDED RELEASE ORAL DAILY
COMMUNITY
End: 2021-10-07

## 2021-10-01 RX ORDER — QUETIAPINE FUMARATE 25 MG/1
25 TABLET, FILM COATED ORAL
Qty: 90 TABLET | Refills: 0 | Status: SHIPPED | OUTPATIENT
Start: 2021-10-01 | End: 2022-01-19

## 2021-10-01 RX ORDER — GLIPIZIDE 5 MG/1
5 TABLET, FILM COATED, EXTENDED RELEASE ORAL 2 TIMES DAILY
Qty: 30 TABLET | Refills: 0 | COMMUNITY
Start: 2021-10-01 | End: 2021-10-11

## 2021-10-01 NOTE — PATIENT INSTRUCTIONS
Expect a call to schedule an appointment with psychiatry to discuss medications.     You have an appointment with me soon, and to do lab work.     We will try to add the pharmacist appointment with your upcoming appointment with me.     Let's try to add an eye appointment when you're coming to the clinic.     OA class in [place]:  Monday 6:30 PM  Face-to-face info  [facility] meeting  [address] [place] [address]  [facility]  Online Info  Website URL  Online procedures:  Go to the Website URL link above for more information on virtual meetings. Meeting ID: [id number] or Dial-in: [phone] or [phone], Meeting ID: [id number]  More info  Meeting #: [id number]  Language: English  Open/Closed: Open  Special topic: Big Book, OA Steps and/or Traditions Study  Additional notes: Hybrid face-to-face/Zoom  Intergroup #: [id number]  Intergroup name: Hansel HUTCHINSON  Intergroup address:  [facility]  Phone #: [phone]  Part of region: 04  Service area: [facility] Areas  Website: Service body website

## 2021-10-01 NOTE — PROGRESS NOTES
Zeynep is a 76 year old who is being evaluated via a billable video visit.      How would you like to obtain your AVS? MyChart  If the video visit is dropped, the invitation should be resent by: Send to e-mail at: [email or web address]  Will anyone else be joining your video visit? No      Video Start Time: 11:33 AM    Assessment & Plan     Type 2 diabetes mellitus with diabetic polyneuropathy, without long-term current use of insulin (H)  She is due for A1C and has a lab only appointment scheduled. She has been taking medications, but could consdier changing meds depending on her results. Will schedule with MTM. We discussed the link between very high sugars and mood, and the need to manage diabetes.     Insomnia, unspecified type  Marginally stable  - traZODone (DESYREL) 50 MG tablet; Take 1 tablet (50 mg) by mouth At Bedtime  - QUEtiapine (SEROQUEL) 25 MG tablet; Take 1 tablet (25 mg) by mouth nightly as needed (insomnia)    Moderate episode of recurrent major depressive disorder (H)  Not well contrlled. She has had worsening symptoms of depression and anxiety, no thoughts of self harm or suicide. She has been on three medications for mood, will reer to psychiatry to discuss next steps. She is also is seeing a therapist regularly, but wants to do a group therapy about over eating. She tried to get set up with [facility], but it is a year waiting list. Will try to get her connected with [facility] and gave her info regarding [facility] and highly encouraged she go.   - MENTAL HEALTH REFERRAL  - Adult; Psychiatry; Psychiatry; Collaborative Care Psychiatry Service/Bridge to Long-Term Psychiatry as indicated ([phone]); No - Patient must be evaluated prior to placing referral OR document reason for referral -...; Future    Neuropathy  She has been followed by elizabeth and has a follow-up appointment to discuss medications.     Type 2 diabetes mellitus with hyperglycemia, without long-term current use of  insulin (H)  Per above  - glipiZIDE (GLUCOTROL XL) 5 MG 24 hr tablet; Take 1 tablet (5 mg) by mouth 2 times daily    Diabetic polyneuropathy associated with type 2 diabetes mellitus (H)    - glipiZIDE (GLUCOTROL XL) 5 MG 24 hr tablet; Take 1 tablet (5 mg) by mouth 2 times daily         Return in about 2 weeks (around 10/15/2021) for as already scheduled.    Trinh Castillo, APRN [facility] ZULEMA Adhikari is a 76 year old who presents for the following health issues     History of Present Illness       Mental Health Follow-up:  Patient presents to follow-up on Depression & Anxiety.Patient's depression since last visit has been:  No change  The patient is having other symptoms associated with depression.  Patient's anxiety since last visit has been:  Worse  The patient is having other symptoms associated with anxiety.  Any significant life events: housing concerns and health concerns  Patient is feeling anxious or having panic attacks.  Patient has no concerns about alcohol or drug use.     Social History  Tobacco Use    Smoking status: Former Smoker      Packs/day: 0.00      Years: 5.00      Pack years: 0      Types: Cigarettes      Quit date: 1970      Years since quittin.7    Smokeless tobacco: Never Used  Alcohol use: Yes    Comment: once per month   Drug use: Never      Today's PHQ-9         PHQ-9 Total Score:     (P) 20   PHQ-9 Q9 Thoughts of better off dead/self-harm past 2 weeks :   (P) Not at all   Thoughts of suicide or self harm:      Self-harm Plan:        Self-harm Action:          Safety concerns for self or others:           Diabetes:   She presents for follow up of diabetes.  She is checking home blood glucose a few times a month. She checks blood glucose before meals.  Blood glucose is sometimes over 200 and never under 70. When her blood glucose is low, the patient is asymptomatic for confusion, blurred vision, lethargy and reports not feeling dizzy,  shaky, or weak.  She is concerned about blood sugar frequently over 200. She is having numbness in feet, burning in feet and weight gain. The patient has not had a diabetic eye exam in the last 12 months.         She eats 2-3 servings of fruits and vegetables daily.She consumes 0 sweetened beverage(s) daily.She exercises with enough effort to increase her heart rate 9 or less minutes per day.  She exercises with enough effort to increase her heart rate 3 or less days per week.   She is taking medications regularly.     Needs Ac[employer]rips for glucometer to test blood sugar. Not on current med list.    Pt notes worsening anxiety, difficulty making decisions, getting easily overwhelmed. Pt notes diabetes seems to be out of control and wants to get back on track. Pt also notes worsening arthritis in spine which is worsening and she is not sure what to do next. Pt notes sleeping 12-14 hours every day. She has a therapist.  She used to take xanax, but doesn't have it anymore. Is on seroquel and trazodone. She does have neuropathy and had an EMG and has a follow-up appointment wit neuro to discuss. She has been on neurontin for trigeminal neuralgia. She restarted effexor 3 wks ago.     She increased her dose of glipizide from 5 mg once a day to 5 mg TWICE dailiy about six months ago. She has a history of ozempic with side effects of diarrhea and stopped years ago.     She moved first of august. Doesn't drive, which was right for her, but limits her autonomy. Lives in a place that has a bus, but they don't have a .     Review of Systems   Constitutional, HEENT, cardiovascular, pulmonary, GI, , musculoskeletal, neuro, skin, endocrine and psych systems are negative, except as otherwise noted.      Objective         Vitals:  No vitals were obtained today due to virtual visit.    Physical Exam   GENERAL: Healthy, alert and no distress  EYES: Eyes grossly normal to inspection.  No discharge or erythema, or obvious  scleral/conjunctival abnormalities.  RESP: No audible wheeze, cough, or visible cyanosis.  No visible retractions or increased work of breathing.    SKIN: Visible skin clear. No significant rash, abnormal pigmentation or lesions.  NEURO: Cranial nerves grossly intact.  Mentation and speech appropriate for age.  PSYCH: Mentation appears normal, affect normal/bright, judgement and insight intact, normal speech and appearance well-groomed.              Video-Visit Details    Type of service:  Video Visit    Video End Time:1210    Originating Location (pt. Location): Home    Distant Location (provider location):  [facility]     Platform used for Video Visit: [employer]  Answers for HPI/ROS submitted by the patient on 9/28/2021  If you checked off any problems, how difficult have these problems made it for you to do your work, take care of things at home, or get along with other people?: Somewhat difficult  PHQ9 TOTAL SCORE: 20  NELLIE 7 TOTAL SCORE: 4

## 2021-10-05 ENCOUNTER — LAB (OUTPATIENT)
Dept: LAB | Facility: CLINIC | Age: 76
End: 2021-10-05
Payer: COMMERCIAL

## 2021-10-05 DIAGNOSIS — Z98.84 S/P GASTRIC BYPASS: ICD-10-CM

## 2021-10-05 LAB
DEPRECATED CALCIDIOL+CALCIFEROL SERPL-MC: 23 UG/L (ref 20–75)
ERYTHROCYTE [DISTWIDTH] IN BLOOD BY AUTOMATED COUNT: 14.1 % (ref 10–15)
HBA1C MFR BLD: 7.8 % (ref 0–5.6)
HCT VFR BLD AUTO: 38 % (ref 35–47)
HCV AB SERPL QL IA: NONREACTIVE
HGB BLD-MCNC: 11.8 G/DL (ref 11.7–15.7)
MCH RBC QN AUTO: 28.7 PG (ref 26.5–33)
MCHC RBC AUTO-ENTMCNC: 31.1 G/DL (ref 31.5–36.5)
MCV RBC AUTO: 93 FL (ref 78–100)
PLATELET # BLD AUTO: 273 10E3/UL (ref 150–450)
RBC # BLD AUTO: 4.11 10E6/UL (ref 3.8–5.2)
VIT B12 SERPL-MCNC: 492 PG/ML (ref 193–986)
WBC # BLD AUTO: 6.5 10E3/UL (ref 4–11)

## 2021-10-05 PROCEDURE — 99000 SPECIMEN HANDLING OFFICE-LAB: CPT

## 2021-10-05 PROCEDURE — 82728 ASSAY OF FERRITIN: CPT

## 2021-10-05 PROCEDURE — 84630 ASSAY OF ZINC: CPT | Mod: 90

## 2021-10-05 PROCEDURE — 82607 VITAMIN B-12: CPT

## 2021-10-05 PROCEDURE — 83036 HEMOGLOBIN GLYCOSYLATED A1C: CPT

## 2021-10-05 PROCEDURE — 85027 COMPLETE CBC AUTOMATED: CPT

## 2021-10-05 PROCEDURE — 80053 COMPREHEN METABOLIC PANEL: CPT

## 2021-10-05 PROCEDURE — 84100 ASSAY OF PHOSPHORUS: CPT

## 2021-10-05 PROCEDURE — 36415 COLL VENOUS BLD VENIPUNCTURE: CPT

## 2021-10-05 PROCEDURE — 82306 VITAMIN D 25 HYDROXY: CPT

## 2021-10-05 PROCEDURE — 86803 HEPATITIS C AB TEST: CPT

## 2021-10-05 PROCEDURE — 84425 ASSAY OF VITAMIN B-1: CPT | Mod: 90

## 2021-10-05 PROCEDURE — 83735 ASSAY OF MAGNESIUM: CPT

## 2021-10-05 NOTE — PROGRESS NOTES
Incorrect specimen drawn for Vitamin B6.  Canceled, reordered and called patient to inform.  LM on voicemail for patient to return for redraw.

## 2021-10-05 NOTE — PROGRESS NOTES
ESTABLISHED PATIENT NEUROLOGY NOTE    DATE OF VISIT: 10/6/2021  CLINIC LOCATION: [facility]  MRN: [id number]  PATIENT NAME: Zeynep Martinez  YOB: 1945    PCP: BENEDICTO Martin CNP    REASON FOR VISIT:   Chief Complaint   Patient presents with     Follow Up     EMG      SUBJECTIVE:                                                      HISTORY OF PRESENT ILLNESS: Patient, who I see for bilateral postural hand tremor, is here to follow up regarding mainly left hand paresthesias and pain after the completion of EMG.  Her last visit was on 9/3/2021, when this test was ordered. Please refer to my initial/other prior notes for further information.  The patient is accompanied by her daughter, who participates in the interview today.    Since the last visit, the patient reports no significant changes in her left-sided hand paresthesias.  She denies interval development of new focal neurological symptoms.    Her most recent hemoglobin A1C was 7.8 on 10/5/2021.    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  Tabulated data from EMG report were personally reviewed and independently interpreted.    On review of systems, patient endorses intermittent shooting pain in the left ankle in the evenings, but no additional active complaints. Medications, allergies, family and social history were also reviewed. There are no changes reported by patient.  REVIEW OF SYSTEMS:                                                    10-system review was completed. Pertinent positives are included in HPI. The remainder of ROS is negative.  EXAM:                                                    Physical Exam:   Vitals: /79 (BP Location: Left arm, Patient Position: Sitting)   Pulse 84   LMP  (LMP Unknown)   SpO2 96%      General: pt is in NAD, cooperative.  Skin: normal turgor, moist mucous membranes, no lesions/rashes noticed.  HEENT: ATNC, white sclera, normal conjunctiva.  Respiratory: Symmetric lung excursion, no accessory respiratory muscle use.  Abdomen: Non distended.  Neurological: awake, cooperative, follows commands, no exam changes compared to the initial visit.  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  I discussed these findings with the patient and her daughter.  We discussed that alpha lipoic acid could be added or her gabapentin dose could be further increased if necessary, and she should also optimize her control of diabetes.  I do not think that we need to pursue any additional testing for that symptom.    Regarding her hand tremor, I would like to see her back in approximately 6 months from now.    Diagnoses:    ICD-10-CM    1. Peripheral polyneuropathy  G62.9    2. Numbness and tingling in left hand  R20.0     R20.2    3. Pain of left hand  M79.642    4. Tremor  R25.1      Plan: At today's visit we thoroughly discussed current symptoms, EMG results (consistent with diabetic polyneuropathy, but no recurrence of carpal tunnel syndrome), available treatment options, and the plan.  I do not think that we need to investigate her left hand symptoms any further.    We discussed that she might consider adding alpha lipoic acid at 600 mg daily to see if it helps her symptoms, but she also needs to work on optimizing diabetes control to keep her hemoglobin A1C of less than 7.0 to prevent further progression of her diabetic  polyneuropathy that is responsible for her current symptoms worsening.    We also reviewed that I do not believe that her intermittent left ankle pain is related to neuropathy, but could be due to musculoskeletal issues and advanced arthritis at her ankle joint.  She will discuss this symptom with her primary care provider.    For her tremor, I would like to see her back in 6 months or sooner if needed.    Total Time: 31 minutes spent on the date of the encounter doing chart review, history and exam, documentation and further activities per the note.    Isael Cordero MD  [facility]  (Chart documentation was completed in part with Dragon voice-recognition software. Even though reviewed, some grammatical, spelling, and word errors may remain.)

## 2021-10-06 ENCOUNTER — OFFICE VISIT (OUTPATIENT)
Dept: NEUROLOGY | Facility: CLINIC | Age: 76
End: 2021-10-06
Attending: PSYCHIATRY & NEUROLOGY
Payer: COMMERCIAL

## 2021-10-06 VITALS — HEART RATE: 84 BPM | SYSTOLIC BLOOD PRESSURE: 131 MMHG | DIASTOLIC BLOOD PRESSURE: 79 MMHG | OXYGEN SATURATION: 96 %

## 2021-10-06 DIAGNOSIS — R20.2 NUMBNESS AND TINGLING IN LEFT HAND: ICD-10-CM

## 2021-10-06 DIAGNOSIS — R25.1 TREMOR: ICD-10-CM

## 2021-10-06 DIAGNOSIS — R20.0 NUMBNESS AND TINGLING IN LEFT HAND: ICD-10-CM

## 2021-10-06 DIAGNOSIS — M79.642 PAIN OF LEFT HAND: ICD-10-CM

## 2021-10-06 DIAGNOSIS — G62.9 PERIPHERAL POLYNEUROPATHY: Primary | ICD-10-CM

## 2021-10-06 LAB
ALBUMIN SERPL-MCNC: 3 G/DL (ref 3.4–5)
ALP SERPL-CCNC: 79 U/L (ref 40–150)
ALT SERPL W P-5'-P-CCNC: 14 U/L (ref 0–50)
ANION GAP SERPL CALCULATED.3IONS-SCNC: 6 MMOL/L (ref 3–14)
AST SERPL W P-5'-P-CCNC: 9 U/L (ref 0–45)
BILIRUB SERPL-MCNC: 0.3 MG/DL (ref 0.2–1.3)
BUN SERPL-MCNC: 21 MG/DL (ref 7–30)
CALCIUM SERPL-MCNC: 8.6 MG/DL (ref 8.5–10.1)
CHLORIDE BLD-SCNC: 108 MMOL/L (ref 94–109)
CO2 SERPL-SCNC: 26 MMOL/L (ref 20–32)
CREAT SERPL-MCNC: 1 MG/DL (ref 0.52–1.04)
FERRITIN SERPL-MCNC: 15 NG/ML (ref 8–252)
GFR SERPL CREATININE-BSD FRML MDRD: 56 ML/MIN/1.73M2
GLUCOSE BLD-MCNC: 243 MG/DL (ref 70–99)
MAGNESIUM SERPL-MCNC: 1.7 MG/DL (ref 1.6–2.3)
PHOSPHATE SERPL-MCNC: 3.1 MG/DL (ref 2.5–4.5)
POTASSIUM BLD-SCNC: 5.6 MMOL/L (ref 3.4–5.3)
PROT SERPL-MCNC: 7 G/DL (ref 6.8–8.8)
SODIUM SERPL-SCNC: 140 MMOL/L (ref 133–144)

## 2021-10-06 PROCEDURE — G0463 HOSPITAL OUTPT CLINIC VISIT: HCPCS

## 2021-10-06 PROCEDURE — 99214 OFFICE O/P EST MOD 30 MIN: CPT | Performed by: PSYCHIATRY & NEUROLOGY

## 2021-10-06 ASSESSMENT — PATIENT HEALTH QUESTIONNAIRE - PHQ9
10. IF YOU CHECKED OFF ANY PROBLEMS, HOW DIFFICULT HAVE THESE PROBLEMS MADE IT FOR YOU TO DO YOUR WORK, TAKE CARE OF THINGS AT HOME, OR GET ALONG WITH OTHER PEOPLE: VERY DIFFICULT
SUM OF ALL RESPONSES TO PHQ QUESTIONS 1-9: 19
SUM OF ALL RESPONSES TO PHQ QUESTIONS 1-9: 19

## 2021-10-06 NOTE — PATIENT INSTRUCTIONS
AFTER VISIT SUMMARY (AVS):    At today's visit we thoroughly discussed current symptoms, EMG results (consistent with diabetic polyneuropathy, but no recurrence of carpal tunnel syndrome), available treatment options, and the plan.  I do not think that we need to investigate your left hand symptoms any further.    We discussed that you might consider adding alpha lipoic acid at 600 mg daily to see if it helps your symptoms, but you need to work on optimizing your diabetes control to keep your hemoglobin A1C of less than 7.0.    For your tremor, would like to see you back in 6 months or sooner if needed.    Please do not hesitate to call me with any questions or concerns.    Thanks.

## 2021-10-06 NOTE — LETTER
10/6/2021         RE: Zeynep Martinez  [address]  [place] [address]        Dear Colleague,    Thank you for referring your patient, Zeynep Martinez, to the [facility] [place]. Please see a copy of my visit note below.    ESTABLISHED PATIENT NEUROLOGY NOTE    DATE OF VISIT: 10/6/2021  CLINIC LOCATION: [facility]  MRN: [id number]  PATIENT NAME: Zeynep Martinez  YOB: 1945    PCP: BENEDICTO Martin CNP    REASON FOR VISIT:   Chief Complaint   Patient presents with     Follow Up     EMG      SUBJECTIVE:                                                      HISTORY OF PRESENT ILLNESS: Patient, who I see for bilateral postural hand tremor, is here to follow up regarding mainly left hand paresthesias and pain after the completion of EMG.  Her last visit was on 9/3/2021, when this test was ordered. Please refer to my initial/other prior notes for further information.  The patient is accompanied by her daughter, who participates in the interview today.    Since the last visit, the patient reports no significant changes in her left-sided hand paresthesias.  She denies interval development of new focal neurological symptoms.    Her most recent hemoglobin A1C was 7.8 on 10/5/2021.    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  Tabulated data from EMG report were personally reviewed and independently interpreted.    On review of systems, patient endorses intermittent shooting pain in the left ankle in the evenings, but no additional active complaints. Medications, allergies, family and social history were also reviewed. There are no changes reported by patient.  REVIEW OF SYSTEMS:                                                    10-system review was completed.  Pertinent positives are included in HPI. The remainder of ROS is negative.  EXAM:                                                    Physical Exam:   Vitals: /79 (BP Location: Left arm, Patient Position: Sitting)   Pulse 84   LMP  (LMP Unknown)   SpO2 96%     General: pt is in NAD, cooperative.  Skin: normal turgor, moist mucous membranes, no lesions/rashes noticed.  HEENT: ATNC, white sclera, normal conjunctiva.  Respiratory: Symmetric lung excursion, no accessory respiratory muscle use.  Abdomen: Non distended.  Neurological: awake, cooperative, follows commands, no exam changes compared to the initial visit.  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  I discussed these findings with the patient and her daughter.  We discussed that alpha lipoic acid could be added or her gabapentin dose could be further increased if necessary, and she should also optimize her control of diabetes.  I do not think that we need to pursue any additional testing for that symptom.    Regarding her hand tremor, I would like to see her back in approximately 6 months from now.    Diagnoses:    ICD-10-CM    1. Peripheral polyneuropathy  G62.9    2. Numbness and tingling in left hand  R20.0     R20.2    3. Pain of left hand  M79.642    4. Tremor  R25.1      Plan: At today's visit we thoroughly discussed current symptoms, EMG results (consistent with diabetic polyneuropathy, but no recurrence of carpal tunnel syndrome), available treatment options, and the plan.  I do not think that we need to investigate her left hand symptoms any  further.    We discussed that she might consider adding alpha lipoic acid at 600 mg daily to see if it helps her symptoms, but she also needs to work on optimizing diabetes control to keep her hemoglobin A1C of less than 7.0 to prevent further progression of her diabetic polyneuropathy that is responsible for her current symptoms worsening.    We also reviewed that I do not believe that her intermittent left ankle pain is related to neuropathy, but could be due to musculoskeletal issues and advanced arthritis at her ankle joint.  She will discuss this symptom with her primary care provider.    For her tremor, I would like to see her back in 6 months or sooner if needed.    Total Time: 31 minutes spent on the date of the encounter doing chart review, history and exam, documentation and further activities per the note.    Isael Cordero MD  [facility]  (Chart documentation was completed in part with Dragon voice-recognition software. Even though reviewed, some grammatical, spelling, and word errors may remain.)      Again, thank you for allowing me to participate in the care of your patient.        Sincerely,        Isael Cordero MD

## 2021-10-07 ENCOUNTER — VIRTUAL VISIT (OUTPATIENT)
Dept: BEHAVIORAL HEALTH | Facility: CLINIC | Age: 76
End: 2021-10-07
Payer: COMMERCIAL

## 2021-10-07 ENCOUNTER — VIRTUAL VISIT (OUTPATIENT)
Dept: PSYCHIATRY | Facility: CLINIC | Age: 76
End: 2021-10-07
Payer: COMMERCIAL

## 2021-10-07 DIAGNOSIS — F50.819 BINGE EATING DISORDER: ICD-10-CM

## 2021-10-07 DIAGNOSIS — F33.1 MODERATE EPISODE OF RECURRENT MAJOR DEPRESSIVE DISORDER (H): ICD-10-CM

## 2021-10-07 DIAGNOSIS — G47.00 INSOMNIA, UNSPECIFIED TYPE: ICD-10-CM

## 2021-10-07 DIAGNOSIS — F41.9 ANXIETY: Primary | ICD-10-CM

## 2021-10-07 DIAGNOSIS — F33.1 MODERATE EPISODE OF RECURRENT MAJOR DEPRESSIVE DISORDER (H): Primary | ICD-10-CM

## 2021-10-07 DIAGNOSIS — F41.9 ANXIETY: ICD-10-CM

## 2021-10-07 PROCEDURE — 99205 OFFICE O/P NEW HI 60 MIN: CPT | Mod: 95 | Performed by: PSYCHIATRY & NEUROLOGY

## 2021-10-07 PROCEDURE — 90832 PSYTX W PT 30 MINUTES: CPT | Mod: 95 | Performed by: SOCIAL WORKER

## 2021-10-07 PROCEDURE — 99417 PROLNG OP E/M EACH 15 MIN: CPT | Performed by: PSYCHIATRY & NEUROLOGY

## 2021-10-07 RX ORDER — VENLAFAXINE HYDROCHLORIDE 75 MG/1
75 CAPSULE, EXTENDED RELEASE ORAL DAILY
Qty: 90 CAPSULE | Refills: 1 | Status: SHIPPED | OUTPATIENT
Start: 2021-10-07

## 2021-10-07 RX ORDER — BUSPIRONE HYDROCHLORIDE 10 MG/1
TABLET ORAL
Qty: 60 TABLET | Refills: 1 | Status: SHIPPED | OUTPATIENT
Start: 2021-10-07 | End: 2021-12-08

## 2021-10-07 RX ORDER — VENLAFAXINE HYDROCHLORIDE 150 MG/1
150 CAPSULE, EXTENDED RELEASE ORAL DAILY
Qty: 90 CAPSULE | Refills: 1 | Status: SHIPPED | OUTPATIENT
Start: 2021-10-07

## 2021-10-07 RX ORDER — VENLAFAXINE HYDROCHLORIDE 225 MG/1
225 TABLET, EXTENDED RELEASE ORAL DAILY
Status: CANCELLED | OUTPATIENT
Start: 2021-10-07

## 2021-10-07 RX ORDER — ALPRAZOLAM 0.5 MG
0.5 TABLET ORAL DAILY PRN
Qty: 15 TABLET | Refills: 1 | Status: SHIPPED | OUTPATIENT
Start: 2021-10-07

## 2021-10-07 ASSESSMENT — ANXIETY QUESTIONNAIRES
6. BECOMING EASILY ANNOYED OR IRRITABLE: SEVERAL DAYS
2. NOT BEING ABLE TO STOP OR CONTROL WORRYING: NOT AT ALL
IF YOU CHECKED OFF ANY PROBLEMS ON THIS QUESTIONNAIRE, HOW DIFFICULT HAVE THESE PROBLEMS MADE IT FOR YOU TO DO YOUR WORK, TAKE CARE OF THINGS AT HOME, OR GET ALONG WITH OTHER PEOPLE: SOMEWHAT DIFFICULT
GAD7 TOTAL SCORE: 3
3. WORRYING TOO MUCH ABOUT DIFFERENT THINGS: SEVERAL DAYS
5. BEING SO RESTLESS THAT IT IS HARD TO SIT STILL: NOT AT ALL
7. FEELING AFRAID AS IF SOMETHING AWFUL MIGHT HAPPEN: NOT AT ALL
1. FEELING NERVOUS, ANXIOUS, OR ON EDGE: SEVERAL DAYS

## 2021-10-07 ASSESSMENT — PATIENT HEALTH QUESTIONNAIRE - PHQ9
5. POOR APPETITE OR OVEREATING: NOT AT ALL
SUM OF ALL RESPONSES TO PHQ QUESTIONS 1-9: 19
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
## 2021-10-07 NOTE — PROGRESS NOTES
Medication Therapy Management (MTM) Encounter    ASSESSMENT:                            Medication Adherence/Access: No issues identified    Hx of recurrent UTI/calculus kidney: continue working with urology.    Arthritis/Trigeminal pain: suggest primary care provider assessment. I suspect the medication her daughter was on is likely an NSAID, patient has hx of gastic bypass would need to be cautious with use.     Type 2 Diabetes/Obesity: A1c is at goal of less than 8%. Due to patient goals of weight loss and elevated microalbumin she may be a candidate for SGLT2 inhibitor. However, cost may be a barrier as she noted GLP-1RA to be costly and her hx of recurrent UTI may be a barrier. Although her UTI seems to be associated with her calculus kidney, I would prefer to confirm with urology regarding safety.     Insomnia/Depression/anxiety: with her weight loss goal, I would suggest in the future possibly weaning down. I suspect as her mood improved her insomnia may improve as well. Lastly, consider weaning down on trazodone as patient is being titrated on buspar increasing risk of serotonin syndrome.     Vaccines: candidate for Shingrix, PPSV23 - though uncertain with her hx no documentation if PCV23 or PPSV23 either.     PLAN:                            1. Refilled test strips.    2. I will reach out to your urologist regarding safety of a medication such as Jardiance to replace glipizide. If agreeable then I will help check on cost.    3. We do not have your vaccine records, you would be a candidate for a pneumovax and shinges vaccination.    4. For sleep, you can consider splitting the trazodone in half. As you go up in the doses of medications for anxiety and depression, we want to monitor and lower your risk for serotonin syndrome.    Follow-up: Return in about 1 week (around 10/15/2021) for kathy Boyle to follow-up.  - review Osteoporosis screening    SUBJECTIVE/OBJECTIVE:                          Zeynep  Juan is a 76 year old female coming in for a follow-up visit. She was referred to me from Trinh Bello.  Today's visit is a follow-up MTM visit from 7/8/21 with Lauren Bloch, Pharm D.     Reason for visit: she reports her main question for me today is around he diabetes regimen. She is concerned her diabetes regimen is not effective.  Allergies/ADRs: Reviewed in chart  Past Medical History: Reviewed in chart - gastric bypass (2003)  Tobacco: She reports that she quit smoking about 51 years ago. Her smoking use included cigarettes. She smoked 0.00 packs per day for 5.00 years. She has never used smokeless tobacco.  Alcohol: none  Living in a Senior housing but independently.     Medication Adherence/Access: no issues reported, fills at [facility].    Hx of recurrent UTI/calculus kidney: She does have a history of urinary tract infection. She follows with urology.    Arthritis/Trigeminal pain: taking gabapentin 800 mg twice daily. She reports having pain that started at the neck now down the back. She reports her daughter also has this and was started on a medication that has helped but cannot recall name. She reports the pain is tolerable in the morning but when she starts cleaning her kitchen her back is in pain until she sits down to rest again for a few hours. She reports the pain continues to radiate lower and lower down her spine now. When she coughs deeply she feels her ribs are poking her lungs and nearly takes her breath away.  She reports the pain gradually gets worse, the pain is not sharp, but gets achy.   She reports not tried any topicals because cannot reach her back. Therefore only trying to rest to relieve pain. She will take a few acetaminophen for the shoulders and neck. She has tried topicals such as Outback Pain Relief Roll-On Oil( which she finds effective but her cats dislike the smell), capsaicin, blue emu, Voltaren gel, IcyHot. Topicals are hard for her because she cannot  "reach the back.She reports years ago in [place] had siatica pain and had injections. The first time it helped but after 2-3rd time did not work that was about 8 years ago.     Type 2 Diabetes:  She reports the Ozempic was very expensive for and caused her side effects.   Currently taking metformin XR 1000 mg twice daily and glipizide XL 5 mg twice daily (increased from daily when blood sugar higher).  We reviewed her a1c and she was pleasantly surprised, not as high as she thought it would be.  Blood sugar monitoring: Accu Chek Nohelia needs new strips. Ranges (patient reported): 89 today, had not been checking regular.  Symptoms of low blood sugar? none  Symptoms of high blood sugar? Fatigue and muscle pain  Eye exam: due  Foot exam: up to date  Diet/Exercise: she reports emptying foods that are trigger foods in the home. She is avoiding to eat foods that she \"knows\" she should not. She tells me she knows which foods will trigger the binge eating. She has a hx of binge eating disorder.   She is thinking about following up with Radha but had too many appointments recently.   Aspirin: Not taking due to age  Statin: No   ACEi/ARB: No.   Urine Albumin:   Lab Results   Component Value Date    UMALCR 97.73 (H) 01/26/2021      Lab Results   Component Value Date    A1C 7.8 10/05/2021    A1C 8.1 07/03/2021    A1C 7.3 05/11/2021    A1C 6.2 01/26/2021     BP Readings from Last 3 Encounters:   10/11/21 112/64   10/06/21 131/79   09/16/21 132/64     Wt Readings from Last 4 Encounters:   10/11/21 261 lb (118.4 kg)   07/06/21 253 lb 8 oz (115 kg)   06/03/21 242 lb (109.8 kg)   05/27/21 250 lb (113.4 kg)     Estimated body mass index is 46.77 kg/m  as calculated from the following:    Height as of 10/11/21: 5' 2.64\" (1.591 m).    Weight as of 10/11/21: 261 lb (118.4 kg).    Recent Labs   Lab Test 05/25/21  1759   CHOL 175   HDL 46*   LDL 77   TRIG 262*     The 10-year ASCVD risk score ([place] CARLOS Stewart., et al., 2013) is: 20.5%    Values " used to calculate the score:      Age: 76 years      Sex: Female      Is Non- : No      Diabetic: Yes      Tobacco smoker: No      Systolic Blood Pressure: 112 mmHg      Is BP treated: No      HDL Cholesterol: 46 mg/dL      Total Cholesterol: 175 mg/dL    Depression/anxiety:  Current medications include: Buspirone 5 mg daily for 7 days, then 5 mg twice daily for 7 days, then 10 mg twice daily and venlafaxine  mg in the morning. Also has xanax for as needed use. She gets abnormal dreams when taking venlafaxine at bedtime.   She met with the psychiatry just yesterday and changes were made. She is hoping these changes will help with her mood and anxiety. No safety concerns today.  Works with a counselor Noe that helps her with CBT strategies.   PHQ-9 SCORE 9/28/2021 10/6/2021 10/8/2021   PHQ-9 Total Score MyChart 20 (Severe depression) 19 (Moderately severe depression) 16 (Moderately severe depression)   PHQ-9 Total Score 20 19 16     NELLIE-7 SCORE 8/11/2021 9/28/2021 10/7/2021   Total Score 6 (mild anxiety) 4 (minimal anxiety) -   Total Score 6 4 3     Insomnia: Current medications include: trazodone 50 mg daily and quetiapine 25 mg at bedtime. she reports had slept 12 hours last night.  Feels this works and possibly too well. Sometimes can be tired in the am.     Vaccines: she recalls receiving a pneumonia vaccine in [place] but no records. This would be about 5 years.  Most Recent Immunizations   Administered Date(s) Administered     COVID-19,PF,Alexey 03/11/2021     Flu, Unspecified 10/02/2013     Influenza (IIV3) PF 10/26/2012     Influenza Vaccine IM > 6 months Valent IIV4 (Alfuria,Fluzone) 10/01/2013     Influenza, Quad, High Dose, Pf, 65yr+ (Fluzone HD) 10/05/2021     Pneumococcal 23 valent 10/11/2021     TDAP Vaccine (Adacel) 11/15/2011     Td (Adult), Adsorbed 06/15/2003     Tdap (Adacel,Boostrix) 11/15/2011     Zoster vaccine recombinant adjuvanted (SHINGRIX) 10/11/2021      Zoster vaccine, live 10/25/2007         Today's Vitals: LMP  (LMP Unknown)   ----------------      I spent 50 minutes with this patient today. All changes were made via collaborative practice agreement with BENEDICTO Martin CNP. A copy of the visit note was provided to the patient's primary care provider.    The patient was sent via [email or web address] a summary of these recommendations.     Carmella Abraham, PharmD  Medication Therapy Management Pharmacist  Pager#: [phone]      Telemedicine Visit Details  Type of service:  Telephone visit  Start Time: 1:00 pm  End Time: 1:50pm  Originating Location (patient location): [place]  Distant Location (provider location):  [facility]     Medication Therapy Recommendations  Insomnia, unspecified type    Current Medication: traZODone (DESYREL) 50 MG tablet (Discontinued)   Rationale: Dose too high - Dosage too high - Safety   Recommendation: Decrease Dose   Status: Accepted per CPA         Type 2 diabetes mellitus with diabetic polyneuropathy, without long-term current use of insulin (H)    Current Medication: glipiZIDE (GLUCOTROL XL) 5 MG 24 hr tablet   Rationale: More effective medication available - Ineffective medication - Effectiveness   Recommendation: Change Medication - JARDIANCE PO - contacted urologist   Status: Contact Provider - Awaiting Response         Vaccine counseling    Rationale: Preventive therapy - Needs additional medication therapy - Indication   Recommendation: Provide Education - consider vaccination at upcoming office visit - ppsv23, shingles   Status: Patient Agreed - Adherence/Education

## 2021-10-07 NOTE — PROGRESS NOTES
Collaborative Care Psychiatry Service (CCPS)  October 7, 2021    Behavioral Health Clinician Progress Note    Patient Name: Zeynep Martinez      Telemedicine Visit: The patient's condition can be safely assessed and treated via synchronous audio and visual telemedicine encounter.      Reason for Telemedicine Visit: Services only offered telehealth    Originating Site (Patient Location): Patient's home    Distant Site (Provider Location): Provider Remote Setting- Home Office    Consent:  The patient/guardian has verbally consented to: the potential risks and benefits of telemedicine (video visit) versus in person care; bill my insurance or make self-payment for services provided; and responsibility for payment of non-covered services.     Mode of Communication:  Video Conference via [employer]    As the provider I attest to compliance with applicable laws and regulations related to telemedicine.         Service Type:  Individual      Service Location:   Face to Face in Home / Community     Session Start Time: 10:01a  Session End Time: 10:38a      Session Length: 16 - 37      Attendees: Client    Visit Activities (Refresh list every visit): [facility] Only    Diagnostic Assessment Date: 8/12/21- Noe Meeks  See Flowsheets for today's PHQ-9 and NELLIE-7 results  Previous PHQ-9:   PHQ-9 SCORE 8/11/2021 9/28/2021 10/6/2021   PHQ-9 Total Score MyChart 11 (Moderate depression) 20 (Severe depression) 19 (Moderately severe depression)   PHQ-9 Total Score 11 20 19       Previous NELLIE-7:   NELLIE-7 SCORE 8/11/2021 9/28/2021 10/7/2021   Total Score 6 (mild anxiety) 4 (minimal anxiety) -   Total Score 6 4 3       WHODAS  WHODAS 2.0 Total Score 8/12/2021 10/6/2021   Total Score 32 41   Total Score MyChart - 41        CAGE  CAGE-AID Flowsheet 8/11/2021 10/6/2021   Have you ever felt you should Cut down on your drinking or drug use? 0 0   Have people Annoyed you by criticizing your drinking or drug use? 0 0   Have you ever felt bad or  Guilty about your drinking or drug use? 0 0   Have you ever had a drink or used drugs first thing in the morning to steady your nerves or to get rid of a hangover? (Eye opener) 0 0   CAGE-AID SCORE 0 0   1. Have you felt you ought to cut down on your drinking or drug use? No No   2. Have people annoyed you by criticizing your drinking or drug use? No No   3. Have you felt bad or guilty about your drinking or drug use? No No   4. Have you ever had a drink or used drugs first thing in the morning to steady your nerves or to get rid of a hangover (eye opener)? No No   CAGE-AID SCORE 0 (A total score of 2 or greater is considered clinically significant) 0 (A total score of 2 or greater is considered clinically significant)         DATA  Extended Session (60+ minutes): No  Interactive Complexity: No  Crisis: No    Medication Compliance:  Yes      Chemical Use Review:   Substance Use: Chemical use reviewed, no active concerns identified      Tobacco Use: No current tobacco use.      Current Stressors / Issues:  This is patients first CCPS appointment.  Had a diagnostic assessment completed on 8/12/21 with DEANN Luevano.  Pt reports a long hx of anxiety and depression over the years.  Moved back to [place] from [place] in January, 2021 to be near daughter and 3 grandchildren.  Pt is  x2.  She shares that her move back to [place] is her 40th move.  Is now in a senior apartment and is hoping she will not need to move again anytime soon.  Pt shares that in August she sent her PCP a message that she was really struggling and needed assistance.  She decided at that time that her antidepressants didn't seem to be helping so she went off them for about 1 month. Did realize during that time that she did in fact need them so went back on the antidepressants and reports feeling 50-75% better.  Pt reports that she feels tired all the time and sleeps 14-16 hours many days.  Did have labwork done recently and her hemoglobin did  increase a bit which she says could contribute to her sleepiness as well.  Pt shares she has a dx of binge eating disorder.  She reports that she manages stress by eating and is having a hard time breaking the habit.  Pt shares that when her depression gets really bad so does her anxiety.   Pt shares that she struggles with the aspect of aging in general and with losing independence.  She quit driving prior to moving to [place].  Pt shares that she has neuropathy and recently found out in a neurology appt that she has severe neuropathy in her left arm.  Providers are urging her to get her diabetes under better control.  Takes meds to sleep and reports that with her adjustable bed she sleeps well.  Appetite good overall but does binge when feeling down or anxious.  Is determined to focus on getting her A1c down.  Pt is seeing [facility] therapist regularly and she reports that it is going well.  Has a Medication management appt tomorrow as well.    Progress on Treatment Objective(s) / Homework:  Satisfactory progress - ACTION (Actively working towards change); Intervened by reinforcing change plan / affirming steps taken    Motivational Interviewing    MI Intervention: Expressed Empathy/Understanding, Supported Autonomy, Collaboration, Evocation, Permission to raise concern or advise and Open-ended questions     Change Talk Expressed by the Patient: Desire to change Committment to change Taking steps    Provider Response to Change Talk: E - Evoked more info from patient about behavior change, A - Affirmed patient's thoughts, decisions, or attempts at behavior change, R - Reflected patient's change talk and S - Summarized patient's change talk statements        Review of Symptoms per patient report:  Depression: Change in sleep, Lack of interest, Change in energy level, Difficulties concentrating, Change in appetite, Feelings of hopelessness and Feeling sad, down, or depressed  Eleonora:  No Symptoms  Psychosis: No  Symptoms  Anxiety: feelings of being overwhelmed.  Everything tightens up and can't talk straight.  Panic:  No symptoms  Post Traumatic Stress Disorder:  No Symptoms   Eating Disorder: Binging  ADD / ADHD:  No symptoms  Conduct Disorder: No symptoms  Autism Spectrum Disorder: No symptoms  Obsessive Compulsive Disorder: No Symptoms      Changes in Health Issues:   Yes: Diabetes, Associated Psychological Distress    Assessment: Current Emotional / Mental Status (status of significant symptoms):  Risk status (Self / Other harm or suicidal ideation)  Patient denies a history of suicidal ideation, suicide attempts, self-injurious behavior, homicidal ideation, homicidal behavior and and other safety concerns  Patient denies current fears or concerns for personal safety.  Patient denies current or recent suicidal ideation or behaviors.  Patient denies current or recent homicidal ideation or behaviors.  Patient denies current or recent self injurious behavior or ideation.  Patient denies other safety concerns.  A safety and risk management plan has not been developed at this time, however patient was encouraged to call [facility] should there be a change in any of these risk factors.    Appearance:   Appropriate   Eye Contact:   Good   Psychomotor Behavior: Normal   Attitude:   Cooperative   Orientation:   All  Speech   Rate / Production: Normal    Volume:  Normal   Mood:    Normal  Affect:    Appropriate   Thought Content:  Clear   Thought Form:  Coherent  Logical   Insight:    Good     Diagnoses:  1. Anxiety    2. Moderate episode of recurrent major depressive disorder (H)    3. Binge eating disorder        Collateral Reports Completed:  Not Applicable    Plan: (Homework, other):  Patient was given information about behavioral services and encouraged to schedule a follow up appointment with the clinic [facility] in conjunction with next CCPS appointment.  She was also given information about mental health symptoms and  treatment options .  Pt is seeing a therapist regularly through [facility]. CD Recommendations: No indications of CD issues.  DEANN Hay, [facility]      DEANN Bond, [facility]  October 7, 2021

## 2021-10-07 NOTE — PATIENT INSTRUCTIONS
Continue venlafaxine Exar 225 mg daily.    Start buspirone 5 mg daily for 1 week, then increase to 5 mg twice daily for 1 week, then increase to 10 mg twice daily.    Continue alprazolam 0.5 to 1 mg daily as needed.    Continue all other medications per your primary care provider.    Schedule an appointment with me in 8 weeks or sooner as needed.  You may call [facility] at [phone] to schedule.    [place] Resources:      Go to the Emergency Department as needed or call after hours crisis line at [phone].      To schedule individual or family therapy, call [facility] at [phone].     Follow up with primary care provider as planned or sooner for acute medical concerns.    Call the psychiatric nurse line with medication questions or concerns at [phone].    [employer] may be used to communicate with your provider, but this is not intended to be used for emergencies.    Community Resources:      National Suicide Prevention Lifeline: 570.936.4060 (TTY: 928.255.2123). Call anytime for help.  (www.suicidepreventionlifeline.org)    [employer] [place] on Mental Illness (www.nikki.org): 160.696.5204 or 719-222-4019.     Mental Health Association (www.mentalhealth.org): 724.843.3883 or 857-171-7318.    [place] Crisis Text Line: Text [place] to 051446    Suicide LifeLine Chat: suicidepreZero2IPOline.org/chat

## 2021-10-07 NOTE — Clinical Note
Edilberto Tsang,  I met with Zeynep today.  We agreed to minor adjustments to her medication, I will see her a few more times to try to stabilize her mood.  My other concern was the lack of any vitamin supplements since her gastric bypass surgery, but I see that you have done labs to check them and will be seeing her soon.  For now, we agreed to continue venlafaxine and supplement it with buspirone.    Please feel free to contact me with questions or concerns.  Thank you for the opportunity to be involved in the care of this patient.    Regards,  Nubia Alcaraz MD  Collaborative Care Psychiatry  [facility]

## 2021-10-08 ENCOUNTER — VIRTUAL VISIT (OUTPATIENT)
Dept: PHARMACY | Facility: CLINIC | Age: 76
End: 2021-10-08
Payer: COMMERCIAL

## 2021-10-08 DIAGNOSIS — F41.9 ANXIETY: ICD-10-CM

## 2021-10-08 DIAGNOSIS — G47.00 INSOMNIA, UNSPECIFIED TYPE: ICD-10-CM

## 2021-10-08 DIAGNOSIS — M19.90 ARTHRITIS: ICD-10-CM

## 2021-10-08 DIAGNOSIS — N20.0 CALCULUS OF KIDNEY: ICD-10-CM

## 2021-10-08 DIAGNOSIS — Z71.85 VACCINE COUNSELING: ICD-10-CM

## 2021-10-08 DIAGNOSIS — E66.01 OBESITY, CLASS III, BMI 40-49.9 (MORBID OBESITY) (H): ICD-10-CM

## 2021-10-08 DIAGNOSIS — E11.42 TYPE 2 DIABETES MELLITUS WITH DIABETIC POLYNEUROPATHY, WITHOUT LONG-TERM CURRENT USE OF INSULIN (H): Primary | ICD-10-CM

## 2021-10-08 DIAGNOSIS — G50.0 TRIGEMINAL NEURALGIA: ICD-10-CM

## 2021-10-08 DIAGNOSIS — N39.0 RECURRENT UTI: ICD-10-CM

## 2021-10-08 DIAGNOSIS — F33.1 MODERATE EPISODE OF RECURRENT MAJOR DEPRESSIVE DISORDER (H): ICD-10-CM

## 2021-10-08 LAB — ZINC SERPL-MCNC: 67.5 UG/DL

## 2021-10-08 PROCEDURE — 99606 MTMS BY PHARM EST 15 MIN: CPT | Performed by: PHARMACIST

## 2021-10-08 PROCEDURE — 99607 MTMS BY PHARM ADDL 15 MIN: CPT | Performed by: PHARMACIST

## 2021-10-08 RX ORDER — BLOOD SUGAR DIAGNOSTIC
STRIP MISCELLANEOUS
Qty: 200 STRIP | Refills: 5 | Status: SHIPPED | OUTPATIENT
Start: 2021-10-08

## 2021-10-08 ASSESSMENT — ANXIETY QUESTIONNAIRES: GAD7 TOTAL SCORE: 3

## 2021-10-08 ASSESSMENT — ENCOUNTER SYMPTOMS
MYALGIAS: 1
ABDOMINAL PAIN: 0
DYSURIA: 0
HEARTBURN: 0
PALPITATIONS: 0
BREAST MASS: 0
CHILLS: 0
CONSTIPATION: 1
DIZZINESS: 0
SHORTNESS OF BREATH: 0
JOINT SWELLING: 0
NAUSEA: 0
WEAKNESS: 1
FREQUENCY: 0
HEMATURIA: 0
COUGH: 0
DIARRHEA: 0
EYE PAIN: 0
PARESTHESIAS: 0
FEVER: 0
ARTHRALGIAS: 0
NERVOUS/ANXIOUS: 1
SORE THROAT: 0
HEADACHES: 0
HEMATOCHEZIA: 0

## 2021-10-08 ASSESSMENT — PATIENT HEALTH QUESTIONNAIRE - PHQ9
SUM OF ALL RESPONSES TO PHQ QUESTIONS 1-9: 16
10. IF YOU CHECKED OFF ANY PROBLEMS, HOW DIFFICULT HAVE THESE PROBLEMS MADE IT FOR YOU TO DO YOUR WORK, TAKE CARE OF THINGS AT HOME, OR GET ALONG WITH OTHER PEOPLE: VERY DIFFICULT
SUM OF ALL RESPONSES TO PHQ QUESTIONS 1-9: 16

## 2021-10-08 ASSESSMENT — ACTIVITIES OF DAILY LIVING (ADL): CURRENT_FUNCTION: TRANSPORTATION REQUIRES ASSISTANCE

## 2021-10-08 NOTE — PROGRESS NOTES
"SUBJECTIVE:   Zeynep Martinez is a 76 year old female who presents for Preventive Visit.    Patient has been advised of split billing requirements and indicates understanding: Yes   Are you in the first 12 months of your Medicare coverage?  No    Healthy Habits:     In general, how would you rate your overall health?  Fair    Frequency of exercise:  None    Do you usually eat at least 4 servings of fruit and vegetables a day, include whole grains    & fiber and avoid regularly eating high fat or \"junk\" foods?  Yes    Taking medications regularly:  Yes    Medication side effects:  None    Ability to successfully perform activities of daily living:  Transportation requires assistance    Home Safety:  No safety concerns identified    Hearing Impairment:  Difficulty following a conversation in a noisy restaurant or crowded room, feel that people are mumbling or not speaking clearly and difficulty understanding soft or whispered speech    In the past 6 months, have you been bothered by leaking of urine? Yes    In general, how would you rate your overall mental or emotional health?  Good      PHQ-2 Total Score: 4    Additional concerns today:  Yes    History of L lateral ankle pain off and on for 2 yrs. Has tried topical NSAIDs with little improvement. No redness or swelling. She has trigeminal neuralgia and her neurologist has noted it doesn't appear to be neuralgia in nature. Worse with elevating feet.     History of arthritis of her upper back. She has had moderate pain that affects her ability to do ADLs.     History of depression and anxiety, has a psychiatrist, she has been struggling with sleep. She has been using seroquel PRN. She was referred to [facility] due to overeating and binge eating and she intends to call to schedule.     History of diab, had a recent appointment with MTM. A1C did improve, was instructed to take BGs at home more frequently. Taking sugars around 88 in the morning, 130 in the evening. She " has gotten rid of her trigger foods.     Hemoglobin A1C   Date Value Ref Range Status   10/05/2021 7.8 (H) 0.0 - 5.6 % Final     Comment:     Normal <5.7%   Prediabetes 5.7-6.4%    Diabetes 6.5% or higher     Note: Adopted from ADA consensus guidelines.   07/03/2021 8.1 (H) 0 - 5.6 % Final     Comment:     Normal <5.7% Prediabetes 5.7-6.4%  Diabetes 6.5% or higher - adopted from ADA   consensus guidelines.       Wt Readings from Last 4 Encounters:   10/11/21 118.4 kg (261 lb)   07/06/21 115 kg (253 lb 8 oz)   06/03/21 109.8 kg (242 lb)   05/27/21 113.4 kg (250 lb)       Hypertension ROS: taking medications as instructed,no medication side effects noted,no TIA's,no chest pain on exertion,no dyspnea on exertion,no swelling of ankles.   New concerns: none.  BP Readings from Last 3 Encounters:   10/11/21 112/64   10/06/21 131/79   09/16/21 132/64       Do you feel safe in your environment? Yes    Have you ever done Advance Care Planning? (For example, a Health Directive, POLST, or a discussion with a medical provider or your loved ones about your wishes): Yes, patient states has an Advance Care Planning document and will bring a copy to the clinic.       Fall risk  Fallen 2 or more times in the past year?: (P) No  Any fall with injury in the past year?: (P) No  click delete button to remove this line now  Cognitive Screening   1) Repeat 3 items (Leader, Season, Table)    2) Clock draw: NORMAL  3) 3 item recall: Recalls 3 objects  Results: 3 items recalled: COGNITIVE IMPAIRMENT LESS LIKELY    Mini-CogTM Copyright S Torri. Licensed by the author for use in [facility] [employer]; reprinted with permission (lisa@.[email or web address]). All rights reserved.      Do you have sleep apnea, excessive snoring or daytime drowsiness?: no    Reviewed and updated as needed this visit by clinical staff  Tobacco  Allergies  Meds   Med Hx  Surg Hx  Fam Hx  Soc Hx        Reviewed and updated as needed this visit by Provider    Meds              Social History     Tobacco Use     Smoking status: Former Smoker     Packs/day: 0.00     Years: 5.00     Pack years: 0.00     Types: Cigarettes     Quit date: 1970     Years since quittin.8     Smokeless tobacco: Never Used   Substance Use Topics     Alcohol use: Yes     Comment: once per month        Alcohol Use 10/8/2021   Prescreen: >3 drinks/day or >7 drinks/week? Not Applicable       Current providers sharing in care for this patient include:   Patient Care Team:  Trinh Bello APRN CNP as PCP - General (Internal Medicine - Pediatrics)  Long Mansfield MD as PCP - Nephrology (Nephrology)  Shayy Rivas NP as Assigned PCP  Shayy Rivas NP as Referring Physician (Nurse Practitioner - Family)  Adam Joseph MD as MD (OB/Gyn)  Adam Joseph MD as Assigned OBGYN Provider  Isael Cordero MD as Assigned Neuroscience Provider  Bloch, Lauren Turner, [facility] as Pharmacist (Pharmacist)  Eliot Barber, EMT as Personal Advocate & Liaison (PAL)  Long Mansfield MD as Assigned Surgical Provider  Pascual Painter MD as MD (Neurology)  Carmella Abraham [facility] as Pharmacist (Pharmacist)    The following health maintenance items are reviewed in Epic and correct as of today:  Health Maintenance Due   Topic Date Due     DEXA  Never done     EYE EXAM  Never done     Lab work is in process  Pneumonia Vaccine:due   Mammogram Screening: Mammogram Screening: Recommended mammography every 1-2 years with patient discussion and risk factor consideration  History of abnormal Pap smear: NO - age 67 - see link Cervical Cytology Screening Guidelines  Last 3 Pap and HPV Results:          Review of Systems   Constitutional: Negative for chills and fever.   HENT: Positive for hearing loss. Negative for congestion, ear pain and sore throat.    Eyes: Positive for visual disturbance. Negative for pain.   Respiratory: Negative for cough and shortness of breath.    Cardiovascular:  "Negative for chest pain, palpitations and peripheral edema.   Gastrointestinal: Positive for constipation. Negative for abdominal pain, diarrhea, heartburn, hematochezia and nausea.   Breasts:  Negative for tenderness, breast mass and discharge.   Genitourinary: Negative for dysuria, frequency, genital sores, hematuria, pelvic pain, urgency, vaginal bleeding and vaginal discharge.   Musculoskeletal: Positive for myalgias. Negative for arthralgias and joint swelling.   Skin: Negative for rash.   Neurological: Positive for weakness. Negative for dizziness, headaches and paresthesias.   Psychiatric/Behavioral: Positive for mood changes. The patient is nervous/anxious.          OBJECTIVE:   /64 (BP Location: Right arm, Patient Position: Sitting, Cuff Size: Adult Regular)   Pulse 74   Temp 97.6  F (36.4  C) (Tympanic)   Resp 16   Ht 1.591 m (5' 2.64\")   Wt 118.4 kg (261 lb)   LMP  (LMP Unknown)   SpO2 97%   BMI 46.77 kg/m   Estimated body mass index is 46.77 kg/m  as calculated from the following:    Height as of this encounter: 1.591 m (5' 2.64\").    Weight as of this encounter: 118.4 kg (261 lb).  Physical Exam  GENERAL: healthy, alert and no distress  EYES: Eyes grossly normal to inspection, PERRL and conjunctivae and sclerae normal  HENT: ear canals and TM's normal, nose and mouth without ulcers or lesions  NECK: no adenopathy, no asymmetry, masses, or scars and thyroid normal to palpation  RESP: lungs clear to auscultation - no rales, rhonchi or wheezes  CV: regular rate and rhythm, normal S1 S2, no S3 or S4, no murmur, click or rub, no peripheral edema and peripheral pulses strong  MS: no gross musculoskeletal defects noted, no edema  PSYCH: mentation appears normal, affect normal/bright  Diabetic foot exam: normal DP and PT pulses, no trophic changes or ulcerative lesions and normal sensory exam    ASSESSMENT / PLAN:   (Z00.00) Encounter for Medicare annual wellness exam  (primary encounter " diagnosis)  Comment:   Plan: ZOSTER VACCINE RECOMBINANT ADJUVANTED IM NJX          (F41.9) Anxiety  Comment: She has a therapist and a psychiatrist.  She does feel like she struggles with her anxiety.  She is overall motivated to feel better about her health in general.  She has a supportive ex-wife.  She is making strides in connecting with friends.  She has supportive children.  Plan:     (F33.1) Moderate episode of recurrent major depressive disorder (H)  Comment: Marginally controlled at this point.  Continue to be followed by psychiatry.  She does not report any thoughts of self-harm or suicide.  Plan:     (E11.42) Type 2 diabetes mellitus with diabetic polyneuropathy, without long-term current use of insulin (H)  Comment: We reviewed her labs today.  We also reviewed her last appointment with [facility].  She has been taking her medications although sometimes she misses because she is worried about taking them with or without food.  She has been taking her blood glucoses twice daily and getting a higher reading in the afternoon.  We discussed keeping an eye on that and the role of her overeating.  She is motivated to seek counseling from [facility] and has the number and knows how to call.  She understands the association between binge eating and her blood sugars.  Plan: FOOT EXAM, **A1C FUTURE 3mo          (N18.31) Stage 3a chronic kidney disease (H)  Comment: We reviewed her labs  Plan:     (G47.00) Insomnia, unspecified type  Comment: Marginally controlled.  She does still have some symptoms of concern of falling asleep.  She was not taking her BuSpar twice daily and encouraged her that she take her 2nd dose.  She does wonder if this will help overall with her insomnia.  We discussed sleep hygiene in depth today.  Plan: Per above    (M25.572) Pain in joint, ankle and foot, left  Comment: Her foot exam is normal.  She does have some nonpitting edema generalized bilaterally.  She notes when her left foot is  "hurting there is no redness or swelling or bruising.  Because of her history of diabetes and she has a history of neuropathy will refer her to podiatry for further evaluation of this pain.  Plan: Orthopedic  Referral          (E11.69) Type 2 diabetes mellitus with other specified complication, without long-term current use of insulin (H)  Comment: michela: metFORMIN (GLUCOPHAGE-XR) 500 MG 24 hr tablet            (E11.65) Type 2 diabetes mellitus with hyperglycemia, without long-term current use of insulin (H)  Comment:   Plan: glipiZIDE (GLUCOTROL XL) 5 MG 24 hr tablet          (E11.42) Diabetic polyneuropathy associated with type 2 diabetes mellitus (H)  Comment:   Plan: glipiZIDE (GLUCOTROL XL) 5 MG 24 hr tablet            (M54.6,  G89.29) Chronic thoracic back pain, unspecified back pain laterality  Comment: This is a longstanding issue for her.  She is asking for a prescription for meloxicam which was given to her today.  We also discussed range of motion exercises.  Could consider physical therapy in the future.  Plan: meloxicam (MOBIC) 7.5 MG tablet          Patient has been advised of split billing requirements and indicates understanding: No  COUNSELING:  Reviewed preventive health counseling, as reflected in patient instructions  Special attention given to:       Regular exercise       Healthy diet/nutrition    Estimated body mass index is 46.77 kg/m  as calculated from the following:    Height as of this encounter: 1.591 m (5' 2.64\").    Weight as of this encounter: 118.4 kg (261 lb).    Weight management plan: Discussed healthy diet and exercise guidelines    She reports that she quit smoking about 51 years ago. Her smoking use included cigarettes. She smoked 0.00 packs per day for 5.00 years. She has never used smokeless tobacco.      Appropriate preventive services were discussed with this patient, including applicable screening as appropriate for cardiovascular disease, diabetes, " osteopenia/osteoporosis, and glaucoma.  As appropriate for age/gender, discussed screening for colorectal cancer, prostate cancer, breast cancer, and cervical cancer. Checklist reviewing preventive services available has been given to the patient.    Reviewed patients plan of care and provided an AVS. The Complex Care Plan (for patients with higher acuity and needing more deliberate coordination of services) for Zeynep meets the Care Plan requirement. This Care Plan has been established and reviewed with the Patient.    Counseling Resources:  ATP IV Guidelines  Pooled Cohorts Equation Calculator  Breast Cancer Risk Calculator  Breast Cancer: Medication to Reduce Risk  FRAX Risk Assessment  ICSI Preventive Guidelines  Dietary Guidelines for Americans, 2010  [employer]'s MyPlate  ASA Prophylaxis  Lung CA Screening    Trinh Castillo, BENEDICTO [facility]    Identified Health Risks:  Answers for HPI/ROS submitted by the patient on 10/8/2021  If you checked off any problems, how difficult have these problems made it for you to do your work, take care of things at home, or get along with other people?: Very difficult  PHQ9 TOTAL SCORE: 16      The patient was provided with suggestions to help her develop a healthy physical lifestyle.  She is at risk for lack of exercise and has been provided with information to increase physical activity for the benefit of her well-being.  The patient reports that she has difficulty with activities of daily living. I have asked that the patient make a follow up appointment in 53 weeks where this issue will be further evaluated and addressed.  The patient was provided with written information regarding signs of hearing loss.  Information on urinary incontinence and treatment options given to patient.  The patient s PHQ-9 score is consistent with moderate depression. She was provided with information regarding depression and was advised to schedule a follow up  appointment in 3 month to further address this issue.

## 2021-10-08 NOTE — Clinical Note
Hi there, sorry for the delay I was waiting to see if urology would get back to me. I had discussed Jardiance but with her calculus kidney and recurrent UTI I wanted to verify with urology if reasonable to consider. There's actually some more data that Jardiance may be protective for stones but with recurrent UTI not sure if she would be a good candidate. Thanks!

## 2021-10-08 NOTE — PROGRESS NOTES
"Pre-Visit Planning   Next 5 appointments (look out 90 days)    Oct 11, 2021 11:20 AM  (Arrive by 10:55 AM)  Annual Wellness Visit with BENEDICTO Gipson CNP  [facility] [address]  [address]  [place] [address] [phone]        Appointment Notes for this encounter:   wellness    Questionnaires Reviewed/Assigned  No additional questionnaires are needed    Patient preferred phone number: [phone]      Spoke to patient via phone. Patient does not have additional questions or concerns.        Visit is preventive. pt was curently in video visit with MTM    Patient is established.    Chief complaint confirmed.  Checked for changes of symptoms or new symptoms: nothing new   Any additional needs (transportation, financial, mobility)? no    Health Maintenance Due   Topic Date Due     DEXA  Never done     DIABETIC FOOT EXAM  Never done     ADVANCE CARE PLANNING  Never done     EYE EXAM  Never done     ZOSTER IMMUNIZATION (2 of 3) 12/20/2007     MEDICARE ANNUAL WELLNESS VISIT  Never done     Pneumococcal Vaccine: 65+ Years (2 of 2 - PPSV23) 11/15/2016     Patient is due for:    Patient declined appointment.    [employer]  Patient is active on [employer].    Questionnaire Review   Offered information on completing questionnaires via [employer].    Call Summary  \"Thank you for your time today.  If anything comes up before your appointment, please feel free to contact us at [phone].\"      Answers for HPI/ROS submitted by the patient on 10/8/2021  If you checked off any problems, how difficult have these problems made it for you to do your work, take care of things at home, or get along with other people?: Very difficult  PHQ9 TOTAL SCORE: 16  In general, how would you rate your overall physical health?: fair  Frequency of exercise:: None  Do you usually eat at least 4 servings of fruit and vegetables a day, include whole grains & fiber, " "and avoid regularly eating high fat or \"junk\" foods? : Yes  Taking medications regularly:: Yes  Medication side effects:: None  Activities of Daily Living: transportation requires assistance  Home safety: no safety concerns identified  Hearing Impairment:: difficulty following a conversation in a noisy restaurant or crowded room, feel that people are mumbling or not speaking clearly, difficulty understanding soft or whispered speech  In the past 6 months, have you been bothered by leaking of urine?: Yes  abdominal pain: No  Blood in stool: No  Blood in urine: No  chest pain: No  chills: No  congestion: No  constipation: Yes  cough: No  diarrhea: No  dizziness: No  ear pain: No  eye pain: No  nervous/anxious: Yes  fever: No  frequency: No  genital sores: No  headaches: No  hearing loss: Yes  heartburn: No  arthralgias: No  joint swelling: No  peripheral edema: No  mood changes: Yes  myalgias: Yes  nausea: No  dysuria: No  palpitations: No  Skin sensation changes: No  sore throat: No  urgency: No  rash: No  shortness of breath: No  visual disturbance: Yes  weakness: Yes  pelvic pain: No  vaginal bleeding: No  vaginal discharge: No  tenderness: No  breast mass: No  breast discharge: No  In general, how would you rate your overall mental or emotional health?: good  Additional concerns today:: Yes      "

## 2021-10-09 LAB — VIT B1 PYROPHOSHATE BLD-SCNC: 133 NMOL/L

## 2021-10-09 ASSESSMENT — PATIENT HEALTH QUESTIONNAIRE - PHQ9: SUM OF ALL RESPONSES TO PHQ QUESTIONS 1-9: 16

## 2021-10-11 ENCOUNTER — OFFICE VISIT (OUTPATIENT)
Dept: PEDIATRICS | Facility: CLINIC | Age: 76
End: 2021-10-11
Payer: COMMERCIAL

## 2021-10-11 ENCOUNTER — TELEPHONE (OUTPATIENT)
Dept: PEDIATRICS | Facility: CLINIC | Age: 76
End: 2021-10-11

## 2021-10-11 VITALS
HEIGHT: 63 IN | TEMPERATURE: 97.6 F | SYSTOLIC BLOOD PRESSURE: 112 MMHG | WEIGHT: 261 LBS | OXYGEN SATURATION: 97 % | DIASTOLIC BLOOD PRESSURE: 64 MMHG | BODY MASS INDEX: 46.25 KG/M2 | HEART RATE: 74 BPM | RESPIRATION RATE: 16 BRPM

## 2021-10-11 DIAGNOSIS — E11.65 TYPE 2 DIABETES MELLITUS WITH HYPERGLYCEMIA, WITHOUT LONG-TERM CURRENT USE OF INSULIN (H): ICD-10-CM

## 2021-10-11 DIAGNOSIS — Z00.00 ENCOUNTER FOR MEDICARE ANNUAL WELLNESS EXAM: Primary | ICD-10-CM

## 2021-10-11 DIAGNOSIS — M54.6 CHRONIC THORACIC BACK PAIN, UNSPECIFIED BACK PAIN LATERALITY: ICD-10-CM

## 2021-10-11 DIAGNOSIS — E11.42 DIABETIC POLYNEUROPATHY ASSOCIATED WITH TYPE 2 DIABETES MELLITUS (H): ICD-10-CM

## 2021-10-11 DIAGNOSIS — E11.69 TYPE 2 DIABETES MELLITUS WITH OTHER SPECIFIED COMPLICATION, WITHOUT LONG-TERM CURRENT USE OF INSULIN (H): ICD-10-CM

## 2021-10-11 DIAGNOSIS — N18.31 STAGE 3A CHRONIC KIDNEY DISEASE (H): ICD-10-CM

## 2021-10-11 DIAGNOSIS — E11.42 TYPE 2 DIABETES MELLITUS WITH DIABETIC POLYNEUROPATHY, WITHOUT LONG-TERM CURRENT USE OF INSULIN (H): Primary | ICD-10-CM

## 2021-10-11 DIAGNOSIS — G47.00 INSOMNIA, UNSPECIFIED TYPE: ICD-10-CM

## 2021-10-11 DIAGNOSIS — F33.1 MODERATE EPISODE OF RECURRENT MAJOR DEPRESSIVE DISORDER (H): ICD-10-CM

## 2021-10-11 DIAGNOSIS — F41.9 ANXIETY: ICD-10-CM

## 2021-10-11 DIAGNOSIS — G89.29 CHRONIC THORACIC BACK PAIN, UNSPECIFIED BACK PAIN LATERALITY: ICD-10-CM

## 2021-10-11 DIAGNOSIS — M25.572 PAIN IN JOINT, ANKLE AND FOOT, LEFT: ICD-10-CM

## 2021-10-11 DIAGNOSIS — E11.42 TYPE 2 DIABETES MELLITUS WITH DIABETIC POLYNEUROPATHY, WITHOUT LONG-TERM CURRENT USE OF INSULIN (H): ICD-10-CM

## 2021-10-11 PROCEDURE — 90732 PPSV23 VACC 2 YRS+ SUBQ/IM: CPT | Performed by: NURSE PRACTITIONER

## 2021-10-11 PROCEDURE — 90472 IMMUNIZATION ADMIN EACH ADD: CPT | Performed by: NURSE PRACTITIONER

## 2021-10-11 PROCEDURE — 99214 OFFICE O/P EST MOD 30 MIN: CPT | Mod: 25 | Performed by: NURSE PRACTITIONER

## 2021-10-11 PROCEDURE — 99207 PR FOOT EXAM NO CHARGE: CPT | Mod: 25 | Performed by: NURSE PRACTITIONER

## 2021-10-11 PROCEDURE — 99397 PER PM REEVAL EST PAT 65+ YR: CPT | Mod: 25 | Performed by: NURSE PRACTITIONER

## 2021-10-11 PROCEDURE — 90750 HZV VACC RECOMBINANT IM: CPT | Performed by: NURSE PRACTITIONER

## 2021-10-11 PROCEDURE — G0009 ADMIN PNEUMOCOCCAL VACCINE: HCPCS | Performed by: NURSE PRACTITIONER

## 2021-10-11 RX ORDER — MELOXICAM 7.5 MG/1
7.5 TABLET ORAL DAILY
Qty: 30 TABLET | Refills: 0 | Status: SHIPPED | OUTPATIENT
Start: 2021-10-11 | End: 2021-11-08

## 2021-10-11 RX ORDER — GLIPIZIDE 5 MG/1
5 TABLET, FILM COATED, EXTENDED RELEASE ORAL 2 TIMES DAILY
Qty: 180 TABLET | Refills: 1 | Status: SHIPPED | OUTPATIENT
Start: 2021-10-11

## 2021-10-11 RX ORDER — METFORMIN HCL 500 MG
1000 TABLET, EXTENDED RELEASE 24 HR ORAL 2 TIMES DAILY WITH MEALS
Qty: 360 TABLET | Refills: 1 | Status: SHIPPED | OUTPATIENT
Start: 2021-10-11

## 2021-10-11 ASSESSMENT — ENCOUNTER SYMPTOMS
ARTHRALGIAS: 0
HEARTBURN: 0
DIARRHEA: 0
WEAKNESS: 1
JOINT SWELLING: 0
NAUSEA: 0
NERVOUS/ANXIOUS: 1
COUGH: 0
DIZZINESS: 0
HEADACHES: 0
FEVER: 0
SORE THROAT: 0
SHORTNESS OF BREATH: 0
PARESTHESIAS: 0
FREQUENCY: 0
HEMATURIA: 0
EYE PAIN: 0
HEMATOCHEZIA: 0
ABDOMINAL PAIN: 0
DYSURIA: 0
PALPITATIONS: 0
CONSTIPATION: 1
BREAST MASS: 0
CHILLS: 0
MYALGIAS: 1

## 2021-10-11 ASSESSMENT — MIFFLIN-ST. JEOR: SCORE: 1647.27

## 2021-10-11 ASSESSMENT — ACTIVITIES OF DAILY LIVING (ADL): CURRENT_FUNCTION: TRANSPORTATION REQUIRES ASSISTANCE

## 2021-10-11 NOTE — TELEPHONE ENCOUNTER
The pharmacy got a prescription for Zeynep's Accu-Chek Nohelia Plus test strips on Friday. However, her insurance does not cover Accu-Chek testing supplies. If the pharmacy could please get 3 new prescriptions for a new testing kit. We would need prescriptions for a meter, test strips, and lancets.     Thank you,  Trinh Billy, [facility] [place]

## 2021-10-11 NOTE — PATIENT INSTRUCTIONS
Schedule a lab only a wk before MT in about 3 months.     You have an eye exam scheduled.       Patient Education   Personalized Prevention Plan  You are due for the preventive services outlined below.  Your care team is available to assist you in scheduling these services.  If you have already completed any of these items, please share that information with your care team to update in your medical record.  Health Maintenance Due   Topic Date Due     Osteoporosis Screening  Never done     Diabetic Foot Exam  Never done     Eye Exam  Never done     Zoster (Shingles) Vaccine (2 of 3) 12/20/2007     Pneumococcal Vaccine (2 of 2 - PPSV23) 11/15/2016     Your Health Risk Assessment indicates you feel you are not in good health    A healthy lifestyle helps keep the body fit and the mind alert. It helps protect you from disease, helps you fight disease, and helps prevent chronic disease (disease that doesn't go away) from getting worse. This is important as you get older and begin to notice twinges in muscles and joints and a decline in the strength and stamina you once took for granted. A healthy lifestyle includes good healthcare, good nutrition, weight control, recreation, and regular exercise. Avoid harmful substances and do what you can to keep safe. Another part of a healthy lifestyle is stay mentally active and socially involved.    Good healthcare     Have a wellness visit every year.     If you have new symptoms, let us know right away. Don't wait until the next checkup.     Take medicines exactly as prescribed and keep your medicines in a safe place. Tell us if your medicine causes problems.   Healthy diet and weight control     Eat 3 or 4 small, nutritious, low-fat, high-fiber meals a day. Include a variety of fruits, vegetables, and whole-grain foods.     Make sure you get enough calcium in your diet. Calcium, vitamin D, and exercise help prevent osteoporosis (bone thinning).     If you live alone, try eating  with others when you can. That way you get a good meal and have company while you eat it.     Try to keep a healthy weight. If you eat more calories than your body uses for energy, it will be stored as fat and you will gain weight.     Recreation   Recreation is not limited to sports and team events. It includes any activity that provides relaxation, interest, enjoyment, and exercise. Recreation provides an outlet for physical, mental, and social energy. It can give a sense of worth and achievement. It can help you stay healthy.    Mental Exercise and Social Involvement  Mental and emotional health is as important as physical health. Keep in touch with friends and family. Stay as active as possible. Continue to learn and challenge yourself.   Things you can do to stay mentally active are:    Learn something new, like a foreign language or musical instrument.     Play SCRABBLE or do crossword puzzles. If you cannot find people to play these games with you at home, you can play them with others on your computer through the Internet.     Join a games club--anything from card games to chess or checkers or lawn bowling.     Start a new hobby.     Go back to school.     Volunteer.     Read.   Keep up with world events.    Exercise for a Healthier Heart  You may wonder how you can improve the health of your heart. If you re thinking about exercise, you re on the right track. You don t need to become an athlete. But you do need a certain amount of brisk exercise to help strengthen your heart. If you have been diagnosed with a heart condition, your healthcare provider may advise exercise to help stabilize your condition. To help make exercise a habit, choose safe, fun activities.      Exercise with a friend. When activity is fun, you're more likely to stick with it.   Before you start  Check with your healthcare provider before starting an exercise program. This is especially important if you have not been active for a  while. It's also important if you have a long-term (chronic) health problem such as heart disease, diabetes, or obesity. Or if you are at high risk for having these problems.   Why exercise?  Exercising regularly offers many healthy rewards. It can help you do all of the following:     Improve your blood cholesterol level to help prevent further heart trouble    Lower your blood pressure to help prevent a stroke or heart attack    Control diabetes, or reduce your risk of getting this disease    Improve your heart and lung function    Reach and stay at a healthy weight    Make your muscles stronger so you can stay active    Prevent falls and fractures by slowing the loss of bone mass (osteoporosis)    Manage stress better    Reduce your blood pressure    Improve your sense of self and your body image  Exercise tips      Ease into your routine. Set small goals. Then build on them. If you are not sure what your activity level should be, talk with your healthcare provider first before starting an exercise routine.    Exercise on most days. Aim for a total of 150 minutes (2 hours and 30 minutes) or more of moderate-intensity aerobic activity each week. Or 75 minutes (1 hour and 15 minutes) or more of vigorous-intensity aerobic activity each week. Or try for a combination of both. Moderate activity means that you breathe heavier and your heart rate increases but you can still talk. Think about doing 40 minutes of moderate exercise, 3 to 4 times a week. For best results, activity should last for about 40 minutes to lower blood pressure and cholesterol. It's OK to work up to the 40-minute period over time. Examples of moderate-intensity activity are walking 1 mile in 15 minutes. Or doing 30 to 45 minutes of yard work.    Step up your daily activity level.  Along with your exercise program, try being more active the whole day. Walk instead of drive. Or park further away so that you take more steps each day. Do more household  tasks or yard work. You may not be able to meet the advised mount of physical activity. But doing some moderate- or vigorous-intensity aerobic activity can help reduce your risk for heart disease. Your healthcare provider can help you figure out what is best for you.    Choose 1 or more activities you enjoy.  Walking is one of the easiest things you can do. You can also try swimming, riding a bike, dancing, or taking an exercise class.    When to call your healthcare provider  Call your healthcare provider if you have any of these:     Chest pain or feel dizzy or lightheaded    Burning, tightness, pressure, or heaviness in your chest, neck, shoulders, back, or arms    Abnormal shortness of breath    More joint or muscle pain    A very fast or irregular heartbeat (palpitations)  [email or web address] last reviewed this educational content on 7/1/2019 2000-2021 [employer]. All rights reserved. This information is not intended as a substitute for professional medical care. Always follow your healthcare professional's instructions.        Activities of Daily Living    Your Health Risk Assessment indicates you have difficulties with activities of daily living such as housework, bathing, preparing meals, taking medication, etc. Please make a follow up appointment for us to address this issue in more detail.    Signs of Hearing Loss      Hearing much better with one ear can be a sign of hearing loss.   Hearing loss is a problem shared by many people. In fact, it is one of the most common health problems, particularly as people age. Most people age 65 and older have some hearing loss. By age 80, almost everyone does. Hearing loss often occurs slowly over the years. So you may not realize your hearing has gotten worse.  Have your hearing checked  Call your healthcare provider if you:    Have to strain to hear normal conversation    Have to watch other people s faces very carefully to follow what they re saying    Need  to ask people to repeat what they ve said    Often misunderstand what people are saying    Turn the volume of the television or radio up so high that others complain    Feel that people are mumbling when they re talking to you    Find that the effort to hear leaves you feeling tired and irritated    Notice, when using the phone, that you hear better with one ear than the other  [employer] last reviewed this educational content on 1/1/2020 2000-2021 [employer]. All rights reserved. This information is not intended as a substitute for professional medical care. Always follow your healthcare professional's instructions.          Urinary Incontinence, Female (Adult)   Urinary incontinence means loss of bladder control. This problem affects many women, especially as they get older. If you have incontinence, you may be embarrassed to ask for help. But know that this problem can be treated.   Types of Incontinence  There are different types of incontinence. Two of the main types are described here. You can have more than one type.     Stress incontinence. With this type, urine leaks when pressure (stress) is put on the bladder. This may happen when you cough, sneeze, or laugh. Stress incontinence most often occurs because the pelvic floor muscles that support the bladder and urethra are weak. This can happen after pregnancy and vaginal childbirth or a hysterectomy. It can also be due to excess body weight or hormone changes.    Urge incontinence (also called overactive bladder). With this type, a sudden urge to urinate is felt often. This may happen even though there may not be much urine in the bladder. The need to urinate often during the night is common. Urge incontinence most often occurs because of bladder spasms. This may be due to bladder irritation or infection. Damage to bladder nerves or pelvic muscles, constipation, and certain medicines can also lead to urge incontinence.  Treatment depends on  the cause. Further evaluation is needed to find the type you have. This will likely include an exam and certain tests. Based on the results, you and your healthcare provider can then plan treatment. Until a diagnosis is made, the home care tips below can help ease symptoms.   Home care    Do pelvic floor muscle exercises, if they are prescribed. The pelvic floor muscles help support the bladder and urethra. Many women find that their symptoms improve when doing special exercises that strengthen these muscles. To do the exercises, contract the muscles you would use to stop your stream of urine. But do this when you re not urinating. Hold for 10 seconds, then relax. Repeat 10 to 20 times in a row, at least 3 times a day. Your healthcare provider may give you other instructions for how to do the exercises and how often.    Keep a bladder diary. This helps track how often and how much you urinate over a set period of time. Bring this diary with you to your next visit with the provider. The information can help your provider learn more about your bladder problem.    Lose weight, if advised to by your provider. Extra weight puts pressure on the bladder. Your provider can help you create a weight-loss plan that s right for you. This may include exercising more and making certain diet changes.    Don't have foods and drinks that may irritate the bladder. These can include alcohol and caffeinated drinks.    Quit smoking. Smoking and other tobacco use can lead to a long-term (chronic) cough that strains the pelvic floor muscles. Smoking may also damage the bladder and urethra. Talk with your provider about treatments or methods you can use to quit smoking.    If drinking large amounts of fluid makes you have symptoms, you may be advised to limit your fluid intake. You may also be advised to drink most of your fluids during the day and to limit fluids at night.    If you re worried about urine leakage or accidents, you may  wear absorbent pads to catch urine. Change the pads often. This helps reduce discomfort. It may also reduce the risk of skin or bladder infections.    Follow-up care  Follow up with your healthcare provider, or as directed. It may take some to find the right treatment for your problem. But healthy lifestyle changes can be made right away. These include such things as exercising on a regular basis, eating a healthy diet, losing weight (if needed), and quitting smoking. Your treatment plan may include special therapies or medicines. Certain procedures or surgery may also be options. Talk about any questions you have with your provider.   When to seek medical advice  Call the healthcare provider right away if any of these occur:    Fever of 100.4 F (38 C) or higher, or as directed by your provider    Bladder pain or fullness    Belly swelling    Nausea or vomiting    Back pain    Weakness, dizziness, or fainting  [employer] last reviewed this educational content on 1/1/2020 2000-2021 [employer]. All rights reserved. This information is not intended as a substitute for professional medical care. Always follow your healthcare professional's instructions.          Depression and Suicide in Older Adults    Nearly 2 million older Americans have some type of depression. Some of them even take their own lives. Yet depression among older adults is often ignored. Learn the warning signs. You may help spare a loved one needless pain. You may also save a life.   What is depression?  Depression is a common and serious illness that affects the way you think and feel. It is not a normal part of aging, nor is it a sign of weakness, a character flaw, or something you can snap out of. Most people with depression need treatment to get better. The most common symptom is a feeling of deep sadness. People who are depressed also may seem tired and listless. And nothing seems to give them pleasure. It s normal to grieve or be  "sad sometimes. But sadness lessens or passes with time. Depression rarely goes away or improves on its own. A person with clinical depression can't \"snap out of it.\" Other symptoms of depression are:     Sleeping more or less than normal    Eating more or less than normal    Having headaches, stomachaches, or other pains that don t go away    Feeling nervous,  empty,  or worthless    Crying a great deal    Thinking or talking about suicide or death    Loss of interest in activities previously enjoyed    Social isolation    Feeling confused or forgetful  What causes it?  The causes of depression aren t fully known. But it is thought to result from a complex blend of these factors:     Biochemistry. Certain chemicals in the brain play a role.    Genes. Depression does run in families.    Life stress. Life stresses can also trigger depression in some people. Older adults often face many stressors, such as death of friends or a spouse, health problems, and financial concerns.    Chronic conditions. This includes conditions such as diabetes, heart disease, or cancer. These can cause symptoms of depression. Medicine side effects can cause changes in thoughts and behaviors.  How you can help  Often, depressed people may not want to ask for help. When they do, they may be ignored. Or, they may receive the wrong treatment. You can help by showing parents and older friends love and support. If they seem depressed, don t lecture the person, ignore the symptoms, or discount the symptoms as a  normal  part of aging -which they are not. Get involved, listen, and show interest and support.   Help them understand that depression is a treatable illness. Tell them you can help them find the right treatment. Offer to go to their healthcare provider's appointment with them for support when the symptoms are discussed. With their approval, contact a local mental health center, social service agency, or hospital about services.   You can " be an advocate for him or her at healthcare appointments. Many older adults have chronic illnesses that can cause symptoms of depression. Medicine side effects can change thoughts and behaviors. You can help make sure that the healthcare provider looks at all of these factors. He or she should refer your family member or friend to a mental healthcare provider when needed. in some cases, untreated depression can lead to a misdiagnosis. A person may be diagnosed with a brain disorder such as dementia. If the healthcare provider does not take the issue of depression seriously, help your family member or friend to find another provider.   Don't be afraid to ask  If you think an older person you care about could be suicidal, ask,  Have you thought about suicide?  Most people will tell you the truth. If they say  yes,  they may already have a plan for how and when they will attempt it. Find out as much as you can. The more detailed the plan, and the easier it is to carry out, the more danger the person is in right now. Tell the person you are there for them and do not want them to harm him or herself. Don't wait to get help for the person. Call the person's healthcare provider, local hospital, or emergency services.   To learn more    National Suicide Prevention Lifeline (crisis hotline) 689-626-OPCD (061-851-5825)    National [place] of Mental Xqprqs690-479-1193tbv.[facility].nih.gov    National [place] on Mental Kwxqcpu651-832-6334loh.nikki.org    Mental Health Svppxby842-346-2723bgx.[facility].org    National Suicide Pcnmawo327-VHTQBBO (039-750-6571)    Call 911  Never leave the person alone. A person who is actively suicidal needs psychiatric care right away. They will need constant supervision. Never leave the person out of sight. Call 911 or the national 24-hour suicide crisis hotline at 584-464-KDCO (043-657-1984). You can also take the person to the closest emergency room.   [employer] last reviewed this educational content  on 5/1/2020 2000-2021 [employer]. All rights reserved. This information is not intended as a substitute for professional medical care. Always follow your healthcare professional's instructions.

## 2021-10-12 ENCOUNTER — TELEPHONE (OUTPATIENT)
Dept: PEDIATRICS | Facility: CLINIC | Age: 76
End: 2021-10-12

## 2021-10-12 DIAGNOSIS — E11.42 TYPE 2 DIABETES MELLITUS WITH DIABETIC POLYNEUROPATHY, WITHOUT LONG-TERM CURRENT USE OF INSULIN (H): ICD-10-CM

## 2021-10-12 NOTE — TELEPHONE ENCOUNTER
The pharmacy needs a prescription for lancets, we had received an order earlier but it was written for the lancing device. If we could please get a new order for lancets sent down that would be great.     Thank you,  Trinh [facility] Disha

## 2021-10-13 RX ORDER — TRAZODONE HYDROCHLORIDE 50 MG/1
25-50 TABLET, FILM COATED ORAL AT BEDTIME
Qty: 90 TABLET | Refills: 1
Start: 2021-10-13

## 2021-10-13 RX ORDER — ACETAMINOPHEN 500 MG
500-1000 TABLET ORAL EVERY 6 HOURS PRN
COMMUNITY

## 2021-10-13 NOTE — PATIENT INSTRUCTIONS
Recommendations from today's MTM visit:                                                      1. Refilled test strips.    2. I will reach out to your urologist regarding safety of a medication such as Jardiance to replace glipizide. If agreeable then I will help check on cost.    3. We do not have your vaccine records, you would be a candidate for a pneumovax and shinges vaccination.    4. For sleep, you can consider splitting the trazodone in half. As you go up in the doses of medications for anxiety and depression, we want to monitor and lower your risk for serotonin syndrome.    5. Please discuss pain and let amanda know what pain medication you are talking about.    Follow-up: No follow-ups on file.    It was great to speak with you today.  I value your experience and would be very thankful for your time with providing feedback on our clinic survey. You may receive a survey via email or text message in the next few days.     To schedule another MTM appointment, please call the clinic directly or you may call the MTM scheduling line at [phone] or toll-free at [phone].     My Clinical Pharmacist's contact information:                                                      Please feel free to contact me with any questions or concerns you have.      Carmella Abraham, Feli  Medication Therapy Management Pharmacist  Pager#: [phone]

## 2021-10-15 ENCOUNTER — LAB (OUTPATIENT)
Dept: LAB | Facility: CLINIC | Age: 76
End: 2021-10-15
Payer: COMMERCIAL

## 2021-10-15 ENCOUNTER — TELEPHONE (OUTPATIENT)
Dept: UROLOGY | Facility: CLINIC | Age: 76
End: 2021-10-15

## 2021-10-15 DIAGNOSIS — N39.0 RECURRENT UTI: Primary | ICD-10-CM

## 2021-10-15 DIAGNOSIS — R30.0 DYSURIA: ICD-10-CM

## 2021-10-15 DIAGNOSIS — N39.0 RECURRENT UTI: ICD-10-CM

## 2021-10-15 LAB
ALBUMIN UR-MCNC: 100 MG/DL
APPEARANCE UR: ABNORMAL
BACTERIA #/AREA URNS HPF: ABNORMAL /HPF
BILIRUB UR QL STRIP: ABNORMAL
COLOR UR AUTO: YELLOW
GLUCOSE UR STRIP-MCNC: NEGATIVE MG/DL
HGB UR QL STRIP: ABNORMAL
KETONES UR STRIP-MCNC: ABNORMAL MG/DL
LEUKOCYTE ESTERASE UR QL STRIP: ABNORMAL
NITRATE UR QL: NEGATIVE
PH UR STRIP: 5 [PH] (ref 5–7)
RBC #/AREA URNS AUTO: ABNORMAL /HPF
SP GR UR STRIP: 1.02 (ref 1–1.03)
SQUAMOUS #/AREA URNS AUTO: ABNORMAL /LPF
UROBILINOGEN UR STRIP-ACNC: 1 E.U./DL
WBC #/AREA URNS AUTO: ABNORMAL /HPF

## 2021-10-15 PROCEDURE — 87088 URINE BACTERIA CULTURE: CPT

## 2021-10-15 PROCEDURE — 87186 SC STD MICRODIL/AGAR DIL: CPT

## 2021-10-15 PROCEDURE — 87086 URINE CULTURE/COLONY COUNT: CPT

## 2021-10-15 PROCEDURE — 81001 URINALYSIS AUTO W/SCOPE: CPT

## 2021-10-15 NOTE — TELEPHONE ENCOUNTER
Patient called stating that she has been off the antibiotics for 2 weeks and now is having symptoms of a UTI.  She has cloudy urine and dysuria.  Orders placed for a ua/uc which patient will go into the [facility] lab for testing.  Joy Horner RN

## 2021-10-17 LAB — BACTERIA UR CULT: ABNORMAL

## 2021-10-18 ENCOUNTER — TELEPHONE (OUTPATIENT)
Dept: UROLOGY | Facility: CLINIC | Age: 76
End: 2021-10-18

## 2021-10-18 DIAGNOSIS — R30.0 DYSURIA: ICD-10-CM

## 2021-10-18 DIAGNOSIS — N39.0 RECURRENT UTI: Primary | ICD-10-CM

## 2021-10-18 RX ORDER — SULFAMETHOXAZOLE/TRIMETHOPRIM 800-160 MG
1 TABLET ORAL 2 TIMES DAILY
Qty: 14 TABLET | Refills: 0 | Status: SHIPPED | OUTPATIENT
Start: 2021-10-18 | End: 2021-10-25

## 2021-10-18 NOTE — TELEPHONE ENCOUNTER
Message left for patient to call back to [facility] regarding treatment for her recent urine culture.  Antibiotic sent to pharmacy.  Joy Horner RN    Patient returned call and will start antibiotic today.  Joy Horner RN

## 2021-10-20 ENCOUNTER — OFFICE VISIT (OUTPATIENT)
Dept: PODIATRY | Facility: CLINIC | Age: 76
End: 2021-10-20
Attending: NURSE PRACTITIONER
Payer: COMMERCIAL

## 2021-10-20 ENCOUNTER — ANCILLARY PROCEDURE (OUTPATIENT)
Dept: GENERAL RADIOLOGY | Facility: CLINIC | Age: 76
End: 2021-10-20
Attending: PODIATRIST
Payer: COMMERCIAL

## 2021-10-20 VITALS
BODY MASS INDEX: 46.25 KG/M2 | SYSTOLIC BLOOD PRESSURE: 118 MMHG | DIASTOLIC BLOOD PRESSURE: 70 MMHG | HEIGHT: 63 IN | WEIGHT: 261 LBS

## 2021-10-20 DIAGNOSIS — R09.89 DECREASED PULSES IN FEET: ICD-10-CM

## 2021-10-20 DIAGNOSIS — M21.6X1 PRONATION OF BOTH FEET: Primary | ICD-10-CM

## 2021-10-20 DIAGNOSIS — I73.9 PAD (PERIPHERAL ARTERY DISEASE) (H): ICD-10-CM

## 2021-10-20 DIAGNOSIS — E11.42 TYPE 2 DIABETES MELLITUS WITH DIABETIC POLYNEUROPATHY, WITHOUT LONG-TERM CURRENT USE OF INSULIN (H): ICD-10-CM

## 2021-10-20 DIAGNOSIS — M19.079 ARTHRITIS OF MIDFOOT: ICD-10-CM

## 2021-10-20 DIAGNOSIS — E66.01 OBESITY, CLASS III, BMI 40-49.9 (MORBID OBESITY) (H): ICD-10-CM

## 2021-10-20 DIAGNOSIS — M21.6X2 PRONATION OF BOTH FEET: Primary | ICD-10-CM

## 2021-10-20 DIAGNOSIS — M25.572 PAIN IN JOINT, ANKLE AND FOOT, LEFT: ICD-10-CM

## 2021-10-20 PROCEDURE — 73630 X-RAY EXAM OF FOOT: CPT | Mod: LT | Performed by: RADIOLOGY

## 2021-10-20 PROCEDURE — 99203 OFFICE O/P NEW LOW 30 MIN: CPT | Performed by: PODIATRIST

## 2021-10-20 ASSESSMENT — MIFFLIN-ST. JEOR: SCORE: 1647.3

## 2021-10-20 NOTE — PATIENT INSTRUCTIONS
Thank you for choosing [facility] Podiatry / Foot & Ankle Surgery!    DR. VICENTE'S CLINIC LOCATIONS     [facility] SCHEDULE SURGERY: [phone]   [address] [place] [address] APPOINTMENTS: [phone]   [place] [address] BILLING QUESTIONS: [phone]  [phone] RADIOLOGY: [phone]       [place]    [address]    [place] [id number] [phone]  [phone]        Next steps: KHALIDA testing, orthotics    ANKLE-BRACHIAL INDEX (KHALIDA) EXAM  [facility] - [phone]  [facility]   [address] [place] [address] [id number]      [facility] LOCATIONS  [facility]  [address]  [place] [address]  Phone: [phone]  Fax: [phone] [facility]  [address]  [place] [address]  Phone: [phone]   Fax: [phone]   [facility]  [address]  [place] [address]  Phone: [phone]  Fax: [phone] [facility]  [address]  [place] [address]  Phone: [phone]   Fax: [phone]   [facility]   [address]  [place] [address]  Phone: [phone]  Fax: [phone] * Please call any location listed to make an appointment for a casting/fitting. Your referral was sent to their central office and they will all have the order on file.         DIABETES AND YOUR FEET  Diabetes can result in several problems in the feet including ulcers (open sores) and amputations. Two of the most important reasons why people develop foot problems when they have diabetes is : 1. Neuropathy (loss of feeling)  2. Vascular disease (loss or decrease of blood flow).    Neuropathy is a term used to describe a loss of nerve function.  Patients with diabetes are at risk of developing neuropathy if their sugars continue to run high and are above the normal value. One  "theory for neuropathy is that the \"extra\" sugar in the body enters the nerves and is broken down. These by-products build up in the nerve causing it to swell and impairing nerve function. Often times, this can be prevented by controlling your sugars, dieting and exercise.    When a person develops neuropathy, they usually begin to feel numbness or tingling in their feet and sometime in their legs.  Other symptoms may include painful burning or hot feet, tingling or feeling like insects or ants are crawling on your feet or legs.  If the diabetes is sever and the sugars run high for long periods of time, neuropathy can also occur in the hands.    Vascular disease  is a term used to describe a loss or decrease in circulation (blood flow). There is a problem in getting blood and oxygen to areas that need it. Similar to neuropathy, sugars can build up in the walls of the arteries (blood vessels) and cause them to become swollen, thickened and hardened. This decreases the amount of blood that can go to an area that needs it. Though this is common in the legs of diabetic patients, it can also affect other arteries (blood vessels) in the body such as in the heart and eyes.    In the legs, vascular disease usually results in cramping. Patients who develop leg cramps after walking the same distance every time (i.e. One block, half a mile, ect.) need to let their doctors know so that their circulation may be checked. Cramps causing severe pain in the feet and/or legs while sleeping and the cramps go away when you stand or hang your legs off the side of the bed, may also be a sign of poor blood circulation.  Occasional cramping in cold weather or on rare occasions with activity may not be due to poor circulation, but you should inform your doctor.    PREVENTION OF THESE DISEASES  The key to prevention is good blood sugar control. Poor blood sugar control is a big reason many of these problems start. Physical activity (exercise) " "is a very good way to help decrease your blood sugars. Exercise can lower your blood sugar, blood pressure, and cholesterol. It also reduces your risk for heart disease and stroke, relieves stress, and strengthens your heart, muscles and bones.  In addition, regular activity helps insulin work better, improves your blood circulation, and keeps your joints flexible. If you're trying to lose weight, a combination of exercise and wise food choices can help you reach your target weight and maintain it.      PAIN MANAGEMENT (**Please speak with your primary doctor about any medications**)  1.Blood Sugar Control - Most important  2. Medications such as:  Amytriptylline, duloxetine, gabapentin, lyrica, tramadol (talk with your primary care doctor about this).     NUTRITION:  Nutrition is also important to help with healing. If your body does not have what it needs, it can't heal.   1. Increasing your protein intake is important.  With wounds you need 60-90gm of protein a day to help with healing. Over the counter protein shakes such as Dimitri, Glucerna, Ensure, ect... can help to supplement your daily protein intake.   2. It is also important to take Vitamins to help with healing.  Vitamins such as B12, B6 and Vitamin D3 are important for healing. These can be gotten over the counter at pharmacies or at stores like [employer] or the [employer].    I can also prescribe a dietary supplement called \"Rheumate\" that has a lot of essential vitamins in one capsule.  This may not be covered by insurance though.     FOOT CARE RECOMMENDATIONS   1. Wash your feet with lukewarm water and a mild soap and then dry them thoroughly, especially between the toes.     2. Examine your feet daily looking for cuts, corns, blisters, cracks, ect, especially after wearing new shoes. Make sure to look between your toes. If you cannot see the bottom of your feet, set a mirror on the floor and hold your foot over it, or ask a spouse, friend or family " member to examine your feet for you. Contact your doctor immediately if new problems are noted or if sores are not healing.     3. Immediately apply moisturizer to the tops and bottoms of your feet, avoiding areas between the toes. Hand lotion (Intesive Care, Sofya, Eucerin, Neutrogena, Curel, ect) is sufficient unless your doctor prescribes a medicated lotion. Apply sunscreen to your feet when going swimming outside.     4. Use clean comfortable shoes, wear white socks (if you have any bleeding or drainage, you will see it on white socks). Socks should not have thick seams or cut off the circulation around the leg. Break in new shoes slowly and rotate with older shoes until broken in. Check the inside of your shoes with your hand to look for areas of irritation or objects that may have fallen into your shoes.       5. Keep slippers by the side of your bed for use during the night.     6.  Shoes should be fitted by a professional and should not cause areas of irritation.  Check your feet regularly when wearing a new pair of shoes and replace them as needed.     7.  Talk to your doctor about proper exercise. Exercise and stretching stimulate blood flow to your feet and maintain proper glucose levels.     8.  Monitor your blood glucose level as instructed by your doctor. Notify your doctor immediately if your blood sugar is abnormally high or low.    9. Cut your nails straight across, but then gently round any sharp edges with a cardboard nail file. If you have neuropathy, peripheral vascular disease or cannot see that well to trim your own toenails contact [employer] ([phone]) or [employer] ([phone]).      THINGS TO AVOID DOING   1.  Do not soak your feet if you have an open sore. Use only lukewarm water and always check the temperature with your hand as hot water can easily burn your feet.       2.  Never use a hot water bottle or heating pad on your feet. Also do not apply cold compresses to your feet.  With decreased sensation, you could burn or freeze your feet.       3.  Do not apply any of these to your feet:    -  Over the counter medicine for corns or warts    -  Harsh chemicals like boric acid    -  Do not self-treat corns, cuts, blisters or infections. Always consult your doctor.       4.  Do not wear sandals, slippers or walk barefoot, especially on hot sand or concrete or other harsh surfaces.     5.  If you smoke, stop!!!

## 2021-10-20 NOTE — LETTER
10/20/2021         RE: Zeynep Martinez  [address]  [place] [facility] [address]        Dear Colleague,    Thank you for referring your patient, Zeynep Martinez, to the [facility] PODIATRY. Please see a copy of my visit note below.    ASSESSMENT:  Encounter Diagnoses   Name Primary?     Pain in joint, ankle and foot, left      Pronation of both feet Yes     Arthritis of midfoot      Type 2 diabetes mellitus with diabetic polyneuropathy, without long-term current use of insulin (H)      Obesity, Class III, BMI 40-49.9 (morbid obesity) (H)      Decreased pulses in feet      PAD (peripheral artery disease) (H)      MEDICAL DECISION MAKING:  Given her body weight, foot structure and arthritis seen on x-ray, I suspect much of her foot pain is mechanical.  She admits that she does not wear shoes very often.    I personally reviewed the x-ray images with Zeynep and her daughter.  No acute findings.    I think it is important for her to have better support.  I will refer her for diabetic shoes and custom molded multidensity orthoses.  I highly encouraged her to try to wear shoes and support her feet more often.    Because she has night pain is relieved with placing her foot in a dependent position, I will order noninvasive vascular studies.  This might result in a referral to the vascular Health Center.    She was provided an after visit summary with information regarding diabetes and foot care.    Disclaimer: This note consists of symbols derived from keyboarding, dictation and/or voice recognition software. As a result, there may be errors in the script that have gone undetected. Please consider this when interpreting information found in this chart.    Eliot Varghese DPM, FACFAS, [facility] Department of Podiatry/Foot & Ankle Surgery      ____________________________________________________________________    HPI:       I was asked by BENEDICTO Gipson CNP to evaluate this  "patient for left foot and ankle pain.  Chief Complaint: pain in feet  She describes 2 regions of pain: Anterolateral ankle and medial midfoot.  Onset of problem: 2 years  Pain Rating:  10/10 at worst   Frequency:  \"comes and goes\"  The pain is exacerbated by elevating leg.  She will have pain at night which is relieved with placing the foot in a dependent position.  Non-smoker    Type 2 DM  Reports numbness in her feet  She has peripheral neuropathy.  *  Past Medical History:   Diagnosis Date     Anxiety      Arthritis      Bladder infection      Calculus of kidney     multiple, uric acid and calcium     Cancer (H) Uterine 2/2001     Cancer (H)     uterine     Depression      Diabetes mellitus (H)     type 2     Frequent UTI      Gout      History of anesthesia complications     Slow to wake and gets very anxious     Hyperlipidemia LDL goal <100      Kidney stone      Type 2 diabetes mellitus without complication, without long-term current use of insulin (H)      Ulcer     Ulcer in the anastomosis   *  *  Past Surgical History:   Procedure Laterality Date     ABDOMINOPLASTY       APPENDECTOMY       APPENDECTOMY       EXTRACORPOREAL SHOCK WAVE LITHOTRIPSY (ESWL)       EXTRACORPOREAL SHOCK WAVE LITHOTRIPSY (ESWL)       galbaldder       GASTRIC BYPASS       HYSTERECTOMY       HYSTERECTOMY TOTAL ABDOMINAL, BILATERAL SALPINGO-OOPHORECTOMY, COMBINED      no cervix     IR MISCELLANEOUS PROCEDURE  3/18/2008     IR MISCELLANEOUS PROCEDURE  3/18/2008     IR MISCELLANEOUS PROCEDURE  6/10/2008     IR MISCELLANEOUS PROCEDURE  1/24/2012     IR URETER DILATION BILATERAL  1/24/2012     IR URETER DILATION BILATERAL  3/18/2008     DC CYSTO/URETERO W/LITHOTRIPSY &INDWELL STENT INSRT Left 10/26/2018    Procedure:  CYSTOSCOPY, LEFT  URETEROSCOPY, LASER LITHOTRIPSY STENT INSERTION;  Surgeon: Long Mansfield MD;  Location: [facility];  Service: Urology     DC CYSTO/URETERO W/LITHOTRIPSY &INDWELL STENT INSRT Right 5/28/2021    " Procedure: CYSTOURETEROSCOPY, URETEROSCOPY, URETERAL DILATION WITH RETROGRADE PYELOGRAM, AND STENT INSERTION RIGHT;  Surgeon: Long Mansfield MD;  [facility]: [facility];  Service: Urology     CO ERCP W/BIOPSY SINGLE/MULTIPLE       REVISION CATHERINE-EN-Y       SKIN SURGERY  2013    15 lbs of skin/adipose removed     STOMACH SURGERY       URETEROSCOPY     *  *  Current Outpatient Medications   Medication Sig Dispense Refill     acetaminophen (TYLENOL) 500 MG tablet Take 500-1,000 mg by mouth every 6 hours as needed for mild pain       ALPRAZolam (XANAX) 0.5 MG tablet Take 1 tablet (0.5 mg) by mouth daily as needed for anxiety 15 tablet 1     blood glucose (ACCU-CHEK PASQUALE PLUS) test strip Use to test blood sugar 1-2 times daily or as directed. 200 strip 5     blood glucose (NO BRAND SPECIFIED) lancets standard Use to test blood sugar 1-2 times daily or as directed. 1 each 4     blood glucose (NO BRAND SPECIFIED) test strip Use to test blood sugar 1-2 times daily or as directed. 100 strip 4     blood glucose monitoring (NO BRAND SPECIFIED) meter device kit Use to test blood sugar 1-2 times daily or as directed. 1 kit 0     busPIRone (BUSPAR) 10 MG tablet Take 5 mg PO daily for one week, then 5 mg PO BID for one week, then 10 mg PO BID 60 tablet 1     capsaicin (ZOSTRIX) 0.025 % external cream Apply 4 times a day as needed for nerve pain (Patient not taking: Reported on 10/6/2021) 50 g 0     gabapentin (NEURONTIN) 800 MG tablet Take 1 tablet (800 mg) by mouth 2 times daily 40 tablet 0     glipiZIDE (GLUCOTROL XL) 5 MG 24 hr tablet Take 1 tablet (5 mg) by mouth 2 times daily 180 tablet 1     meloxicam (MOBIC) 7.5 MG tablet Take 1 tablet (7.5 mg) by mouth daily 30 tablet 0     metFORMIN (GLUCOPHAGE-XR) 500 MG 24 hr tablet Take 2 tablets (1,000 mg) by mouth 2 times daily (with meals) 360 tablet 1     QUEtiapine (SEROQUEL) 25 MG tablet Take 1 tablet (25 mg) by mouth nightly as needed (insomnia) 90 tablet 0     STATIN  "NOT PRESCRIBED (INTENTIONAL) Please choose reason not prescribed from choices below.       sulfamethoxazole-trimethoprim (BACTRIM DS) 800-160 MG tablet Take 1 tablet by mouth 2 times daily for 7 days 14 tablet 0     traZODone (DESYREL) 50 MG tablet Take 0.5-1 tablets (25-50 mg) by mouth At Bedtime 90 tablet 1     venlafaxine (EFFEXOR-XR) 150 MG 24 hr capsule Take 1 capsule (150 mg) by mouth daily 90 capsule 1     venlafaxine (EFFEXOR-XR) 75 MG 24 hr capsule Take 1 capsule (75 mg) by mouth daily 90 capsule 1         EXAM:    Vitals: /70   Ht 1.591 m (5' 2.64\")   Wt 118.4 kg (261 lb)   LMP  (LMP Unknown)   BMI 46.77 kg/m    BMI: Body mass index is 46.77 kg/m .    Constitutional:  Zeynep Martinez is in no apparent distress, appears well-nourished.  Cooperative with history and physical exam.    Diabetic Foot Exam (details below): Nonpalpable DP and PT pulses, no trophic changes or ulcerative lesions and diminished sensory exam    Vascular:  Pedal pulses are readily palpable bilateral for both the DP and PT arteries.  CFT < 3 sec. generalized bilateral lower extremity edema    Neuro: Light touch sensation is intact to the L4, L5, S1 distributions  No evidence of weakness, spasticity, or contracture in the lower extremities.   Protective sensation is absent, bilateral forefoot.    Derm: Normal texture and turgor.  No erythema, ecchymosis, or cyanosis.  No open lesions.     Musculoskeletal:    Lower extremity muscle strength is normal. No gross deformities.  Although she maintains a medial longitudinal arch, there is abnormal pronation with weightbearing.    X-Ray Findings:  I personally reviewed the left foot images.  Midfoot degenerative changes.  Plantar and posterior calcaneal spurring.  Evidence of old fifth metatarsal fracture with resulting fracture deformity.  No acute findings.              Again, thank you for allowing me to participate in the care of your patient.        Sincerely,        Eliot" Albaro Varghese, DPM

## 2021-10-20 NOTE — PROGRESS NOTES
"ASSESSMENT:  Encounter Diagnoses   Name Primary?     Pain in joint, ankle and foot, left      Pronation of both feet Yes     Arthritis of midfoot      Type 2 diabetes mellitus with diabetic polyneuropathy, without long-term current use of insulin (H)      Obesity, Class III, BMI 40-49.9 (morbid obesity) (H)      Decreased pulses in feet      PAD (peripheral artery disease) (H)      MEDICAL DECISION MAKING:  Given her body weight, foot structure and arthritis seen on x-ray, I suspect much of her foot pain is mechanical.  She admits that she does not wear shoes very often.    I personally reviewed the x-ray images with Zeynep and her daughter.  No acute findings.    I think it is important for her to have better support.  I will refer her for diabetic shoes and custom molded multidensity orthoses.  I highly encouraged her to try to wear shoes and support her feet more often.    Because she has night pain is relieved with placing her foot in a dependent position, I will order noninvasive vascular studies.  This might result in a referral to the vascular Health Center.    She was provided an after visit summary with information regarding diabetes and foot care.    Disclaimer: This note consists of symbols derived from keyboarding, dictation and/or voice recognition software. As a result, there may be errors in the script that have gone undetected. Please consider this when interpreting information found in this chart.    Eliot Varghese DPM, FACFAS, MS    [place] Department of Podiatry/Foot & Ankle Surgery      ____________________________________________________________________    HPI:       I was asked by BENEDICTO Gipson CNP to evaluate this patient for left foot and ankle pain.  Chief Complaint: pain in feet  She describes 2 regions of pain: Anterolateral ankle and medial midfoot.  Onset of problem: 2 years  Pain Rating:  10/10 at worst   Frequency:  \"comes and goes\"  The pain is exacerbated by elevating " leg.  She will have pain at night which is relieved with placing the foot in a dependent position.  Non-smoker    Type 2 DM  Reports numbness in her feet  She has peripheral neuropathy.  *  Past Medical History:   Diagnosis Date     Anxiety      Arthritis      Bladder infection      Calculus of kidney     multiple, uric acid and calcium     Cancer (H) Uterine 2/2001     Cancer (H)     uterine     Depression      Diabetes mellitus (H)     type 2     Frequent UTI      Gout      History of anesthesia complications     Slow to wake and gets very anxious     Hyperlipidemia LDL goal <100      Kidney stone      Type 2 diabetes mellitus without complication, without long-term current use of insulin (H)      Ulcer     Ulcer in the anastomosis   *  *  Past Surgical History:   Procedure Laterality Date     ABDOMINOPLASTY       APPENDECTOMY       APPENDECTOMY       EXTRACORPOREAL SHOCK WAVE LITHOTRIPSY (ESWL)       EXTRACORPOREAL SHOCK WAVE LITHOTRIPSY (ESWL)       galbaldder       GASTRIC BYPASS       HYSTERECTOMY       HYSTERECTOMY TOTAL ABDOMINAL, BILATERAL SALPINGO-OOPHORECTOMY, COMBINED      no cervix     IR MISCELLANEOUS PROCEDURE  3/18/2008     IR MISCELLANEOUS PROCEDURE  3/18/2008     IR MISCELLANEOUS PROCEDURE  6/10/2008     IR MISCELLANEOUS PROCEDURE  1/24/2012     IR URETER DILATION BILATERAL  1/24/2012     IR URETER DILATION BILATERAL  3/18/2008     [place] CYSTO/URETERO W/LITHOTRIPSY &INDWELL STENT INSRT Left 10/26/2018    Procedure:  CYSTOSCOPY, LEFT  URETEROSCOPY, LASER LITHOTRIPSY STENT INSERTION;  Surgeon: Long Mansfield MD;  Location: [facility];  Service: Urology     TX CYSTO/URETERO W/LITHOTRIPSY &INDWELL STENT INSRT Right 5/28/2021    Procedure: CYSTOURETEROSCOPY, URETEROSCOPY, URETERAL DILATION WITH RETROGRADE PYELOGRAM, AND STENT INSERTION RIGHT;  Surgeon: Long Mansfield MD;  Location: [facility];  Service: Urology     TX ERCP W/BIOPSY SINGLE/MULTIPLE       REVISION CATHERINE-EN-Y       SKIN  SURGERY  2013    15 lbs of skin/adipose removed     STOMACH SURGERY       URETEROSCOPY     *  *  Current Outpatient Medications   Medication Sig Dispense Refill     acetaminophen (TYLENOL) 500 MG tablet Take 500-1,000 mg by mouth every 6 hours as needed for mild pain       ALPRAZolam (XANAX) 0.5 MG tablet Take 1 tablet (0.5 mg) by mouth daily as needed for anxiety 15 tablet 1     blood glucose (ACCU-CHEK PASQUALE PLUS) test strip Use to test blood sugar 1-2 times daily or as directed. 200 strip 5     blood glucose (NO BRAND SPECIFIED) lancets standard Use to test blood sugar 1-2 times daily or as directed. 1 each 4     blood glucose (NO BRAND SPECIFIED) test strip Use to test blood sugar 1-2 times daily or as directed. 100 strip 4     blood glucose monitoring (NO BRAND SPECIFIED) meter device kit Use to test blood sugar 1-2 times daily or as directed. 1 kit 0     busPIRone (BUSPAR) 10 MG tablet Take 5 mg PO daily for one week, then 5 mg PO BID for one week, then 10 mg PO BID 60 tablet 1     capsaicin (ZOSTRIX) 0.025 % external cream Apply 4 times a day as needed for nerve pain (Patient not taking: Reported on 10/6/2021) 50 g 0     gabapentin (NEURONTIN) 800 MG tablet Take 1 tablet (800 mg) by mouth 2 times daily 40 tablet 0     glipiZIDE (GLUCOTROL XL) 5 MG 24 hr tablet Take 1 tablet (5 mg) by mouth 2 times daily 180 tablet 1     meloxicam (MOBIC) 7.5 MG tablet Take 1 tablet (7.5 mg) by mouth daily 30 tablet 0     metFORMIN (GLUCOPHAGE-XR) 500 MG 24 hr tablet Take 2 tablets (1,000 mg) by mouth 2 times daily (with meals) 360 tablet 1     QUEtiapine (SEROQUEL) 25 MG tablet Take 1 tablet (25 mg) by mouth nightly as needed (insomnia) 90 tablet 0     STATIN NOT PRESCRIBED (INTENTIONAL) Please choose reason not prescribed from choices below.       sulfamethoxazole-trimethoprim (BACTRIM DS) 800-160 MG tablet Take 1 tablet by mouth 2 times daily for 7 days 14 tablet 0     traZODone (DESYREL) 50 MG tablet Take 0.5-1 tablets  "(25-50 mg) by mouth At Bedtime 90 tablet 1     venlafaxine (EFFEXOR-XR) 150 MG 24 hr capsule Take 1 capsule (150 mg) by mouth daily 90 capsule 1     venlafaxine (EFFEXOR-XR) 75 MG 24 hr capsule Take 1 capsule (75 mg) by mouth daily 90 capsule 1         EXAM:    Vitals: /70   Ht 1.591 m (5' 2.64\")   Wt 118.4 kg (261 lb)   LMP  (LMP Unknown)   BMI 46.77 kg/m    BMI: Body mass index is 46.77 kg/m .    Constitutional:  Zeynep Martinez is in no apparent distress, appears well-nourished.  Cooperative with history and physical exam.    Diabetic Foot Exam (details below): Nonpalpable DP and PT pulses, no trophic changes or ulcerative lesions and diminished sensory exam    Vascular:  Pedal pulses are readily palpable bilateral for both the DP and PT arteries.  CFT < 3 sec. generalized bilateral lower extremity edema    Neuro: Light touch sensation is intact to the L4, L5, S1 distributions  No evidence of weakness, spasticity, or contracture in the lower extremities.   Protective sensation is absent, bilateral forefoot.    Derm: Normal texture and turgor.  No erythema, ecchymosis, or cyanosis.  No open lesions.     Musculoskeletal:    Lower extremity muscle strength is normal. No gross deformities.  Although she maintains a medial longitudinal arch, there is abnormal pronation with weightbearing.    X-Ray Findings:  I personally reviewed the left foot images.  Midfoot degenerative changes.  Plantar and posterior calcaneal spurring.  Evidence of old fifth metatarsal fracture with resulting fracture deformity.  No acute findings.          "

## 2021-10-26 ENCOUNTER — VIRTUAL VISIT (OUTPATIENT)
Dept: PSYCHOLOGY | Facility: CLINIC | Age: 76
End: 2021-10-26
Payer: COMMERCIAL

## 2021-10-26 DIAGNOSIS — F41.1 GAD (GENERALIZED ANXIETY DISORDER): ICD-10-CM

## 2021-10-26 DIAGNOSIS — F33.1 MAJOR DEPRESSIVE DISORDER, RECURRENT EPISODE, MODERATE WITH ANXIOUS DISTRESS (H): Primary | ICD-10-CM

## 2021-10-26 PROCEDURE — 90834 PSYTX W PT 45 MINUTES: CPT | Mod: 95 | Performed by: SOCIAL WORKER

## 2021-10-26 NOTE — PROGRESS NOTES
Progress Note    Patient Name: Zeynep Martinez  Date: 10-26-21         Service Type: Individual      Session Start Time: 3:00  Session End Time: 3:45     Session Length: 45    Session #: 5    Attendees: Client    Service Modality:  Video Visit:      Provider verified identity through the following two step process.  Patient provided:  Patient  and Patient address    Telemedicine Visit: The patient's condition can be safely assessed and treated via synchronous audio and visual telemedicine encounter.      Reason for Telemedicine Visit: Services only offered telehealth    Originating Site (Patient Location): Patient's home    Distant Site (Provider Location): Provider Remote Setting- Home Office    Consent:  The patient/guardian has verbally consented to: the potential risks and benefits of telemedicine (video visit) versus in person care; bill my insurance or make self-payment for services provided; and responsibility for payment of non-covered services.     Patient would like the video invitation sent by:  My Chart    Mode of Communication:  Video Conference via [employer]    As the provider I attest to compliance with applicable laws and regulations related to telemedicine.     Treatment Plan Last Reviewed: Started 21  PHQ-9 / NELLIE-7 : Complete next session    DATA  Interactive Complexity: No  Crisis: No       Progress Since Last Session (Related to Symptoms / Goals / Homework):   Symptoms: Improved anxiety and depression symptoms this session    Homework: Achieved / completed to satisfaction; Previous sessions:  Put female bust next to her television so she can admire this on a more regular basis.  Created a positive affirmation picture and hung it by her TV also to remind herself of positives about self.  Feels better about herself and engaging with family and friends on a regular basis.Client feeling more isolated and alone this session and feeling more depressed.   We talked in session what she can engage in and how to improve her overall mood. Did have work friends over for a brunch which she enjoyed and also engaing in coloring with affrimations and putting this on her wall. Current session:  Client has been to many doctor appts.this last month and feeling better, she has an appointment with the [facility] schedule to address her eating disorder.  Has met a friend from [place] who she is getting close with but has serious health issues but this relationship is exciting to client and makes her feel good.  Family relationship issues has been positive and working on creative art projects that brings her belkys.        Episode of Care Goals: Achieved / completed to satisfaction - ACTION (Actively working towards change); Intervened by reinforcing change plan / affirming steps taken     Current / Ongoing Stressors and Concerns:   Family stress, difficult childhood, ego despair issues     Treatment Objective(s) Addressed in This Session:   Connect to gratitude and ego integrity  Use GAP-stay grounded; attuned and present  Connect to positive affirmations and gratitude  Create meals and freeze them, save money and put in bank, start realistic exercise program, schedule doctor appointment to check on ulcer, skin issues and continue work with other rheumatologist  on wrist and tremor issues. Continue creative projects that she enjoys. Look into eating disorder group or program. Concentrate on thought stopping process and CBT skills.     Intervention:   Motivational Interviewing: OARS  Strength based therapy  CBT Therapy     ASSESSMENT: Current Emotional / Mental Status (status of significant symptoms):   Risk status (Self / Other harm or suicidal ideation)   Patient denies current fears or concerns for personal safety.   Patient denies current or recent suicidal ideation or behaviors.   Patient denies current or recent homicidal ideation or behaviors.   Patient denies current or  recent self injurious behavior or ideation.   Patient denies other safety concerns.   Patient reports there has been no change in risk factors since their last session.     Patient reports there has been no change in protective factors since their last session.     Recommended that patient call 911 or go to the local ED should there be a change in any of these risk factors.     Appearance:   Appropriate    Eye Contact:   Good    Psychomotor Behavior: Normal    Attitude:   Cooperative  Interested Pleasant   Orientation:   All   Speech    Rate / Production: Normal/ Responsive Normal     Volume:  Normal    Mood:    Anxious  Depressed  Expansive   Affect:    Appropriate  Bright    Thought Content:  Clear    Thought Form:  Coherent  Goal Directed  Logical    Insight:    Fair      Medication Review:   No changes to current psychiatric medication(s)     Medication Compliance:   Yes     Changes in Health Issues:   None reported     Chemical Use Review:   Substance Use: Chemical use reviewed, no active concerns identified      Tobacco Use: No current tobacco use.      Diagnosis:  1. Major depressive disorder, recurrent episode, moderate with anxious distress (H)    2. NELLIE (generalized anxiety disorder)        Collateral Reports Completed:   Not Applicable    PLAN: (Patient Tasks / Therapist Tasks / Other)  Previous sessions: Patient will bring her lady bust to her room so she can look at each morning when she get's up; stay in her GAP, Connect to positive thoughts affirmations and gratitude.   Follow rheumatologist recommendations for hands and wrists, contact PCP about ulcer and skin issues,  Start healthy meal plan and freeze meals that she can eat later, save money from meals and put in savings, continue outings with friends and family, join lunch group when she can, start realistic exercise program, and continue creative projects with limits.  Continue to connect to gratitude, positive affirmations and strengths. Set  limits with granddaughter and continue to build on relationship when appropriate. Ask PCP for referral for an eating disorder group or program, watch and try and control binge eating, continue coloring, connecting to strengths and positive affirmations on her wall daily.  Use thought stopping process and use FOff as her mantra.  Reframe thinking to positive thinking and possibilities in her life.  Continue building and outside activities, connect and get together with friends for brunch next month and support from family. Current session:  Feeling better health wise and seeing many doctors and feeling better has a referral for the [facility] program in January to address her eating issues, continue to connect with new friend Mini which brings her belkys and continue making her art hand towels and coloring and connection to family and friends for support.         Noe Meeks, [facility]                                                         ______________________________________________________________________    Treatment Plan    Patient's Name: Zeynep Martinez  YOB: 1945    Date: 8-19-21    DSM5 Diagnoses: 296.32 (F33.1) Major Depressive Disorder, Recurrent Episode, Moderate _ and With anxious distress or 300.02 (F41.1) Generalized Anxiety Disorder  Psychosocial / Contextual Factors: Family stress, difficult childhood, ego despair issues    WHODAS: Completed first session    Referral / Collaboration:  Was/were discussed and client will pursue.  Patient stated that she would enroll in an eating disorder program if her binge eating persists.    Anticipated number of session or this episode of care: 12      MeasurableTreatment Goal(s) related to diagnosis / functional impairment(s)  Goal 1: Patient will decrease her depressive and anxious thoughts and return cordell normal daily living.     I will know I've met my goal when I know who I am authentically and act appropriately.      Objective #A (Patient  Action)    Patient will work toward stronger ego intergrity vs despair.  Determine authentic self and feel good about this. .  Status: Continued - Date(s): 8-19-21    Intervention(s)  Therapist will process origins of negative self image and esteem and concentrate on celebrating authenticity and improving overall self esteem and worth.    Objective #B  Patient will use cognitive strategies identified in therapy to challenge anxious thoughts.  Status: Continued - Date(s): 8-19-21    Intervention(s)  Therapist will teach thought stopping process and CBT skills and practice skills as needed until they become automatic .    Objective #C  Patient will eat healthy and limit binge eating.  Status: Continued - Date(s): 8-19-21    Intervention(s)  Therapist will check-in on eating behaior, determine reasons for binge eating and reinforce healthy eating habits and behavior.         Patient has reviewed and agreed to the above plan.      Noe Meeks, [facility]  August 19, 2021

## 2021-10-28 ENCOUNTER — TRANSFERRED RECORDS (OUTPATIENT)
Dept: HEALTH INFORMATION MANAGEMENT | Facility: CLINIC | Age: 76
End: 2021-10-28
Payer: COMMERCIAL

## 2021-11-01 ENCOUNTER — TELEPHONE (OUTPATIENT)
Dept: UROLOGY | Facility: CLINIC | Age: 76
End: 2021-11-01

## 2021-11-01 ENCOUNTER — LAB (OUTPATIENT)
Dept: LAB | Facility: CLINIC | Age: 76
End: 2021-11-01
Payer: COMMERCIAL

## 2021-11-01 DIAGNOSIS — N39.0 RECURRENT UTI: ICD-10-CM

## 2021-11-01 DIAGNOSIS — N39.0 RECURRENT UTI: Primary | ICD-10-CM

## 2021-11-01 LAB
ALBUMIN UR-MCNC: 100 MG/DL
APPEARANCE UR: ABNORMAL
BACTERIA #/AREA URNS HPF: ABNORMAL /HPF
BILIRUB UR QL STRIP: NEGATIVE
COLOR UR AUTO: YELLOW
GLUCOSE UR STRIP-MCNC: NEGATIVE MG/DL
HGB UR QL STRIP: ABNORMAL
KETONES UR STRIP-MCNC: NEGATIVE MG/DL
LEUKOCYTE ESTERASE UR QL STRIP: ABNORMAL
NITRATE UR QL: NEGATIVE
PH UR STRIP: 5.5 [PH] (ref 5–7)
RBC #/AREA URNS AUTO: ABNORMAL /HPF
SP GR UR STRIP: 1.02 (ref 1–1.03)
SQUAMOUS #/AREA URNS AUTO: ABNORMAL /LPF
UROBILINOGEN UR STRIP-ACNC: 1 E.U./DL
WBC #/AREA URNS AUTO: ABNORMAL /HPF

## 2021-11-01 PROCEDURE — 81001 URINALYSIS AUTO W/SCOPE: CPT

## 2021-11-01 PROCEDURE — 87086 URINE CULTURE/COLONY COUNT: CPT

## 2021-11-01 PROCEDURE — 87186 SC STD MICRODIL/AGAR DIL: CPT

## 2021-11-01 NOTE — TELEPHONE ENCOUNTER
Pt denies fever or chills. No hematuria. No concerns for urgency and frequency. Pt states first symptom is always cloudy urine, she is experiencing this now, wishing to stay ahead of the uti and is requesting to leave a urine sample at the lab. Orders entered. Pt advised if symptoms increase or she has any concerns of fever/chills to call back into clinic or be assessed in UC. Pt agreeable to the plan and will leave sample today at the lab.   Joy Horner RN

## 2021-11-04 ENCOUNTER — PREP FOR PROCEDURE (OUTPATIENT)
Dept: UROLOGY | Facility: CLINIC | Age: 76
End: 2021-11-04

## 2021-11-04 ENCOUNTER — CONSENT FORM (OUTPATIENT)
Dept: UROLOGY | Facility: CLINIC | Age: 76
End: 2021-11-04

## 2021-11-04 ENCOUNTER — HOME INFUSION (PRE-WILLOW HOME INFUSION) (OUTPATIENT)
Dept: PHARMACY | Facility: CLINIC | Age: 76
End: 2021-11-04

## 2021-11-04 ENCOUNTER — HOSPITAL ENCOUNTER (OUTPATIENT)
Dept: INTERVENTIONAL RADIOLOGY/VASCULAR | Facility: HOSPITAL | Age: 76
End: 2021-11-04
Attending: PHYSICIAN ASSISTANT
Payer: COMMERCIAL

## 2021-11-04 DIAGNOSIS — N39.0 URINARY TRACT INFECTION DUE TO EXTENDED-SPECTRUM BETA LACTAMASE (ESBL) PRODUCING ESCHERICHIA COLI: Primary | ICD-10-CM

## 2021-11-04 DIAGNOSIS — B96.29 URINARY TRACT INFECTION DUE TO EXTENDED-SPECTRUM BETA LACTAMASE (ESBL) PRODUCING ESCHERICHIA COLI: Primary | ICD-10-CM

## 2021-11-04 DIAGNOSIS — B96.29 URINARY TRACT INFECTION DUE TO EXTENDED-SPECTRUM BETA LACTAMASE (ESBL) PRODUCING ESCHERICHIA COLI: ICD-10-CM

## 2021-11-04 DIAGNOSIS — A49.8 KLEBSIELLA PNEUMONIAE INFECTION: ICD-10-CM

## 2021-11-04 DIAGNOSIS — Z16.12 URINARY TRACT INFECTION DUE TO EXTENDED-SPECTRUM BETA LACTAMASE (ESBL) PRODUCING ESCHERICHIA COLI: Primary | ICD-10-CM

## 2021-11-04 DIAGNOSIS — Z16.12 URINARY TRACT INFECTION DUE TO EXTENDED-SPECTRUM BETA LACTAMASE (ESBL) PRODUCING ESCHERICHIA COLI: ICD-10-CM

## 2021-11-04 DIAGNOSIS — N39.0 URINARY TRACT INFECTION DUE TO EXTENDED-SPECTRUM BETA LACTAMASE (ESBL) PRODUCING ESCHERICHIA COLI: ICD-10-CM

## 2021-11-04 LAB
BACTERIA UR CULT: ABNORMAL
BACTERIA UR CULT: ABNORMAL

## 2021-11-04 NOTE — PROGRESS NOTES
Spoke with patient regarding finalized urine culture    Culture   Date Value Ref Range Status   11/01/2021 >100,000 CFU/mL Klebsiella pneumoniae (A)  Final   11/01/2021 50,000-100,000 CFU/mL Escherichia coli ESBL (A)  Final   10/15/2021 >100,000 CFU/mL Klebsiella pneumoniae (A)  Final     Patient notes ongoing cloudy urine and lower back pain, left > right.     She denies fever, chills, nausea, dysuria or hematuria.    Per Dr. Mansfield, will treat with gentamicin IV x 1 week.   Order placed for PICC insertion  Pharmacy to dose and check labs as needed    Patient verbally consented to procedure; consent will be faxed to facility.

## 2021-11-05 ENCOUNTER — HOME INFUSION (PRE-WILLOW HOME INFUSION) (OUTPATIENT)
Dept: PHARMACY | Facility: CLINIC | Age: 76
End: 2021-11-05
Payer: COMMERCIAL

## 2021-11-06 DIAGNOSIS — M54.6 CHRONIC THORACIC BACK PAIN, UNSPECIFIED BACK PAIN LATERALITY: ICD-10-CM

## 2021-11-06 DIAGNOSIS — G89.29 CHRONIC THORACIC BACK PAIN, UNSPECIFIED BACK PAIN LATERALITY: ICD-10-CM

## 2021-11-08 ENCOUNTER — HOME INFUSION (PRE-WILLOW HOME INFUSION) (OUTPATIENT)
Dept: PHARMACY | Facility: CLINIC | Age: 76
End: 2021-11-08
Payer: COMMERCIAL

## 2021-11-08 RX ORDER — MELOXICAM 7.5 MG/1
TABLET ORAL
Qty: 30 TABLET | Refills: 0 | Status: SHIPPED | OUTPATIENT
Start: 2021-11-08 | End: 2021-12-09

## 2021-11-08 NOTE — TELEPHONE ENCOUNTER
Routing refill request to provider for review/approval because:  Failing due to age    Sandra Rivera, RN

## 2021-11-09 NOTE — PROGRESS NOTES
This is a recent snapshot of the patient's [facility] medical record.  For current drug dose and complete information and questions, call [phone] or In Basket pool, fv home infusion ([id number])  CSN Number:  [id number]

## 2021-11-10 ENCOUNTER — TELEPHONE (OUTPATIENT)
Dept: UROLOGY | Facility: CLINIC | Age: 76
End: 2021-11-10
Payer: COMMERCIAL

## 2021-11-10 DIAGNOSIS — B96.29 URINARY TRACT INFECTION DUE TO EXTENDED-SPECTRUM BETA LACTAMASE (ESBL) PRODUCING ESCHERICHIA COLI: Primary | ICD-10-CM

## 2021-11-10 DIAGNOSIS — N39.0 URINARY TRACT INFECTION DUE TO EXTENDED-SPECTRUM BETA LACTAMASE (ESBL) PRODUCING ESCHERICHIA COLI: Primary | ICD-10-CM

## 2021-11-10 DIAGNOSIS — Z16.12 URINARY TRACT INFECTION DUE TO EXTENDED-SPECTRUM BETA LACTAMASE (ESBL) PRODUCING ESCHERICHIA COLI: Primary | ICD-10-CM

## 2021-11-10 NOTE — TELEPHONE ENCOUNTER
Message left with patient and daughter regarding setting up a one month follow-up with Dr Mansfield with a urine culture ahead of visit.  Order placed for culture.  Infusion service will contact patient regarding PICC removal also.  Joy Horner RN    dtr and patient returned call and were set up for one month f/u and urine drop off.  Patient is set up for PICC removal tomorrow after her last dose of antibiotic.  Joy Horner RN

## 2021-11-11 ENCOUNTER — HOME INFUSION (PRE-WILLOW HOME INFUSION) (OUTPATIENT)
Dept: PHARMACY | Facility: CLINIC | Age: 76
End: 2021-11-11
Payer: COMMERCIAL

## 2021-11-16 NOTE — PROGRESS NOTES
This is a recent snapshot of the patient's [facility] medical record.  For current drug dose and complete information and questions, call [phone] or In Basket pool, fv home infusion ([id number])  CSN Number:  [id number]

## 2021-11-22 ENCOUNTER — LAB (OUTPATIENT)
Dept: LAB | Facility: CLINIC | Age: 76
End: 2021-11-22
Payer: COMMERCIAL

## 2021-11-22 ENCOUNTER — TELEPHONE (OUTPATIENT)
Dept: UROLOGY | Facility: CLINIC | Age: 76
End: 2021-11-22
Payer: COMMERCIAL

## 2021-11-22 DIAGNOSIS — R30.0 DYSURIA: ICD-10-CM

## 2021-11-22 DIAGNOSIS — N39.0 RECURRENT UTI: Primary | ICD-10-CM

## 2021-11-22 DIAGNOSIS — N39.0 RECURRENT UTI: ICD-10-CM

## 2021-11-22 LAB
ALBUMIN UR-MCNC: 100 MG/DL
APPEARANCE UR: CLEAR
BACTERIA #/AREA URNS HPF: ABNORMAL /HPF
BILIRUB UR QL STRIP: NEGATIVE
COLOR UR AUTO: YELLOW
GLUCOSE UR STRIP-MCNC: NEGATIVE MG/DL
HGB UR QL STRIP: ABNORMAL
KETONES UR STRIP-MCNC: NEGATIVE MG/DL
LEUKOCYTE ESTERASE UR QL STRIP: ABNORMAL
NITRATE UR QL: POSITIVE
PH UR STRIP: 5.5 [PH] (ref 5–7)
RBC #/AREA URNS AUTO: ABNORMAL /HPF
SP GR UR STRIP: 1.01 (ref 1–1.03)
UROBILINOGEN UR STRIP-ACNC: 0.2 E.U./DL
WBC #/AREA URNS AUTO: >100 /HPF

## 2021-11-22 PROCEDURE — 87088 URINE BACTERIA CULTURE: CPT

## 2021-11-22 PROCEDURE — 2894A URINE CULTURE: CPT

## 2021-11-22 PROCEDURE — 81001 URINALYSIS AUTO W/SCOPE: CPT

## 2021-11-22 PROCEDURE — 87186 SC STD MICRODIL/AGAR DIL: CPT

## 2021-11-22 PROCEDURE — 87086 URINE CULTURE/COLONY COUNT: CPT

## 2021-11-22 NOTE — PROGRESS NOTES
This is a recent snapshot of the patient's [facility] medical record.  For current drug dose and complete information and questions, call [phone] or In Basket pool, fv home infusion ([id number])  CSN Number:  [id number]

## 2021-11-22 NOTE — TELEPHONE ENCOUNTER
Message left for patient to call back to the nurse regarding uti symptoms.  Joy Horner RN      Patient returned call and will drop off a urine specimen at her clinic today.  Orders placed.  She is having low back pain and feels tired and her urine looks cloudy.  Joy Horner RN

## 2021-11-24 ENCOUNTER — HOSPITAL ENCOUNTER (INPATIENT)
Facility: CLINIC | Age: 76
LOS: 3 days | Discharge: HOME | DRG: 872 | End: 2021-11-27
Attending: EMERGENCY MEDICINE | Admitting: INTERNAL MEDICINE
Payer: COMMERCIAL

## 2021-11-24 ENCOUNTER — APPOINTMENT (OUTPATIENT)
Dept: CT IMAGING | Facility: CLINIC | Age: 76
DRG: 872 | End: 2021-11-24
Attending: EMERGENCY MEDICINE
Payer: COMMERCIAL

## 2021-11-24 DIAGNOSIS — R65.20 SEVERE SEPSIS (H): ICD-10-CM

## 2021-11-24 DIAGNOSIS — N39.0 COMPLICATED UTI (URINARY TRACT INFECTION): ICD-10-CM

## 2021-11-24 DIAGNOSIS — A41.4: Primary | ICD-10-CM

## 2021-11-24 DIAGNOSIS — N28.1 BILATERAL RENAL CYSTS: ICD-10-CM

## 2021-11-24 DIAGNOSIS — N20.0 CALCULUS OF KIDNEY: ICD-10-CM

## 2021-11-24 DIAGNOSIS — N39.0 RECURRENT UTI: ICD-10-CM

## 2021-11-24 DIAGNOSIS — A41.9 SEVERE SEPSIS (H): ICD-10-CM

## 2021-11-24 LAB
ALBUMIN UR-MCNC: 20 MG/DL
ANION GAP SERPL CALCULATED.3IONS-SCNC: 12 MMOL/L (ref 3–14)
APPEARANCE UR: ABNORMAL
ATRIAL RATE - MUSE: 103 BPM
BACTERIA #/AREA URNS HPF: ABNORMAL /HPF
BASOPHILS # BLD AUTO: 0.1 10E3/UL (ref 0–0.2)
BASOPHILS NFR BLD AUTO: 0 %
BILIRUB UR QL STRIP: NEGATIVE
BUN SERPL-MCNC: 36 MG/DL (ref 7–30)
CALCIUM SERPL-MCNC: 9.4 MG/DL (ref 8.5–10.1)
CHLORIDE BLD-SCNC: 105 MMOL/L (ref 94–109)
CO2 SERPL-SCNC: 22 MMOL/L (ref 20–32)
COLOR UR AUTO: YELLOW
CREAT SERPL-MCNC: 0.92 MG/DL (ref 0.52–1.04)
DIASTOLIC BLOOD PRESSURE - MUSE: NORMAL MMHG
EOSINOPHIL # BLD AUTO: 0.1 10E3/UL (ref 0–0.7)
EOSINOPHIL NFR BLD AUTO: 0 %
ERYTHROCYTE [DISTWIDTH] IN BLOOD BY AUTOMATED COUNT: 13.9 % (ref 10–15)
FLUAV RNA SPEC QL NAA+PROBE: NEGATIVE
FLUBV RNA RESP QL NAA+PROBE: NEGATIVE
GFR SERPL CREATININE-BSD FRML MDRD: 62 ML/MIN/1.73M2
GLUCOSE BLD-MCNC: 192 MG/DL (ref 70–99)
GLUCOSE BLDC GLUCOMTR-MCNC: 157 MG/DL (ref 70–99)
GLUCOSE BLDC GLUCOMTR-MCNC: 69 MG/DL (ref 70–99)
GLUCOSE BLDC GLUCOMTR-MCNC: 71 MG/DL (ref 70–99)
GLUCOSE BLDC GLUCOMTR-MCNC: 97 MG/DL (ref 70–99)
GLUCOSE UR STRIP-MCNC: NEGATIVE MG/DL
HCO3 BLDV-SCNC: 24 MMOL/L (ref 21–28)
HCO3 BLDV-SCNC: 26 MMOL/L (ref 21–28)
HCO3 BLDV-SCNC: 27 MMOL/L (ref 21–28)
HCT VFR BLD AUTO: 37.9 % (ref 35–47)
HGB BLD-MCNC: 11.8 G/DL (ref 11.7–15.7)
HGB UR QL STRIP: ABNORMAL
IMM GRANULOCYTES # BLD: 0.1 10E3/UL
IMM GRANULOCYTES NFR BLD: 1 %
INTERPRETATION ECG - MUSE: NORMAL
KETONES UR STRIP-MCNC: NEGATIVE MG/DL
LACTATE BLD-SCNC: 3.4 MMOL/L
LACTATE BLD-SCNC: 3.5 MMOL/L
LACTATE BLD-SCNC: 3.8 MMOL/L
LEUKOCYTE ESTERASE UR QL STRIP: ABNORMAL
LYMPHOCYTES # BLD AUTO: 0.5 10E3/UL (ref 0.8–5.3)
LYMPHOCYTES NFR BLD AUTO: 3 %
MCH RBC QN AUTO: 29 PG (ref 26.5–33)
MCHC RBC AUTO-ENTMCNC: 31.1 G/DL (ref 31.5–36.5)
MCV RBC AUTO: 93 FL (ref 78–100)
MONOCYTES # BLD AUTO: 0.2 10E3/UL (ref 0–1.3)
MONOCYTES NFR BLD AUTO: 2 %
MUCOUS THREADS #/AREA URNS LPF: PRESENT /LPF
NEUTROPHILS # BLD AUTO: 12.8 10E3/UL (ref 1.6–8.3)
NEUTROPHILS NFR BLD AUTO: 94 %
NITRATE UR QL: POSITIVE
NRBC # BLD AUTO: 0 10E3/UL
NRBC BLD AUTO-RTO: 0 /100
P AXIS - MUSE: 37 DEGREES
PCO2 BLDV: 37 MM HG (ref 40–50)
PCO2 BLDV: 39 MM HG (ref 40–50)
PCO2 BLDV: 43 MM HG (ref 40–50)
PH BLDV: 7.35 [PH] (ref 7.32–7.43)
PH BLDV: 7.42 [PH] (ref 7.32–7.43)
PH BLDV: 7.46 [PH] (ref 7.32–7.43)
PH UR STRIP: 5.5 [PH] (ref 5–7)
PLATELET # BLD AUTO: 301 10E3/UL (ref 150–450)
PO2 BLDV: 36 MM HG (ref 25–47)
PO2 BLDV: 49 MM HG (ref 25–47)
PO2 BLDV: 56 MM HG (ref 25–47)
POTASSIUM BLD-SCNC: 5.4 MMOL/L (ref 3.4–5.3)
POTASSIUM BLD-SCNC: 5.5 MMOL/L (ref 3.4–5.3)
PR INTERVAL - MUSE: 176 MS
QRS DURATION - MUSE: 82 MS
QT - MUSE: 324 MS
QTC - MUSE: 424 MS
R AXIS - MUSE: -43 DEGREES
RBC # BLD AUTO: 4.07 10E6/UL (ref 3.8–5.2)
RBC URINE: 7 /HPF
SAO2 % BLDV: 71 % (ref 94–100)
SAO2 % BLDV: 87 % (ref 94–100)
SAO2 % BLDV: 87 % (ref 94–100)
SARS-COV-2 RNA RESP QL NAA+PROBE: NEGATIVE
SODIUM SERPL-SCNC: 139 MMOL/L (ref 133–144)
SP GR UR STRIP: 1.02 (ref 1–1.03)
SQUAMOUS EPITHELIAL: <1 /HPF
SYSTOLIC BLOOD PRESSURE - MUSE: NORMAL MMHG
T AXIS - MUSE: 34 DEGREES
UROBILINOGEN UR STRIP-MCNC: NORMAL MG/DL
VENTRICULAR RATE- MUSE: 103 BPM
WBC # BLD AUTO: 13.8 10E3/UL (ref 4–11)
WBC URINE: >182 /HPF

## 2021-11-24 PROCEDURE — 87149 DNA/RNA DIRECT PROBE: CPT | Performed by: EMERGENCY MEDICINE

## 2021-11-24 PROCEDURE — 36415 COLL VENOUS BLD VENIPUNCTURE: CPT | Performed by: PHYSICIAN ASSISTANT

## 2021-11-24 PROCEDURE — 74176 CT ABD & PELVIS W/O CONTRAST: CPT

## 2021-11-24 PROCEDURE — 96366 THER/PROPH/DIAG IV INF ADDON: CPT

## 2021-11-24 PROCEDURE — 84132 ASSAY OF SERUM POTASSIUM: CPT | Performed by: PHYSICIAN ASSISTANT

## 2021-11-24 PROCEDURE — 82803 BLOOD GASES ANY COMBINATION: CPT

## 2021-11-24 PROCEDURE — 87086 URINE CULTURE/COLONY COUNT: CPT | Performed by: EMERGENCY MEDICINE

## 2021-11-24 PROCEDURE — 99285 EMERGENCY DEPT VISIT HI MDM: CPT | Mod: 25

## 2021-11-24 PROCEDURE — 99207 PR NO BILLABLE SERVICE THIS VISIT: CPT | Performed by: PHYSICIAN ASSISTANT

## 2021-11-24 PROCEDURE — 87636 SARSCOV2 & INF A&B AMP PRB: CPT | Performed by: EMERGENCY MEDICINE

## 2021-11-24 PROCEDURE — 250N000013 HC RX MED GY IP 250 OP 250 PS 637: Performed by: EMERGENCY MEDICINE

## 2021-11-24 PROCEDURE — 96365 THER/PROPH/DIAG IV INF INIT: CPT

## 2021-11-24 PROCEDURE — 258N000003 HC RX IP 258 OP 636: Performed by: EMERGENCY MEDICINE

## 2021-11-24 PROCEDURE — 93005 ELECTROCARDIOGRAM TRACING: CPT

## 2021-11-24 PROCEDURE — C9803 HOPD COVID-19 SPEC COLLECT: HCPCS

## 2021-11-24 PROCEDURE — 250N000013 HC RX MED GY IP 250 OP 250 PS 637: Performed by: PHYSICIAN ASSISTANT

## 2021-11-24 PROCEDURE — 96375 TX/PRO/DX INJ NEW DRUG ADDON: CPT

## 2021-11-24 PROCEDURE — 120N000001 HC R&B MED SURG/OB

## 2021-11-24 PROCEDURE — 258N000003 HC RX IP 258 OP 636: Performed by: PHYSICIAN ASSISTANT

## 2021-11-24 PROCEDURE — 250N000012 HC RX MED GY IP 250 OP 636 PS 637: Performed by: PHYSICIAN ASSISTANT

## 2021-11-24 PROCEDURE — 87186 SC STD MICRODIL/AGAR DIL: CPT | Performed by: EMERGENCY MEDICINE

## 2021-11-24 PROCEDURE — 82565 ASSAY OF CREATININE: CPT | Performed by: EMERGENCY MEDICINE

## 2021-11-24 PROCEDURE — 36415 COLL VENOUS BLD VENIPUNCTURE: CPT | Performed by: EMERGENCY MEDICINE

## 2021-11-24 PROCEDURE — 96361 HYDRATE IV INFUSION ADD-ON: CPT

## 2021-11-24 PROCEDURE — 81001 URINALYSIS AUTO W/SCOPE: CPT | Performed by: EMERGENCY MEDICINE

## 2021-11-24 PROCEDURE — 250N000011 HC RX IP 250 OP 636: Performed by: PHYSICIAN ASSISTANT

## 2021-11-24 PROCEDURE — 85025 COMPLETE CBC W/AUTO DIFF WBC: CPT | Performed by: EMERGENCY MEDICINE

## 2021-11-24 PROCEDURE — 99223 1ST HOSP IP/OBS HIGH 75: CPT | Mod: AI | Performed by: INTERNAL MEDICINE

## 2021-11-24 PROCEDURE — 250N000011 HC RX IP 250 OP 636: Performed by: EMERGENCY MEDICINE

## 2021-11-24 RX ORDER — NICOTINE POLACRILEX 4 MG
15-30 LOZENGE BUCCAL
Status: DISCONTINUED | OUTPATIENT
Start: 2021-11-24 | End: 2021-11-27 | Stop reason: HOSPADM

## 2021-11-24 RX ORDER — PANTOPRAZOLE SODIUM 40 MG/1
40 TABLET, DELAYED RELEASE ORAL
Status: DISCONTINUED | OUTPATIENT
Start: 2021-11-25 | End: 2021-11-27 | Stop reason: HOSPADM

## 2021-11-24 RX ORDER — CEFOXITIN 1 G/1
1 INJECTION, POWDER, FOR SOLUTION INTRAVENOUS EVERY 6 HOURS
Status: DISCONTINUED | OUTPATIENT
Start: 2021-11-24 | End: 2021-11-24

## 2021-11-24 RX ORDER — SODIUM CHLORIDE, SODIUM LACTATE, POTASSIUM CHLORIDE, CALCIUM CHLORIDE 600; 310; 30; 20 MG/100ML; MG/100ML; MG/100ML; MG/100ML
INJECTION, SOLUTION INTRAVENOUS CONTINUOUS
Status: DISCONTINUED | OUTPATIENT
Start: 2021-11-24 | End: 2021-11-26

## 2021-11-24 RX ORDER — IBUPROFEN 200 MG
400 TABLET ORAL EVERY 8 HOURS PRN
Status: CANCELLED | OUTPATIENT
Start: 2021-11-24

## 2021-11-24 RX ORDER — DEXTROSE MONOHYDRATE 25 G/50ML
25-50 INJECTION, SOLUTION INTRAVENOUS
Status: DISCONTINUED | OUTPATIENT
Start: 2021-11-24 | End: 2021-11-27 | Stop reason: HOSPADM

## 2021-11-24 RX ORDER — ACETAMINOPHEN 325 MG/1
650 TABLET ORAL ONCE
Status: COMPLETED | OUTPATIENT
Start: 2021-11-24 | End: 2021-11-24

## 2021-11-24 RX ORDER — ONDANSETRON 4 MG/1
4 TABLET, ORALLY DISINTEGRATING ORAL EVERY 6 HOURS PRN
Status: DISCONTINUED | OUTPATIENT
Start: 2021-11-24 | End: 2021-11-27 | Stop reason: HOSPADM

## 2021-11-24 RX ORDER — MEROPENEM 500 MG/1
500 INJECTION, POWDER, FOR SOLUTION INTRAVENOUS EVERY 6 HOURS
Status: DISCONTINUED | OUTPATIENT
Start: 2021-11-24 | End: 2021-11-27 | Stop reason: HOSPADM

## 2021-11-24 RX ORDER — ALPRAZOLAM 0.5 MG
0.5 TABLET ORAL DAILY PRN
Status: DISCONTINUED | OUTPATIENT
Start: 2021-11-24 | End: 2021-11-27 | Stop reason: HOSPADM

## 2021-11-24 RX ORDER — LIDOCAINE 40 MG/G
CREAM TOPICAL
Status: DISCONTINUED | OUTPATIENT
Start: 2021-11-24 | End: 2021-11-27 | Stop reason: HOSPADM

## 2021-11-24 RX ORDER — HYDROMORPHONE HCL IN WATER/PF 6 MG/30 ML
0.2 PATIENT CONTROLLED ANALGESIA SYRINGE INTRAVENOUS EVERY 4 HOURS PRN
Status: DISCONTINUED | OUTPATIENT
Start: 2021-11-24 | End: 2021-11-27 | Stop reason: HOSPADM

## 2021-11-24 RX ORDER — MORPHINE SULFATE 4 MG/ML
4 INJECTION, SOLUTION INTRAMUSCULAR; INTRAVENOUS ONCE
Status: COMPLETED | OUTPATIENT
Start: 2021-11-24 | End: 2021-11-24

## 2021-11-24 RX ORDER — ONDANSETRON 2 MG/ML
4 INJECTION INTRAMUSCULAR; INTRAVENOUS EVERY 6 HOURS PRN
Status: DISCONTINUED | OUTPATIENT
Start: 2021-11-24 | End: 2021-11-27 | Stop reason: HOSPADM

## 2021-11-24 RX ORDER — ACETAMINOPHEN 500 MG
1000 TABLET ORAL EVERY 8 HOURS PRN
Status: DISCONTINUED | OUTPATIENT
Start: 2021-11-24 | End: 2021-11-27 | Stop reason: HOSPADM

## 2021-11-24 RX ORDER — CEFOXITIN 1 G/1
1 INJECTION, POWDER, FOR SOLUTION INTRAVENOUS ONCE
Status: COMPLETED | OUTPATIENT
Start: 2021-11-24 | End: 2021-11-24

## 2021-11-24 RX ADMIN — ACETAMINOPHEN 650 MG: 325 TABLET, FILM COATED ORAL at 06:48

## 2021-11-24 RX ADMIN — INSULIN ASPART 1 UNITS: 100 INJECTION, SOLUTION INTRAVENOUS; SUBCUTANEOUS at 13:38

## 2021-11-24 RX ADMIN — HYDROMORPHONE HYDROCHLORIDE 0.2 MG: 0.2 INJECTION, SOLUTION INTRAMUSCULAR; INTRAVENOUS; SUBCUTANEOUS at 16:17

## 2021-11-24 RX ADMIN — MEROPENEM 500 MG: 500 INJECTION, POWDER, FOR SOLUTION INTRAVENOUS at 22:25

## 2021-11-24 RX ADMIN — SODIUM CHLORIDE, POTASSIUM CHLORIDE, SODIUM LACTATE AND CALCIUM CHLORIDE: 600; 310; 30; 20 INJECTION, SOLUTION INTRAVENOUS at 22:33

## 2021-11-24 RX ADMIN — MEROPENEM 500 MG: 500 INJECTION, POWDER, FOR SOLUTION INTRAVENOUS at 10:02

## 2021-11-24 RX ADMIN — ACETAMINOPHEN, ASPIRIN AND CAFFEINE 1 TABLET: 250; 250; 65 TABLET, FILM COATED ORAL at 15:50

## 2021-11-24 RX ADMIN — ALPRAZOLAM 0.5 MG: 0.5 TABLET ORAL at 15:17

## 2021-11-24 RX ADMIN — ACETAMINOPHEN 1000 MG: 500 TABLET, FILM COATED ORAL at 17:45

## 2021-11-24 RX ADMIN — CEFOXITIN SODIUM 1 G: 1 POWDER, FOR SOLUTION INTRAVENOUS at 07:46

## 2021-11-24 RX ADMIN — MEROPENEM 500 MG: 500 INJECTION, POWDER, FOR SOLUTION INTRAVENOUS at 15:10

## 2021-11-24 RX ADMIN — ENOXAPARIN SODIUM 40 MG: 40 INJECTION SUBCUTANEOUS at 22:25

## 2021-11-24 RX ADMIN — MORPHINE SULFATE 4 MG: 4 INJECTION INTRAVENOUS at 06:48

## 2021-11-24 RX ADMIN — SODIUM CHLORIDE, POTASSIUM CHLORIDE, SODIUM LACTATE AND CALCIUM CHLORIDE: 600; 310; 30; 20 INJECTION, SOLUTION INTRAVENOUS at 09:49

## 2021-11-24 RX ADMIN — SODIUM CHLORIDE 1000 ML: 9 INJECTION, SOLUTION INTRAVENOUS at 06:49

## 2021-11-24 RX ADMIN — OXYCODONE HYDROCHLORIDE 2.5 MG: 5 TABLET ORAL at 22:25

## 2021-11-24 RX ADMIN — SODIUM CHLORIDE 572 ML: 9 INJECTION, SOLUTION INTRAVENOUS at 07:45

## 2021-11-24 RX ADMIN — SODIUM CHLORIDE, POTASSIUM CHLORIDE, SODIUM LACTATE AND CALCIUM CHLORIDE: 600; 310; 30; 20 INJECTION, SOLUTION INTRAVENOUS at 15:40

## 2021-11-24 ASSESSMENT — ACTIVITIES OF DAILY LIVING (ADL)
ADLS_ACUITY_SCORE: 12
ADLS_ACUITY_SCORE: 6
ADLS_ACUITY_SCORE: 6
ADLS_ACUITY_SCORE: 12

## 2021-11-24 ASSESSMENT — ENCOUNTER SYMPTOMS
NAUSEA: 0
DYSURIA: 1
COUGH: 1
HEADACHES: 1
ABDOMINAL PAIN: 0

## 2021-11-24 ASSESSMENT — MIFFLIN-ST. JEOR
SCORE: 1619.13
SCORE: 1630.34

## 2021-11-24 NOTE — PHARMACY-ADMISSION MEDICATION HISTORY
Admission medication history interview status for this patient is complete. See [facility] admission navigator for allergy information, prior to admission medications and immunization status.     Medication history interview done, indicate source(s): Patient  Medication history resources (including written lists, pill bottles, clinic record):None      Changes made to PTA medication list:  Added: none  Changed: buspar  Reported as Not Taking: none  Removed: zostrix cream    Actions taken by pharmacist (provider contacted, etc):None     Additional medication history information:None    Medication reconciliation/reorder completed by provider prior to medication history?  y   (Y/N)     For patients on insulin therapy:   Do you use sliding scale insulin based on blood sugars?   What is your pre-meal insulin coverage?    Do you typically eat three meals a day?   How many times do you check your blood glucose per day?   How many episodes of hypoglycemia do you typically have per month?   Do you have a Continuous Glucose Monitor (CGM)?      Prior to Admission medications    Medication Sig Last Dose Taking? Auth Provider   acetaminophen (TYLENOL) 500 MG tablet Take 500-1,000 mg by mouth every 6 hours as needed for mild pain  Yes Reported, Patient   ALPRAZolam (XANAX) 0.5 MG tablet Take 1 tablet (0.5 mg) by mouth daily as needed for anxiety Past Week at Unknown time Yes Andie Alcaraz MD   busPIRone (BUSPAR) 10 MG tablet Take 5 mg PO daily for one week, then 5 mg PO BID for one week, then 10 mg PO BID  Patient taking differently: Take 5 mg by mouth At Bedtime  11/23/2021 at hs Yes Andie Alcaraz MD   gabapentin (NEURONTIN) 800 MG tablet Take 1 tablet (800 mg) by mouth 2 times daily 11/23/2021 at Unknown time Yes Nataliya Ambriz PA   glipiZIDE (GLUCOTROL XL) 5 MG 24 hr tablet Take 1 tablet (5 mg) by mouth 2 times daily 11/23/2021 at Unknown time Yes Trinh Bello APRN CNP   meloxicam (MOBIC) 7.5  MG tablet TAKE ONE TABLET BY MOUTH ONCE DAILY 11/23/2021 at am Yes Trinh Bello APRN CNP   metFORMIN (GLUCOPHAGE-XR) 500 MG 24 hr tablet Take 2 tablets (1,000 mg) by mouth 2 times daily (with meals) 11/23/2021 at Unknown time Yes Trinh Bello APRN CNP   QUEtiapine (SEROQUEL) 25 MG tablet Take 1 tablet (25 mg) by mouth nightly as needed (insomnia) 11/23/2021 at Unknown time Yes Trinh Bello APRN CNP   traZODone (DESYREL) 50 MG tablet Take 0.5-1 tablets (25-50 mg) by mouth At Bedtime 11/23/2021 at Unknown time Yes Trinh Bello APRN CNP   venlafaxine (EFFEXOR-XR) 150 MG 24 hr capsule Take 1 capsule (150 mg) by mouth daily 11/23/2021 at am Yes Andie Alcaraz MD   venlafaxine (EFFEXOR-XR) 75 MG 24 hr capsule Take 1 capsule (75 mg) by mouth daily 11/23/2021 at am Yes Andie Alcaraz MD   blood glucose (ACCU-CHEK PASQUALE PLUS) test strip Use to test blood sugar 1-2 times daily or as directed.   Trinh Bello APRN CNP   blood glucose (NO BRAND SPECIFIED) lancets standard Use to test blood sugar 1-2 times daily or as directed.   Trinh Bello APRN CNP   blood glucose (NO BRAND SPECIFIED) test strip Use to test blood sugar 1-2 times daily or as directed.   Trinh Bello APRN CNP   blood glucose monitoring (NO BRAND SPECIFIED) meter device kit Use to test blood sugar 1-2 times daily or as directed.   Trinh Bello APRN CNP   STATIN NOT PRESCRIBED (INTENTIONAL) Please choose reason not prescribed from choices below.   Shayy Rivas, NP

## 2021-11-24 NOTE — ED PROVIDER NOTES
History     Chief Complaint:  Headache      The history is provided by the patient.      Zeynep Martinez is a 76 year old female who presents with headache. The patient complains left sided trigeminal head pain since yesterday afternoon. She has been having intermittent episode of the same pain for the past 25 years. She suspects that she her pain is caused by an UTI as they are usually associated with each other. She notes having 4 UTIs in the past 6 weeks for which she was on antibiotics each time, though her doctor is not sure why they keep reoccurring. Other symptoms mentioned include a little cough. She has bilateral neuropathy and takes gabapentin, and her last dose was this morning. She denies abdominal pain, nausea, or dental problems.   The patient also notes noticing her skin starting to peal on her both her hands and left heal for the past week.     Reviwed recent records; noted UA from 11/1/201 was treated with IV gentamycin for week via picc line    Review of Systems   Respiratory: Positive for cough.    Gastrointestinal: Negative for abdominal pain and nausea.   Genitourinary: Positive for dysuria.   Neurological: Positive for headaches.   All other systems reviewed and are negative.      Allergies:  Adhesive Tape  Statin Drugs [Hmg-Coa-R Inhibitors]  Penicillins    Medications:    Buspar   Gabapentin   Glipizide   Mobic   Metformin   Seroquel   Trazodone   Effexor   Xanax     Past Medical History:    Anxiety   Arthritis   Bladder infection   Calculus of kidney   Depression   Frequent UTI   Gout   History of anesthesia complications   Hyperlipidemia LDL goal <100   Kidney stone   Type 2 diabetes mellitus without complication, without long-term current use of insulin (H)   Ulcer   Anxiety   Chronic kidney disease, stage 3 (H)   Binge eating disorder   Chronic diarrhea   Moderate episode of recurrent major depressive disorder (H)   Insomnia, unspecified type   Acute pain of left knee   History of  "uterine cancer   Trigeminal neuralgia   Gastroesophageal reflux disease without esophagitis   Recurrent UTI   Calculus of kidney      Past Surgical History:    Abdominoplasty   Appendectomy   Hysterectomy   Gastric bypass   Uretal stent insertion   Penectomy   Cholecystectomy    Kidney cyst excision     Family History:    Mother - MS   Daughter - depression     Social History:  Patient was unaccompanied to the ED.  Hx of tobacco use (former smoker)  Hx of alcohol use     Physical Exam     Patient Vitals for the past 24 hrs:   BP Temp Temp src Pulse Resp SpO2 Height Weight   11/24/21 0945 93/53 -- -- 82 15 93 % -- --   11/24/21 0930 93/56 -- -- 81 14 95 % -- --   11/24/21 0915 111/54 98.8  F (37.1  C) Oral 90 17 95 % -- --   11/24/21 0900 99/55 -- -- 89 14 98 % -- --   11/24/21 0845 105/56 -- -- 88 23 96 % -- --   11/24/21 0830 (!) 100/39 -- -- 98 26 91 % -- --   11/24/21 0745 97/68 -- -- 102 16 91 % -- --   11/24/21 0730 133/72 -- -- 101 20 -- -- --   11/24/21 0719 -- -- -- -- -- -- 1.6 m (5' 3\") 115 kg (253 lb 8.5 oz)   11/24/21 0715 -- -- -- 101 22 -- -- --   11/24/21 0700 139/40 -- -- -- -- -- -- --   11/24/21 0545 126/70 (!) 101.3  F (38.5  C) Oral 111 20 95 % -- --       Physical Exam  Constitutional: Vital signs reviewed as above.   HENT:    Head: No external signs of trauma noted.   Eyes: Conjunctivae are normal. Pupils are equal, round, and reactive to light.   Cardiovascular:    Tachardic rate, regular rhythm and normal heart sounds.     Exam reveals no friction rub.     No murmur heard.  Pulmonary/Chest:    Effort normal and breath sounds normal.    No respiratory distress.    There are no wheezes.    There are no rales.   Gastrointestinal:    Soft.    Bowel sounds normal.    There is no distension.    There is no tenderness on my exam.    There is no rebound or guarding.   Musculoskeletal:    Normal range of motion.    Normal Tone  Neurological: Patient is alert and oriented to person, place, and time. No " focal motor or sensory deficits (even despite neuropathy).   Skin: Skin is warm and dry. Patient is not diaphoretic  Psychiatric: The patient appears calm    Emergency Department Course   EC  ECG taken at 0545, ECG read at 0635  Sinus tachycardia   Left axis deviation   Low voltage QRS   Cannot rule out Anterior infarct, age undetermined   Abnormal ECG   No significant change as compared to prior, dated 2021.  Rate 103 bpm. MD interval 176 ms. QRS duration 82 ms. QT/QTc 324/424 ms. P-R-T axes 37 -43 34.     Imaging:  Abd/pelvis CT no contrast - Stone Protocol  Final Result  IMPRESSION:   1.  No acute findings in the abdomen and pelvis.  2.  Stable nonobstructing 4 mm right renal calculus.  3.  Stable simple and mildly complex bilateral renal cysts. The  largest 6.8 cm cyst at the lower pole of the right kidney contains  similar layering proteinaceous/hemorrhagic fluid.  ANDRES DRAKE MD      Per Radiology     Laboratory:  Labs Ordered and Resulted from Time of ED Arrival to Time of ED Departure   BASIC METABOLIC PANEL - Abnormal       Result Value    Sodium 139      Potassium 5.5 (*)     Chloride 105      Carbon Dioxide (CO2) 22      Anion Gap 12      Urea Nitrogen 36 (*)     Creatinine 0.92      Calcium 9.4      Glucose 192 (*)     GFR Estimate 62     ROUTINE UA WITH MICROSCOPIC REFLEX TO CULTURE - Abnormal    Color Urine Yellow      Appearance Urine Slightly Cloudy (*)     Glucose Urine Negative      Bilirubin Urine Negative      Ketones Urine Negative      Specific Gravity Urine 1.017      Blood Urine Trace (*)     pH Urine 5.5      Protein Albumin Urine 20  (*)     Urobilinogen Urine Normal      Nitrite Urine Positive (*)     Leukocyte Esterase Urine Large (*)     Bacteria Urine Few (*)     Mucus Urine Present (*)     RBC Urine 7 (*)     WBC Urine >182 (*)     Squamous Epithelials Urine <1     CBC WITH PLATELETS AND DIFFERENTIAL - Abnormal    WBC Count 13.8 (*)     RBC Count 4.07      Hemoglobin 11.8       Hematocrit 37.9      MCV 93      MCH 29.0      MCHC 31.1 (*)     RDW 13.9      Platelet Count 301      % Neutrophils 94      % Lymphocytes 3      % Monocytes 2      % Eosinophils 0      % Basophils 0      % Immature Granulocytes 1      NRBCs per 100 WBC 0      Absolute Neutrophils 12.8 (*)     Absolute Lymphocytes 0.5 (*)     Absolute Monocytes 0.2      Absolute Eosinophils 0.1      Absolute Basophils 0.1      Absolute Immature Granulocytes 0.1 (*)     Absolute NRBCs 0.0     ISTAT GASES LACTATE VENOUS POCT - Abnormal    Lactic Acid POCT 3.8 (*)     Bicarbonate Venous POCT 27      O2 Sat, Venous POCT 87 (*)     pCO2V Venous POCT 37 (*)     pH Venous POCT 7.46 (*)     pO2 Venous POCT 49 (*)    ISTAT GASES LACTATE VENOUS POCT - Abnormal    Lactic Acid POCT 3.5 (*)     Bicarbonate Venous POCT 26      O2 Sat, Venous POCT 71 (*)     pCO2V Venous POCT 39 (*)     pH Venous POCT 7.42      pO2 Venous POCT 36     ISTAT GASES LACTATE VENOUS POCT - Abnormal    Lactic Acid POCT 3.4 (*)     Bicarbonate Venous POCT 24      O2 Sat, Venous POCT 87 (*)     pCO2V Venous POCT 43      pH Venous POCT 7.35      pO2 Venous POCT 56 (*)    INFLUENZA A/B & SARS-COV2 PCR MULTIPLEX - Normal    Influenza A PCR Negative      Influenza B PCR Negative      SARS CoV2 PCR Negative     BLOOD CULTURE   BLOOD CULTURE   URINE CULTURE     Emergency Department Course:    Reviewed:  I reviewed nursing notes, vitals, past history and care everywhere    Assessments/Consults:   ED Course as of 11/24/21 1021   Wed Nov 24, 2021   0619 I assessed the patient at bedside.     0718 Rechecked and updated.   0725 D/W Earnestine Juan PA-C. Ok for admission to Dr. Bowens       Interventions:  0648 Morphine 4 mg IV   0648 Tylenol 650 mg PO  0649 NS 1 L IV     Disposition:  The patient was admitted to the hospital under the care of Dr. Bowens.    Impression & Plan    CMS Diagnoses:   The patient has signs of Severe Sepsis        If one the following conditions is  present, a 30 mL/kg bolus is recommended as part of the 6 hour bundle (IBW can be used for BMI >30, or document refusal/contraindication):      1.   Initial hypotension  defined as 2 bps < 90 or map < 65 in the 6hrs before or 6hrs after time zero.     2.  Lactate >4.     The patient has signs of Severe Sepsis as evidenced by:    1. 2 SIRS criteria, AND  2. Suspected infection, AND   3. Organ dysfunction: Lactic Acidosis with value >2.0    Time severe sepsis diagnosis confirmed: [id number] 11/24/21 as this was the time when Lactate resulted, and the level was > 2.0 (intial result at 0643 was thought to be in error)    3 Hour Severe Sepsis Bundle Completion:    1. Initial Lactic Acid Result:   Recent Labs   Lab Test 11/24/21  0927 11/24/21  0700 11/24/21  0643   LACT 3.4* 3.5* 3.8*     2. Blood Cultures before Antibiotics: Yes  3. Broad Spectrum Antibiotics Administered:  yes       Anti-infectives (From admission through now)    Start     Dose/Rate Route Frequency Ordered Stop    11/24/21 1000  meropenem (MERREM) 500 mg vial to attach to  mL bag for ADULTS or 25 mL bag for PEDS         500 mg  over 30 Minutes Intravenous EVERY 6 HOURS 11/24/21 0934      11/24/21 0710  cefOXitin (MEFOXIN) 1 g vial to attach to  mL bag for ADULTS or 25 mL bag for PEDS         1 g  over 30 Minutes Intravenous ONCE 11/24/21 0708 11/24/21 0858          4. Fluid volume administered in ED:  Obese patient (BMI >30); 30 mL/kg bolus based on IBW given (see amount below).    BMI Readings from Last 1 Encounters:   11/24/21 44.91 kg/m      30 mL/kg fluids based on weight: 3,450 mL  30 mL/kg fluids based on IBW (must be >= 60 inches tall): 1,570 mL                Severe Sepsis reassessment:  1. Repeat Lactic Acid Level: 3.5  2. MAP>65 after initial IVF bolus, will continue to monitor fluid status and vital signs      and None     Medical Decision Making:  This 76-year-old female patient presents to the ED due to trigeminal neuralgia, fever,  and urinary infection.  Please see the HPI and exam for specifics.    The patient had recently been treated with gentamicin for a week via a PICC line by her urologist based on prior urine culture results (E. coli and Klebsiella).  She is allergic to penicillin.     Her work-up today is still notable for a urinary infection which leads me to believe she was either incompletely treated with gentamicin, or she has developed new resistance.  Based on the urine culture results from 11/1/2021, I chose cefoxitin as it was not a penicillin-based antibiotic, and the urine culture notes that it would treat both species of bacteria found in the culture.  Zosyn would be ideal, but the patient does have a penicillin allergy listed. (Meropenem added by inpatient team)    After discussion with the hospitalist, she will be admitted for ongoing monitoring and care.    Covid-19  Zeynep Martinez was evaluated during a global COVID-19 pandemic, which necessitated consideration that the patient might be at risk for infection with the SARS-CoV-2 virus that causes COVID-19.   Applicable protocols for evaluation were followed during the patient's care.   COVID-19 was considered as part of the patient's evaluation. The plan for testing is:  a test was obtained during this visit.    Diagnosis:    ICD-10-CM    1. Severe sepsis (H)  A41.9     R65.20    2. Complicated UTI (urinary tract infection)  N39.0    3. Calculus of kidney  N20.0    4. Bilateral renal cysts  N28.1        Scribe Disclosure:  Yakov TERRY, am serving as a scribe at 6:04 AM on 11/24/2021 to document services personally performed by Guilherme Parikh DO based on my observations and the provider's statements to me.      Guilherme Parikh DO  11/24/21 5483

## 2021-11-24 NOTE — ED TRIAGE NOTES
Pt arrived by EMS for neuropathy pain to left side of head; pt states pain has been ongoing x 4 days and gradually worsening; pt states she has intermittent episodes of this pain since childhood.

## 2021-11-24 NOTE — CONSULTS
Consult Date: 11/24/2021    INFECTIOUS DISEASE CONSULTATION    LOCATION:  [facility] currently in the Emergency Room, but now admitted in ED 26.    REFERRING PHYSICIAN:  NAHUM Hernández    IMPRESSION:  1.  A 76-year-old female with multiple recurrent UTIs, first time we have seen her, but mostly symptomatic lower UTIs, possibly some pyelo, 1 episode of sepsis, but not in the last 2 years, ESBL organism has been predominant, although most recent urine had a sensitive Klebsiella.  2.  Multiple recurrent UTIs, including numerous ESBL episodes, multiple of them not treated, and she only been on IV antibiotics once recently and thus seemed to resolve without major treatment, major episode of sepsis while she was in [place].  3.  Urinary anatomy includes significant stones and at least some obstruction and stents in the past.  No obvious obstruction currently and renal function okay.  4.  Multiple renal cysts, this pattern of the same organism repeatedly raised at least slight concern about a chronic infection, but not evident.  5.  Diabetes mellitus.    RECOMMENDATIONS:  1.  Meropenem for now.  Cefoxitin is not actually active against the ESBL organism despite the listed sensitivity.  2.  Await the current cultures including blood cultures to decide on course of action.  If the current organism is still the Klebsiella and not the resistant organism, possibly not need an IV course of antibiotics and oral will be acceptable.  3.  Overall, high desirability to remove as much stones and any obstruction, etc., as possible from a urinary tract standpoint, as these are obviously driving a lot of this.  4.  Going forward, the superior agent for treating this would be ertapenem once daily, rather than gentamicin and the incumbent risks the gentamicin has.    HISTORY OF PRESENT ILLNESS:  This 76-year-old female is seen in consultation due to apparent acute urosepsis.  The patient is being seen by the first time by  myself.  She is in the Emergency Room, having fevers, chills, abdomen and flank discomfort and has an apparent relapsed urinary infection.  She has a history of multiple urine infections over the last 2 or 3 years; first in [place] and then here.  She has also had stones, some obstruction and stent needed.  Only has had 1 hospitalization and major urosepsis episode.  Here, she has had multiple urines that have grown an ESBL organism and she has only had IV antibiotics once.  There are no oral options for this organism, so apparently has resolved without major treatment on several occasions.  Most recently had a more sensitive Klebsiella, but also recently ESBL organism and got a 1-week course of gentamicin as an outpatient.  She does not have any other localizing symptoms and has no artificial material.    PAST MEDICAL HISTORY:  Multiple recurrent UTIs as above, history of ESBL as predominant organism, history of diabetes.    ALLERGIES:  PENICILLIN - REMOTE ALLERGY; TOLERATES CEPHALOSPORINS AND NOW CARBAPENEMS.    MEDICATIONS:  As listed.    REVIEW OF SYSTEMS:  Largely as above.  Some flank and abdominal discomfort.  Feels better at present, but felt relatively ill at admission, including mild nausea.    PHYSICAL EXAMINATION:    GENERAL:  The patient appears her stated age.  She is a good historian.  Does not look that toxic or ill.  VITAL SIGNS:  Currently, normal including being afebrile.  HEENT:  No thrush or intraoral lesions.  Pupils reactive.  NECK:  Supple and nontender.  HEART:  Regular rhythm, no major murmur.  LUNGS:  Clear.  ABDOMEN:  Slight flank tenderness on the right.  EXTREMITIES:  No rash or skin lesions.  Slight edema.    LABORATORY DATA:  Urine grossly abnormal.  Current urine and blood cultures are pending.  Most recent urine from 11/22 looks like it is going to grow Klebsiella rather than the ESBL organism, but as recently as 11/01 had both Klebsiella and ESBL organism present.    Thank you very  much for the consultation.  I will follow the patient with you.    Fer Padilla MD        D: 2021   T: 2021   MT: [id number]    Name:     DEBBIE CHUA  MRN:      [id number]        Account:      [id number]   :      1945           Consult Date: 2021     Document: [id number]

## 2021-11-24 NOTE — PLAN OF CARE
DX: UTI Sepsis, Bilat Renal cysts   Tele: na  A&O x4  Activity: A-1 gait belt walker moving very slow had up to bed side commode  Diet: reg  VSS: Q4  O2: RA  B  PIV:  infusing  Pain: yes iv dilaudid x1, Excedrin migraine x1  GI/: continent   Plan: Merrem axb SW  Discharge: TBD continue plan of care

## 2021-11-24 NOTE — H&P
[facility] Hospitalist Admission Note  Name: Zeynep Martinez    MRN: [id number]  YOB: 1945    Age: 76 year old  Date of admission: 11/24/2021  Primary care provider: Trinh Bello        Assessment & Plan   Zeynep Martinez is a 76 year old female with past medical history significant for nephrolithiasis recurrent urinary tract infections with ESBL, trigeminal neuralgia, uterine cancer s/p JUAN RAMON 2001, gastric bypass, who was admitted on 11/24/2021 with concern for sepsis secondary to UTI after presenting with headache.     On presentation febrile and tachycardic.  Lab work revealed a neutrophilic leukocytosis to 13.8K.  Elevated lactic acid of 3.8. VBG showed uncompensated respiratory alkalosis.  BMP is notable for slightly elevated BUN at 36 with a GFR of 62 and potassium of 5.5. Urine culture and blood culture were obtained.  Preliminary urinalysis demonstrated nitrite positive moderate blood and large LE with WBC's and no RBC's.  Rapid viral panel was negative for Covid and influenza.  Imaging showed nonobstructing 4 mm right renal calculi and stable simple, mildly complex bilateral renal cysts the largest measuring 6.8 cm.     Discussed with Dr. Parikh in the ED, full chart review including lab work, imaging, and vital signs were reviewed. Patient received fluids, cefoxitin, in the ED. Admission was requested from hospitalist service for further cares and monitoring.     1. Severe Sepsis  Complicated UTI:   Ms. Martinez has history of recurrent UTI's complicated by ESBL E. coli for the last several years. she is followed as an outpatient by [facility] kidney Stone [place] and urology.  Her last cystoscopy was in May 2021 where she diagnosed with a right proximal ureteral stricture. Stent was placed and subsequently removed.   Most recently treated for UTI with one week of outpatient IV Gentamycin, ending 11/11 which was susceptible to both Klebsiella and ESBL based on  "that culture data.   Cystitis symptoms re-developed 11/19 with frequency, dysuria.   On admission meets criteria for severe sepsis , fluid responsive and stable BP currently.  Abdominal exam is benign. No current e/o pyelo or obstructing nephrolithiasis.   -Follow urine culture and blood cultures to completion  -IV antibiotics: Continue cefoxitin, add Meropenem for now for coverage of ESBL given recurrence of infection. Request ID input.   -May need vascular access input for outpt IV abx if no oral antibiotic options available once we have culture data to review and taper antibiotics   -IV fluids, LR to follow boluses ordered in ED  -APAP, analgesia as needed  -recheck lactic acid around 1300 to ensure down trend   -follow cbc, renal function  -will need eventual follow up regarding further recommendations surgical stricture management with Dr. Mansfield once infection again treated     2. Type II DM: Blood Sugar 192 on arrival with prior a1c of 7.8  -Hold oral diabetic agents at this time  -May consider resuming Metformin if renal function is stable  -For now cover with sliding scale insulin     3. Trigeminal neuralgia:  Symptoms currently resolved.   -resume PTA Gabapentin   -analgesia prn  -if recurrent request migraine cocktail    4. Gastric bypass Poli-en-Y: judicious use for NSAIDs. Resume PPI.    Awaiting formal pharmacy reconciliation to resume home medications.     DVT Prophylaxis: Enoxaparin (Lovenox) SQ  Code Status: Full Code  Expected discharge: 11/26/2021 once sepsis treated and antibiotic plan established.  Baseline Functional Status: Independent, resides at [facility]. SW consult.  COVID PCR STATUS: neg, asymptomatic. No precautions.   Family Updated with Plan of Care: daughter jim, via phone.     Earnestine Juan PA-C    Primary Care Physician   Trinh Castillo    Chief Complaint   \"headache\"    History is obtained from the patient   Services Used: No    History of " "Present Illness   Zeynep Martinez is a 76 year old female with past medical history significant for nephrolithiasis recurrent urinary tract infections with ESBL, trigeminal neuralgia, who presented for evaluation of left-sided headache.    Headache developed the evening of 11/23/2021 and became constant.  Located on her left lateral facial region.  Described as constant \"electric shock \"in nature.  It is not associated with auras.  Patient has history of these headaches from trigeminal neuralgia.  Usually associated with urinary tract infections and at home treated with Tylenol.  The morning of presentation had called 911 because she \"knew she had a urinary tract infection and her trigeminal neuralgia was out of whack \".      Ms. Martinez has history of recurrent UTI's complicated by ESBL E. coli for the last several years. he is followed as an outpatient by [facility] kidney stone [place] and urology.  Her last cystoscopy was in May 2021 where she diagnosed with a right proximal ureteral stricture. Stent was placed and subsequently removed.   Was most recently treated as an outpatient with a course of IV gentamicin that ended on 11/11.  Urine culture at the time grew Klebsiella and ESBL E. Coli. Susceptible to cefoxitin.    She developed recurrent irritative voiding symptoms on 11/19/2021 with dysuria, urinary frequency.  No flank pain.  No hematuria.  No nausea or vomiting.//The day prior to presentation patient had low energy.    Past Medical History    I have reviewed this patient's medical history and updated it with pertinent information if needed.   Past Medical History:   Diagnosis Date     Anxiety      Arthritis      Bladder infection      Calculus of kidney     multiple, uric acid and calcium     Cancer (H) Uterine 2/2001     Cancer (H)     uterine     Depression      Diabetes mellitus (H)     type 2     Frequent UTI      Gout      History of anesthesia complications     Slow to wake and gets " very anxious     Hyperlipidemia LDL goal <100      Kidney stone      Type 2 diabetes mellitus without complication, without long-term current use of insulin (H)      Ulcer     Ulcer in the anastomosis       Past Surgical History   I have reviewed this patient's surgical history and updated it with pertinent information if needed.  Past Surgical History:   Procedure Laterality Date     ABDOMINOPLASTY       APPENDECTOMY       APPENDECTOMY       EXTRACORPOREAL SHOCK WAVE LITHOTRIPSY (ESWL)       EXTRACORPOREAL SHOCK WAVE LITHOTRIPSY (ESWL)       galbaldder       GASTRIC BYPASS       HYSTERECTOMY       HYSTERECTOMY TOTAL ABDOMINAL, BILATERAL SALPINGO-OOPHORECTOMY, COMBINED      no cervix     IR MISCELLANEOUS PROCEDURE  3/18/2008     IR MISCELLANEOUS PROCEDURE  3/18/2008     IR MISCELLANEOUS PROCEDURE  6/10/2008     IR MISCELLANEOUS PROCEDURE  1/24/2012     IR URETER DILATION BILATERAL  1/24/2012     IR URETER DILATION BILATERAL  3/18/2008     PICC  11/4/2021          OR CYSTO/URETERO W/LITHOTRIPSY &INDWELL STENT INSRT Left 10/26/2018    Procedure:  CYSTOSCOPY, LEFT  URETEROSCOPY, LASER LITHOTRIPSY STENT INSERTION;  Surgeon: Long Mansfield MD;  Location: [facility];  Service: Urology     OR CYSTO/URETERO W/LITHOTRIPSY &INDWELL STENT INSRT Right 5/28/2021    Procedure: CYSTOURETEROSCOPY, URETEROSCOPY, URETERAL DILATION WITH RETROGRADE PYELOGRAM, AND STENT INSERTION RIGHT;  Surgeon: Long Mansfield MD;  Location: [facility];  Service: Urology     OR ERCP W/BIOPSY SINGLE/MULTIPLE       REVISION CATHERINE-EN-Y       SKIN SURGERY  2013    15 lbs of skin/adipose removed     STOMACH SURGERY       URETEROSCOPY         Prior to Admission Medications   Prior to Admission Medications   Prescriptions Last Dose Informant Patient Reported? Taking?   ALPRAZolam (XANAX) 0.5 MG tablet   No No   Sig: Take 1 tablet (0.5 mg) by mouth daily as needed for anxiety   QUEtiapine (SEROQUEL) 25 MG tablet   No No   Sig: Take 1  tablet (25 mg) by mouth nightly as needed (insomnia)   STATIN NOT PRESCRIBED (INTENTIONAL)   No No   Sig: Please choose reason not prescribed from choices below.   acetaminophen (TYLENOL) 500 MG tablet   Yes No   Sig: Take 500-1,000 mg by mouth every 6 hours as needed for mild pain   blood glucose (ACCU-CHEK PASQUALE PLUS) test strip   No No   Sig: Use to test blood sugar 1-2 times daily or as directed.   blood glucose (NO BRAND SPECIFIED) lancets standard   No No   Sig: Use to test blood sugar 1-2 times daily or as directed.   blood glucose (NO BRAND SPECIFIED) test strip   No No   Sig: Use to test blood sugar 1-2 times daily or as directed.   blood glucose monitoring (NO BRAND SPECIFIED) meter device kit   No No   Sig: Use to test blood sugar 1-2 times daily or as directed.   busPIRone (BUSPAR) 10 MG tablet   No No   Sig: Take 5 mg PO daily for one week, then 5 mg PO BID for one week, then 10 mg PO BID   capsaicin (ZOSTRIX) 0.025 % external cream   No No   Sig: Apply 4 times a day as needed for nerve pain   Patient not taking: Reported on 10/6/2021   gabapentin (NEURONTIN) 800 MG tablet   No No   Sig: Take 1 tablet (800 mg) by mouth 2 times daily   glipiZIDE (GLUCOTROL XL) 5 MG 24 hr tablet   No No   Sig: Take 1 tablet (5 mg) by mouth 2 times daily   meloxicam (MOBIC) 7.5 MG tablet   No No   Sig: TAKE ONE TABLET BY MOUTH ONCE DAILY   metFORMIN (GLUCOPHAGE-XR) 500 MG 24 hr tablet   No No   Sig: Take 2 tablets (1,000 mg) by mouth 2 times daily (with meals)   traZODone (DESYREL) 50 MG tablet   No No   Sig: Take 0.5-1 tablets (25-50 mg) by mouth At Bedtime   venlafaxine (EFFEXOR-XR) 150 MG 24 hr capsule   No No   Sig: Take 1 capsule (150 mg) by mouth daily   venlafaxine (EFFEXOR-XR) 75 MG 24 hr capsule   No No   Sig: Take 1 capsule (75 mg) by mouth daily      Facility-Administered Medications: None     Allergies   Allergies   Allergen Reactions     Adhesive Tape Rash     Paper tape is ok      Statin Drugs [Hmg-Coa-R  Inhibitors] Other (See Comments)     confusion     Penicillins Rash       Social History   I have reviewed this patient's social history and updated it with pertinent information if needed. Zeynep Martinez  reports that she quit smoking about 51 years ago. Her smoking use included cigarettes. She smoked 0.00 packs per day for 5.00 years. She has never used smokeless tobacco. She reports current alcohol use. She reports that she does not use drugs.    Family History   I have reviewed this patient's family history and updated it with pertinent information if needed.   Family History   Problem Relation Age of Onset     Multiple Sclerosis Mother      Cancer Maternal Grandmother         cervical     Diabetes Maternal Grandmother      Hypertension Maternal Grandmother      Heart Disease Maternal Grandmother      Multiple Sclerosis Cousin      Multiple Sclerosis Cousin      Diabetes Maternal Aunt      Depression Daughter      Depression Granddaughter        Review of Systems   The 10 point Review of Systems is negative other than noted in the HPI or here.     Physical Exam   Temp: (!) 101.3  F (38.5  C) Temp src: Oral BP: 126/70 Pulse: 111   Resp: 20 SpO2: 95 %      Vital Signs with Ranges  Temp:  [101.3  F (38.5  C)] 101.3  F (38.5  C)  Pulse:  [111] 111  Resp:  [20] 20  BP: (126)/(70) 126/70  SpO2:  [95 %] 95 %  253 lbs 8.46 oz    Constitutional: Awake, alert,  no apparent distress.  Eyes: Conjunctiva and pupils examined and normal.  HEENT: Non traumatic. dry mucous membranes, normal dentition.  Respiratory: Clear to auscultation bilaterally, no crackles or wheezing.  Cardiovascular: Regular rate and rhythm, normal S1 and S2, and no murmur noted.  GI: Soft, non-distended, non-tender, bowel sounds present. No rebound tenderness or guarding.  No suprapubic tenderness or flank pain.  Lymph/Hematologic: No anterior cervical or supraclavicular adenopathy.  Skin: Warm, dry. No edema.  Musculoskeletal: No gross deformities  noted.  No erythema or tenderness. Moving all extremities.  Neurologic: No tremor. Speech is clear. Moving all extremities with symmetrical strength. CN 2-12 grossly intact.  Coordination and sensation intact.   Psychiatric: Appropriate affect.    Data   Data reviewed today:      Recent Labs   Lab 11/24/21  0638   WBC 13.8*   HGB 11.8   MCV 93          Earnestine Juan PA-C on 11/24/2021 at 7:23 AM

## 2021-11-24 NOTE — CONSULTS
ID consult dictated IMP 1 77 yo female multiply recurrent ESBL UTI, stones and active ? Anatomic issues    REc Michelle, discontinue cefoxitin is no active against this sens is false, urology help  1st I have seen this pt

## 2021-11-24 NOTE — ED NOTES
"[facility]  ED Nurse Handoff Report    Zeynep Martinez is a 76 year old female   ED Chief complaint: NEUROPATHY  . ED Diagnosis:   Final diagnoses:   Severe sepsis (H)   Complicated UTI (urinary tract infection)   Calculus of kidney   Bilateral renal cysts     Allergies:   Allergies   Allergen Reactions     Adhesive Tape Rash     Paper tape is ok      Statin Drugs [Hmg-Coa-R Inhibitors] Other (See Comments)     confusion     Penicillins Rash       Code Status: Full Code  Activity level - Baseline/Home:  Independent. Activity Level - Current:   Stand by Assist. Lift room needed: No. Bariatric: No   Needed: No   Isolation: No. Infection: Not Applicable.     Vital Signs:   Vitals:    11/24/21 0545 11/24/21 0700 11/24/21 0715 11/24/21 0719   BP: 126/70 139/40     Pulse: 111  101    Resp: 20  22    Temp: (!) 101.3  F (38.5  C)      TempSrc: Oral      SpO2: 95%      Weight:    115 kg (253 lb 8.5 oz)   Height:    1.6 m (5' 3\")       Cardiac Rhythm:  ,      Pain level:    Patient confused: No. Patient Falls Risk: Yes.   Elimination Status: Has voided   Patient Report - Initial Complaint:   Pt arrived by EMS for neuropathy pain to left side of head; pt states pain has been ongoing x 4 days and gradually worsening; pt states she has intermittent episodes of this pain since childhood.       . Focused Assessment:  Genitourinary - Genitourinary WDL: .WDL except; female symptoms; voiding ability/characteristics (dysuria) Voiding Characteristics: urgency   Tests Performed: labs and imaging. Abnormal Results:   Labs Ordered and Resulted from Time of ED Arrival to Time of ED Departure   BASIC METABOLIC PANEL - Abnormal       Result Value    Sodium 139      Potassium 5.5 (*)     Chloride 105      Carbon Dioxide (CO2) 22      Anion Gap 12      Urea Nitrogen 36 (*)     Creatinine 0.92      Calcium 9.4      Glucose 192 (*)     GFR Estimate 62     ROUTINE UA WITH MICROSCOPIC REFLEX TO CULTURE - Abnormal    " Color Urine Yellow      Appearance Urine Slightly Cloudy (*)     Glucose Urine Negative      Bilirubin Urine Negative      Ketones Urine Negative      Specific Gravity Urine 1.017      Blood Urine Trace (*)     pH Urine 5.5      Protein Albumin Urine 20  (*)     Urobilinogen Urine Normal      Nitrite Urine Positive (*)     Leukocyte Esterase Urine Large (*)     Bacteria Urine Few (*)     Mucus Urine Present (*)     RBC Urine 7 (*)     WBC Urine >182 (*)     Squamous Epithelials Urine <1     CBC WITH PLATELETS AND DIFFERENTIAL - Abnormal    WBC Count 13.8 (*)     RBC Count 4.07      Hemoglobin 11.8      Hematocrit 37.9      MCV 93      MCH 29.0      MCHC 31.1 (*)     RDW 13.9      Platelet Count 301      % Neutrophils 94      % Lymphocytes 3      % Monocytes 2      % Eosinophils 0      % Basophils 0      % Immature Granulocytes 1      NRBCs per 100 WBC 0      Absolute Neutrophils 12.8 (*)     Absolute Lymphocytes 0.5 (*)     Absolute Monocytes 0.2      Absolute Eosinophils 0.1      Absolute Basophils 0.1      Absolute Immature Granulocytes 0.1 (*)     Absolute NRBCs 0.0     ISTAT GASES LACTATE VENOUS POCT - Abnormal    Lactic Acid POCT 3.8 (*)     Bicarbonate Venous POCT 27      O2 Sat, Venous POCT 87 (*)     pCO2V Venous POCT 37 (*)     pH Venous POCT 7.46 (*)     pO2 Venous POCT 49 (*)    ISTAT GASES LACTATE VENOUS POCT - Abnormal    Lactic Acid POCT 3.5 (*)     Bicarbonate Venous POCT 26      O2 Sat, Venous POCT 71 (*)     pCO2V Venous POCT 39 (*)     pH Venous POCT 7.42      pO2 Venous POCT 36     INFLUENZA A/B & SARS-COV2 PCR MULTIPLEX - Normal    Influenza A PCR Negative      Influenza B PCR Negative      SARS CoV2 PCR Negative     BLOOD CULTURE   BLOOD CULTURE   URINE CULTURE     Abd/pelvis CT no contrast - Stone Protocol   Final Result   IMPRESSION:    1.  No acute findings in the abdomen and pelvis.   2.  Stable nonobstructing 4 mm right renal calculus.   3.  Stable simple and mildly complex bilateral renal  cysts. The   largest 6.8 cm cyst at the lower pole of the right kidney contains   similar layering proteinaceous/hemorrhagic fluid.      ANDRES DRAKE MD            SYSTEM ID:  [id number]        .   Treatments provided: see MAR  Family Comments: no family bedside  OBS brochure/video discussed/provided to patient:  N/A  ED Medications:   Medications   morphine (PF) injection 4 mg (4 mg Intravenous Given 11/24/21 0648)   0.9% sodium chloride BOLUS (0 mLs Intravenous Stopped 11/24/21 0857)   acetaminophen (TYLENOL) tablet 650 mg (650 mg Oral Given 11/24/21 0648)   cefOXitin (MEFOXIN) 1 g vial to attach to  mL bag for ADULTS or 25 mL bag for PEDS (0 g Intravenous Stopped 11/24/21 0858)   0.9% sodium chloride BOLUS (0 mLs Intravenous Stopped 11/24/21 0858)     Drips infusing:  No  For the majority of the shift, the patient's behavior Green. Interventions performed were N/A.    Sepsis treatment initiated:   Yes    Per the ED Provider, Time Zero for severe sepsis or septic shock is:  0647    3 Hour Severe Sepsis Bundle Completion:  1. Initial Lactic Acid Result: Recent Labs   Lab Test 11/24/21  0700 11/24/21  0643   LACT 3.5* 3.8*     2. Blood Cultures before Antibiotics: Yes  3. Broad Spectrum Antibiotics Administered:     Anti-infectives (From now, onward)    None        4. 1572 ml of IV fluids have been given so far      6 Hour Severe Sepsis Bundle Completion:    1. Repeat Lactic Acid Level: Not drawn  2. Patient currently on Vasopressors =  No     Patient tested for COVID 19 prior to admission: YES    ED Nurse Name/Phone Number: Alisha Mayberry RN,   8:58 AM    RECEIVING UNIT ED HANDOFF REVIEW    Above ED Nurse Handoff Report was reviewed: Yes  Reviewed by: Carmella Plummer RN on November 24, 2021 at 4:26 PM

## 2021-11-25 LAB
ACINETOBACTER SPECIES: NOT DETECTED
ANION GAP SERPL CALCULATED.3IONS-SCNC: 4 MMOL/L (ref 3–14)
BASOPHILS # BLD AUTO: 0 10E3/UL (ref 0–0.2)
BASOPHILS NFR BLD AUTO: 0 %
BUN SERPL-MCNC: 27 MG/DL (ref 7–30)
CALCIUM SERPL-MCNC: 8.4 MG/DL (ref 8.5–10.1)
CHLORIDE BLD-SCNC: 109 MMOL/L (ref 94–109)
CITROBACTER SPECIES: NOT DETECTED
CO2 SERPL-SCNC: 28 MMOL/L (ref 20–32)
CREAT SERPL-MCNC: 0.93 MG/DL (ref 0.52–1.04)
CTX-M: NOT DETECTED
ENTEROBACTER SPECIES: NOT DETECTED
EOSINOPHIL # BLD AUTO: 0.1 10E3/UL (ref 0–0.7)
EOSINOPHIL NFR BLD AUTO: 1 %
ERYTHROCYTE [DISTWIDTH] IN BLOOD BY AUTOMATED COUNT: 14.3 % (ref 10–15)
ESCHERICHIA COLI: NOT DETECTED
GFR SERPL CREATININE-BSD FRML MDRD: 61 ML/MIN/1.73M2
GLUCOSE BLD-MCNC: 110 MG/DL (ref 70–99)
GLUCOSE BLDC GLUCOMTR-MCNC: 108 MG/DL (ref 70–99)
GLUCOSE BLDC GLUCOMTR-MCNC: 110 MG/DL (ref 70–99)
GLUCOSE BLDC GLUCOMTR-MCNC: 112 MG/DL (ref 70–99)
GLUCOSE BLDC GLUCOMTR-MCNC: 113 MG/DL (ref 70–99)
GLUCOSE BLDC GLUCOMTR-MCNC: 119 MG/DL (ref 70–99)
GLUCOSE BLDC GLUCOMTR-MCNC: 151 MG/DL (ref 70–99)
HCT VFR BLD AUTO: 34.6 % (ref 35–47)
HGB BLD-MCNC: 10.3 G/DL (ref 11.7–15.7)
IMM GRANULOCYTES # BLD: 0 10E3/UL
IMM GRANULOCYTES NFR BLD: 1 %
IMP: NOT DETECTED
KLEBSIELLA OXYTOCA: NOT DETECTED
KLEBSIELLA PNEUMONIAE: DETECTED
KPC: NOT DETECTED
LACTATE SERPL-SCNC: 0.8 MMOL/L (ref 0.7–2)
LYMPHOCYTES # BLD AUTO: 0.9 10E3/UL (ref 0.8–5.3)
LYMPHOCYTES NFR BLD AUTO: 18 %
MCH RBC QN AUTO: 28.3 PG (ref 26.5–33)
MCHC RBC AUTO-ENTMCNC: 29.8 G/DL (ref 31.5–36.5)
MCV RBC AUTO: 95 FL (ref 78–100)
MONOCYTES # BLD AUTO: 0.4 10E3/UL (ref 0–1.3)
MONOCYTES NFR BLD AUTO: 9 %
NDM: NOT DETECTED
NEUTROPHILS # BLD AUTO: 3.3 10E3/UL (ref 1.6–8.3)
NEUTROPHILS NFR BLD AUTO: 71 %
NRBC # BLD AUTO: 0 10E3/UL
NRBC BLD AUTO-RTO: 0 /100
OXA (DETECTED/NOT DETECTED): NOT DETECTED
PLATELET # BLD AUTO: 226 10E3/UL (ref 150–450)
POTASSIUM BLD-SCNC: 4.6 MMOL/L (ref 3.4–5.3)
PROTEUS SPECIES: NOT DETECTED
PSEUDOMONAS AERUGINOSA: NOT DETECTED
RBC # BLD AUTO: 3.64 10E6/UL (ref 3.8–5.2)
SODIUM SERPL-SCNC: 141 MMOL/L (ref 133–144)
VIM: NOT DETECTED
WBC # BLD AUTO: 4.7 10E3/UL (ref 4–11)

## 2021-11-25 PROCEDURE — 250N000013 HC RX MED GY IP 250 OP 250 PS 637: Performed by: PHYSICIAN ASSISTANT

## 2021-11-25 PROCEDURE — 36415 COLL VENOUS BLD VENIPUNCTURE: CPT | Performed by: INTERNAL MEDICINE

## 2021-11-25 PROCEDURE — 85025 COMPLETE CBC W/AUTO DIFF WBC: CPT | Performed by: PHYSICIAN ASSISTANT

## 2021-11-25 PROCEDURE — 99233 SBSQ HOSP IP/OBS HIGH 50: CPT | Performed by: INTERNAL MEDICINE

## 2021-11-25 PROCEDURE — 258N000003 HC RX IP 258 OP 636: Performed by: PHYSICIAN ASSISTANT

## 2021-11-25 PROCEDURE — 82565 ASSAY OF CREATININE: CPT | Performed by: PHYSICIAN ASSISTANT

## 2021-11-25 PROCEDURE — 258N000003 HC RX IP 258 OP 636: Performed by: INTERNAL MEDICINE

## 2021-11-25 PROCEDURE — 250N000011 HC RX IP 250 OP 636: Performed by: PHYSICIAN ASSISTANT

## 2021-11-25 PROCEDURE — 250N000013 HC RX MED GY IP 250 OP 250 PS 637: Performed by: INTERNAL MEDICINE

## 2021-11-25 PROCEDURE — 120N000001 HC R&B MED SURG/OB

## 2021-11-25 PROCEDURE — 36415 COLL VENOUS BLD VENIPUNCTURE: CPT | Performed by: PHYSICIAN ASSISTANT

## 2021-11-25 PROCEDURE — 83605 ASSAY OF LACTIC ACID: CPT | Performed by: INTERNAL MEDICINE

## 2021-11-25 RX ORDER — VENLAFAXINE HYDROCHLORIDE 150 MG/1
150 CAPSULE, EXTENDED RELEASE ORAL DAILY
Status: DISCONTINUED | OUTPATIENT
Start: 2021-11-25 | End: 2021-11-27 | Stop reason: HOSPADM

## 2021-11-25 RX ORDER — BUSPIRONE HYDROCHLORIDE 5 MG/1
5 TABLET ORAL AT BEDTIME
Status: DISCONTINUED | OUTPATIENT
Start: 2021-11-25 | End: 2021-11-27 | Stop reason: HOSPADM

## 2021-11-25 RX ORDER — GABAPENTIN 400 MG/1
800 CAPSULE ORAL 2 TIMES DAILY
Status: DISCONTINUED | OUTPATIENT
Start: 2021-11-25 | End: 2021-11-27 | Stop reason: HOSPADM

## 2021-11-25 RX ORDER — VENLAFAXINE HYDROCHLORIDE 75 MG/1
75 CAPSULE, EXTENDED RELEASE ORAL DAILY
Status: DISCONTINUED | OUTPATIENT
Start: 2021-11-25 | End: 2021-11-27 | Stop reason: HOSPADM

## 2021-11-25 RX ORDER — ACETAMINOPHEN 500 MG
500-1000 TABLET ORAL EVERY 6 HOURS PRN
Status: DISCONTINUED | OUTPATIENT
Start: 2021-11-25 | End: 2021-11-25

## 2021-11-25 RX ORDER — QUETIAPINE FUMARATE 25 MG/1
25 TABLET, FILM COATED ORAL
Status: DISCONTINUED | OUTPATIENT
Start: 2021-11-25 | End: 2021-11-27 | Stop reason: HOSPADM

## 2021-11-25 RX ADMIN — ACETAMINOPHEN 1000 MG: 500 TABLET, FILM COATED ORAL at 16:19

## 2021-11-25 RX ADMIN — VENLAFAXINE HYDROCHLORIDE 75 MG: 75 CAPSULE, EXTENDED RELEASE ORAL at 13:18

## 2021-11-25 RX ADMIN — MEROPENEM 500 MG: 500 INJECTION, POWDER, FOR SOLUTION INTRAVENOUS at 10:37

## 2021-11-25 RX ADMIN — OXYCODONE HYDROCHLORIDE 5 MG: 5 TABLET ORAL at 13:17

## 2021-11-25 RX ADMIN — ENOXAPARIN SODIUM 40 MG: 40 INJECTION SUBCUTANEOUS at 21:47

## 2021-11-25 RX ADMIN — ENOXAPARIN SODIUM 40 MG: 40 INJECTION SUBCUTANEOUS at 07:55

## 2021-11-25 RX ADMIN — MEROPENEM 500 MG: 500 INJECTION, POWDER, FOR SOLUTION INTRAVENOUS at 04:11

## 2021-11-25 RX ADMIN — SODIUM CHLORIDE, POTASSIUM CHLORIDE, SODIUM LACTATE AND CALCIUM CHLORIDE: 600; 310; 30; 20 INJECTION, SOLUTION INTRAVENOUS at 08:34

## 2021-11-25 RX ADMIN — GABAPENTIN 800 MG: 400 CAPSULE ORAL at 21:52

## 2021-11-25 RX ADMIN — Medication 50 MG: at 21:57

## 2021-11-25 RX ADMIN — ACETAMINOPHEN 1000 MG: 500 TABLET, FILM COATED ORAL at 07:53

## 2021-11-25 RX ADMIN — ACETAMINOPHEN 1000 MG: 500 TABLET, FILM COATED ORAL at 00:00

## 2021-11-25 RX ADMIN — BUSPIRONE HYDROCHLORIDE 5 MG: 5 TABLET ORAL at 21:52

## 2021-11-25 RX ADMIN — SODIUM CHLORIDE, POTASSIUM CHLORIDE, SODIUM LACTATE AND CALCIUM CHLORIDE: 600; 310; 30; 20 INJECTION, SOLUTION INTRAVENOUS at 22:50

## 2021-11-25 RX ADMIN — VENLAFAXINE HYDROCHLORIDE 150 MG: 150 CAPSULE, EXTENDED RELEASE ORAL at 13:19

## 2021-11-25 RX ADMIN — MEROPENEM 500 MG: 500 INJECTION, POWDER, FOR SOLUTION INTRAVENOUS at 21:44

## 2021-11-25 RX ADMIN — MEROPENEM 500 MG: 500 INJECTION, POWDER, FOR SOLUTION INTRAVENOUS at 16:20

## 2021-11-25 RX ADMIN — PANTOPRAZOLE SODIUM 40 MG: 40 TABLET, DELAYED RELEASE ORAL at 07:53

## 2021-11-25 RX ADMIN — GABAPENTIN 800 MG: 400 CAPSULE ORAL at 13:18

## 2021-11-25 ASSESSMENT — ACTIVITIES OF DAILY LIVING (ADL)
ADLS_ACUITY_SCORE: 12

## 2021-11-25 NOTE — CONSULTS
Care Management Initial Consult    General Information  Assessment completed with: Patient,    Type of CM/SW Visit: Initial Assessment    Primary Care Provider verified and updated as needed:     Readmission within the last 30 days:        Reason for Consult: discharge planning  Advance Care Planning:            Communication Assessment  Patient's communication style: spoken language (English or Bilingual)    Hearing Difficulty or Deaf: yes   Wear Glasses or Blind: yes    Cognitive  Cognitive/Neuro/Behavioral:                        Living Environment:   People in home: alone     Current living Arrangements: independent living facility      Able to return to prior arrangements: yes       Family/Social Support:  Care provided by: self  Provides care for: no one     Children          Description of Support System: Supportive,Involved         Current Resources:   Patient receiving home care services: No     Community Resources: None  Equipment currently used at home: cane, straight,walker, rolling  Supplies currently used at home:      Employment/Financial:  Employment Status:          Financial Concerns: No concerns identified           Lifestyle & Psychosocial Needs:  Social Determinants of Health     Tobacco Use: Medium Risk     Smoking Tobacco Use: Former Smoker     Smokeless Tobacco Use: Never Used   Alcohol Use: Not on file   Financial Resource Strain: Not on file   Food Insecurity: Not on file   Transportation Needs: Not on file   Physical Activity: Not on file   Stress: Not on file   Social Connections: Not on file   Intimate Partner Violence: Not on file   Depression: At risk     PHQ-2 Score: 4   Housing Stability: Not on file       Functional Status:  Prior to admission patient needed assistance: groceries, appts, and transportation        Mental Health Status:  Mental Health Status: No Current Concerns       Chemical Dependency Status:  Chemical Dependency Status: No Current Concerns              Values/Beliefs:  Spiritual, Cultural Beliefs, Mu-ism Practices, Values that affect care:                 Additional Information:  Met with pt who reports she lives at [facility], independent living, she does not receive any services but services are available if needed. She reports she has 1 dtr who lives nearby and helps with groceries, transportation, etc. She reports her son does not live in the [facility]. She uses a walker and a cane, denies any falls. Per chart review she has had [facility] in the past.  She denies any [facility] stays. She reports her dtr will provide transportation at time of discharge.     Plan:  SW will continue to follow and assist with any discharge planning.     Kim WOOD, [facility]  Inpatient Care Coordination   [facility]   [phone]

## 2021-11-25 NOTE — PROGRESS NOTES
[facility]  Infectious Disease Progress Note          Assessment and Plan:   IMPRESSION:  1.  A 76-year-old female with multiple recurrent UTIs, first time we have seen her, but mostly symptomatic lower UTIs, possibly some pyelo, 1 episode of sepsis, but not in the last 2 years, ESBL organism has been predominant, although most recent urine had a sensitive Klebsiella.  2.  Multiple recurrent UTIs, including numerous ESBL episodes, multiple of them not treated, and she only been on IV antibiotics once recently and thus seemed to resolve without major treatment, major episode of sepsis while she was in [place].  3.  Urinary anatomy includes significant stones and at least some obstruction and stents in the past.  No obvious obstruction currently and renal function okay.  4.  Multiple renal cysts, this pattern of the same organism repeatedly raised at least slight concern about a chronic infection, but not evident.  5.  Diabetes mellitus.     RECOMMENDATIONS:  1.  Meropenem for now.  Cefoxitin is not actually active against the ESBL organism despite the listed sensitivity.  2.   current cultures include blood cultures +.  current organism is still the Klebsiella which is quite sens, and not the resistant ESBL, if improves will not need an IV course of antibiotics and oral will be acceptable.  3.  Overall, high desirability to remove as much stones and any obstruction, etc., as possible from a urinary tract standpoint, as these are obviously driving a lot of this.  4.  Going forward, if ESBLthe superior agent for treating this would be ertapenem once daily, rather than gentamicin and the incumbent risks the gentamicin has.  For now darrell to also cover the recently still present ESBL           Interval History:   no new complaints and doing better no cp, sob, n/v/d, or abd pain. T down BC kleb sens pending, has been historically sens no ESBL              Medications:       enoxaparin  "ANTICOAGULANT  40 mg Subcutaneous Q12H     insulin aspart  1-7 Units Subcutaneous TID AC     insulin aspart  1-5 Units Subcutaneous At Bedtime     meropenem  500 mg Intravenous Q6H     pantoprazole  40 mg Oral QAM AC     sodium chloride (PF)  3 mL Intracatheter Q8H                  Physical Exam:   Blood pressure 104/48, pulse 84, temperature 98.3  F (36.8  C), temperature source Oral, resp. rate 16, height 1.6 m (5' 3\"), weight 116.1 kg (256 lb), SpO2 93 %, not currently breastfeeding.  Wt Readings from Last 2 Encounters:   11/24/21 116.1 kg (256 lb)   10/20/21 118.4 kg (261 lb)     Vital Signs with Ranges  Temp:  [98.3  F (36.8  C)-103  F (39.4  C)] 98.3  F (36.8  C)  Pulse:  [70-89] 84  Resp:  [11-20] 16  BP: ()/(31-96) 104/48  SpO2:  [90 %-96 %] 93 %    Constitutional: Awake, alert, cooperative, no apparent distress looks Ok   Lungs: Clear to auscultation bilaterally, no crackles or wheezing   Cardiovascular: Regular rate and rhythm, normal S1 and S2, and no murmur noted   Abdomen: Normal bowel sounds, soft, non-distended, non-tender   Skin: No rashes, no cyanosis, no edema   Other:           Data:   All microbiology laboratory data reviewed.  Recent Labs   Lab Test 11/25/21  0754 11/24/21  0638 10/05/21  1033   WBC 4.7 13.8* 6.5   HGB 10.3* 11.8 11.8   HCT 34.6* 37.9 38.0   MCV 95 93 93    301 273     Recent Labs   Lab Test 11/25/21  0754 11/24/21  0638 10/05/21  1032   CR 0.93 0.92 1.00     No lab results found.  Recent Labs   Lab Test 06/29/21  1300 03/22/21  1516   CULT <10,000 colonies/mL  Escherichia coli ESBL  ESBL producers are resistant to all cephalosporins (including 3rd generation).  ESBL   producers are usually susceptible to amikacin and the carbapenems.  Susceptibility testing in progress  *  <10,000 colonies/mL  Enterococcus faecalis  *  50,000 to 100,000 colonies/mL  Lactobacillus species  Susceptibility testing not routinely done  *  Susceptibility testing requested by  Joy Mcdaniel " for Dr. Long Mansfield phone [phone] for all organisms 7.1.21 CM   10,000 to 50,000 colonies/mL  Escherichia coli ESBL  ESBL (extended beta lactamase) producing organisms require contact precautions.  *  <10,000 colonies/mL  Enterococcus faecalis  *  <10,000 colonies/mL  Strain 2  Escherichia coli ESBL  ESBL (extended beta lactamase) producing organisms require contact precautions.  *

## 2021-11-25 NOTE — PLAN OF CARE
Continues on merrem antibiotic. States head pain has flared with this infection. Usually is able to control it with tylenol. However states she takes 2000mg at time. Instructed this is more than recommended dose. Will ask for pain consult.  Up with assist of one. Using bsc to void.

## 2021-11-25 NOTE — PROGRESS NOTES
End of Shift Summary  For vital signs and complete assessments, please see documentation flowsheets.     Pertinent assessments: Pt A&O, soft BP's, on RA, pain 10/10 Tylenol x 1 given with relief. Up to BSC x 2, voiding without difficulty. BG check 112    Major Shift Events: +BC from 11/24 @ 0730 gram - bacilli, MD notified, no new orders, pt already on IV Merrem, 2nd call from micro lab 1st preliminary result: klebsiella pneumonia, MD notified.     Treatment Plan: IVF, IV Merrem, pain management   Bedside Nurse: Anna Durant RN

## 2021-11-25 NOTE — PROGRESS NOTES
[facility]  Hospitalist Progress Note  Ren Gallegos MD 11/25/2021    Reason for Stay (Diagnosis): Klebsiella pneumonia sepsis.         Assessment and Plan:      Summary of Stay: Zeynep Martinez is a 76 year old female with history of nephrolithiasis, ESBL urinary tract infection, trigeminal neuralgia, gastric bypass, uterine cancer status post JUAN RAMON who presents with headache and found to be septic secondary to urinary tract infection and admitted on 11/24/2021.    Problem List:   1. Severe sepsis secondary to Klebsiella pneumonia.  -She was treated with IV fluids.  -He is hemodynamically stable  -Patient with history of ESBL infection.  -Blood culture grew Klebsiella pneumonia, sensitivity.  -Urine culture grew gram negative rods.  -Patient is on Merrem 500 mg every 6.  -ID consulted, input appreciated, antibiotic management for ID  -WBC improved from 13.8-4.7.  -Continue inpatient care, still at risk of deterioration.  -Check lactate level today, last 1 was checked yesterday and 3.4.    2. Complicated urinary tract infection in the setting of underlying nonobstructive nephrolithiasis.   -Urine culture grew gram-negative rods.  -Continue same antibiotic management.  -Needs outpatient follow-up with urologist,  3. Diabetes mellitus type 2.  -Glucose is fairly controlled.  -Continue sliding scale insulin  4. Trigeminal neuralgia.  -Patient stated Tylenol helps.  -Restarted her gabapentin 800 mg twice.  5.       DVT Prophylaxis: Enoxaparin (Lovenox) SQ  Code Status: Full Code  Discharge Dispo: Home  Estimated Disch Date / # of Days until Disch: 2 more days in the hospital if continues to improve.        Interval History (Subjective):      Patient seen and examined, assumed care today, H&P reviewed, overnight events reviewed discussed with RN.  Feels better, no nausea or vomiting, headache is better with Tylenol.                  Physical Exam:      Last Vital Signs:  /48 (BP Location:  "Right arm)   Pulse 84   Temp 98.3  F (36.8  C) (Oral)   Resp 16   Ht 1.6 m (5' 3\")   Wt 116.1 kg (256 lb)   LMP  (LMP Unknown)   SpO2 93%   BMI 45.35 kg/m      I/O last 3 completed shifts:  In: 4871 [P.O.:240; I.V.:2859; IV Piggyback:1772]  Out: -   Wt Readings from Last 1 Encounters:   11/24/21 116.1 kg (256 lb)     Current Facility-Administered Medications   Medication     acetaminophen (TYLENOL) tablet 1,000 mg     ALPRAZolam (XANAX) tablet 0.5 mg     aspirin-acetaminophen-caffeine (EXCEDRIN MIGRAINE) per tablet 1 tablet     busPIRone (BUSPAR) tablet 5 mg     glucose gel 15-30 g    Or     dextrose 50 % injection 25-50 mL    Or     glucagon injection 1 mg     enoxaparin ANTICOAGULANT (LOVENOX) injection 40 mg     gabapentin (NEURONTIN) capsule 800 mg     HYDROmorphone (DILAUDID) injection 0.2 mg     insulin aspart (NovoLOG) injection (RAPID ACTING)     insulin aspart (NovoLOG) injection (RAPID ACTING)     lactated ringers infusion     lidocaine (LMX4) cream     lidocaine 1 % 0.1-1 mL     melatonin tablet 1 mg     meropenem (MERREM) 500 mg vial to attach to  mL bag for ADULTS or 25 mL bag for PEDS     ondansetron (ZOFRAN-ODT) ODT tab 4 mg    Or     ondansetron (ZOFRAN) injection 4 mg     oxyCODONE IR (ROXICODONE) half-tab 2.5-5 mg     pantoprazole (PROTONIX) EC tablet 40 mg     QUEtiapine (SEROquel) tablet 25 mg     sodium chloride (PF) 0.9% PF flush 3 mL     sodium chloride (PF) 0.9% PF flush 3 mL     traZODone (DESYREL) half-tab 25-50 mg     venlafaxine (EFFEXOR-XR) 24 hr capsule 150 mg     venlafaxine (EFFEXOR-XR) 24 hr capsule 75 mg       Constitutional: Awake, alert, cooperative, no apparent distress   Respiratory: Clear to auscultation bilaterally, no crackles or wheezing   Cardiovascular: Regular rate and rhythm, normal S1 and S2, and no murmur noted   Abdomen: Normal bowel sounds, soft, non-distended, non-tender   Skin: No rashes, no cyanosis, dry to touch   Neuro: Alert and oriented x3, no " weakness, numbness, memory loss   Extremities: No edema, normal range of motion   Other(s):HEENT  NCAT, nonicteric, moist oral mucosa       All other systems: Negative          Medications:      All current medications were reviewed with changes reflected in problem list.         Data:      All new lab and imaging data was reviewed.   Labs:  Recent Labs   Lab 11/25/21  1127 11/25/21  0836 11/25/21 0754 11/24/21 1811 11/24/21 1716 11/24/21  1307 11/24/21  0638   NA  --   --  141  --   --   --  139   POTASSIUM  --   --  4.6  --  5.4*  --  5.5*   CHLORIDE  --   --  109  --   --   --  105   CO2  --   --  28  --   --   --  22   ANIONGAP  --   --  4  --   --   --  12   * 108* 110*   < >  --    < > 192*   BUN  --   --  27  --   --   --  36*   CR  --   --  0.93  --   --   --  0.92   GFRESTIMATED  --   --  61  --   --   --  62   SHAUN  --   --  8.4*  --   --   --  9.4    < > = values in this interval not displayed.     Recent Labs   Lab 11/25/21 0754 11/24/21  0638   WBC 4.7 13.8*   HGB 10.3* 11.8   HCT 34.6* 37.9   MCV 95 93    301     Recent Labs   Lab 11/25/21  1127 11/25/21  0836 11/25/21 0754 11/24/21 1811 11/24/21 1716 11/24/21  1307 11/24/21  0638   NA  --   --  141  --   --   --  139   POTASSIUM  --   --  4.6  --  5.4*  --  5.5*   CHLORIDE  --   --  109  --   --   --  105   CO2  --   --  28  --   --   --  22   ANIONGAP  --   --  4  --   --   --  12   * 108* 110*   < >  --    < > 192*   BUN  --   --  27  --   --   --  36*   CR  --   --  0.93  --   --   --  0.92   GFRESTIMATED  --   --  61  --   --   --  62   SHAUN  --   --  8.4*  --   --   --  9.4    < > = values in this interval not displayed.     Recent Labs   Lab 11/25/21  1127 11/25/21  0836 11/25/21  0754 11/25/21  0156 11/25/21  0051   * 108* 110* 113* 112*      Imaging:   Results for orders placed or performed during the hospital encounter of 11/24/21   Abd/pelvis CT no contrast - Stone Protocol    Narrative    CT ABDOMEN PELVIS  W/O CONTRAST 11/24/2021 8:02 AM    CLINICAL HISTORY: Flank pain, kidney stone suspected. Back pain  TECHNIQUE: CT scan of the abdomen and pelvis was performed without IV  contrast. Multiplanar reformats were obtained. Dose reduction  techniques were used.  CONTRAST: None.    COMPARISON: 5/13/2021    FINDINGS:   LOWER CHEST: Calcified granulomas in the visualized lung bases.    HEPATOBILIARY: Normal liver. Cholecystectomy.    PANCREAS: Normal.    SPLEEN: Punctate calcified granulomas.    ADRENAL GLANDS: Normal.    KIDNEYS/BLADDER: Nonobstructing 5 mm calculus at the lower pole of the  right kidney. No hydronephrosis or hydroureter bilaterally. Multiple  bilateral renal cysts, some of which have hemorrhagic/proteinaceous  material and calcifications. The largest cystic lesion in the lower  pole of the right kidney measuring 6.8 cm has layering  hemorrhagic/proteinaceous contents..    BOWEL: Poli-en-Y gastric bypass. No small bowel or colonic obstruction  or inflammatory changes.    LYMPH NODES: Normal.    VASCULATURE: No abdominal aortic aneurysm.    PELVIC ORGANS: Hysterectomy.    OTHER: No free fluid, fluid collections or extraluminal air.    MUSCULOSKELETAL: Degenerative changes in the spine. No suspicious  lesions within the bones.      Impression    IMPRESSION:   1.  No acute findings in the abdomen and pelvis.  2.  Stable nonobstructing 4 mm right renal calculus.  3.  Stable simple and mildly complex bilateral renal cysts. The  largest 6.8 cm cyst at the lower pole of the right kidney contains  similar layering proteinaceous/hemorrhagic fluid.    ANDRES DRAKE MD         SYSTEM ID:  [id number]

## 2021-11-25 NOTE — PLAN OF CARE
End of Shift Summary  For vital signs and complete assessments, please see documentation flowsheets.     Pertinent assessments: A&Ox4. VSS. Afebrile. C/o intense headache rated 10/10, tylenol & Oxycodone given, effective. Numbness and tingling bilat LE's baseline per patient. Voiding without difficulty, can be incontinent at times. Regular diet, fair appetite. B & 119. Up SBA.     Major Shift Events: UC + for Klebsiella pneumoniae and enterococcus faecalis.     Treatment Plan: IVF, Merrem Q6. Pain management. ID & SW following.

## 2021-11-26 LAB
BACTERIA BLD CULT: ABNORMAL
BACTERIA BLD CULT: ABNORMAL
BACTERIA UR CULT: ABNORMAL
BACTERIA UR CULT: ABNORMAL
GLUCOSE BLDC GLUCOMTR-MCNC: 112 MG/DL (ref 70–99)
GLUCOSE BLDC GLUCOMTR-MCNC: 128 MG/DL (ref 70–99)
GLUCOSE BLDC GLUCOMTR-MCNC: 142 MG/DL (ref 70–99)
GLUCOSE BLDC GLUCOMTR-MCNC: 150 MG/DL (ref 70–99)
GLUCOSE BLDC GLUCOMTR-MCNC: 203 MG/DL (ref 70–99)

## 2021-11-26 PROCEDURE — 120N000001 HC R&B MED SURG/OB

## 2021-11-26 PROCEDURE — 250N000013 HC RX MED GY IP 250 OP 250 PS 637: Performed by: PHYSICIAN ASSISTANT

## 2021-11-26 PROCEDURE — 250N000013 HC RX MED GY IP 250 OP 250 PS 637: Performed by: INTERNAL MEDICINE

## 2021-11-26 PROCEDURE — 99232 SBSQ HOSP IP/OBS MODERATE 35: CPT | Performed by: INTERNAL MEDICINE

## 2021-11-26 PROCEDURE — 250N000011 HC RX IP 250 OP 636: Performed by: PHYSICIAN ASSISTANT

## 2021-11-26 RX ADMIN — MEROPENEM 500 MG: 500 INJECTION, POWDER, FOR SOLUTION INTRAVENOUS at 11:25

## 2021-11-26 RX ADMIN — PANTOPRAZOLE SODIUM 40 MG: 40 TABLET, DELAYED RELEASE ORAL at 08:42

## 2021-11-26 RX ADMIN — ACETAMINOPHEN, ASPIRIN AND CAFFEINE 1 TABLET: 250; 250; 65 TABLET, FILM COATED ORAL at 11:28

## 2021-11-26 RX ADMIN — ENOXAPARIN SODIUM 40 MG: 40 INJECTION SUBCUTANEOUS at 08:44

## 2021-11-26 RX ADMIN — INSULIN ASPART 2 UNITS: 100 INJECTION, SOLUTION INTRAVENOUS; SUBCUTANEOUS at 11:28

## 2021-11-26 RX ADMIN — BUSPIRONE HYDROCHLORIDE 5 MG: 5 TABLET ORAL at 21:07

## 2021-11-26 RX ADMIN — MEROPENEM 500 MG: 500 INJECTION, POWDER, FOR SOLUTION INTRAVENOUS at 04:09

## 2021-11-26 RX ADMIN — ACETAMINOPHEN 1000 MG: 500 TABLET, FILM COATED ORAL at 02:15

## 2021-11-26 RX ADMIN — ACETAMINOPHEN 1000 MG: 500 TABLET, FILM COATED ORAL at 14:54

## 2021-11-26 RX ADMIN — VENLAFAXINE HYDROCHLORIDE 150 MG: 150 CAPSULE, EXTENDED RELEASE ORAL at 08:42

## 2021-11-26 RX ADMIN — ACETAMINOPHEN, ASPIRIN AND CAFFEINE 1 TABLET: 250; 250; 65 TABLET, FILM COATED ORAL at 18:10

## 2021-11-26 RX ADMIN — MEROPENEM 500 MG: 500 INJECTION, POWDER, FOR SOLUTION INTRAVENOUS at 18:09

## 2021-11-26 RX ADMIN — MEROPENEM 500 MG: 500 INJECTION, POWDER, FOR SOLUTION INTRAVENOUS at 21:03

## 2021-11-26 RX ADMIN — VENLAFAXINE HYDROCHLORIDE 75 MG: 75 CAPSULE, EXTENDED RELEASE ORAL at 08:42

## 2021-11-26 RX ADMIN — GABAPENTIN 800 MG: 400 CAPSULE ORAL at 21:06

## 2021-11-26 RX ADMIN — Medication 50 MG: at 21:06

## 2021-11-26 RX ADMIN — ENOXAPARIN SODIUM 40 MG: 40 INJECTION SUBCUTANEOUS at 21:07

## 2021-11-26 RX ADMIN — GABAPENTIN 800 MG: 400 CAPSULE ORAL at 08:42

## 2021-11-26 RX ADMIN — ACETAMINOPHEN, ASPIRIN AND CAFFEINE 1 TABLET: 250; 250; 65 TABLET, FILM COATED ORAL at 04:44

## 2021-11-26 ASSESSMENT — ACTIVITIES OF DAILY LIVING (ADL)
ADLS_ACUITY_SCORE: 12

## 2021-11-26 NOTE — PLAN OF CARE
Pertinent assessments: VSS on room air. Afebrile. A&Ox4. Up with SBA. Numbness and tingling to BLE; baseline. Voiding spontaneously. Pt reports her urine is no longer cloudy. Regular diet, fair appetite. B, 151. PRN Tylenol given for headaches. Pt reports that Tylenol is the only thing that works for her headaches.    Major Shift Events: none.    Treatment Plan: IV fluids, IV Merrem q6h, PRN Tylenol for headaches, pain management, ID and SW following.

## 2021-11-26 NOTE — PROGRESS NOTES
End of Shift Summary  For vital signs and complete assessments, please see documentation flowsheets.     Pertinent assessments: Pt A&O, low grade temp 99.1, on RA, c/o HA 9/10 Tylenol x 1 and Excedrin given. Voiding without difficulty. BG check 112    Major Shift Events: uneventful   Treatment Plan: IVF, IV Merrem, Lovenox, pain management     Bedside Nurse: Karol Durant RN

## 2021-11-26 NOTE — PROGRESS NOTES
[facility]  Hospitalist Progress Note  Ren Gallegos MD 11/26/2021    Reason for Stay (Diagnosis): Klebsiella pneumonia sepsis.         Assessment and Plan:      Summary of Stay: Zeynep Martinez is a 76 year old female with history of nephrolithiasis, ESBL urinary tract infection, trigeminal neuralgia, gastric bypass, uterine cancer status post JUAN RAMON who presents with headache and found to be septic secondary to urinary tract infection and admitted on 11/24/2021.    Problem List:   1. Severe sepsis secondary to Klebsiella pneumonia.  -She was treated with IV fluids, now discontinued.  -she is hemodynamically stable  -Patient has recent history of ESBL infection.  -Blood culture grew Klebsiella pneumonia, pansensitive except for ampicillin.  -Urine culture grew Klebsiella pneumonia.  -Continue Merrem 500 mg every 6 for today.  -ID consulted, input appreciated, antibiotic management for ID  -WBC improved from 13.8-4.7.  -Continue inpatient care.  -She is improving overall.  -Lactate checked and is 0.8    2. Complicated urinary tract infection in the setting of underlying nonobstructive nephrolithiasis.   -Urine culture grew Klebsiella pneumonia.  -Continue same antibiotic management.  -Needs outpatient follow-up with urologist.     3. Diabetes mellitus type 2.  -Glucose is fairly controlled.  -Continue sliding scale insulin.    4. Trigeminal neuralgia.  -Patient stated Tylenol helps.  -Restarted her gabapentin 800 mg twice.    DVT Prophylaxis: Enoxaparin (Lovenox) SQ  Code Status: Full Code  Discharge Dispo: Home  Estimated Disch Date / # of Days until Disch: 1days in the hospital on IV antibiotic, will be discharged tomorrow on oral antibiotic per ID recommendation.        Interval History (Subjective):      Patient seen and examined, no new overnight issues, feels better, her energy level improved, no fever or chills, no nausea or vomiting.  She has trigeminal neuralgia and headache is  "better, she only uses Tylenol.                  Physical Exam:      Last Vital Signs:  /59 (BP Location: Right arm)   Pulse 82   Temp 97.8  F (36.6  C) (Oral)   Resp 20   Ht 1.6 m (5' 3\")   Wt 116.1 kg (256 lb)   LMP  (LMP Unknown)   SpO2 92%   BMI 45.35 kg/m      I/O last 3 completed shifts:  In: 2340 [P.O.:480; I.V.:1860]  Out: -   Wt Readings from Last 1 Encounters:   11/24/21 116.1 kg (256 lb)     Current Facility-Administered Medications   Medication     acetaminophen (TYLENOL) tablet 1,000 mg     ALPRAZolam (XANAX) tablet 0.5 mg     aspirin-acetaminophen-caffeine (EXCEDRIN MIGRAINE) per tablet 1 tablet     busPIRone (BUSPAR) tablet 5 mg     glucose gel 15-30 g    Or     dextrose 50 % injection 25-50 mL    Or     glucagon injection 1 mg     enoxaparin ANTICOAGULANT (LOVENOX) injection 40 mg     gabapentin (NEURONTIN) capsule 800 mg     HYDROmorphone (DILAUDID) injection 0.2 mg     insulin aspart (NovoLOG) injection (RAPID ACTING)     insulin aspart (NovoLOG) injection (RAPID ACTING)     lidocaine (LMX4) cream     lidocaine 1 % 0.1-1 mL     melatonin tablet 1 mg     meropenem (MERREM) 500 mg vial to attach to  mL bag for ADULTS or 25 mL bag for PEDS     ondansetron (ZOFRAN-ODT) ODT tab 4 mg    Or     ondansetron (ZOFRAN) injection 4 mg     oxyCODONE IR (ROXICODONE) half-tab 2.5-5 mg     pantoprazole (PROTONIX) EC tablet 40 mg     QUEtiapine (SEROquel) tablet 25 mg     sodium chloride (PF) 0.9% PF flush 3 mL     sodium chloride (PF) 0.9% PF flush 3 mL     traZODone (DESYREL) half-tab 25-50 mg     venlafaxine (EFFEXOR-XR) 24 hr capsule 150 mg     venlafaxine (EFFEXOR-XR) 24 hr capsule 75 mg       Constitutional: Awake, alert, cooperative, no apparent distress   Respiratory: Clear to auscultation bilaterally, no crackles or wheezing   Cardiovascular: Regular rate and rhythm, normal S1 and S2, and no murmur noted   Abdomen: Normal bowel sounds, soft, non-distended, non-tender   Skin: No rashes, " no cyanosis, dry to touch   Neuro: Alert and oriented x3, no weakness, numbness, memory loss   Extremities: No edema, normal range of motion   Other(s):HEENT  NCAT, nonicteric, moist oral mucosa       All other systems: Negative          Medications:      All current medications were reviewed with changes reflected in problem list.         Data:      All new lab and imaging data was reviewed.   Labs:  Recent Labs   Lab 11/26/21  1127 11/26/21  0947 11/26/21 0206 11/25/21 0836 11/25/21 0754 11/24/21 1811 11/24/21 1716 11/24/21  1307 11/24/21  0638   NA  --   --   --   --  141  --   --   --  139   POTASSIUM  --   --   --   --  4.6  --  5.4*  --  5.5*   CHLORIDE  --   --   --   --  109  --   --   --  105   CO2  --   --   --   --  28  --   --   --  22   ANIONGAP  --   --   --   --  4  --   --   --  12   * 142* 112*   < > 110*   < >  --    < > 192*   BUN  --   --   --   --  27  --   --   --  36*   CR  --   --   --   --  0.93  --   --   --  0.92   GFRESTIMATED  --   --   --   --  61  --   --   --  62   SHAUN  --   --   --   --  8.4*  --   --   --  9.4    < > = values in this interval not displayed.     Recent Labs   Lab 11/25/21 0754 11/24/21  0638   WBC 4.7 13.8*   HGB 10.3* 11.8   HCT 34.6* 37.9   MCV 95 93    301     Recent Labs   Lab 11/26/21  1127 11/26/21  0947 11/26/21 0206 11/25/21 0836 11/25/21 0754 11/24/21 1811 11/24/21 1716 11/24/21  1307 11/24/21  0638   NA  --   --   --   --  141  --   --   --  139   POTASSIUM  --   --   --   --  4.6  --  5.4*  --  5.5*   CHLORIDE  --   --   --   --  109  --   --   --  105   CO2  --   --   --   --  28  --   --   --  22   ANIONGAP  --   --   --   --  4  --   --   --  12   * 142* 112*   < > 110*   < >  --    < > 192*   BUN  --   --   --   --  27  --   --   --  36*   CR  --   --   --   --  0.93  --   --   --  0.92   GFRESTIMATED  --   --   --   --  61  --   --   --  62   SHAUN  --   --   --   --  8.4*  --   --   --  9.4    < > = values in this  interval not displayed.     Recent Labs   Lab 11/26/21  1127 11/26/21  0947 11/26/21  0206 11/25/21  2142 11/25/21  1721   * 142* 112* 151* 110*      Imaging:   Results for orders placed or performed during the hospital encounter of 11/24/21   Abd/pelvis CT no contrast - Stone Protocol    Narrative    CT ABDOMEN PELVIS W/O CONTRAST 11/24/2021 8:02 AM    CLINICAL HISTORY: Flank pain, kidney stone suspected. Back pain  TECHNIQUE: CT scan of the abdomen and pelvis was performed without IV  contrast. Multiplanar reformats were obtained. Dose reduction  techniques were used.  CONTRAST: None.    COMPARISON: 5/13/2021    FINDINGS:   LOWER CHEST: Calcified granulomas in the visualized lung bases.    HEPATOBILIARY: Normal liver. Cholecystectomy.    PANCREAS: Normal.    SPLEEN: Punctate calcified granulomas.    ADRENAL GLANDS: Normal.    KIDNEYS/BLADDER: Nonobstructing 5 mm calculus at the lower pole of the  right kidney. No hydronephrosis or hydroureter bilaterally. Multiple  bilateral renal cysts, some of which have hemorrhagic/proteinaceous  material and calcifications. The largest cystic lesion in the lower  pole of the right kidney measuring 6.8 cm has layering  hemorrhagic/proteinaceous contents..    BOWEL: Poli-en-Y gastric bypass. No small bowel or colonic obstruction  or inflammatory changes.    LYMPH NODES: Normal.    VASCULATURE: No abdominal aortic aneurysm.    PELVIC ORGANS: Hysterectomy.    OTHER: No free fluid, fluid collections or extraluminal air.    MUSCULOSKELETAL: Degenerative changes in the spine. No suspicious  lesions within the bones.      Impression    IMPRESSION:   1.  No acute findings in the abdomen and pelvis.  2.  Stable nonobstructing 4 mm right renal calculus.  3.  Stable simple and mildly complex bilateral renal cysts. The  largest 6.8 cm cyst at the lower pole of the right kidney contains  similar layering proteinaceous/hemorrhagic fluid.    ANDRES DRAKE MD         SYSTEM ID:   [id number]

## 2021-11-26 NOTE — PROGRESS NOTES
[facility]  Infectious Disease Progress Note          Assessment and Plan:   IMPRESSION:  1.  A 76-year-old female with multiple recurrent UTIs, first time we have seen her, but mostly symptomatic lower UTIs, possibly some pyelo, 1 episode of sepsis, but not in the last 2 years, ESBL organism has been predominant, although most recent urine had a sensitive Klebsiella.  2.  Multiple recurrent UTIs, including numerous ESBL episodes, multiple of them not treated, and she only been on IV antibiotics once recently and thus seemed to resolve without major treatment, major episode of sepsis while she was in [place].  3.  Urinary anatomy includes significant stones and at least some obstruction and stents in the past.  No obvious obstruction currently and renal function okay.  4.  Multiple renal cysts, this pattern of the same organism repeatedly raised at least slight concern about a chronic infection, but not evident.  5.  Diabetes mellitus.     RECOMMENDATIONS:  1.  Meropenem for now.  Cefoxitin is not actually active against the ESBL organism despite the listed sensitivity.  2.   current cultures include blood cultures +.  current organism is still the Klebsiella which is quite sens, and not the resistant ESBL, if improves will not need an IV course of antibiotics and oral will be acceptable.  3.  Overall, high desirability to remove as much stones and any obstruction, etc., as possible from a urinary tract standpoint, as these are obviously driving a lot of this.  4.  Going forward, if ESBLthe superior agent for treating this would be ertapenem once daily, rather than gentamicin and the incumbent risks the gentamicin has.  While  Here  darrell to also cover the recently still present ESBL,   OK po levaquin 10 days more and dispoaition as soon as tomorrow           Interval History:   no new complaints and doing better no cp, sob, n/v/d, or abd pain. T down BC kleb sens pending, has been  "historically sens and is now, no ESBL              Medications:       busPIRone  5 mg Oral At Bedtime     enoxaparin ANTICOAGULANT  40 mg Subcutaneous Q12H     gabapentin  800 mg Oral BID     insulin aspart  1-7 Units Subcutaneous TID AC     insulin aspart  1-5 Units Subcutaneous At Bedtime     meropenem  500 mg Intravenous Q6H     pantoprazole  40 mg Oral QAM AC     sodium chloride (PF)  3 mL Intracatheter Q8H     traZODone  25-50 mg Oral At Bedtime     venlafaxine  150 mg Oral Daily     venlafaxine  75 mg Oral Daily                  Physical Exam:   Blood pressure 137/59, pulse 82, temperature 97.8  F (36.6  C), temperature source Oral, resp. rate 20, height 1.6 m (5' 3\"), weight 116.1 kg (256 lb), SpO2 92 %, not currently breastfeeding.  Wt Readings from Last 2 Encounters:   11/24/21 116.1 kg (256 lb)   10/20/21 118.4 kg (261 lb)     Vital Signs with Ranges  Temp:  [97.6  F (36.4  C)-99.4  F (37.4  C)] 97.8  F (36.6  C)  Pulse:  [61-82] 82  Resp:  [14-20] 20  BP: (111-154)/(57-67) 137/59  SpO2:  [92 %-93 %] 92 %    Constitutional: Awake, alert, cooperative, no apparent distress looks Ok   Lungs: Clear to auscultation bilaterally, no crackles or wheezing   Cardiovascular: Regular rate and rhythm, normal S1 and S2, and no murmur noted   Abdomen: Normal bowel sounds, soft, non-distended, non-tender   Skin: No rashes, no cyanosis, no edema   Other:           Data:   All microbiology laboratory data reviewed.  Recent Labs   Lab Test 11/25/21  0754 11/24/21  0638 10/05/21  1033   WBC 4.7 13.8* 6.5   HGB 10.3* 11.8 11.8   HCT 34.6* 37.9 38.0   MCV 95 93 93    301 273     Recent Labs   Lab Test 11/25/21  0754 11/24/21  0638 10/05/21  1032   CR 0.93 0.92 1.00     No lab results found.  Recent Labs   Lab Test 06/29/21  1300 03/22/21  1516   CULT <10,000 colonies/mL  Escherichia coli ESBL  ESBL producers are resistant to all cephalosporins (including 3rd generation).  ESBL   producers are usually susceptible to " amikacin and the carbapenems.  Susceptibility testing in progress  *  <10,000 colonies/mL  Enterococcus faecalis  *  50,000 to 100,000 colonies/mL  Lactobacillus species  Susceptibility testing not routinely done  *  Susceptibility testing requested by  Joy Mcdaniel for Dr. Long Mansfield phone [phone] for all organisms 7.1.21 CM   10,000 to 50,000 colonies/mL  Escherichia coli ESBL  ESBL (extended beta lactamase) producing organisms require contact precautions.  *  <10,000 colonies/mL  Enterococcus faecalis  *  <10,000 colonies/mL  Strain 2  Escherichia coli ESBL  ESBL (extended beta lactamase) producing organisms require contact precautions.  *

## 2021-11-27 VITALS
BODY MASS INDEX: 45.36 KG/M2 | HEART RATE: 85 BPM | HEIGHT: 63 IN | DIASTOLIC BLOOD PRESSURE: 67 MMHG | OXYGEN SATURATION: 92 % | SYSTOLIC BLOOD PRESSURE: 130 MMHG | WEIGHT: 256 LBS | RESPIRATION RATE: 16 BRPM | TEMPERATURE: 98.5 F

## 2021-11-27 LAB
BACTERIA BLD CULT: ABNORMAL
BACTERIA BLD CULT: ABNORMAL
BACTERIA UR CULT: ABNORMAL
BACTERIA UR CULT: ABNORMAL
CREAT SERPL-MCNC: 0.87 MG/DL (ref 0.52–1.04)
GFR SERPL CREATININE-BSD FRML MDRD: 66 ML/MIN/1.73M2
GLUCOSE BLDC GLUCOMTR-MCNC: 117 MG/DL (ref 70–99)
GLUCOSE BLDC GLUCOMTR-MCNC: 140 MG/DL (ref 70–99)
GLUCOSE BLDC GLUCOMTR-MCNC: 144 MG/DL (ref 70–99)
PLATELET # BLD AUTO: 192 10E3/UL (ref 150–450)

## 2021-11-27 PROCEDURE — 99239 HOSP IP/OBS DSCHRG MGMT >30: CPT | Performed by: INTERNAL MEDICINE

## 2021-11-27 PROCEDURE — 250N000013 HC RX MED GY IP 250 OP 250 PS 637: Performed by: PHYSICIAN ASSISTANT

## 2021-11-27 PROCEDURE — 36415 COLL VENOUS BLD VENIPUNCTURE: CPT | Performed by: PHYSICIAN ASSISTANT

## 2021-11-27 PROCEDURE — 250N000011 HC RX IP 250 OP 636: Performed by: PHYSICIAN ASSISTANT

## 2021-11-27 PROCEDURE — 85049 AUTOMATED PLATELET COUNT: CPT | Performed by: PHYSICIAN ASSISTANT

## 2021-11-27 PROCEDURE — 250N000013 HC RX MED GY IP 250 OP 250 PS 637: Performed by: INTERNAL MEDICINE

## 2021-11-27 PROCEDURE — 82565 ASSAY OF CREATININE: CPT | Performed by: PHYSICIAN ASSISTANT

## 2021-11-27 RX ORDER — LEVOFLOXACIN 500 MG/1
500 TABLET, FILM COATED ORAL DAILY
Qty: 10 TABLET | Refills: 0 | Status: SHIPPED | OUTPATIENT
Start: 2021-11-27 | End: 2021-12-08

## 2021-11-27 RX ADMIN — INSULIN ASPART 1 UNITS: 100 INJECTION, SOLUTION INTRAVENOUS; SUBCUTANEOUS at 11:13

## 2021-11-27 RX ADMIN — ACETAMINOPHEN 1000 MG: 500 TABLET, FILM COATED ORAL at 08:22

## 2021-11-27 RX ADMIN — VENLAFAXINE HYDROCHLORIDE 150 MG: 150 CAPSULE, EXTENDED RELEASE ORAL at 08:23

## 2021-11-27 RX ADMIN — MEROPENEM 500 MG: 500 INJECTION, POWDER, FOR SOLUTION INTRAVENOUS at 11:11

## 2021-11-27 RX ADMIN — VENLAFAXINE HYDROCHLORIDE 75 MG: 75 CAPSULE, EXTENDED RELEASE ORAL at 08:23

## 2021-11-27 RX ADMIN — ENOXAPARIN SODIUM 40 MG: 40 INJECTION SUBCUTANEOUS at 08:23

## 2021-11-27 RX ADMIN — GABAPENTIN 800 MG: 400 CAPSULE ORAL at 08:22

## 2021-11-27 RX ADMIN — PANTOPRAZOLE SODIUM 40 MG: 40 TABLET, DELAYED RELEASE ORAL at 08:23

## 2021-11-27 RX ADMIN — MEROPENEM 500 MG: 500 INJECTION, POWDER, FOR SOLUTION INTRAVENOUS at 04:57

## 2021-11-27 ASSESSMENT — ACTIVITIES OF DAILY LIVING (ADL)
ADLS_ACUITY_SCORE: 12

## 2021-11-27 NOTE — PLAN OF CARE
Patient discharged at 1:15pm via [place]. Went over follow up appointments with patient as well tucker Mendez which she will be taking for 10 days. All questions were answered before discharge. Belongings present at discharge  and taken home with patient (clothing, shoes, phone, tablet, coloring books, , Levofloxacin). Discharged in stable condition.

## 2021-11-27 NOTE — PLAN OF CARE
To Do:  End of Shift Summary  For vital signs and complete assessments, please see documentation flowsheets.     Pertinent assessments: Patient is AOx4 this shift. Complains of headache related to nerve pain, tylenol and Excedrin migraine given with relief. Denies nausea, SOB or flank pain. Voiding without difficulties. Ambulating in room, good PO intake. /203/128    Major Shift Events: Fluids discontinued.     Treatment Plan: Plan to transition to PO Abx and discharge home tomorrow. IV merrum today.     Bedside Nurse: Dior Rubio RN

## 2021-11-27 NOTE — PLAN OF CARE
End of Shift Summary  For vital signs and complete assessments, please see documentation flowsheets.     Pertinent assessments: Patient is A&O.  VSS.  Denies pain, nausea and SOB.   Voiding without difficulties. Up independently in room.      Major Shift Events: uneventful    Treatment Plan: Plan to transition to PO Abx and discharge home tomorrow. IV merrum today.     Bedside Nurse: Sara Benitez RN

## 2021-11-27 NOTE — DISCHARGE SUMMARY
[facility]  Discharge Summary  Name: Zeynep Martinez    MRN: [id number]  YOB: 1945    Age: 76 year old  Date of Discharge:  11/27/2021  1:10 PM  Date of Admission: 11/24/2021  Primary Care Provider: Trinh Bello  Discharge Physician:  Ren Gallegos M.D  Discharging Service:  Hospitalist      Discharge Diagnosis:  Severe sepsis secondary to Klebsiella pneumonia due to UTI  Complicated Klebsiella pneumonia UTI in setting of underlying nephrolithiasis.  Diabetes mellitus type 2     Other Diagnosis:  Trigeminal neuralgia     Discharge Disposition:  Discharged to home     Allergies:  Allergies   Allergen Reactions     Adhesive Tape Rash     Paper tape is ok      Statin Drugs [Hmg-Coa-R Inhibitors] Other (See Comments)     confusion     Penicillins Rash        Discharge Medications:   Discharge Medication List as of 11/27/2021 12:19 PM      START taking these medications    Details   levofloxacin (LEVAQUIN) 500 MG tablet Take 1 tablet (500 mg) by mouth daily, Disp-10 tablet, R-0, E-Prescribe         CONTINUE these medications which have NOT CHANGED    Details   acetaminophen (TYLENOL) 500 MG tablet Take 500-1,000 mg by mouth every 6 hours as needed for mild pain, Historical      ALPRAZolam (XANAX) 0.5 MG tablet Take 1 tablet (0.5 mg) by mouth daily as needed for anxiety, Disp-15 tablet, R-1, E-Prescribe      busPIRone (BUSPAR) 10 MG tablet Take 5 mg PO daily for one week, then 5 mg PO BID for one week, then 10 mg PO BID, Disp-60 tablet, R-1, E-Prescribe      gabapentin (NEURONTIN) 800 MG tablet Take 1 tablet (800 mg) by mouth 2 times daily, Disp-40 tablet, R-0, Local Print      glipiZIDE (GLUCOTROL XL) 5 MG 24 hr tablet Take 1 tablet (5 mg) by mouth 2 times daily, Disp-180 tablet, R-1, E-Prescribe      meloxicam (MOBIC) 7.5 MG tablet TAKE ONE TABLET BY MOUTH ONCE DAILY, Disp-30 tablet, R-0, E-Prescribe      metFORMIN (GLUCOPHAGE-XR) 500 MG 24 hr tablet Take 2 tablets (1,000 mg)  "by mouth 2 times daily (with meals), Disp-360 tablet, R-1, E-Prescribe      QUEtiapine (SEROQUEL) 25 MG tablet Take 1 tablet (25 mg) by mouth nightly as needed (insomnia), Disp-90 tablet, R-0, E-Prescribe      traZODone (DESYREL) 50 MG tablet Take 0.5-1 tablets (25-50 mg) by mouth At Bedtime, Disp-90 tablet, R-1, No Print Out      !! venlafaxine (EFFEXOR-XR) 150 MG 24 hr capsule Take 1 capsule (150 mg) by mouth daily, Disp-90 capsule, R-1, E-Prescribe      !! venlafaxine (EFFEXOR-XR) 75 MG 24 hr capsule Take 1 capsule (75 mg) by mouth daily, Disp-90 capsule, R-1, E-Prescribe      !! blood glucose (ACCU-CHEK PASQUALE PLUS) test strip Use to test blood sugar 1-2 times daily or as directed., Disp-200 strip, R-5, E-Prescribe      blood glucose (NO BRAND SPECIFIED) lancets standard Use to test blood sugar 1-2 times daily or as directed.Disp-1 each, [id number]      !! blood glucose (NO BRAND SPECIFIED) test strip Use to test blood sugar 1-2 times daily or as directed., Disp-100 strip, R-4, E-Prescribe      blood glucose monitoring (NO BRAND SPECIFIED) meter device kit Use to test blood sugar 1-2 times daily or as directed.Disp-1 kit, [id number]      STATIN NOT PRESCRIBED (INTENTIONAL) Reason Statin was Not Prescribed: Allergy to statinNo Print Out       !! - Potential duplicate medications found. Please discuss with provider.           Condition on Discharge:  Discharge condition: Fair   Discharge vitals: Blood pressure 130/67, pulse 85, temperature 98.5  F (36.9  C), temperature source Oral, resp. rate 16, height 1.6 m (5' 3\"), weight 116.1 kg (256 lb), SpO2 92 %, not currently breastfeeding.   Code status on discharge: Full Code     History of Illness:  See detailed admission note for full details.    Significant Physical Exam Findings:  Constitutional: Awake, alert, cooperative, no apparent distress   Respiratory: Clear to auscultation bilaterally, no crackles or wheezing   Cardiovascular: Regular rate and rhythm, " normal S1 and S2, and no murmur noted   Abdomen: Normal bowel sounds, soft, non-distended, non-tender   Skin: No rashes, no cyanosis, dry to touch   Neuro: Alert and oriented x3, no weakness, numbness, memory loss   Extremities: No edema, normal range of motion   Other(s):HEENT  NCAT, nonicteric, moist oral mucosa         All other systems: Negative     Procedures other than Imaging:  None     Imaging:  Results for orders placed or performed during the hospital encounter of 11/24/21   Abd/pelvis CT no contrast - Stone Protocol    Narrative    CT ABDOMEN PELVIS W/O CONTRAST 11/24/2021 8:02 AM    CLINICAL HISTORY: Flank pain, kidney stone suspected. Back pain  TECHNIQUE: CT scan of the abdomen and pelvis was performed without IV  contrast. Multiplanar reformats were obtained. Dose reduction  techniques were used.  CONTRAST: None.    COMPARISON: 5/13/2021    FINDINGS:   LOWER CHEST: Calcified granulomas in the visualized lung bases.    HEPATOBILIARY: Normal liver. Cholecystectomy.    PANCREAS: Normal.    SPLEEN: Punctate calcified granulomas.    ADRENAL GLANDS: Normal.    KIDNEYS/BLADDER: Nonobstructing 5 mm calculus at the lower pole of the  right kidney. No hydronephrosis or hydroureter bilaterally. Multiple  bilateral renal cysts, some of which have hemorrhagic/proteinaceous  material and calcifications. The largest cystic lesion in the lower  pole of the right kidney measuring 6.8 cm has layering  hemorrhagic/proteinaceous contents..    BOWEL: Poli-en-Y gastric bypass. No small bowel or colonic obstruction  or inflammatory changes.    LYMPH NODES: Normal.    VASCULATURE: No abdominal aortic aneurysm.    PELVIC ORGANS: Hysterectomy.    OTHER: No free fluid, fluid collections or extraluminal air.    MUSCULOSKELETAL: Degenerative changes in the spine. No suspicious  lesions within the bones.      Impression    IMPRESSION:   1.  No acute findings in the abdomen and pelvis.  2.  Stable nonobstructing 4 mm right renal  calculus.  3.  Stable simple and mildly complex bilateral renal cysts. The  largest 6.8 cm cyst at the lower pole of the right kidney contains  similar layering proteinaceous/hemorrhagic fluid.    ANDRES DRAKE MD         SYSTEM ID:  [id number]        Consultations:  Consultation during this admission received from infectious disease.     Recent Lab Results:  Recent Labs   Lab 11/27/21  0622 11/25/21  0754 11/24/21  0638   WBC  --  4.7 13.8*   HGB  --  10.3* 11.8   HCT  --  34.6* 37.9   MCV  --  95 93    226 301     Recent Labs   Lab 11/27/21  1111 11/27/21  0931 11/27/21  0622 11/27/21  0222 11/25/21  0836 11/25/21  0754 11/24/21  1811 11/24/21  1716 11/24/21  1307 11/24/21  0638   NA  --   --   --   --   --  141  --   --   --  139   POTASSIUM  --   --   --   --   --  4.6  --  5.4*  --  5.5*   CHLORIDE  --   --   --   --   --  109  --   --   --  105   CO2  --   --   --   --   --  28  --   --   --  22   ANIONGAP  --   --   --   --   --  4  --   --   --  12   * 140*  --  117*   < > 110*   < >  --    < > 192*   BUN  --   --   --   --   --  27  --   --   --  36*   CR  --   --  0.87  --   --  0.93  --   --   --  0.92   GFRESTIMATED  --   --  66  --   --  61  --   --   --  62   SHAUN  --   --   --   --   --  8.4*  --   --   --  9.4    < > = values in this interval not displayed.     No results for input(s): DD in the last 168 hours.  No results for input(s): SED, CRP in the last 168 hours.  Recent Labs   Lab 11/27/21  1111 11/27/21  0931 11/27/21  0222 11/26/21  2152 11/26/21  1714   * 140* 117* 150* 128*          Pending Results:    Unresulted Labs Ordered in the Past 30 Days of this Admission     No orders found from 10/25/2021 to 11/25/2021.              Reason for your hospital stay    Complicated UTI secondary to Klebsiella pneumonia.  Sepsis secondary to Klebsiella pneumonia     Follow-up and recommended labs and tests     Follow up with primary care provider, Trinh Castillo, within 10 days  for hospital follow- up.  The following labs/tests are recommended: CBC, BMP result to primary care provider.  Follow-up with your urologist as an outpatient as instructed.     Activity    Your activity upon discharge: activity as tolerated     Diet    Follow this diet upon discharge: Orders Placed This Encounter      Regular Diet Adult       Hospital Course:  Zeynep Martinez is a 76 year old female with history of nephrolithiasis, ESBL urinary tract infection, trigeminal neuralgia, gastric bypass, uterine cancer status post JUAN RAMON who presents with headache and found to be severe sepsis due to urinary tract infection and admitted on 11/24/2021.  She was given bolus of IV fluids in the emergency room, started on IV Merrem due to history of ESBL urinary tract infection.  After admission her overall condition improved and she remained stable.     Problem List:   1. Severe sepsis secondary to Klebsiella pneumonia from UTI.  She had leukocytosis, lactic acidosis, fever on presentation. She was treated with IV fluids, and then discontinued, as she was hemodynamically stable.  Due to recent history of ESBL infection she was started on meropenem.  Infectious disease was consulted, recommended to continue on meropenem until cultures are resulted.  Blood culture came back positive for Klebsiella pneumonia which is sensitive to most antibiotics except ampicillin.  Urine culture also came back positive for Klebsiella pneumonia with the same sensitivity pattern.  Patient continued meropenem while in the hospital.  ID recommended to discharge her home on 10 days course of levofloxacin.  Patient is discharged in stable condition with oral antibiotics.  She will follow up with her primary care provider as an outpatient.    2. Complicated urinary tract infection in the setting of underlying nonobstructive nephrolithiasis.  Urine culture grew Klebsiella pneumonia same sensitivity pattern as Klebsiella pneumonia in the blood.   Patient is started on antibiotic and will follow up with her urologist as an outpatient.     3. Diabetes mellitus type 2. Glucose is fairly controlled.  Discharge prior to admission diabetic medications.     4. Trigeminal neuralgia. Patient stated Tylenol helps. Restarted her gabapentin 800 mg twice.  I discussed with patient at length the plan of care, all patient's question concerns addressed and showed understanding.  She agreed with the plan of discharge and follow-up.     Total time spent in face to face contact with the patient and coordinating discharge was:  >30 Minutes.

## 2021-11-29 ENCOUNTER — PATIENT OUTREACH (OUTPATIENT)
Dept: CARE COORDINATION | Facility: CLINIC | Age: 76
End: 2021-11-29
Payer: COMMERCIAL

## 2021-11-29 ENCOUNTER — VIRTUAL VISIT (OUTPATIENT)
Dept: UROLOGY | Facility: CLINIC | Age: 76
End: 2021-11-29
Payer: COMMERCIAL

## 2021-11-29 DIAGNOSIS — N10 PYELONEPHRITIS, ACUTE: Primary | ICD-10-CM

## 2021-11-29 DIAGNOSIS — Z71.89 OTHER SPECIFIED COUNSELING: ICD-10-CM

## 2021-11-29 PROCEDURE — 99214 OFFICE O/P EST MOD 30 MIN: CPT | Mod: 95 | Performed by: UROLOGY

## 2021-11-29 ASSESSMENT — PAIN SCALES - GENERAL: PAINLEVEL: MILD PAIN (2)

## 2021-11-29 NOTE — PROGRESS NOTES
Clinic Care Coordination Contact    Background: Care Coordination referral placed from [facility] discharge report for reason of patient meeting criteria for a TCM outreach call by [facility] team.    Assessment: Upon chart review, [facility] Team member will cancel/close the referral for TCM outreach due to reason below:    Patient has a follow up appointment with an appropriate provider today for hospital discharge    Plan: Care Coordination referral for TCM outreach canceled.    Maria Guadalupe French  Community Health Worker  [facility]  [phone]

## 2021-11-29 NOTE — PROGRESS NOTES
Patient states that they are in [place] at the time of their visit.  Patient is aware telehealth visit is billable and agrees to proceed.  Joy Horner RN

## 2021-11-29 NOTE — PROGRESS NOTES
Assessment/Plan:    Assessment & Plan   Zeynep was seen today for follow up.    Diagnoses and all orders for this visit:    Pyelonephritis, acute  -     NM Renal Scan w Flw & Fnct w/o Phrm Int; Future        Stone Management Plan  Stone Management 5/13/2021 6/14/2021 11/29/2021   Urinary Tract Infection Suspected Infection Possible Infection Suspected Infection   Renal Colic Well controlled symptoms Well controlled symptoms Well controlled symptoms   Renal Failure No suspicion of renal failure No suspicion of renal failure No suspicion of renal failure   Current CT date 5/13/2021 - -   Right sided stones? Yes - -   R Number of ureteral stones No ureteral stones - -   R Number of kidney stones  2 - -   R GSD of kidney stones 2 - 4 - -   R Hydronephrosis None - -   R Stone Event New event Established event No current event   Diagnosis date 5/13/2021 - -   Initial location of primary symptomatic stone Renal - -   Initial GSD of primary symptomatic stone 3 - -   R Current Plan Clear - -   Clear rationale Associated treated infection - -   Observe rationale - - -   Left sided stones? No - -   L Stone Event No current event No current event No current event             PLAN    Video call duration: 10 minutes  15 minutes spent on the date of the encounter doing chart review, history and exam, documentation and further activities per the note    RICH ARANA MD  [facility]    HPI  Ms. Zeynep Martinez is a 76 year old  female who is being evaluated via a billable video visit by [facility] [place] for follow up of her complex UTI's    She was recently hospitalized at [facility] with Klebsiella sepsis. Also has a history of ESBL. No flank pain. Was her 4th significant UTI in a month. Currently on IV meropenem.    Recent CT shows small right lower pole calcification and she has a history of partial UPJ obstruction.    Will arrange for lasix renogram in a  few weeks. May require repeat ureteroscopy.    ROS   Review of systems is negative except for HPI.    Past Medical History:   Diagnosis Date     Anxiety      Arthritis      Bladder infection      Calculus of kidney     multiple, uric acid and calcium     Cancer (H) Uterine 2/2001     Cancer (H)     uterine     Depression      Diabetes mellitus (H)     type 2     Frequent UTI      Gout      History of anesthesia complications     Slow to wake and gets very anxious     Hyperlipidemia LDL goal <100      Kidney stone      Type 2 diabetes mellitus without complication, without long-term current use of insulin (H)      Ulcer     Ulcer in the anastomosis       Past Surgical History:   Procedure Laterality Date     ABDOMINOPLASTY       APPENDECTOMY       APPENDECTOMY       EXTRACORPOREAL SHOCK WAVE LITHOTRIPSY (ESWL)       EXTRACORPOREAL SHOCK WAVE LITHOTRIPSY (ESWL)       galbaldder       GASTRIC BYPASS       HYSTERECTOMY       HYSTERECTOMY TOTAL ABDOMINAL, BILATERAL SALPINGO-OOPHORECTOMY, COMBINED      no cervix     IR MISCELLANEOUS PROCEDURE  3/18/2008     IR MISCELLANEOUS PROCEDURE  3/18/2008     IR MISCELLANEOUS PROCEDURE  6/10/2008     IR MISCELLANEOUS PROCEDURE  1/24/2012     IR URETER DILATION BILATERAL  1/24/2012     IR URETER DILATION BILATERAL  3/18/2008     PICC  11/4/2021          MD CYSTO/URETERO W/LITHOTRIPSY &INDWELL STENT INSRT Left 10/26/2018    Procedure:  CYSTOSCOPY, LEFT  URETEROSCOPY, LASER LITHOTRIPSY STENT INSERTION;  Surgeon: Long Mansfield MD;  Location: [facility];  Service: Urology     MD CYSTO/URETERO W/LITHOTRIPSY &INDWELL STENT INSRT Right 5/28/2021    Procedure: CYSTOURETEROSCOPY, URETEROSCOPY, URETERAL DILATION WITH RETROGRADE PYELOGRAM, AND STENT INSERTION RIGHT;  Surgeon: Long Mansfield MD;  Location: [facility];  Service: Urology     MD ERCP W/BIOPSY SINGLE/MULTIPLE       REVISION CATHERINE-EN-Y       SKIN SURGERY  2013    15 lbs of skin/adipose removed     STOMACH  SURGERY       URETEROSCOPY         Current Outpatient Medications   Medication Sig Dispense Refill     acetaminophen (TYLENOL) 500 MG tablet Take 500-1,000 mg by mouth every 6 hours as needed for mild pain       ALPRAZolam (XANAX) 0.5 MG tablet Take 1 tablet (0.5 mg) by mouth daily as needed for anxiety 15 tablet 1     blood glucose (ACCU-CHEK PASQUALE PLUS) test strip Use to test blood sugar 1-2 times daily or as directed. 200 strip 5     blood glucose (NO BRAND SPECIFIED) lancets standard Use to test blood sugar 1-2 times daily or as directed. 1 each 4     blood glucose (NO BRAND SPECIFIED) test strip Use to test blood sugar 1-2 times daily or as directed. 100 strip 4     blood glucose monitoring (NO BRAND SPECIFIED) meter device kit Use to test blood sugar 1-2 times daily or as directed. 1 kit 0     busPIRone (BUSPAR) 10 MG tablet Take 5 mg PO daily for one week, then 5 mg PO BID for one week, then 10 mg PO BID (Patient taking differently: Take 5 mg by mouth At Bedtime ) 60 tablet 1     gabapentin (NEURONTIN) 800 MG tablet Take 1 tablet (800 mg) by mouth 2 times daily 40 tablet 0     glipiZIDE (GLUCOTROL XL) 5 MG 24 hr tablet Take 1 tablet (5 mg) by mouth 2 times daily 180 tablet 1     levofloxacin (LEVAQUIN) 500 MG tablet Take 1 tablet (500 mg) by mouth daily 10 tablet 0     meloxicam (MOBIC) 7.5 MG tablet TAKE ONE TABLET BY MOUTH ONCE DAILY 30 tablet 0     metFORMIN (GLUCOPHAGE-XR) 500 MG 24 hr tablet Take 2 tablets (1,000 mg) by mouth 2 times daily (with meals) 360 tablet 1     QUEtiapine (SEROQUEL) 25 MG tablet Take 1 tablet (25 mg) by mouth nightly as needed (insomnia) 90 tablet 0     STATIN NOT PRESCRIBED (INTENTIONAL) Please choose reason not prescribed from choices below.       traZODone (DESYREL) 50 MG tablet Take 0.5-1 tablets (25-50 mg) by mouth At Bedtime 90 tablet 1     venlafaxine (EFFEXOR-XR) 150 MG 24 hr capsule Take 1 capsule (150 mg) by mouth daily 90 capsule 1     venlafaxine (EFFEXOR-XR) 75 MG 24  hr capsule Take 1 capsule (75 mg) by mouth daily 90 capsule 1       Allergies   Allergen Reactions     Adhesive Tape Rash     Paper tape is ok      Statin Drugs [Hmg-Coa-R Inhibitors] Other (See Comments)     confusion     Penicillins Rash       Social History     Socioeconomic History     Marital status:      Spouse name: Not on file     Number of children: Not on file     Years of education: Not on file     Highest education level: Not on file   Occupational History     Not on file   Tobacco Use     Smoking status: Former Smoker     Packs/day: 0.00     Years: 5.00     Pack years: 0.00     Types: Cigarettes     Quit date: 1970     Years since quittin.9     Smokeless tobacco: Never Used   Substance and Sexual Activity     Alcohol use: Yes     Comment: once per month      Drug use: Never     Sexual activity: Not Currently     Partners: Female     Birth control/protection: Post-menopausal   Other Topics Concern     Parent/sibling w/ CABG, MI or angioplasty before 65F 55M? No   Social History Narrative     Not on file     Social Determinants of Health     Financial Resource Strain: Not on file   Food Insecurity: Not on file   Transportation Needs: Not on file   Physical Activity: Not on file   Stress: Not on file   Social Connections: Not on file   Intimate Partner Violence: Not on file   Housing Stability: Not on file       Family History   Problem Relation Age of Onset     Multiple Sclerosis Mother      Cancer Maternal Grandmother         cervical     Diabetes Maternal Grandmother      Hypertension Maternal Grandmother      Heart Disease Maternal Grandmother      Multiple Sclerosis Cousin      Multiple Sclerosis Cousin      Diabetes Maternal Aunt      Depression Daughter      Depression Granddaughter        Objective:     Appears AAO x 3  No vitals obtained due to virtual visit    Labs   Most Recent 3 CBC's:Recent Labs   Lab Test 21  0622 21  0754 21  0638 10/05/21  1033   WBC  --   4.7 13.8* 6.5   HGB  --  10.3* 11.8 11.8   MCV  --  95 93 93    226 301 273     Most Recent 3 BMP's:Recent Labs   Lab Test 11/27/21  1111 11/27/21  0931 11/27/21  0622 11/27/21  0222 11/25/21  0836 11/25/21  0754 11/24/21  1811 11/24/21  1716 11/24/21  1307 11/24/21  0638 10/05/21  1032   NA  --   --   --   --   --  141  --   --   --  139 140   POTASSIUM  --   --   --   --   --  4.6  --  5.4*  --  5.5* 5.6*   CHLORIDE  --   --   --   --   --  109  --   --   --  105 108   CO2  --   --   --   --   --  28  --   --   --  22 26   BUN  --   --   --   --   --  27  --   --   --  36* 21   CR  --   --  0.87  --   --  0.93  --   --   --  0.92 1.00   ANIONGAP  --   --   --   --   --  4  --   --   --  12 6   SHAUN  --   --   --   --   --  8.4*  --   --   --  9.4 8.6   * 140*  --  117*   < > 110*   < >  --    < > 192* 243*    < > = values in this interval not displayed.     Most Recent Urinalysis:Recent Labs   Lab Test 11/24/21  0817 11/22/21  1400   COLOR Yellow Yellow   APPEARANCE Slightly Cloudy* Clear   URINEGLC Negative Negative   URINEBILI Negative Negative   URINEKETONE Negative Negative   SG 1.017 1.015   UBLD Trace* Moderate*   URINEPH 5.5 5.5   PROTEIN 20 * 100 *   UROBILINOGEN  --  0.2   NITRITE Positive* Positive*   LEUKEST Large* Large*   RBCU 7* None Seen   WBCU >182* >100*     Acute Labs   Urine Culture    Culture   Date Value Ref Range Status   11/24/2021 Positive on the 1st day of incubation (A)  Final   11/24/2021 Klebsiella pneumoniae (AA)  Final     Comment:     2 of 2 bottles  Susceptibilities done on previous cultures   11/24/2021 50,000-100,000 CFU/mL Klebsiella pneumoniae (A)  Final   11/24/2021 10,000-50,000 CFU/mL Klebsiella pneumoniae (A)  Final

## 2021-12-06 PROBLEM — N13.5 STRICTURE OR KINKING OF URETER: Status: ACTIVE | Noted: 2021-12-06

## 2021-12-07 DIAGNOSIS — M54.6 CHRONIC THORACIC BACK PAIN, UNSPECIFIED BACK PAIN LATERALITY: ICD-10-CM

## 2021-12-07 DIAGNOSIS — G89.29 CHRONIC THORACIC BACK PAIN, UNSPECIFIED BACK PAIN LATERALITY: ICD-10-CM

## 2021-12-08 ENCOUNTER — VIRTUAL VISIT (OUTPATIENT)
Dept: PSYCHIATRY | Facility: CLINIC | Age: 76
End: 2021-12-08
Payer: COMMERCIAL

## 2021-12-08 ENCOUNTER — TELEPHONE (OUTPATIENT)
Dept: PSYCHIATRY | Facility: CLINIC | Age: 76
End: 2021-12-08
Payer: COMMERCIAL

## 2021-12-08 ENCOUNTER — VIRTUAL VISIT (OUTPATIENT)
Dept: BEHAVIORAL HEALTH | Facility: TELEHEALTH | Age: 76
End: 2021-12-08
Payer: COMMERCIAL

## 2021-12-08 VITALS — BODY MASS INDEX: 44.29 KG/M2 | WEIGHT: 250 LBS

## 2021-12-08 DIAGNOSIS — F50.819 BINGE EATING DISORDER: ICD-10-CM

## 2021-12-08 DIAGNOSIS — F33.1 MODERATE EPISODE OF RECURRENT MAJOR DEPRESSIVE DISORDER (H): Primary | ICD-10-CM

## 2021-12-08 DIAGNOSIS — F41.9 ANXIETY: ICD-10-CM

## 2021-12-08 DIAGNOSIS — F41.9 ANXIETY: Primary | ICD-10-CM

## 2021-12-08 PROCEDURE — 90832 PSYTX W PT 30 MINUTES: CPT | Mod: 95 | Performed by: SOCIAL WORKER

## 2021-12-08 PROCEDURE — 99214 OFFICE O/P EST MOD 30 MIN: CPT | Mod: 95 | Performed by: PSYCHIATRY & NEUROLOGY

## 2021-12-08 RX ORDER — BUSPIRONE HYDROCHLORIDE 10 MG/1
10 TABLET ORAL 2 TIMES DAILY
Qty: 180 TABLET | Refills: 1 | Status: SHIPPED | OUTPATIENT
Start: 2021-12-08

## 2021-12-08 ASSESSMENT — ANXIETY QUESTIONNAIRES
7. FEELING AFRAID AS IF SOMETHING AWFUL MIGHT HAPPEN: NOT AT ALL
6. BECOMING EASILY ANNOYED OR IRRITABLE: NOT AT ALL
3. WORRYING TOO MUCH ABOUT DIFFERENT THINGS: NOT AT ALL
5. BEING SO RESTLESS THAT IT IS HARD TO SIT STILL: NOT AT ALL
GAD7 TOTAL SCORE: 1
IF YOU CHECKED OFF ANY PROBLEMS ON THIS QUESTIONNAIRE, HOW DIFFICULT HAVE THESE PROBLEMS MADE IT FOR YOU TO DO YOUR WORK, TAKE CARE OF THINGS AT HOME, OR GET ALONG WITH OTHER PEOPLE: SOMEWHAT DIFFICULT
1. FEELING NERVOUS, ANXIOUS, OR ON EDGE: SEVERAL DAYS
2. NOT BEING ABLE TO STOP OR CONTROL WORRYING: NOT AT ALL

## 2021-12-08 ASSESSMENT — PATIENT HEALTH QUESTIONNAIRE - PHQ9: 5. POOR APPETITE OR OVEREATING: NOT AT ALL

## 2021-12-08 NOTE — TELEPHONE ENCOUNTER
Zeynep is being discharged from the collaborative care psychiatry service back to the care of her primary care provider.  Please refer all future refill request to primary care.    Nubia Alcaraz MD  Collaborative Care Psychiatry  [facility]

## 2021-12-08 NOTE — PROGRESS NOTES
Collaborative Care Psychiatry Service (CCPS)  December 8, 2021    Behavioral Health Clinician Progress Note    Patient Name: Zeynep Martinez      Telemedicine Visit: The patient's condition can be safely assessed and treated via synchronous audio and visual telemedicine encounter.      Reason for Telemedicine Visit: Services only offered telehealth    Originating Site (Patient Location): Patient's home    Distant Site (Provider Location): Provider Remote Setting- Home Office    Consent:  The patient/guardian has verbally consented to: the potential risks and benefits of telemedicine (video visit) versus in person care; bill my insurance or make self-payment for services provided; and responsibility for payment of non-covered services.     Mode of Communication:  Video Conference via [facility]    As the provider I attest to compliance with applicable laws and regulations related to telemedicine.         Service Type:  Individual      Service Location:   Face to Face in Home / Community     Session Start Time: 12:38pm  Session End Time: 1:00pm      Session Length: 16 - 37      Attendees: Client    Visit Activities (Refresh list every visit): [facility] Only    Diagnostic Assessment Date: 8/12/21- Noe Meeks  See Flowsheets for today's PHQ-9 and NELLIE-7 results  Previous PHQ-9:   PHQ-9 SCORE 10/6/2021 10/8/2021 12/8/2021   PHQ-9 Total Score MyChart 19 (Moderately severe depression) 16 (Moderately severe depression) -   PHQ-9 Total Score 19 16 14       Previous NELLIE-7:   NELLIE-7 SCORE 9/28/2021 10/7/2021 12/8/2021   Total Score 4 (minimal anxiety) - -   Total Score 4 3 1       WHODAS  WHODAS 2.0 Total Score 8/12/2021 10/6/2021   Total Score 32 41   Total Score MyChart - 41        CAGE  CAGE-AID Flowsheet 8/11/2021 10/6/2021   Have you ever felt you should Cut down on your drinking or drug use? 0 0   Have people Annoyed you by criticizing your drinking or drug use? 0 0   Have you ever felt bad or Guilty about your drinking  "or drug use? 0 0   Have you ever had a drink or used drugs first thing in the morning to steady your nerves or to get rid of a hangover? (Eye opener) 0 0   CAGE-AID SCORE 0 0   1. Have you felt you ought to cut down on your drinking or drug use? No No   2. Have people annoyed you by criticizing your drinking or drug use? No No   3. Have you felt bad or guilty about your drinking or drug use? No No   4. Have you ever had a drink or used drugs first thing in the morning to steady your nerves or to get rid of a hangover (eye opener)? No No   CAGE-AID SCORE 0 (A total score of 2 or greater is considered clinically significant) 0 (A total score of 2 or greater is considered clinically significant)         DATA  Extended Session (60+ minutes): No  Interactive Complexity: No  Crisis: No    Medication Compliance:  Yes      Chemical Use Review:   Substance Use: Chemical use reviewed, no active concerns identified      Tobacco Use: No current tobacco use.      Current Stressors / Issues:  Patient reported that things have been \"wild' due to having 2 UTI's in October and 2 UTI's in November and then ending up in the hospital over Thanksgiving. She reported feeling exasperated and weak from being in the hospital and realizing she is more frail than she thought she was. Though it has been a tough couple months medically, she is doing a lot better mentally. She has noticed that her depression/anxiety doesn't last as long and lightens up quicker. She also has a new person in her life who she is talking with, which she thinks is also helpful. She does want to go back home to [place] and plans on doing that once she is feeling better medically and stronger. She believes she is on the right path. She has a phone visit scheduled with Radha in January and continues to see her therapist regularly.    Progress on Treatment Objective(s) / Homework:  Satisfactory progress - ACTION (Actively working towards change); Intervened by " reinforcing change plan / affirming steps taken    Motivational Interviewing    MI Intervention: Expressed Empathy/Understanding, Supported Autonomy, Collaboration, Evocation, Permission to raise concern or advise and Open-ended questions     Change Talk Expressed by the Patient: Desire to change Committment to change Taking steps    Provider Response to Change Talk: E - Evoked more info from patient about behavior change, A - Affirmed patient's thoughts, decisions, or attempts at behavior change, R - Reflected patient's change talk and S - Summarized patient's change talk statements        Review of Symptoms per patient report:  Depression: Change in sleep, Lack of interest, Change in energy level, Difficulties concentrating, Change in appetite, Feelings of hopelessness and Feeling sad, down, or depressed  Eleonoar:  No Symptoms  Psychosis: No Symptoms  Anxiety: feelings of being overwhelmed.  Everything tightens up and can't talk straight.  Panic:  No symptoms  Post Traumatic Stress Disorder:  No Symptoms   Eating Disorder: Binging  ADD / ADHD:  No symptoms  Conduct Disorder: No symptoms  Autism Spectrum Disorder: No symptoms  Obsessive Compulsive Disorder: No Symptoms      Changes in Health Issues:   Yes: Diabetes, Associated Psychological Distress    Assessment: Current Emotional / Mental Status (status of significant symptoms):  Risk status (Self / Other harm or suicidal ideation)  Patient denies a history of suicidal ideation, suicide attempts, self-injurious behavior, homicidal ideation, homicidal behavior and and other safety concerns  Patient denies current fears or concerns for personal safety.  Patient denies current or recent suicidal ideation or behaviors.  Patient denies current or recent homicidal ideation or behaviors.  Patient denies current or recent self injurious behavior or ideation.  Patient denies other safety concerns.  A safety and risk management plan has not been developed at this time,  however patient was encouraged to call [facility] should there be a change in any of these risk factors.    Appearance:   Appropriate   Eye Contact:   Good   Psychomotor Behavior: Normal   Attitude:   Cooperative   Orientation:   All  Speech   Rate / Production: Normal    Volume:  Normal   Mood:    Normal  Affect:    Appropriate   Thought Content:  Clear   Thought Form:  Coherent  Logical   Insight:    Good     Diagnoses:  1. Anxiety        Collateral Reports Completed:  Not Applicable    Plan: (Homework, other):  Patient was given information about behavioral services and encouraged to schedule a follow up appointment with the clinic [facility].  She was also given information about mental health symptoms and treatment options .  Pt is seeing a therapist regularly through [facility]. CD Recommendations: No indications of CD issues.  DEANN Anne, [facility]      DEANN Anne, DEANN, [facility]  December 8, 2021

## 2021-12-08 NOTE — PATIENT INSTRUCTIONS
Continue venlafaxine  mg daily.     Continue buspirone 10 mg twice daily.     Continue alprazolam 0.5 to 1 mg daily as needed.     Continue all other medications per your primary care provider.    Per our conversation, you are graduating from the Collaborative Care Psychiatry program back to the care of your primary care provider.  Please follow up with them in three to six months.  All future refill requests should go to them.    It has been a pleasure working with you, best wishes for the future!      [facility] Resources:      Go to the Emergency Department as needed or call after hours crisis line at [phone].      To schedule individual or family therapy, call [facility] at [phone].     Follow up with primary care provider as planned or sooner for acute medical concerns.    Call the psychiatric nurse line with medication questions or concerns at [phone].    Health Information Designshart may be used to communicate with your provider, but this is not intended to be used for emergencies.    Community Resources:      National Suicide Prevention Lifeline: 142.768.7221 (TTY: 511.905.8795). Call anytime for help.  (www.suicidepreventionlifeline.org)    National [place] on Mental Illness (www.nikki.org): 925.821.5907 or 916-332-0773.     Mental Health Association (www.mentalhealth.org): 165.126.2065 or 744-709-1434.    [place] Crisis Text Line: Text [place] to 008191    Suicide LifeLine Chat: suicidepreTrendzolifeline.org/chat

## 2021-12-08 NOTE — PROGRESS NOTES
Telemedicine Visit: The patient's condition can be safely assessed and treated via synchronous audio and visual telemedicine encounter.      Reason for Telemedicine Visit: Services only offered telehealth    Originating Site (Patient Location): Patient's home    Distant Site (Provider Location): Provider Remote Setting- Home Office    Consent:  The patient/guardian has verbally consented to: the potential risks and benefits of telemedicine (video visit) versus in person care; bill my insurance or make self-payment for services provided; and responsibility for payment of non-covered services.     Mode of Communication:  Video Conference via [employer]    As the provider I attest to compliance with applicable laws and regulations related to telemedicine.    How would you like to obtain your AVS? MyChart  If the video visit is dropped, the invitation should be resent by: Text to cell phone: [phone]  Will anyone else be joining your video visit? No         Outpatient Psychiatric Progress Note    Name: Zeynep Martinez   : 1945                    Primary Care Provider: BENEDICTO Martin CNP   Therapist: Brad Luevano     PHQ-9 scores:  PHQ-9 SCORE 10/6/2021 10/8/2021 2021   PHQ-9 Total Score MyChart 19 (Moderately severe depression) 16 (Moderately severe depression) -   PHQ-9 Total Score 19 16 14       NELLIE-7 scores:  NELLIE-7 SCORE 2021 10/7/2021 2021   Total Score 4 (minimal anxiety) - -   Total Score 4 3 1       Patient Identification:  Zeynep is a 76 year old year old female  who presents for return visit with me.  Patient attended the session alone.     Interim History:  Per DEANN Anne:  She is doing a lot better mentally, but has had a lot going on physically/medically. She feels she is on the right track overall. She does not have any specific questions for you. She has a phone visit scheduled with [facility] in January and she continues to see her therapist  "regularly.    Zeynep has been struggling with multiple urinary tract infections, and was recently hospitalized with sepsis.  She had been on IV antibiotics, but 10 days after the course was completed, she developed another UTI and very quickly needed to be hospitalized for a week.  She reports they tried several different antibiotics while she was in the hospital.  The skin is peeling from her hands now.  She is not sure which medication might be causing this reaction.    She reports that she has been much less anxious since she was discharged from the hospital.  She rates her mood today as 9 out of 10 with 10 being the best.  She has been able to use her adult coloring books again which she finds entertaining and soothing.    She has a new \"friend\" in [place] that she has been talking to over the Internet.  This friend it was planning to come to visit, but they put it off because of the Zeynep's health.  In the past, she reports she would have been devastated, but now she is able to see that it is the best decision.    She very firmly believes that she needs to move back to [place], because she feels so much more at home there.  She is hoping to move as soon as her urinary tract infection issues have resolved.    She denies any adverse side effects from buspirone.  We agreed to continue with 10 mg twice daily.  She has some alprazolam to use as needed, but reports that she is only used it 2 times in the last few weeks.    She reports she is doing well enough she is comfortable returning to her primary care provider for ongoing medication management.    Vital Signs:   Wt 113.4 kg (250 lb)   LMP  (LMP Unknown)   Breastfeeding No   BMI 44.29 kg/m      Current Medications:  Current Outpatient Medications   Medication     acetaminophen (TYLENOL) 500 MG tablet     ALPRAZolam (XANAX) 0.5 MG tablet     blood glucose (ACCU-CHEK PASQUALE PLUS) test strip     blood glucose (NO BRAND SPECIFIED) lancets standard     blood " glucose (NO BRAND SPECIFIED) test strip     blood glucose monitoring (NO BRAND SPECIFIED) meter device kit     busPIRone (BUSPAR) 10 MG tablet     gabapentin (NEURONTIN) 800 MG tablet     glipiZIDE (GLUCOTROL XL) 5 MG 24 hr tablet     meloxicam (MOBIC) 7.5 MG tablet     metFORMIN (GLUCOPHAGE-XR) 500 MG 24 hr tablet     QUEtiapine (SEROQUEL) 25 MG tablet     STATIN NOT PRESCRIBED (INTENTIONAL)     traZODone (DESYREL) 50 MG tablet     venlafaxine (EFFEXOR-XR) 150 MG 24 hr capsule     venlafaxine (EFFEXOR-XR) 75 MG 24 hr capsule     levofloxacin (LEVAQUIN) 500 MG tablet     No current facility-administered medications for this visit.        The [place] Prescription Monitoring Program has been reviewed and there are no concerns about diversionary activity for controlled substances at this time.      Mental Status Examination:  Zeynep is a 76-year-old woman in no acute distress.  Speech is clear and normal in rate and tone.  Mood is good.  Thoughts are logical and spontaneous with no loose associations or flight of ideas.  Thought content shows no psychosis.  No suicidal thoughts.  She is alert and oriented x3.    Assessment and Plan:    ICD-10-CM    1. Moderate episode of recurrent major depressive disorder (H)  F33.1    2. Anxiety  F41.9 busPIRone (BUSPAR) 10 MG tablet   3. Binge eating disorder  F50.81        Medical comorbidities include:   Patient Active Problem List   Diagnosis     History of uterine cancer     Calculus of kidney     Trigeminal neuralgia     Gastroesophageal reflux disease without esophagitis     Recurrent UTI     Obesity, Class III, BMI 40-49.9 (morbid obesity) (H)     Type 2 diabetes mellitus with diabetic polyneuropathy, without long-term current use of insulin (H)     S/P gastric bypass     Acute pain of left knee     Chronic kidney disease, stage 3 (H)     Binge eating disorder     Chronic diarrhea     Moderate episode of recurrent major depressive disorder (H)     Insomnia, unspecified type      Anxiety     Complicated UTI (urinary tract infection)     Severe sepsis (H)     Bilateral renal cysts     Degeneration of lumbar or lumbosacral intervertebral disc     Spondylosis of lumbosacral region without myelopathy or radiculopathy     Depressive disorder     Diabetic neuropathy (H)     Essential hypertension     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     Hereditary and idiopathic peripheral neuropathy     Hyperlipidemia     Malignant neoplasm of uterus (H)     Right lumbar radiculopathy     Senile nuclear sclerosis     Spondylolisthesis of lumbosacral region     Status post LASIK surgery     Stricture or kinking of ureter     Vitreous floater       Treatment Plan:  Patient Instructions   Continue venlafaxine  mg daily.     Continue buspirone 10 mg twice daily.     Continue alprazolam 0.5 to 1 mg daily as needed.     Continue all other medications per your primary care provider.    Per our conversation, you are graduating from the [facility] program back to the care of your primary care provider.  Please follow up with them in three to six months.  All future refill requests should go to them.    It has been a pleasure working with you, best wishes for the future!      [place] Resources:      Go to the Emergency Department as needed or call after hours crisis line at [phone].      To schedule individual or family therapy, call [facility] at [phone].     Follow up with primary care provider as planned or sooner for acute medical concerns.    Call the psychiatric nurse line with medication questions or concerns at [phone].    PR Slidest may be used to communicate with your provider, but this is not intended to be used for emergencies.    Community Resources:      National Suicide Prevention Lifeline: 345.788.4494 (TTY: 980.464.4191). Call anytime for help.  (www.suicidepreventionlifeline.org)    National [place] on Mental Illness (www.nikki.org): 726.782.8520 or  435.276.7014.     Mental Health Association (www.mentalhealth.org): 973-568-8575 or 441-672-0563.    [place] Crisis Text [place]: Text [place] to 691025    Suicide LifeLine Chat: suicidepreventionlifeline.org/chat       Administrative Billing:   Zeynep was scheduled for a video visit, but was unable to join the visit due to technical difficulties.  We agreed to meet by telephone.    Telephone call duration: 16 minutes  Total time spent, including chart review, and coordination of care and documentation: 30 minutes    Patient Status:  The patient is being returned to the referring provider for ongoing care and medication prescribing.  The patient can be referred back to this service for further consultation as needed.

## 2021-12-09 RX ORDER — MELOXICAM 7.5 MG/1
TABLET ORAL
Qty: 90 TABLET | Refills: 0 | Status: SHIPPED | OUTPATIENT
Start: 2021-12-09

## 2021-12-09 RX ORDER — MELOXICAM 7.5 MG/1
TABLET ORAL
Qty: 90 TABLET | Refills: 0 | Status: SHIPPED | OUTPATIENT
Start: 2021-12-09 | End: 2022-01-19

## 2021-12-09 ASSESSMENT — PATIENT HEALTH QUESTIONNAIRE - PHQ9: SUM OF ALL RESPONSES TO PHQ QUESTIONS 1-9: 14

## 2021-12-09 ASSESSMENT — ANXIETY QUESTIONNAIRES: GAD7 TOTAL SCORE: 1

## 2021-12-09 NOTE — TELEPHONE ENCOUNTER
Routing refill request to provider for review/approval because:  Due to age    Mayte Mejia RN

## 2021-12-09 NOTE — TELEPHONE ENCOUNTER
Change in pharmacy requested. Prescription approved per [facility] Refill Protocol.  Barak ROBERTS RN

## 2021-12-10 NOTE — NURSING NOTE
Medications Phoned  to Pharmacy [] yes [x]no  Name of Pharmacist:  List Medications, including dose, quantity and instructions    Medications ordered this visit were e-scribed.  Verified by order class [x] yes  [] no  Buspar 10mg  Medication changes or discontinuations were communicated to patient's pharmacy: [] yes  [x] no    UA collected [] yes  [x] no    Outside referrals / labs, etc support staff to follow up: [] yes  [x] no    Future appointment was made: [] yes  [x] no   Returning back to PCP    Dictation completed at time of chart check: [x] yes  [] no    I have checked the documentation for today s encounters and the above information has been reviewed and completed.      Abbie Villalpando MA on December 10, 2021 at 9:32 AM

## 2021-12-14 ENCOUNTER — VIRTUAL VISIT (OUTPATIENT)
Dept: PSYCHOLOGY | Facility: CLINIC | Age: 76
End: 2021-12-14
Payer: COMMERCIAL

## 2021-12-14 DIAGNOSIS — F33.1 MAJOR DEPRESSIVE DISORDER, RECURRENT EPISODE, MODERATE WITH ANXIOUS DISTRESS (H): Primary | ICD-10-CM

## 2021-12-14 DIAGNOSIS — F41.1 GAD (GENERALIZED ANXIETY DISORDER): ICD-10-CM

## 2021-12-14 PROCEDURE — 90834 PSYTX W PT 45 MINUTES: CPT | Mod: 95 | Performed by: SOCIAL WORKER

## 2021-12-14 NOTE — PROGRESS NOTES
Progress Note    Patient Name: Zeynep Martinez  Date: 21         Service Type: Individual      Session Start Time: 3:05  Session End Time: 3:50     Session Length: 45    Session #: 6    Attendees: Client    Service Modality:  Video Visit:      Provider verified identity through the following two step process.  Patient provided:  Patient  and Patient address    Telemedicine Visit: The patient's condition can be safely assessed and treated via synchronous audio and visual telemedicine encounter.      Reason for Telemedicine Visit: Services only offered telehealth    Originating Site (Patient Location): Patient's home    Distant Site (Provider Location): Provider Remote Setting- Home Office    Consent:  The patient/guardian has verbally consented to: the potential risks and benefits of telemedicine (video visit) versus in person care; bill my insurance or make self-payment for services provided; and responsibility for payment of non-covered services.     Patient would like the video invitation sent by:  My Chart    Mode of Communication:  Video Conference via [employer]    As the provider I attest to compliance with applicable laws and regulations related to telemedicine.     Treatment Plan Last Reviewed: Started 21  PHQ-9 / NELLIE-7 : Complete next session    DATA  Interactive Complexity: No  Crisis: No       Progress Since Last Session (Related to Symptoms / Goals / Homework):   Symptoms: Stable anxiety and depression symptoms this session    Homework: Achieved / completed to satisfaction; Previous sessions:  Put female bust next to her television so she can admire this on a more regular basis.  Created a positive affirmation picture and hung it by her TV also to remind herself of positives about self.  Feels better about herself and engaging with family and friends on a regular basis.Client feeling more isolated and alone this session and feeling more depressed.   "We talked in session what she can engage in and how to improve her overall mood. Did have work friends over for a brunch which she enjoyed and also engaing in coloring with affrimations and putting this on her wall.   Client has been to many doctor appts.this last month and feeling better, she has an appointment with the [facility] schedule to address her eating disorder.  Has met a friend from [place] who she is getting close with but has serious health issues but this relationship is exciting to client and makes her feel good.  Family relationship issues has been positive and working on creative art projects that brings her belkys. Current session: Client has been very sick and to the hospital due to a UTI and infection which occurred over Thanksgiving.  She is trying to heal up and get rid of her infection and possible surgery in the future.  Client is still connecting with her friend Mini and hoping and planning to move back to [facility] next Spring.  She is hoping that her new friend will join her there and she is hoping that she will be well enough to move back there. She wants to move \"Home\"  Which is home to her and she has very close friends who will be there for her if anything should come up for her.  This plan is motivating her to concentrate more on her health and improvement of her overall health.       Episode of Care Goals: Achieved / completed to satisfaction - ACTION (Actively working towards change); Intervened by reinforcing change plan / affirming steps taken     Current / Ongoing Stressors and Concerns:   Family stress, difficult childhood, ego despair issues     Treatment Objective(s) Addressed in This Session:   Connect to gratitude and ego integrity  Use GAP-stay grounded; attuned and present  Connect to positive affirmations and gratitude  Create meals and freeze them, save money and put in bank, start realistic exercise program, schedule doctor appointment to check on ulcer, " skin issues and continue work with other rheumatologist  on wrist and tremor issues. Continue creative projects that she enjoys. Look into eating disorder group or program. Concentrate on thought stopping process and CBT skills.     Intervention:   Motivational Interviewing: OARS  Strength based therapy  CBT Therapy     ASSESSMENT: Current Emotional / Mental Status (status of significant symptoms):   Risk status (Self / Other harm or suicidal ideation)   Patient denies current fears or concerns for personal safety.   Patient denies current or recent suicidal ideation or behaviors.   Patient denies current or recent homicidal ideation or behaviors.   Patient denies current or recent self injurious behavior or ideation.   Patient denies other safety concerns.   Patient reports there has been no change in risk factors since their last session.     Patient reports there has been no change in protective factors since their last session.     Recommended that patient call 911 or go to the local ED should there be a change in any of these risk factors.     Appearance:   Appropriate    Eye Contact:   Good    Psychomotor Behavior: Normal    Attitude:   Cooperative  Interested Pleasant   Orientation:   All   Speech    Rate / Production: Normal/ Responsive Normal     Volume:  Normal    Mood:    Anxious  Depressed  Expansive   Affect:    Appropriate  Bright    Thought Content:  Clear    Thought Form:  Coherent  Goal Directed  Logical    Insight:    Fair      Medication Review:   No changes to current psychiatric medication(s)     Medication Compliance:   Yes     Changes in Health Issues:   None reported     Chemical Use Review:   Substance Use: Chemical use reviewed, no active concerns identified      Tobacco Use: No current tobacco use.      Diagnosis:  1. Major depressive disorder, recurrent episode, moderate with anxious distress (H)    2. NELLIE (generalized anxiety disorder)        Collateral Reports Completed:   Not  Applicable    PLAN: (Patient Tasks / Therapist Tasks / Other)  Previous sessions: Patient will bring her lady bust to her room so she can look at each morning when she get's up; stay in her GAP, Connect to positive thoughts affirmations and gratitude.   Follow rheumatologist recommendations for hands and wrists, contact PCP about ulcer and skin issues,  Start healthy meal plan and freeze meals that she can eat later, save money from meals and put in savings, continue outings with friends and family, join lunch group when she can, start realistic exercise program, and continue creative projects with limits.  Continue to connect to gratitude, positive affirmations and strengths. Set limits with granddaughter and continue to build on relationship when appropriate. Ask PCP for referral for an eating disorder group or program, watch and try and control binge eating, continue coloring, connecting to strengths and positive affirmations on her wall daily.  Use thought stopping process and use FOff as her mantra.  Reframe thinking to positive thinking and possibilities in her life.  Continue building and outside activities, connect and get together with friends for brunch next month and support from family.  Feeling better health wise and seeing many doctors and feeling better has a referral for the [facility] program in January to address her eating issues, continue to connect with new friend Mini which brings her belkys and continue making her art hand towels and coloring and connection to family and friends for support.Current session: Client will try and get to gym in her facility and ride the elliptical bike to build up her core, attend her doctor appointments, connect with her friend Mini.  Connect to strengths and positive affirmations. Spend time with family over the Holidays.   Attend [facility] appt. In January.           DEANN Quinonez                                                          ______________________________________________________________________    Treatment Plan    Patient's Name: Zeynep Martinez  YOB: 1945    Date: 8-19-21    DSM5 Diagnoses: 296.32 (F33.1) Major Depressive Disorder, Recurrent Episode, Moderate _ and With anxious distress or 300.02 (F41.1) Generalized Anxiety Disorder  Psychosocial / Contextual Factors: Family stress, difficult childhood, ego despair issues    WHODAS: Completed first session    Referral / Collaboration:  Was/were discussed and client will pursue.  Patient stated that she would enroll in an eating disorder program if her binge eating persists.    Anticipated number of session or this episode of care: 12      MeasurableTreatment Goal(s) related to diagnosis / functional impairment(s)  Goal 1: Patient will decrease her depressive and anxious thoughts and return cordell normal daily living.     I will know I've met my goal when I know who I am authentically and act appropriately.      Objective #A (Patient Action)    Patient will work toward stronger ego intergrity vs despair.  Determine authentic self and feel good about this. .  Status: Continued - Date(s): 8-19-21    Intervention(s)  Therapist will process origins of negative self image and esteem and concentrate on celebrating authenticity and improving overall self esteem and worth.    Objective #B  Patient will use cognitive strategies identified in therapy to challenge anxious thoughts.  Status: Continued - Date(s): 8-19-21    Intervention(s)  Therapist will teach thought stopping process and CBT skills and practice skills as needed until they become automatic .    Objective #C  Patient will eat healthy and limit binge eating.  Status: Continued - Date(s): 8-19-21    Intervention(s)  Therapist will check-in on eating behaior, determine reasons for binge eating and reinforce healthy eating habits and behavior.         Patient has reviewed and agreed to the above plan.      Noe CM  Jeanna, [facility]  August 19, 2021

## 2021-12-21 ENCOUNTER — HOSPITAL ENCOUNTER (OUTPATIENT)
Dept: NUCLEAR MEDICINE | Facility: CLINIC | Age: 76
Discharge: HOME | End: 2021-12-21
Attending: UROLOGY | Admitting: UROLOGY
Payer: COMMERCIAL

## 2021-12-21 DIAGNOSIS — N10 PYELONEPHRITIS, ACUTE: ICD-10-CM

## 2021-12-21 PROCEDURE — A9562 TC99M MERTIATIDE: HCPCS | Performed by: UROLOGY

## 2021-12-21 PROCEDURE — 250N000011 HC RX IP 250 OP 636: Performed by: UROLOGY

## 2021-12-21 PROCEDURE — 78708 K FLOW/FUNCT IMAGE W/DRUG: CPT

## 2021-12-21 PROCEDURE — 343N000001 HC RX 343: Performed by: UROLOGY

## 2021-12-21 RX ORDER — FUROSEMIDE 10 MG/ML
40 INJECTION INTRAMUSCULAR; INTRAVENOUS ONCE
Status: COMPLETED | OUTPATIENT
Start: 2021-12-21 | End: 2021-12-21

## 2021-12-21 RX ADMIN — FUROSEMIDE 40 MG: 10 INJECTION, SOLUTION INTRAMUSCULAR; INTRAVENOUS at 13:19

## 2021-12-21 RX ADMIN — TECHNESCAN TC 99M MERTIATIDE 8.3 MILLICURIE: 1 INJECTION, POWDER, LYOPHILIZED, FOR SOLUTION INTRAVENOUS at 13:23

## 2021-12-23 ENCOUNTER — VIRTUAL VISIT (OUTPATIENT)
Dept: UROLOGY | Facility: CLINIC | Age: 76
End: 2021-12-23
Payer: COMMERCIAL

## 2021-12-23 DIAGNOSIS — N20.0 CALCULUS OF KIDNEY: ICD-10-CM

## 2021-12-23 DIAGNOSIS — N39.0 RECURRENT UTI: Primary | ICD-10-CM

## 2021-12-23 DIAGNOSIS — N13.30 HYDRONEPHROSIS, UNSPECIFIED HYDRONEPHROSIS TYPE: Primary | ICD-10-CM

## 2021-12-23 PROCEDURE — 99213 OFFICE O/P EST LOW 20 MIN: CPT | Mod: 95 | Performed by: UROLOGY

## 2021-12-23 ASSESSMENT — PAIN SCALES - GENERAL: PAINLEVEL: NO PAIN (0)

## 2021-12-23 NOTE — PROGRESS NOTES
Patient is roomed via telephone for a virtual visit.  Patient confirmed she is in the [facility] at the time of this appointment.  Patient understands that this virtual visit is billable and agree to proceed with appointment.

## 2021-12-23 NOTE — PROGRESS NOTES
Assessment/Plan:    Assessment & Plan   Zeynep was seen today for follow up.    Diagnoses and all orders for this visit:    Hydronephrosis, unspecified hydronephrosis type    Calculus of kidney        Stone Management Plan  Stone Management 6/14/2021 11/29/2021 12/23/2021   Urinary Tract Infection Possible Infection Suspected Infection Possible Infection   Renal Colic Well controlled symptoms Well controlled symptoms Well controlled symptoms   Renal Failure No suspicion of renal failure No suspicion of renal failure No suspicion of renal failure   Current CT date - - 11/24/2021   Right sided stones? - - Yes   R Number of ureteral stones - - No ureteral stones   R Number of kidney stones  - - 1   R GSD of kidney stones - - 2 - 4   R Hydronephrosis - - Moderate   R Stone Event Established event No current event No current event   Diagnosis date - - -   Initial location of primary symptomatic stone - - -   Initial GSD of primary symptomatic stone - - -   R Current Plan - - Clear   Clear rationale - - Associated treated infection   Observe rationale - - -   Left sided stones? - - No   L Stone Event No current event No current event No current event             PLAN    Video call duration: 12 minutes  17 minutes spent on the date of the encounter doing chart review, history and exam, documentation and further activities per the note    RICH ARANA MD  [facility]    HPI  Ms. Zeynep Martinez is a 76 year old  female who is being evaluated via a billable video visit by [facility] for follow up of her stone disease.    She has been doing well in the interim. Had some right flank pain after her renogram.     Renogram demonstrates slightly better t1/2 at 18 min with 31% differential function on the right.    Last CT demonstrates 5 mm persistent right lower pole stone.    We discussed checking a urine culture now and then probably attempting  ureteroscopic clearance after appropriate antibiotic.    She is very motivated to return to [facility] and eager to do anything she can to decrease her risks for recurrent severe sepsis. She has had recurrent ESBL but more recently it has been a relatively sensitive Klebsiella.    ROS   Review of systems is negative except for HPI.    Past Medical History:   Diagnosis Date     Anxiety      Arthritis      Bladder infection      Calculus of kidney     multiple, uric acid and calcium     Cancer (H) Uterine 2/2001     Cancer (H)     uterine     Depression      Diabetes mellitus (H)     type 2     Frequent UTI      Gout      History of anesthesia complications     Slow to wake and gets very anxious     Hyperlipidemia LDL goal <100      Kidney stone      Type 2 diabetes mellitus without complication, without long-term current use of insulin (H)      Ulcer     Ulcer in the anastomosis       Past Surgical History:   Procedure Laterality Date     ABDOMINOPLASTY       APPENDECTOMY       APPENDECTOMY       EXTRACORPOREAL SHOCK WAVE LITHOTRIPSY (ESWL)       EXTRACORPOREAL SHOCK WAVE LITHOTRIPSY (ESWL)       galbaldder       GASTRIC BYPASS       HYSTERECTOMY       HYSTERECTOMY TOTAL ABDOMINAL, BILATERAL SALPINGO-OOPHORECTOMY, COMBINED      no cervix     IR MISCELLANEOUS PROCEDURE  3/18/2008     IR MISCELLANEOUS PROCEDURE  3/18/2008     IR MISCELLANEOUS PROCEDURE  6/10/2008     IR MISCELLANEOUS PROCEDURE  1/24/2012     IR URETER DILATION BILATERAL  1/24/2012     IR URETER DILATION BILATERAL  3/18/2008     PICC  11/4/2021          NM CYSTO/URETERO W/LITHOTRIPSY &INDWELL STENT INSRT Left 10/26/2018    Procedure:  CYSTOSCOPY, LEFT  URETEROSCOPY, LASER LITHOTRIPSY STENT INSERTION;  Surgeon: Long Mansfield MD;  Location: [facility];  Service: Urology     NM CYSTO/URETERO W/LITHOTRIPSY &INDWELL STENT INSRT Right 5/28/2021    Procedure: CYSTOURETEROSCOPY, URETEROSCOPY, URETERAL DILATION WITH RETROGRADE PYELOGRAM, AND  STENT INSERTION RIGHT;  Surgeon: Long Mansfield MD;  Location: [facility];  Service: Urology     [place] ERCP W/BIOPSY SINGLE/MULTIPLE       REVISION CATHERINE-EN-Y       SKIN SURGERY  2013    15 lbs of skin/adipose removed     STOMACH SURGERY       URETEROSCOPY         Current Outpatient Medications   Medication Sig Dispense Refill     acetaminophen (TYLENOL) 500 MG tablet Take 500-1,000 mg by mouth every 6 hours as needed for mild pain       ALPRAZolam (XANAX) 0.5 MG tablet Take 1 tablet (0.5 mg) by mouth daily as needed for anxiety 15 tablet 1     blood glucose (ACCU-CHEK PASQUALE PLUS) test strip Use to test blood sugar 1-2 times daily or as directed. 200 strip 5     blood glucose (NO BRAND SPECIFIED) lancets standard Use to test blood sugar 1-2 times daily or as directed. 1 each 4     blood glucose (NO BRAND SPECIFIED) test strip Use to test blood sugar 1-2 times daily or as directed. 100 strip 4     blood glucose monitoring (NO BRAND SPECIFIED) meter device kit Use to test blood sugar 1-2 times daily or as directed. 1 kit 0     busPIRone (BUSPAR) 10 MG tablet Take 1 tablet (10 mg) by mouth 2 times daily 180 tablet 1     gabapentin (NEURONTIN) 800 MG tablet Take 1 tablet (800 mg) by mouth 2 times daily 40 tablet 0     glipiZIDE (GLUCOTROL XL) 5 MG 24 hr tablet Take 1 tablet (5 mg) by mouth 2 times daily 180 tablet 1     meloxicam (MOBIC) 7.5 MG tablet TAKE ONE TABLET BY MOUTH ONCE DAILY 90 tablet 0     meloxicam (MOBIC) 7.5 MG tablet TAKE ONE TABLET BY MOUTH ONCE DAILY 90 tablet 0     metFORMIN (GLUCOPHAGE-XR) 500 MG 24 hr tablet Take 2 tablets (1,000 mg) by mouth 2 times daily (with meals) 360 tablet 1     QUEtiapine (SEROQUEL) 25 MG tablet Take 1 tablet (25 mg) by mouth nightly as needed (insomnia) 90 tablet 0     STATIN NOT PRESCRIBED (INTENTIONAL) Please choose reason not prescribed from choices below.       traZODone (DESYREL) 50 MG tablet Take 0.5-1 tablets (25-50 mg) by mouth At Bedtime 90 tablet 1      venlafaxine (EFFEXOR-XR) 150 MG 24 hr capsule Take 1 capsule (150 mg) by mouth daily 90 capsule 1     venlafaxine (EFFEXOR-XR) 75 MG 24 hr capsule Take 1 capsule (75 mg) by mouth daily 90 capsule 1       Allergies   Allergen Reactions     Adhesive Tape Rash     Paper tape is ok      Statin Drugs [Hmg-Coa-R Inhibitors] Other (See Comments)     confusion     Penicillins Rash       Social History     Socioeconomic History     Marital status:      Spouse name: Not on file     Number of children: Not on file     Years of education: Not on file     Highest education level: Not on file   Occupational History     Not on file   Tobacco Use     Smoking status: Former Smoker     Packs/day: 0.00     Years: 5.00     Pack years: 0.00     Types: Cigarettes     Quit date: 1970     Years since quittin.0     Smokeless tobacco: Never Used   Substance and Sexual Activity     Alcohol use: Yes     Comment: once per month      Drug use: Never     Sexual activity: Not Currently     Partners: Female     Birth control/protection: Post-menopausal   Other Topics Concern     Parent/sibling w/ CABG, MI or angioplasty before 65F 55M? No   Social History Narrative     Not on file     Social Determinants of Health     Financial Resource Strain: Not on file   Food Insecurity: Not on file   Transportation Needs: Not on file   Physical Activity: Not on file   Stress: Not on file   Social Connections: Not on file   Intimate Partner Violence: Not on file   Housing Stability: Not on file       Family History   Problem Relation Age of Onset     Multiple Sclerosis Mother      Cancer Maternal Grandmother         cervical     Diabetes Maternal Grandmother      Hypertension Maternal Grandmother      Heart Disease Maternal Grandmother      Multiple Sclerosis Cousin      Multiple Sclerosis Cousin      Diabetes Maternal Aunt      Depression Daughter      Depression Granddaughter        Objective:     Appears AAO x 3  No vitals obtained due to  virtual visit    Labs   Most Recent 3 CBC's:Recent Labs   Lab Test 11/27/21  0622 11/25/21  0754 11/24/21  0638 10/05/21  1033   WBC  --  4.7 13.8* 6.5   HGB  --  10.3* 11.8 11.8   MCV  --  95 93 93    226 301 273     Most Recent 3 BMP's:Recent Labs   Lab Test 11/27/21  1111 11/27/21  0931 11/27/21  0622 11/27/21  0222 11/25/21  0836 11/25/21  0754 11/24/21  1811 11/24/21  1716 11/24/21  1307 11/24/21  0638 10/05/21  1032   NA  --   --   --   --   --  141  --   --   --  139 140   POTASSIUM  --   --   --   --   --  4.6  --  5.4*  --  5.5* 5.6*   CHLORIDE  --   --   --   --   --  109  --   --   --  105 108   CO2  --   --   --   --   --  28  --   --   --  22 26   BUN  --   --   --   --   --  27  --   --   --  36* 21   CR  --   --  0.87  --   --  0.93  --   --   --  0.92 1.00   ANIONGAP  --   --   --   --   --  4  --   --   --  12 6   SHAUN  --   --   --   --   --  8.4*  --   --   --  9.4 8.6   * 140*  --  117*   < > 110*   < >  --    < > 192* 243*    < > = values in this interval not displayed.     Most Recent Urinalysis:Recent Labs   Lab Test 11/24/21  0817 11/22/21  1400   COLOR Yellow Yellow   APPEARANCE Slightly Cloudy* Clear   URINEGLC Negative Negative   URINEBILI Negative Negative   URINEKETONE Negative Negative   SG 1.017 1.015   UBLD Trace* Moderate*   URINEPH 5.5 5.5   PROTEIN 20 * 100 *   UROBILINOGEN  --  0.2   NITRITE Positive* Positive*   LEUKEST Large* Large*   RBCU 7* None Seen   WBCU >182* >100*     Acute Labs   Urine Culture    Culture   Date Value Ref Range Status   11/24/2021 Positive on the 1st day of incubation (A)  Final   11/24/2021 Klebsiella pneumoniae (AA)  Final     Comment:     2 of 2 bottles  Susceptibilities done on previous cultures   11/24/2021 50,000-100,000 CFU/mL Klebsiella pneumoniae (A)  Final   11/24/2021 10,000-50,000 CFU/mL Klebsiella pneumoniae (A)  Final

## 2022-01-02 ENCOUNTER — LAB (OUTPATIENT)
Dept: LAB | Facility: CLINIC | Age: 77
End: 2022-01-02
Payer: COMMERCIAL

## 2022-01-02 DIAGNOSIS — N39.0 RECURRENT UTI: ICD-10-CM

## 2022-01-02 PROCEDURE — 87088 URINE BACTERIA CULTURE: CPT

## 2022-01-02 PROCEDURE — 87186 SC STD MICRODIL/AGAR DIL: CPT

## 2022-01-02 PROCEDURE — 87086 URINE CULTURE/COLONY COUNT: CPT

## 2022-01-05 LAB
BACTERIA UR CULT: ABNORMAL
BACTERIA UR CULT: ABNORMAL

## 2022-01-06 ENCOUNTER — TELEPHONE (OUTPATIENT)
Dept: UROLOGY | Facility: CLINIC | Age: 77
End: 2022-01-06
Payer: COMMERCIAL

## 2022-01-06 DIAGNOSIS — N39.0 RECURRENT UTI: Primary | ICD-10-CM

## 2022-01-06 DIAGNOSIS — N20.0 CALCULUS OF KIDNEY: ICD-10-CM

## 2022-01-06 RX ORDER — NITROFURANTOIN MACROCRYSTAL 100 MG
100 CAPSULE ORAL 2 TIMES DAILY
Qty: 42 CAPSULE | Refills: 0 | Status: SHIPPED | OUTPATIENT
Start: 2022-01-06 | End: 2022-01-06

## 2022-01-06 RX ORDER — NITROFURANTOIN 25; 75 MG/1; MG/1
100 CAPSULE ORAL 2 TIMES DAILY
Qty: 42 CAPSULE | Refills: 0 | Status: SHIPPED | OUTPATIENT
Start: 2022-01-06

## 2022-01-06 NOTE — CONFIDENTIAL NOTE
Message left for patient to call back to [facility] to discuss urine culture results and setting up surgical procedure.  Joy Horner RN    Spoke with patient regarding her urine culture results.  She states that her urine is very cloudy again.  She will start antibiotic today.  She would like to proceed with surgery, will advise provider.  Joy Horner RN

## 2022-01-07 ENCOUNTER — PREP FOR PROCEDURE (OUTPATIENT)
Dept: SURGERY | Facility: CLINIC | Age: 77
End: 2022-01-07
Payer: COMMERCIAL

## 2022-01-07 DIAGNOSIS — N20.1 CALCULUS OF URETER: Primary | ICD-10-CM

## 2022-01-07 RX ORDER — GABAPENTIN 100 MG/1
300 CAPSULE ORAL
Status: CANCELLED | OUTPATIENT
Start: 2022-01-07

## 2022-01-07 RX ORDER — KETOROLAC TROMETHAMINE 30 MG/ML
15 INJECTION, SOLUTION INTRAMUSCULAR; INTRAVENOUS
Status: CANCELLED | OUTPATIENT
Start: 2022-01-26

## 2022-01-07 RX ORDER — ACETAMINOPHEN 325 MG/1
975 TABLET ORAL
Status: CANCELLED | OUTPATIENT
Start: 2022-01-26

## 2022-01-07 RX ORDER — LEVOFLOXACIN 5 MG/ML
500 INJECTION, SOLUTION INTRAVENOUS EVERY 24 HOURS
Status: CANCELLED | OUTPATIENT
Start: 2022-01-07

## 2022-01-10 ENCOUNTER — TELEPHONE (OUTPATIENT)
Dept: UROLOGY | Facility: CLINIC | Age: 77
End: 2022-01-10
Payer: COMMERCIAL

## 2022-01-10 DIAGNOSIS — N20.0 CALCULUS OF KIDNEY: Primary | ICD-10-CM

## 2022-01-12 VITALS — BODY MASS INDEX: 44.3 KG/M2 | WEIGHT: 250 LBS | HEIGHT: 63 IN

## 2022-01-13 ASSESSMENT — ANXIETY QUESTIONNAIRES
5. BEING SO RESTLESS THAT IT IS HARD TO SIT STILL: NOT AT ALL
1. FEELING NERVOUS, ANXIOUS, OR ON EDGE: MORE THAN HALF THE DAYS
6. BECOMING EASILY ANNOYED OR IRRITABLE: MORE THAN HALF THE DAYS
GAD7 TOTAL SCORE: 8
GAD7 TOTAL SCORE: 8
7. FEELING AFRAID AS IF SOMETHING AWFUL MIGHT HAPPEN: SEVERAL DAYS
GAD7 TOTAL SCORE: 8
2. NOT BEING ABLE TO STOP OR CONTROL WORRYING: SEVERAL DAYS
3. WORRYING TOO MUCH ABOUT DIFFERENT THINGS: SEVERAL DAYS
4. TROUBLE RELAXING: SEVERAL DAYS
7. FEELING AFRAID AS IF SOMETHING AWFUL MIGHT HAPPEN: SEVERAL DAYS

## 2022-01-14 ASSESSMENT — ANXIETY QUESTIONNAIRES: GAD7 TOTAL SCORE: 8

## 2022-01-18 ENCOUNTER — VIRTUAL VISIT (OUTPATIENT)
Dept: PSYCHOLOGY | Facility: CLINIC | Age: 77
End: 2022-01-18
Payer: COMMERCIAL

## 2022-01-18 VITALS — DIASTOLIC BLOOD PRESSURE: 73 MMHG | SYSTOLIC BLOOD PRESSURE: 133 MMHG | TEMPERATURE: 97.3 F | HEART RATE: 117 BPM

## 2022-01-18 DIAGNOSIS — F33.1 MAJOR DEPRESSIVE DISORDER, RECURRENT EPISODE, MODERATE WITH ANXIOUS DISTRESS (H): Primary | ICD-10-CM

## 2022-01-18 DIAGNOSIS — F41.1 GAD (GENERALIZED ANXIETY DISORDER): ICD-10-CM

## 2022-01-18 PROCEDURE — 90834 PSYTX W PT 45 MINUTES: CPT | Mod: 95 | Performed by: SOCIAL WORKER

## 2022-01-18 ASSESSMENT — PATIENT HEALTH QUESTIONNAIRE - PHQ9
10. IF YOU CHECKED OFF ANY PROBLEMS, HOW DIFFICULT HAVE THESE PROBLEMS MADE IT FOR YOU TO DO YOUR WORK, TAKE CARE OF THINGS AT HOME, OR GET ALONG WITH OTHER PEOPLE: VERY DIFFICULT
SUM OF ALL RESPONSES TO PHQ QUESTIONS 1-9: 21
SUM OF ALL RESPONSES TO PHQ QUESTIONS 1-9: 21

## 2022-01-18 NOTE — PROGRESS NOTES
Progress Note    Patient Name: Zeynep Martinez  Date: 22         Service Type: Individual      Session Start Time: 2:00  Session End Time: 2:50     Session Length: 50    Session #: 7    Attendees: Client    Service Modality:  Video Visit:      Provider verified identity through the following two step process.  Patient provided:  Patient  and Patient address    Telemedicine Visit: The patient's condition can be safely assessed and treated via synchronous audio and visual telemedicine encounter.      Reason for Telemedicine Visit: Services only offered telehealth    Originating Site (Patient Location): Patient's home    Distant Site (Provider Location): Provider Remote Setting- Home Office    Consent:  The patient/guardian has verbally consented to: the potential risks and benefits of telemedicine (video visit) versus in person care; bill my insurance or make self-payment for services provided; and responsibility for payment of non-covered services.     Patient would like the video invitation sent by:  My Chart    Mode of Communication:  Video Conference via Prized    As the provider I attest to compliance with applicable laws and regulations related to telemedicine.     Treatment Plan Last Reviewed: Started 21  PHQ-9 / NELLIE-7 : Increased depression and anxiety symptoms  DATA  Interactive Complexity: No  Crisis: No       Progress Since Last Session (Related to Symptoms / Goals / Homework):   Symptoms: Increased anxiety and depression symptoms this session    Homework: Partially completed; Previous sessions:  Put female bust next to her television so she can admire this on a more regular basis.  Created a positive affirmation picture and hung it by her TV also to remind herself of positives about self.  Feels better about herself and engaging with family and friends on a regular basis.Client feeling more isolated and alone this session and feeling more  "depressed.  We talked in session what she can engage in and how to improve her overall mood. Did have work friends over for a brunch which she enjoyed and also engaing in coloring with affrimations and putting this on her wall.   Client has been to many doctor appts.this last month and feeling better, she has an appointment with the [facility] schedule to address her eating disorder.  Has met a friend from [place] who she is getting close with but has serious health issues but this relationship is exciting to client and makes her feel good.  Family relationship issues has been positive and working on creative art projects that brings her belkys.  Client has been very sick and to the hospital due to a UTI and infection which occurred over Thanksgiving.  She is trying to heal up and get rid of her infection and possible surgery in the future.  Client is still connecting with her friend Mini and hoping and planning to move back to [facility] next Spring.  She is hoping that her new friend will join her there and she is hoping that she will be well enough to move back there. She wants to move \"Home\"  Which is home to her and she has very close friends who will be there for her if anything should come up for her.  This plan is motivating her to concentrate more on her health and improvement of her overall health. Current session: Client feeling overwhelmed with several things in her life.  She ended the relationship she had with a person that lived in [place] because she felt she ws not getting what she needed in the relationship.  Continues to struggles with her health issues and has upcoming doctor and surgery appointment which she is hopeful will fixed her UTI's and other issues she is dealing with which is difficult to deal with daily. She is still planning on moving back to [facility], which she is excited about. She is also concerned about the health of her cat and his health.  We processed thoughts and " feelings about these issues in session today and ways to connect to her strengths and positive strategies to improve her overall mood      Episode of Care Goals: Minimal progress - ACTION (Actively working towards change); Intervened by reinforcing change plan / affirming steps taken     Current / Ongoing Stressors and Concerns:   Family stress, difficult childhood, ego despair issues     Treatment Objective(s) Addressed in This Session:   Connect to gratitude and ego integrity  Use GAP-stay grounded; attuned and present  Connect to positive affirmations and gratitude  Create meals and freeze them, save money and put in bank, start realistic exercise program, schedule doctor appointment to check on ulcer, skin issues and continue work with other rheumatologist  on wrist and tremor issues. Continue creative projects that she enjoys. Look into eating disorder group or program. Concentrate on thought stopping process and CBT skills.     Intervention:   Motivational Interviewing: OARS  Strength based therapy  CBT Therapy     ASSESSMENT: Current Emotional / Mental Status (status of significant symptoms):   Risk status (Self / Other harm or suicidal ideation)   Patient denies current fears or concerns for personal safety.   Patient denies current or recent suicidal ideation or behaviors.   Patient denies current or recent homicidal ideation or behaviors.   Patient denies current or recent self injurious behavior or ideation.   Patient denies other safety concerns.   Patient reports there has been no change in risk factors since their last session.     Patient reports there has been no change in protective factors since their last session.     Recommended that patient call 911 or go to the local ED should there be a change in any of these risk factors.     Appearance:   Appropriate    Eye Contact:   Good    Psychomotor Behavior: Normal    Attitude:   Cooperative  Interested Pleasant   Orientation:   All   Speech    Rate /  Production: Normal/ Responsive Normal     Volume:  Normal    Mood:    Anxious  Depressed  Expansive   Affect:    Appropriate  Bright    Thought Content:  Clear    Thought Form:  Coherent  Goal Directed  Logical    Insight:    Fair      Medication Review:   No changes to current psychiatric medication(s)     Medication Compliance:   Yes     Changes in Health Issues:   None reported     Chemical Use Review:   Substance Use: Chemical use reviewed, no active concerns identified      Tobacco Use: No current tobacco use.      Diagnosis:  1. Major depressive disorder, recurrent episode, moderate with anxious distress (H)    2. NELLIE (generalized anxiety disorder)        Collateral Reports Completed:   Not Applicable    PLAN: (Patient Tasks / Therapist Tasks / Other)  Previous sessions: Patient will bring her lady bust to her room so she can look at each morning when she get's up; stay in her GAP, Connect to positive thoughts affirmations and gratitude.   Follow rheumatologist recommendations for hands and wrists, contact PCP about ulcer and skin issues,  Start healthy meal plan and freeze meals that she can eat later, save money from meals and put in savings, continue outings with friends and family, join lunch group when she can, start realistic exercise program, and continue creative projects with limits.  Continue to connect to gratitude, positive affirmations and strengths. Set limits with granddaughter and continue to build on relationship when appropriate. Ask PCP for referral for an eating disorder group or program, watch and try and control binge eating, continue coloring, connecting to strengths and positive affirmations on her wall daily.  Use thought stopping process and use FOamanuel as her mantra.  Reframe thinking to positive thinking and possibilities in her life.  Continue building and outside activities, connect and get together with friends for brunch next month and support from family.  Feeling better health  wise and seeing many doctors and feeling better has a referral for the [facility] program in January to address her eating issues, continue to connect with new friend Mini which brings her belkys and continue making her art hand towels and coloring and connection to family and friends for support.Client will try and get to gym in her facility and ride the elliptical bike to build up her core, attend her doctor appointments, connect with her friend Mini.  Connect to strengths and positive affirmations. Spend time with family over the Holidays.   Attend [facility] appt. In January. Current session: Client will start [employer] program, continue with walking and exercise, use thought stopping process and CBT skills to improve her mood.  Use her GAP-stay grounded, attuned and in the present.  Meet with her astrologer, connect with family and friends, plan for move back to [place], engage in art projects and positive self care activities. Connect to strengths and positive affirmations daily.             Noe Meeks, [facility]                                                         ______________________________________________________________________    Treatment Plan    Patient's Name: Zeynep Martinez  YOB: 1945    Date: 8-19-21    DSM5 Diagnoses: 296.32 (F33.1) Major Depressive Disorder, Recurrent Episode, Moderate _ and With anxious distress or 300.02 (F41.1) Generalized Anxiety Disorder  Psychosocial / Contextual Factors: Family stress, difficult childhood, ego despair issues    WHODAS: Completed first session    Referral / Collaboration:  Was/were discussed and client will pursue.  Patient stated that she would enroll in an eating disorder program if her binge eating persists.    Anticipated number of session or this episode of care: 12      MeasurableTreatment Goal(s) related to diagnosis / functional impairment(s)  Goal 1: Patient will decrease her depressive and anxious thoughts and return cordell  normal daily living.     I will know I've met my goal when I know who I am authentically and act appropriately.      Objective #A (Patient Action)    Patient will work toward stronger ego intergrity vs despair.  Determine authentic self and feel good about this. .  Status: Continued - Date(s): 8-19-21    Intervention(s)  Therapist will process origins of negative self image and esteem and concentrate on celebrating authenticity and improving overall self esteem and worth.    Objective #B  Patient will use cognitive strategies identified in therapy to challenge anxious thoughts.  Status: Continued - Date(s): 8-19-21    Intervention(s)  Therapist will teach thought stopping process and CBT skills and practice skills as needed until they become automatic .    Objective #C  Patient will eat healthy and limit binge eating.  Status: Continued - Date(s): 8-19-21    Intervention(s)  Therapist will check-in on eating behaior, determine reasons for binge eating and reinforce healthy eating habits and behavior.         Patient has reviewed and agreed to the above plan.      Noe Meeks, [facility]  August 19, 2021

## 2022-01-19 ENCOUNTER — OFFICE VISIT (OUTPATIENT)
Dept: FAMILY MEDICINE | Facility: CLINIC | Age: 77
End: 2022-01-19
Payer: COMMERCIAL

## 2022-01-19 VITALS
WEIGHT: 260.3 LBS | BODY MASS INDEX: 44.44 KG/M2 | OXYGEN SATURATION: 98 % | TEMPERATURE: 98.5 F | HEART RATE: 81 BPM | HEIGHT: 64 IN | SYSTOLIC BLOOD PRESSURE: 136 MMHG | DIASTOLIC BLOOD PRESSURE: 76 MMHG

## 2022-01-19 DIAGNOSIS — I49.1 PAC (PREMATURE ATRIAL CONTRACTION): ICD-10-CM

## 2022-01-19 DIAGNOSIS — E11.42 TYPE 2 DIABETES MELLITUS WITH DIABETIC POLYNEUROPATHY, WITHOUT LONG-TERM CURRENT USE OF INSULIN (H): ICD-10-CM

## 2022-01-19 DIAGNOSIS — Z28.311 NEED FOR SECOND DOSE OF COVID-19 VACCINE: ICD-10-CM

## 2022-01-19 DIAGNOSIS — Z23 NEED FOR SECOND DOSE OF COVID-19 VACCINE: ICD-10-CM

## 2022-01-19 DIAGNOSIS — Z01.818 PREOP GENERAL PHYSICAL EXAM: Primary | ICD-10-CM

## 2022-01-19 LAB
ANION GAP SERPL CALCULATED.3IONS-SCNC: 8 MMOL/L (ref 3–14)
BUN SERPL-MCNC: 28 MG/DL (ref 7–30)
CALCIUM SERPL-MCNC: 9.1 MG/DL (ref 8.5–10.1)
CHLORIDE BLD-SCNC: 108 MMOL/L (ref 94–109)
CO2 SERPL-SCNC: 21 MMOL/L (ref 20–32)
CREAT SERPL-MCNC: 0.96 MG/DL (ref 0.52–1.04)
CREAT UR-MCNC: 131 MG/DL
GFR SERPL CREATININE-BSD FRML MDRD: 62 ML/MIN/1.73M2
GLUCOSE BLD-MCNC: 165 MG/DL (ref 70–99)
HBA1C MFR BLD: 8 % (ref 0–5.6)
MICROALBUMIN UR-MCNC: 109 MG/L
MICROALBUMIN/CREAT UR: 83.21 MG/G CR (ref 0–25)
POTASSIUM BLD-SCNC: 5.2 MMOL/L (ref 3.4–5.3)
SODIUM SERPL-SCNC: 137 MMOL/L (ref 133–144)

## 2022-01-19 PROCEDURE — 0064A COVID-19,PF,MODERNA (18+ YRS BOOSTER .25ML): CPT | Performed by: PHYSICIAN ASSISTANT

## 2022-01-19 PROCEDURE — 80048 BASIC METABOLIC PNL TOTAL CA: CPT | Performed by: PHYSICIAN ASSISTANT

## 2022-01-19 PROCEDURE — 91306 COVID-19,PF,MODERNA (18+ YRS BOOSTER .25ML): CPT | Performed by: PHYSICIAN ASSISTANT

## 2022-01-19 PROCEDURE — 82043 UR ALBUMIN QUANTITATIVE: CPT | Performed by: PHYSICIAN ASSISTANT

## 2022-01-19 PROCEDURE — 36415 COLL VENOUS BLD VENIPUNCTURE: CPT | Performed by: PHYSICIAN ASSISTANT

## 2022-01-19 PROCEDURE — 93000 ELECTROCARDIOGRAM COMPLETE: CPT | Performed by: PHYSICIAN ASSISTANT

## 2022-01-19 PROCEDURE — 99214 OFFICE O/P EST MOD 30 MIN: CPT | Performed by: PHYSICIAN ASSISTANT

## 2022-01-19 PROCEDURE — 83036 HEMOGLOBIN GLYCOSYLATED A1C: CPT | Performed by: PHYSICIAN ASSISTANT

## 2022-01-19 ASSESSMENT — MIFFLIN-ST. JEOR: SCORE: 1657.77

## 2022-01-19 ASSESSMENT — PATIENT HEALTH QUESTIONNAIRE - PHQ9: SUM OF ALL RESPONSES TO PHQ QUESTIONS 1-9: 21

## 2022-01-19 NOTE — H&P (VIEW-ONLY)
[facility]  [id number] [place] [id number]  Phone: [phone]  Primary Provider: Trinh Bello  Pre-op Performing Provider: NA FLOOD      PREOPERATIVE EVALUATION:  Today's date: 1/19/2022    Zeynep Martinez is a 76 year old female who presents for a preoperative evaluation.    Surgical Information:  Surgery/Procedure: CYSTOURETEROSCOPY, WITH LASER LITHOTRIPSY, CALCULUS REMOVAL AND URETERAL STENT INSERTION  Surgery Location: [facility]  Surgeon: Dr. Long Mansfield  Surgery Date: 1/26/22  Time of Surgery: 730am  Where patient plans to recover: At home with family  Fax number for surgical facility: Note does not need to be faxed, will be available electronically in Epic.    Type of Anesthesia Anticipated: to be determined      Assessment & Plan     The proposed surgical procedure is considered INTERMEDIATE risk.    Preop general physical exam    Cleared for surgery.    - EKG 12-lead complete w/read - Clinics  - Basic metabolic panel  (Ca, Cl, CO2, Creat, Gluc, K, Na, BUN); Future  - Basic metabolic panel  (Ca, Cl, CO2, Creat, Gluc, K, Na, BUN)      Type 2 diabetes mellitus with diabetic polyneuropathy, without long-term current use of insulin (H)    Checking labs today due to upcoming surgery.    - Hemoglobin A1c; Future  - Albumin Random Urine Quantitative with Creat Ratio; Future  - Hemoglobin A1c  - Albumin Random Urine Quantitative with Creat Ratio      PAC (premature atrial contraction)    Noted on EKG. Will obtain Zio patch to determine how frequent.    - Leadless EKG Monitor 3 to 7 Days; Future      Need for second dose of COVID-19 vaccine    - COVID-19,PF,MODERNA (18+ YRS BOOSTER .25ML)         Risks and Recommendations:  The patient has the following additional risks and recommendations for perioperative complications:   - No identified additional risk factors other than previously addressed    Medication Instructions:   -  metformin: HOLD day of surgery.   - sulfonylurea (e.g. glyburide, glipizide): HOLD day of surgery    RECOMMENDATION:  APPROVAL GIVEN to proceed with proposed procedure, without further diagnostic evaluation.                      Subjective     HPI related to upcoming procedure: Kidney stone and blockage of ureter    Preop Questions 1/19/2022   1. Have you ever had a heart attack or stroke? No   2. Have you ever had surgery on your heart or blood vessels, such as a stent placement, a coronary artery bypass, or surgery on an artery in your head, neck, heart, or legs? No   3. Do you have chest pain with activity? No   4. Do you have a history of  heart failure? No   5. Do you currently have a cold, bronchitis or symptoms of other infection? No   6. Do you have a cough, shortness of breath, or wheezing? No   7. Do you or anyone in your family have previous history of blood clots? No   8. Do you or does anyone in your family have a serious bleeding problem such as prolonged bleeding following surgeries or cuts? No   9. Have you ever had problems with anemia or been told to take iron pills? No   10. Have you had any abnormal blood loss such as black, tarry or bloody stools, or abnormal vaginal bleeding? No   11. Have you ever had a blood transfusion? No   12. Are you willing to have a blood transfusion if it is medically needed before, during, or after your surgery? Yes   13. Have you or any of your relatives ever had problems with anesthesia? No   14. Do you have sleep apnea, excessive snoring or daytime drowsiness? No   14a. Do you have a CPAP machine? -   15. Do you have any artifical heart valves or other implanted medical devices like a pacemaker, defibrillator, or continuous glucose monitor? No   16. Do you have artificial joints? No   17. Are you allergic to latex? No       Health Care Directive:  Patient does not have a Health Care Directive or Living Will: Discussed advance care planning with patient; however,  patient declined at this time.    Preoperative Review of :   reviewed - controlled substances reflected in medication list.      Status of Chronic Conditions:  DIABETES - Patient has a longstanding history of DiabetesType Type II . Patient is being treated with oral agents and denies significant side effects. Control has been fair. Complicating factors include but are not limited to: neuropathy.       Review of Systems  CONSTITUTIONAL: NEGATIVE for fever, chills, change in weight  INTEGUMENTARY/SKIN: NEGATIVE for worrisome rashes, moles or lesions  EYES: NEGATIVE for vision changes or irritation  ENT/MOUTH: NEGATIVE for ear, mouth and throat problems  RESP: NEGATIVE for significant cough or SOB  CV: NEGATIVE for chest pain, palpitations or peripheral edema  GI: NEGATIVE for nausea, abdominal pain, heartburn, or change in bowel habits  : NEGATIVE for frequency, dysuria, or hematuria  MUSCULOSKELETAL: NEGATIVE for significant arthralgias or myalgia  NEURO: NEGATIVE for weakness, dizziness or paresthesias  ENDOCRINE: NEGATIVE for temperature intolerance, skin/hair changes  HEME: NEGATIVE for bleeding problems  PSYCHIATRIC: NEGATIVE for changes in mood or affect    Patient Active Problem List    Diagnosis Date Noted     Stricture or kinking of ureter 12/06/2021     Priority: Medium     Complicated UTI (urinary tract infection) 11/24/2021     Priority: Medium     Severe sepsis (H) 11/24/2021     Priority: Medium     Bilateral renal cysts 11/24/2021     Priority: Medium     Anxiety 10/07/2021     Priority: Medium     Chronic kidney disease, stage 3 (H) 07/09/2021     Priority: Medium     Binge eating disorder 07/09/2021     Priority: Medium     Chronic diarrhea 07/09/2021     Priority: Medium     Moderate episode of recurrent major depressive disorder (H) 07/09/2021     Priority: Medium     Insomnia, unspecified type 07/09/2021     Priority: Medium     Acute pain of left knee 07/02/2021     Priority: Medium      S/P gastric bypass 05/11/2021     Priority: Medium     Pre surgery 363lb   Low weight since surgery was 18mo ago - 230lb   Weight regain since covid        Type 2 diabetes mellitus with diabetic polyneuropathy, without long-term current use of insulin (H) 05/10/2021     Priority: Medium     Obesity, Class III, BMI 40-49.9 (morbid obesity) (H) 02/03/2021     Priority: Medium     257lb consult-  Sp RNYGB 18 years   Weight regain        History of uterine cancer 01/26/2021     Priority: Medium     1991       Trigeminal neuralgia 01/26/2021     Priority: Medium     Gastroesophageal reflux disease without esophagitis 01/26/2021     Priority: Medium     Recurrent UTI 01/26/2021     Priority: Medium     Calculus of kidney      Priority: Medium     multiple, uric acid and calcium       Spondylosis of lumbosacral region without myelopathy or radiculopathy 11/24/2015     Priority: Medium     Spondylolisthesis of lumbosacral region 11/24/2015     Priority: Medium     Right lumbar radiculopathy 10/15/2015     Priority: Medium     Degeneration of lumbar or lumbosacral intervertebral disc 02/19/2015     Priority: Medium     Senile nuclear sclerosis 09/06/2012     Priority: Medium     Mild Cataract       Status post LASIK surgery 01/02/2009     Priority: Medium     Vitreous floater 01/02/2009     Priority: Medium     Diabetic neuropathy (H) 10/26/2006     Priority: Medium     EMG 10/06       Hyperlipidemia 11/12/2005     Priority: Medium     Other and unspecified hyperlipidemia (HRC)       Depressive disorder 12/23/2002     Priority: Medium     DEPRESSIVE DISORDER NEC       Essential hypertension 12/23/2002     Priority: Medium     Epic       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 12/23/2002     Priority: Medium     Hereditary and idiopathic peripheral neuropathy 12/23/2002     Priority: Medium     Epic       Malignant neoplasm of uterus (H) 12/23/2002     Priority: Medium     Epic  S/P Total hysterectomy 2001        Past Medical History:    Diagnosis Date     Anxiety      Arthritis      Bladder infection      Calculus of kidney     multiple, uric acid and calcium     Cancer (H) Uterine 2/2001     Cancer (H)     uterine     Depression      Diabetes mellitus (H)     type 2     Frequent UTI      Gout      History of anesthesia complications     Slow to wake and gets very anxious     Hyperlipidemia LDL goal <100      Kidney stone      Type 2 diabetes mellitus without complication, without long-term current use of insulin (H)      Ulcer     Ulcer in the anastomosis     Past Surgical History:   Procedure Laterality Date     ABDOMINOPLASTY       APPENDECTOMY       APPENDECTOMY       EXTRACORPOREAL SHOCK WAVE LITHOTRIPSY (ESWL)       EXTRACORPOREAL SHOCK WAVE LITHOTRIPSY (ESWL)       galbaldder       GASTRIC BYPASS       HYSTERECTOMY       HYSTERECTOMY TOTAL ABDOMINAL, BILATERAL SALPINGO-OOPHORECTOMY, COMBINED      no cervix     IR MISCELLANEOUS PROCEDURE  3/18/2008     IR MISCELLANEOUS PROCEDURE  3/18/2008     IR MISCELLANEOUS PROCEDURE  6/10/2008     IR MISCELLANEOUS PROCEDURE  1/24/2012     IR URETER DILATION BILATERAL  1/24/2012     IR URETER DILATION BILATERAL  3/18/2008     PICC  11/4/2021          IN CYSTO/URETERO W/LITHOTRIPSY &INDWELL STENT INSRT Left 10/26/2018    Procedure:  CYSTOSCOPY, LEFT  URETEROSCOPY, LASER LITHOTRIPSY STENT INSERTION;  Surgeon: Long Mansfield MD;  Location: [facility] OR;  Service: Urology     IN CYSTO/URETERO W/LITHOTRIPSY &INDWELL STENT INSRT Right 5/28/2021    Procedure: CYSTOURETEROSCOPY, URETEROSCOPY, URETERAL DILATION WITH RETROGRADE PYELOGRAM, AND STENT INSERTION RIGHT;  Surgeon: Long Mansfield MD;  Location: [facility];  Service: Urology     IN ERCP W/BIOPSY SINGLE/MULTIPLE       REVISION CATHERINE-EN-Y       SKIN SURGERY  2013    15 lbs of skin/adipose removed     STOMACH SURGERY       URETEROSCOPY       Current Outpatient Medications   Medication Sig Dispense Refill     acetaminophen (TYLENOL) 500 MG  tablet Take 500-1,000 mg by mouth every 6 hours as needed for mild pain       ALPRAZolam (XANAX) 0.5 MG tablet Take 1 tablet (0.5 mg) by mouth daily as needed for anxiety 15 tablet 1     blood glucose (ACCU-CHEK PASQUALE PLUS) test strip Use to test blood sugar 1-2 times daily or as directed. 200 strip 5     blood glucose (NO BRAND SPECIFIED) lancets standard Use to test blood sugar 1-2 times daily or as directed. 1 each 4     blood glucose (NO BRAND SPECIFIED) test strip Use to test blood sugar 1-2 times daily or as directed. 100 strip 4     blood glucose monitoring (NO BRAND SPECIFIED) meter device kit Use to test blood sugar 1-2 times daily or as directed. 1 kit 0     busPIRone (BUSPAR) 10 MG tablet Take 1 tablet (10 mg) by mouth 2 times daily 180 tablet 1     gabapentin (NEURONTIN) 800 MG tablet Take 1 tablet (800 mg) by mouth 2 times daily 40 tablet 0     glipiZIDE (GLUCOTROL XL) 5 MG 24 hr tablet Take 1 tablet (5 mg) by mouth 2 times daily 180 tablet 1     meloxicam (MOBIC) 7.5 MG tablet TAKE ONE TABLET BY MOUTH ONCE DAILY 90 tablet 0     meloxicam (MOBIC) 7.5 MG tablet TAKE ONE TABLET BY MOUTH ONCE DAILY 90 tablet 0     metFORMIN (GLUCOPHAGE-XR) 500 MG 24 hr tablet Take 2 tablets (1,000 mg) by mouth 2 times daily (with meals) 360 tablet 1     nitroFURantoin macrocrystal-monohydrate (MACROBID) 100 MG capsule Take 1 capsule (100 mg) by mouth 2 times daily 42 capsule 0     QUEtiapine (SEROQUEL) 25 MG tablet Take 1 tablet (25 mg) by mouth nightly as needed (insomnia) 90 tablet 0     STATIN NOT PRESCRIBED (INTENTIONAL) Please choose reason not prescribed from choices below.       traZODone (DESYREL) 50 MG tablet Take 0.5-1 tablets (25-50 mg) by mouth At Bedtime 90 tablet 1     venlafaxine (EFFEXOR-XR) 150 MG 24 hr capsule Take 1 capsule (150 mg) by mouth daily 90 capsule 1     venlafaxine (EFFEXOR-XR) 75 MG 24 hr capsule Take 1 capsule (75 mg) by mouth daily 90 capsule 1       Allergies   Allergen Reactions      "Adhesive Tape Rash     Paper tape is ok      Statin Drugs [Hmg-Coa-R Inhibitors] Other (See Comments)     confusion     Penicillins Rash        Social History     Tobacco Use     Smoking status: Former Smoker     Packs/day: 0.00     Years: 5.00     Pack years: 0.00     Types: Cigarettes     Quit date: 1970     Years since quittin.0     Smokeless tobacco: Never Used   Substance Use Topics     Alcohol use: Yes     Comment: once per month      Family History   Problem Relation Age of Onset     Multiple Sclerosis Mother      Cancer Maternal Grandmother         cervical     Diabetes Maternal Grandmother      Hypertension Maternal Grandmother      Heart Disease Maternal Grandmother      Multiple Sclerosis Cousin      Multiple Sclerosis Cousin      Diabetes Maternal Aunt      Depression Daughter      Depression Granddaughter      History   Drug Use Unknown         Objective     /76 (BP Location: Right arm, Patient Position: Sitting, Cuff Size: Adult Large)   Pulse 81   Temp 98.5  F (36.9  C) (Oral)   Ht 1.613 m (5' 3.5\")   Wt 118.1 kg (260 lb 4.8 oz)   LMP  (LMP Unknown)   SpO2 98%   BMI 45.39 kg/m        Physical Exam    GENERAL APPEARANCE: healthy, alert and no distress     EYES: EOMI, PERRL     HENT: ear canals and TM's normal and nose and mouth without ulcers or lesions     NECK: no adenopathy, no asymmetry, masses, or scars and thyroid normal to palpation     RESP: lungs clear to auscultation - no rales, rhonchi or wheezes     CV: regular rates and rhythm, normal S1 S2, no S3 or S4 and no murmur, click or rub     ABDOMEN:  soft, nontender, no HSM or masses and bowel sounds normal     MS: extremities normal- no gross deformities noted, no evidence of inflammation in joints, FROM in all extremities.     SKIN: no suspicious lesions or rashes     NEURO: Normal strength and tone, sensory exam grossly normal, mentation intact and speech normal     PSYCH: mentation appears normal. and affect " normal/bright     LYMPHATICS: No cervical adenopathy    Recent Labs   Lab Test 11/27/21  0622 11/25/21  0754 11/24/21  1716 11/24/21  0638 10/05/21  1033 07/05/21  0627 07/03/21  1316   HGB  --  10.3*  --  11.8 11.8   < > 12.0    226  --  301 273   < > 306   NA  --  141  --  139  --    < > 136   POTASSIUM  --  4.6 5.4* 5.5*  --    < > 5.0   CR 0.87 0.93  --  0.92  --    < > 0.89   A1C  --   --   --   --  7.8*  --  8.1*    < > = values in this interval not displayed.        Diagnostics:  Labs pending at this time.  Results will be reviewed when available.   EKG: normal axis, normal intervals, no acute ST/T changes c/w ischemia, no LVH by voltage criteria, Premature Atrial Contractions (PAC) noted    Revised Cardiac Risk Index (RCRI):  The patient has the following serious cardiovascular risks for perioperative complications:   - No serious cardiac risks = 0 points     RCRI Interpretation: 0 points: Class I (very low risk - 0.4% complication rate)           Signed Electronically by: Aneudy Barron PA-C  Copy of this evaluation report is provided to requesting physician.      Answers for HPI/ROS submitted by the patient on 1/19/2022  How many servings of fruits and vegetables do you eat daily?: 2-3  On average, how many sweetened beverages do you drink each day (Examples: soda, juice, sweet tea, etc.  Do NOT count diet or artificially sweetened beverages)?: 2  How many minutes a day do you exercise enough to make your heart beat faster?: 9 or less  How many days a week do you exercise enough to make your heart beat faster?: 3 or less  How many days per week do you miss taking your medication?: 0

## 2022-01-19 NOTE — PATIENT INSTRUCTIONS
Hold metformin and glipizide the day of surgery.    Hold mobic/meloxicam starting today until surgery.      Preparing for Your Surgery  Getting started  A nurse will call you to review your health history and instructions. They will give you an arrival time based on your scheduled surgery time. Please be ready to share:    Your doctor's clinic name and phone number    Your medical, surgical and anesthesia history    A list of allergies and sensitivities    A list of medicines, including herbal treatments and over-the-counter drugs    Whether the patient has a legal guardian (ask how to send us the papers in advance)  Please tell us if you're pregnant--or if there's any chance you might be pregnant. Some surgeries may injure a fetus (unborn baby), so they require a pregnancy test. Surgeries that are safe for a fetus don't always need a test, and you can choose whether to have one.   If you have a child who's having surgery, please ask for a copy of Preparing for Your Child's Surgery.    Preparing for surgery    Within 30 days of surgery: Have a pre-op exam (sometimes called an H&P, or History and Physical). This can be done at a clinic or pre-operative center.  ? If you're having a , you may not need this exam. Talk to your care team.    At your pre-op exam, talk to your care team about all medicines you take. If you need to stop any medicines before surgery, ask when to start taking them again.  ? We do this for your safety. Many medicines can make you bleed too much during surgery. Some change how well surgery (anesthesia) drugs work.    Call your insurance company to let them know you're having surgery. (If you don't have insurance, call [phone].)    Call your clinic if there's any change in your health. This includes signs of a cold or flu (sore throat, runny nose, cough, rash, fever). It also includes a scrape or scratch near the surgery site.    If you have questions on the day of surgery, call  your hospital or surgery center.  COVID testing  You may need to be tested for COVID-19 before having surgery. If so, your surgical team will give you instructions for scheduling this test, separate from your preoperative history and physical.  Eating and drinking guidelines  For your safety: Unless your surgeon tells you otherwise, follow the guidelines below.    Eat and drink as usual until 8 hours before surgery. After that, no food or milk.    Drink clear liquids until 2 hours before surgery. These are liquids you can see through, like water, Gatorade and Propel Water. You may also have black coffee and tea (no cream or milk).    Nothing by mouth within 2 hours of surgery. This includes gum, candy and breath mints.    If you drink alcohol: Stop drinking it the night before surgery.    If your care team tells you to take medicine on the morning of surgery, it's okay to take it with a sip of water.  Preventing infection    Shower or bathe the night before and morning of your surgery. Follow the instructions your clinic gave you. (If no instructions, use regular soap.)    Don't shave or clip hair near your surgery site. We'll remove the hair if needed.    Don't smoke or vape the morning of surgery. You may chew nicotine gum up to 2 hours before surgery. A nicotine patch is okay.  ? Note: Some surgeries require you to completely quit smoking and nicotine. Check with your surgeon.    Your care team will make every effort to keep you safe from infection. We will:  ? Clean our hands often with soap and water (or an alcohol-based hand rub).  ? Clean the skin at your surgery site with a special soap that kills germs.  ? Give you a special gown to keep you warm. (Cold raises the risk of infection.)  ? Wear special hair covers, masks, gowns and gloves during surgery.  ? Give antibiotic medicine, if prescribed. Not all surgeries need antibiotics.  What to bring on the day of surgery    Photo ID and insurance card    Copy of  your health care directive, if you have one    Glasses and hearing aides (bring cases)  ? You can't wear contacts during surgery    Inhaler and eye drops, if you use them (tell us about these when you arrive)    CPAP machine or breathing device, if you use them    A few personal items, if spending the night    If you have . . .  ? A pacemaker, ICD (cardiac defibrillator) or other implant: Bring the ID card.  ? An implanted stimulator: Bring the remote control.  ? A legal guardian: Bring a copy of the certified (court-stamped) guardianship papers.  Please remove any jewelry, including body piercings. Leave jewelry and other valuables at home.  If you're going home the day of surgery    You must have a responsible adult drive you home. They should stay with you overnight as well.    If you don't have someone to stay with you, and you aren't safe to go home alone, we may keep you overnight. Insurance often won't pay for this.  After surgery  If it's hard to control your pain or you need more pain medicine, please call your surgeon's office.  Questions?   If you have any questions for your care team, list them here: _________________________________________________________________________________________________________________________________________________________________________ ____________________________________ ____________________________________ ____________________________________  For informational purposes only. Not to replace the advice of your health care provider. Copyright   2003, 2019 [facility]. All rights reserved. Clinically reviewed by Niki Atkinson MD. [employer] [id number] - REV 07/21.

## 2022-01-19 NOTE — PROGRESS NOTES
[facility]  [id number] [place] [id number]  Phone: [phone]  Primary Provider: Trinh Bello  Pre-op Performing Provider: NA FLOOD      PREOPERATIVE EVALUATION:  Today's date: 1/19/2022    Zeynep Martinez is a 76 year old female who presents for a preoperative evaluation.    Surgical Information:  Surgery/Procedure: CYSTOURETEROSCOPY, WITH LASER LITHOTRIPSY, CALCULUS REMOVAL AND URETERAL STENT INSERTION  Surgery Location: [facility]  Surgeon: Dr. Long Mansfield  Surgery Date: 1/26/22  Time of Surgery: 730am  Where patient plans to recover: At home with family  Fax number for surgical facility: Note does not need to be faxed, will be available electronically in Epic.    Type of Anesthesia Anticipated: to be determined      Assessment & Plan     The proposed surgical procedure is considered INTERMEDIATE risk.    Preop general physical exam    Cleared for surgery.    - EKG 12-lead complete w/read - Clinics  - Basic metabolic panel  (Ca, Cl, CO2, Creat, Gluc, K, Na, BUN); Future  - Basic metabolic panel  (Ca, Cl, CO2, Creat, Gluc, K, Na, BUN)      Type 2 diabetes mellitus with diabetic polyneuropathy, without long-term current use of insulin (H)    Checking labs today due to upcoming surgery.    - Hemoglobin A1c; Future  - Albumin Random Urine Quantitative with Creat Ratio; Future  - Hemoglobin A1c  - Albumin Random Urine Quantitative with Creat Ratio      PAC (premature atrial contraction)    Noted on EKG. Will obtain Zio patch to determine how frequent.    - Leadless EKG Monitor 3 to 7 Days; Future      Need for second dose of COVID-19 vaccine    - COVID-19,PF,MODERNA (18+ YRS BOOSTER .25ML)         Risks and Recommendations:  The patient has the following additional risks and recommendations for perioperative complications:   - No identified additional risk factors other than previously addressed    Medication Instructions:   -  metformin: HOLD day of surgery.   - sulfonylurea (e.g. glyburide, glipizide): HOLD day of surgery    RECOMMENDATION:  APPROVAL GIVEN to proceed with proposed procedure, without further diagnostic evaluation.                      Subjective     HPI related to upcoming procedure: Kidney stone and blockage of ureter    Preop Questions 1/19/2022   1. Have you ever had a heart attack or stroke? No   2. Have you ever had surgery on your heart or blood vessels, such as a stent placement, a coronary artery bypass, or surgery on an artery in your head, neck, heart, or legs? No   3. Do you have chest pain with activity? No   4. Do you have a history of  heart failure? No   5. Do you currently have a cold, bronchitis or symptoms of other infection? No   6. Do you have a cough, shortness of breath, or wheezing? No   7. Do you or anyone in your family have previous history of blood clots? No   8. Do you or does anyone in your family have a serious bleeding problem such as prolonged bleeding following surgeries or cuts? No   9. Have you ever had problems with anemia or been told to take iron pills? No   10. Have you had any abnormal blood loss such as black, tarry or bloody stools, or abnormal vaginal bleeding? No   11. Have you ever had a blood transfusion? No   12. Are you willing to have a blood transfusion if it is medically needed before, during, or after your surgery? Yes   13. Have you or any of your relatives ever had problems with anesthesia? No   14. Do you have sleep apnea, excessive snoring or daytime drowsiness? No   14a. Do you have a CPAP machine? -   15. Do you have any artifical heart valves or other implanted medical devices like a pacemaker, defibrillator, or continuous glucose monitor? No   16. Do you have artificial joints? No   17. Are you allergic to latex? No       Health Care Directive:  Patient does not have a Health Care Directive or Living Will: Discussed advance care planning with patient; however,  patient declined at this time.    Preoperative Review of :   reviewed - controlled substances reflected in medication list.      Status of Chronic Conditions:  DIABETES - Patient has a longstanding history of DiabetesType Type II . Patient is being treated with oral agents and denies significant side effects. Control has been fair. Complicating factors include but are not limited to: neuropathy.       Review of Systems  CONSTITUTIONAL: NEGATIVE for fever, chills, change in weight  INTEGUMENTARY/SKIN: NEGATIVE for worrisome rashes, moles or lesions  EYES: NEGATIVE for vision changes or irritation  ENT/MOUTH: NEGATIVE for ear, mouth and throat problems  RESP: NEGATIVE for significant cough or SOB  CV: NEGATIVE for chest pain, palpitations or peripheral edema  GI: NEGATIVE for nausea, abdominal pain, heartburn, or change in bowel habits  : NEGATIVE for frequency, dysuria, or hematuria  MUSCULOSKELETAL: NEGATIVE for significant arthralgias or myalgia  NEURO: NEGATIVE for weakness, dizziness or paresthesias  ENDOCRINE: NEGATIVE for temperature intolerance, skin/hair changes  HEME: NEGATIVE for bleeding problems  PSYCHIATRIC: NEGATIVE for changes in mood or affect    Patient Active Problem List    Diagnosis Date Noted     Stricture or kinking of ureter 12/06/2021     Priority: Medium     Complicated UTI (urinary tract infection) 11/24/2021     Priority: Medium     Severe sepsis (H) 11/24/2021     Priority: Medium     Bilateral renal cysts 11/24/2021     Priority: Medium     Anxiety 10/07/2021     Priority: Medium     Chronic kidney disease, stage 3 (H) 07/09/2021     Priority: Medium     Binge eating disorder 07/09/2021     Priority: Medium     Chronic diarrhea 07/09/2021     Priority: Medium     Moderate episode of recurrent major depressive disorder (H) 07/09/2021     Priority: Medium     Insomnia, unspecified type 07/09/2021     Priority: Medium     Acute pain of left knee 07/02/2021     Priority: Medium      S/P gastric bypass 05/11/2021     Priority: Medium     Pre surgery 363lb   Low weight since surgery was 18mo ago - 230lb   Weight regain since covid        Type 2 diabetes mellitus with diabetic polyneuropathy, without long-term current use of insulin (H) 05/10/2021     Priority: Medium     Obesity, Class III, BMI 40-49.9 (morbid obesity) (H) 02/03/2021     Priority: Medium     257lb consult-  Sp RNYGB 18 years   Weight regain        History of uterine cancer 01/26/2021     Priority: Medium     1991       Trigeminal neuralgia 01/26/2021     Priority: Medium     Gastroesophageal reflux disease without esophagitis 01/26/2021     Priority: Medium     Recurrent UTI 01/26/2021     Priority: Medium     Calculus of kidney      Priority: Medium     multiple, uric acid and calcium       Spondylosis of lumbosacral region without myelopathy or radiculopathy 11/24/2015     Priority: Medium     Spondylolisthesis of lumbosacral region 11/24/2015     Priority: Medium     Right lumbar radiculopathy 10/15/2015     Priority: Medium     Degeneration of lumbar or lumbosacral intervertebral disc 02/19/2015     Priority: Medium     Senile nuclear sclerosis 09/06/2012     Priority: Medium     Mild Cataract       Status post LASIK surgery 01/02/2009     Priority: Medium     Vitreous floater 01/02/2009     Priority: Medium     Diabetic neuropathy (H) 10/26/2006     Priority: Medium     EMG 10/06       Hyperlipidemia 11/12/2005     Priority: Medium     Other and unspecified hyperlipidemia (HRC)       Depressive disorder 12/23/2002     Priority: Medium     DEPRESSIVE DISORDER NEC       Essential hypertension 12/23/2002     Priority: Medium     Epic       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 12/23/2002     Priority: Medium     Hereditary and idiopathic peripheral neuropathy 12/23/2002     Priority: Medium     Epic       Malignant neoplasm of uterus (H) 12/23/2002     Priority: Medium     Epic  S/P Total hysterectomy 2001        Past Medical History:    Diagnosis Date     Anxiety      Arthritis      Bladder infection      Calculus of kidney     multiple, uric acid and calcium     Cancer (H) Uterine 2/2001     Cancer (H)     uterine     Depression      Diabetes mellitus (H)     type 2     Frequent UTI      Gout      History of anesthesia complications     Slow to wake and gets very anxious     Hyperlipidemia LDL goal <100      Kidney stone      Type 2 diabetes mellitus without complication, without long-term current use of insulin (H)      Ulcer     Ulcer in the anastomosis     Past Surgical History:   Procedure Laterality Date     ABDOMINOPLASTY       APPENDECTOMY       APPENDECTOMY       EXTRACORPOREAL SHOCK WAVE LITHOTRIPSY (ESWL)       EXTRACORPOREAL SHOCK WAVE LITHOTRIPSY (ESWL)       galbaldder       GASTRIC BYPASS       HYSTERECTOMY       HYSTERECTOMY TOTAL ABDOMINAL, BILATERAL SALPINGO-OOPHORECTOMY, COMBINED      no cervix     IR MISCELLANEOUS PROCEDURE  3/18/2008     IR MISCELLANEOUS PROCEDURE  3/18/2008     IR MISCELLANEOUS PROCEDURE  6/10/2008     IR MISCELLANEOUS PROCEDURE  1/24/2012     IR URETER DILATION BILATERAL  1/24/2012     IR URETER DILATION BILATERAL  3/18/2008     PICC  11/4/2021          OK CYSTO/URETERO W/LITHOTRIPSY &INDWELL STENT INSRT Left 10/26/2018    Procedure:  CYSTOSCOPY, LEFT  URETEROSCOPY, LASER LITHOTRIPSY STENT INSERTION;  Surgeon: Long Mansfield MD;  Location: [facility] OR;  Service: Urology     OK CYSTO/URETERO W/LITHOTRIPSY &INDWELL STENT INSRT Right 5/28/2021    Procedure: CYSTOURETEROSCOPY, URETEROSCOPY, URETERAL DILATION WITH RETROGRADE PYELOGRAM, AND STENT INSERTION RIGHT;  Surgeon: Long Mansfield MD;  Location: [facility];  Service: Urology     OK ERCP W/BIOPSY SINGLE/MULTIPLE       REVISION CATHERINE-EN-Y       SKIN SURGERY  2013    15 lbs of skin/adipose removed     STOMACH SURGERY       URETEROSCOPY       Current Outpatient Medications   Medication Sig Dispense Refill     acetaminophen (TYLENOL) 500 MG  tablet Take 500-1,000 mg by mouth every 6 hours as needed for mild pain       ALPRAZolam (XANAX) 0.5 MG tablet Take 1 tablet (0.5 mg) by mouth daily as needed for anxiety 15 tablet 1     blood glucose (ACCU-CHEK PSAQUALE PLUS) test strip Use to test blood sugar 1-2 times daily or as directed. 200 strip 5     blood glucose (NO BRAND SPECIFIED) lancets standard Use to test blood sugar 1-2 times daily or as directed. 1 each 4     blood glucose (NO BRAND SPECIFIED) test strip Use to test blood sugar 1-2 times daily or as directed. 100 strip 4     blood glucose monitoring (NO BRAND SPECIFIED) meter device kit Use to test blood sugar 1-2 times daily or as directed. 1 kit 0     busPIRone (BUSPAR) 10 MG tablet Take 1 tablet (10 mg) by mouth 2 times daily 180 tablet 1     gabapentin (NEURONTIN) 800 MG tablet Take 1 tablet (800 mg) by mouth 2 times daily 40 tablet 0     glipiZIDE (GLUCOTROL XL) 5 MG 24 hr tablet Take 1 tablet (5 mg) by mouth 2 times daily 180 tablet 1     meloxicam (MOBIC) 7.5 MG tablet TAKE ONE TABLET BY MOUTH ONCE DAILY 90 tablet 0     meloxicam (MOBIC) 7.5 MG tablet TAKE ONE TABLET BY MOUTH ONCE DAILY 90 tablet 0     metFORMIN (GLUCOPHAGE-XR) 500 MG 24 hr tablet Take 2 tablets (1,000 mg) by mouth 2 times daily (with meals) 360 tablet 1     nitroFURantoin macrocrystal-monohydrate (MACROBID) 100 MG capsule Take 1 capsule (100 mg) by mouth 2 times daily 42 capsule 0     QUEtiapine (SEROQUEL) 25 MG tablet Take 1 tablet (25 mg) by mouth nightly as needed (insomnia) 90 tablet 0     STATIN NOT PRESCRIBED (INTENTIONAL) Please choose reason not prescribed from choices below.       traZODone (DESYREL) 50 MG tablet Take 0.5-1 tablets (25-50 mg) by mouth At Bedtime 90 tablet 1     venlafaxine (EFFEXOR-XR) 150 MG 24 hr capsule Take 1 capsule (150 mg) by mouth daily 90 capsule 1     venlafaxine (EFFEXOR-XR) 75 MG 24 hr capsule Take 1 capsule (75 mg) by mouth daily 90 capsule 1       Allergies   Allergen Reactions      "Adhesive Tape Rash     Paper tape is ok      Statin Drugs [Hmg-Coa-R Inhibitors] Other (See Comments)     confusion     Penicillins Rash        Social History     Tobacco Use     Smoking status: Former Smoker     Packs/day: 0.00     Years: 5.00     Pack years: 0.00     Types: Cigarettes     Quit date: 1970     Years since quittin.0     Smokeless tobacco: Never Used   Substance Use Topics     Alcohol use: Yes     Comment: once per month      Family History   Problem Relation Age of Onset     Multiple Sclerosis Mother      Cancer Maternal Grandmother         cervical     Diabetes Maternal Grandmother      Hypertension Maternal Grandmother      Heart Disease Maternal Grandmother      Multiple Sclerosis Cousin      Multiple Sclerosis Cousin      Diabetes Maternal Aunt      Depression Daughter      Depression Granddaughter      History   Drug Use Unknown         Objective     /76 (BP Location: Right arm, Patient Position: Sitting, Cuff Size: Adult Large)   Pulse 81   Temp 98.5  F (36.9  C) (Oral)   Ht 1.613 m (5' 3.5\")   Wt 118.1 kg (260 lb 4.8 oz)   LMP  (LMP Unknown)   SpO2 98%   BMI 45.39 kg/m        Physical Exam    GENERAL APPEARANCE: healthy, alert and no distress     EYES: EOMI, PERRL     HENT: ear canals and TM's normal and nose and mouth without ulcers or lesions     NECK: no adenopathy, no asymmetry, masses, or scars and thyroid normal to palpation     RESP: lungs clear to auscultation - no rales, rhonchi or wheezes     CV: regular rates and rhythm, normal S1 S2, no S3 or S4 and no murmur, click or rub     ABDOMEN:  soft, nontender, no HSM or masses and bowel sounds normal     MS: extremities normal- no gross deformities noted, no evidence of inflammation in joints, FROM in all extremities.     SKIN: no suspicious lesions or rashes     NEURO: Normal strength and tone, sensory exam grossly normal, mentation intact and speech normal     PSYCH: mentation appears normal. and affect " normal/bright     LYMPHATICS: No cervical adenopathy    Recent Labs   Lab Test 11/27/21  0622 11/25/21  0754 11/24/21  1716 11/24/21  0638 10/05/21  1033 07/05/21  0627 07/03/21  1316   HGB  --  10.3*  --  11.8 11.8   < > 12.0    226  --  301 273   < > 306   NA  --  141  --  139  --    < > 136   POTASSIUM  --  4.6 5.4* 5.5*  --    < > 5.0   CR 0.87 0.93  --  0.92  --    < > 0.89   A1C  --   --   --   --  7.8*  --  8.1*    < > = values in this interval not displayed.        Diagnostics:  Labs pending at this time.  Results will be reviewed when available.   EKG: normal axis, normal intervals, no acute ST/T changes c/w ischemia, no LVH by voltage criteria, Premature Atrial Contractions (PAC) noted    Revised Cardiac Risk Index (RCRI):  The patient has the following serious cardiovascular risks for perioperative complications:   - No serious cardiac risks = 0 points     RCRI Interpretation: 0 points: Class I (very low risk - 0.4% complication rate)           Signed Electronically by: Aneudy Barron PA-C  Copy of this evaluation report is provided to requesting physician.      Answers for HPI/ROS submitted by the patient on 1/19/2022  How many servings of fruits and vegetables do you eat daily?: 2-3  On average, how many sweetened beverages do you drink each day (Examples: soda, juice, sweet tea, etc.  Do NOT count diet or artificially sweetened beverages)?: 2  How many minutes a day do you exercise enough to make your heart beat faster?: 9 or less  How many days a week do you exercise enough to make your heart beat faster?: 3 or less  How many days per week do you miss taking your medication?: 0

## 2022-01-24 ENCOUNTER — LAB (OUTPATIENT)
Dept: LAB | Facility: CLINIC | Age: 77
End: 2022-01-24
Attending: UROLOGY
Payer: COMMERCIAL

## 2022-01-24 DIAGNOSIS — N20.0 CALCULUS OF KIDNEY: ICD-10-CM

## 2022-01-24 LAB — SARS-COV-2 RNA RESP QL NAA+PROBE: NEGATIVE

## 2022-01-24 PROCEDURE — U0005 INFEC AGEN DETEC AMPLI PROBE: HCPCS

## 2022-01-24 PROCEDURE — U0003 INFECTIOUS AGENT DETECTION BY NUCLEIC ACID (DNA OR RNA); SEVERE ACUTE RESPIRATORY SYNDROME CORONAVIRUS 2 (SARS-COV-2) (CORONAVIRUS DISEASE [COVID-19]), AMPLIFIED PROBE TECHNIQUE, MAKING USE OF HIGH THROUGHPUT TECHNOLOGIES AS DESCRIBED BY CMS-2020-01-R: HCPCS

## 2022-01-25 ENCOUNTER — ANESTHESIA EVENT (OUTPATIENT)
Dept: SURGERY | Facility: HOSPITAL | Age: 77
End: 2022-01-25
Payer: COMMERCIAL

## 2022-01-26 ENCOUNTER — ANESTHESIA (OUTPATIENT)
Dept: SURGERY | Facility: HOSPITAL | Age: 77
End: 2022-01-26
Payer: COMMERCIAL

## 2022-01-26 ENCOUNTER — HOSPITAL ENCOUNTER (OUTPATIENT)
Facility: HOSPITAL | Age: 77
Discharge: HOME | End: 2022-01-26
Attending: UROLOGY | Admitting: UROLOGY
Payer: COMMERCIAL

## 2022-01-26 ENCOUNTER — APPOINTMENT (OUTPATIENT)
Dept: RADIOLOGY | Facility: HOSPITAL | Age: 77
End: 2022-01-26
Attending: UROLOGY
Payer: COMMERCIAL

## 2022-01-26 VITALS
OXYGEN SATURATION: 94 % | BODY MASS INDEX: 45.19 KG/M2 | TEMPERATURE: 98.1 F | SYSTOLIC BLOOD PRESSURE: 136 MMHG | DIASTOLIC BLOOD PRESSURE: 76 MMHG | HEART RATE: 81 BPM | WEIGHT: 259.2 LBS | RESPIRATION RATE: 16 BRPM

## 2022-01-26 LAB
GLUCOSE BLDC GLUCOMTR-MCNC: 120 MG/DL (ref 70–99)
GLUCOSE BLDC GLUCOMTR-MCNC: 135 MG/DL (ref 70–99)
GRAM STAIN RESULT: NORMAL

## 2022-01-26 PROCEDURE — 250N000025 HC SEVOFLURANE, PER MIN: Performed by: UROLOGY

## 2022-01-26 PROCEDURE — 360N000082 HC SURGERY LEVEL 2 W/ FLUORO, PER MIN: Performed by: UROLOGY

## 2022-01-26 PROCEDURE — 370N000017 HC ANESTHESIA TECHNICAL FEE, PER MIN: Performed by: UROLOGY

## 2022-01-26 PROCEDURE — 250N000011 HC RX IP 250 OP 636: Performed by: NURSE ANESTHETIST, CERTIFIED REGISTERED

## 2022-01-26 PROCEDURE — 82365 CALCULUS SPECTROSCOPY: CPT | Performed by: UROLOGY

## 2022-01-26 PROCEDURE — 52341 CYSTO W/URETER STRICTURE TX: CPT | Mod: 59 | Performed by: UROLOGY

## 2022-01-26 PROCEDURE — C2617 STENT, NON-COR, TEM W/O DEL: HCPCS | Performed by: UROLOGY

## 2022-01-26 PROCEDURE — 999N000141 HC STATISTIC PRE-PROCEDURE NURSING ASSESSMENT: Performed by: UROLOGY

## 2022-01-26 PROCEDURE — 710N000009 HC RECOVERY PHASE 1, LEVEL 1, PER MIN: Performed by: UROLOGY

## 2022-01-26 PROCEDURE — 999N000180 XR SURGERY CARM FLUORO LESS THAN 5 MIN

## 2022-01-26 PROCEDURE — 250N000011 HC RX IP 250 OP 636: Performed by: UROLOGY

## 2022-01-26 PROCEDURE — 87077 CULTURE AEROBIC IDENTIFY: CPT | Performed by: UROLOGY

## 2022-01-26 PROCEDURE — C1726 CATH, BAL DIL, NON-VASCULAR: HCPCS | Performed by: UROLOGY

## 2022-01-26 PROCEDURE — 255N000002 HC RX 255 OP 636: Performed by: UROLOGY

## 2022-01-26 PROCEDURE — 258N000003 HC RX IP 258 OP 636: Performed by: NURSE ANESTHETIST, CERTIFIED REGISTERED

## 2022-01-26 PROCEDURE — 87205 SMEAR GRAM STAIN: CPT | Performed by: UROLOGY

## 2022-01-26 PROCEDURE — 258N000003 HC RX IP 258 OP 636: Performed by: ANESTHESIOLOGY

## 2022-01-26 PROCEDURE — 87075 CULTR BACTERIA EXCEPT BLOOD: CPT | Performed by: UROLOGY

## 2022-01-26 PROCEDURE — 250N000009 HC RX 250: Performed by: NURSE ANESTHETIST, CERTIFIED REGISTERED

## 2022-01-26 PROCEDURE — C1769 GUIDE WIRE: HCPCS | Performed by: UROLOGY

## 2022-01-26 PROCEDURE — C1894 INTRO/SHEATH, NON-LASER: HCPCS | Performed by: UROLOGY

## 2022-01-26 PROCEDURE — 250N000013 HC RX MED GY IP 250 OP 250 PS 637: Performed by: UROLOGY

## 2022-01-26 PROCEDURE — 272N000001 HC OR GENERAL SUPPLY STERILE: Performed by: UROLOGY

## 2022-01-26 PROCEDURE — 82962 GLUCOSE BLOOD TEST: CPT

## 2022-01-26 PROCEDURE — 52352 CYSTOURETERO W/STONE REMOVE: CPT | Mod: 22 | Performed by: UROLOGY

## 2022-01-26 PROCEDURE — 710N000012 HC RECOVERY PHASE 2, PER MINUTE: Performed by: UROLOGY

## 2022-01-26 DEVICE — URETERAL STENT
Type: IMPLANTABLE DEVICE | Site: URETER | Status: FUNCTIONAL
Brand: PERCUFLEX™ PLUS

## 2022-01-26 RX ORDER — LEVOFLOXACIN 5 MG/ML
500 INJECTION, SOLUTION INTRAVENOUS ONCE
Status: COMPLETED | OUTPATIENT
Start: 2022-01-26 | End: 2022-01-26

## 2022-01-26 RX ORDER — FENTANYL CITRATE 50 UG/ML
50 INJECTION, SOLUTION INTRAMUSCULAR; INTRAVENOUS
Status: DISCONTINUED | OUTPATIENT
Start: 2022-01-26 | End: 2022-01-26 | Stop reason: HOSPADM

## 2022-01-26 RX ORDER — ONDANSETRON 2 MG/ML
4 INJECTION INTRAMUSCULAR; INTRAVENOUS EVERY 30 MIN PRN
Status: CANCELLED | OUTPATIENT
Start: 2022-01-26

## 2022-01-26 RX ORDER — PHENYLEPHRINE HYDROCHLORIDE 10 MG/ML
INJECTION INTRAVENOUS PRN
Status: DISCONTINUED | OUTPATIENT
Start: 2022-01-26 | End: 2022-01-26

## 2022-01-26 RX ORDER — SODIUM CHLORIDE, SODIUM LACTATE, POTASSIUM CHLORIDE, CALCIUM CHLORIDE 600; 310; 30; 20 MG/100ML; MG/100ML; MG/100ML; MG/100ML
INJECTION, SOLUTION INTRAVENOUS CONTINUOUS PRN
Status: DISCONTINUED | OUTPATIENT
Start: 2022-01-26 | End: 2022-01-26

## 2022-01-26 RX ORDER — MEPERIDINE HYDROCHLORIDE 25 MG/ML
12.5 INJECTION INTRAMUSCULAR; INTRAVENOUS; SUBCUTANEOUS
Status: CANCELLED | OUTPATIENT
Start: 2022-01-26

## 2022-01-26 RX ORDER — DEXAMETHASONE SODIUM PHOSPHATE 10 MG/ML
INJECTION, SOLUTION INTRAMUSCULAR; INTRAVENOUS PRN
Status: DISCONTINUED | OUTPATIENT
Start: 2022-01-26 | End: 2022-01-26

## 2022-01-26 RX ORDER — SODIUM CHLORIDE, SODIUM LACTATE, POTASSIUM CHLORIDE, CALCIUM CHLORIDE 600; 310; 30; 20 MG/100ML; MG/100ML; MG/100ML; MG/100ML
INJECTION, SOLUTION INTRAVENOUS CONTINUOUS
Status: DISCONTINUED | OUTPATIENT
Start: 2022-01-26 | End: 2022-01-26 | Stop reason: HOSPADM

## 2022-01-26 RX ORDER — LIDOCAINE 40 MG/G
CREAM TOPICAL
Status: DISCONTINUED | OUTPATIENT
Start: 2022-01-26 | End: 2022-01-26 | Stop reason: HOSPADM

## 2022-01-26 RX ORDER — ACETAMINOPHEN 325 MG/1
975 TABLET ORAL
Status: COMPLETED | OUTPATIENT
Start: 2022-01-26 | End: 2022-01-26

## 2022-01-26 RX ORDER — FENTANYL CITRATE 50 UG/ML
25 INJECTION, SOLUTION INTRAMUSCULAR; INTRAVENOUS
Status: CANCELLED | OUTPATIENT
Start: 2022-01-26

## 2022-01-26 RX ORDER — PROPOFOL 10 MG/ML
INJECTION, EMULSION INTRAVENOUS PRN
Status: DISCONTINUED | OUTPATIENT
Start: 2022-01-26 | End: 2022-01-26

## 2022-01-26 RX ORDER — GABAPENTIN 300 MG/1
300 CAPSULE ORAL
Status: COMPLETED | OUTPATIENT
Start: 2022-01-26 | End: 2022-01-26

## 2022-01-26 RX ORDER — FENTANYL CITRATE 50 UG/ML
INJECTION, SOLUTION INTRAMUSCULAR; INTRAVENOUS PRN
Status: DISCONTINUED | OUTPATIENT
Start: 2022-01-26 | End: 2022-01-26

## 2022-01-26 RX ORDER — ONDANSETRON 2 MG/ML
INJECTION INTRAMUSCULAR; INTRAVENOUS PRN
Status: DISCONTINUED | OUTPATIENT
Start: 2022-01-26 | End: 2022-01-26

## 2022-01-26 RX ORDER — OXYCODONE HYDROCHLORIDE 5 MG/1
5 TABLET ORAL EVERY 4 HOURS PRN
Status: CANCELLED | OUTPATIENT
Start: 2022-01-26

## 2022-01-26 RX ORDER — KETOROLAC TROMETHAMINE 30 MG/ML
15 INJECTION, SOLUTION INTRAMUSCULAR; INTRAVENOUS
Status: DISCONTINUED | OUTPATIENT
Start: 2022-01-26 | End: 2022-01-26 | Stop reason: HOSPADM

## 2022-01-26 RX ORDER — LIDOCAINE HYDROCHLORIDE 20 MG/ML
INJECTION, SOLUTION INFILTRATION; PERINEURAL PRN
Status: DISCONTINUED | OUTPATIENT
Start: 2022-01-26 | End: 2022-01-26

## 2022-01-26 RX ORDER — SODIUM CHLORIDE, SODIUM LACTATE, POTASSIUM CHLORIDE, CALCIUM CHLORIDE 600; 310; 30; 20 MG/100ML; MG/100ML; MG/100ML; MG/100ML
INJECTION, SOLUTION INTRAVENOUS CONTINUOUS
Status: CANCELLED | OUTPATIENT
Start: 2022-01-26

## 2022-01-26 RX ORDER — KETAMINE HYDROCHLORIDE 10 MG/ML
INJECTION INTRAMUSCULAR; INTRAVENOUS PRN
Status: DISCONTINUED | OUTPATIENT
Start: 2022-01-26 | End: 2022-01-26

## 2022-01-26 RX ORDER — ONDANSETRON 4 MG/1
4 TABLET, ORALLY DISINTEGRATING ORAL EVERY 30 MIN PRN
Status: CANCELLED | OUTPATIENT
Start: 2022-01-26

## 2022-01-26 RX ORDER — FENTANYL CITRATE 50 UG/ML
25 INJECTION, SOLUTION INTRAMUSCULAR; INTRAVENOUS EVERY 5 MIN PRN
Status: DISCONTINUED | OUTPATIENT
Start: 2022-01-26 | End: 2022-01-26 | Stop reason: HOSPADM

## 2022-01-26 RX ORDER — HYDROMORPHONE HCL IN WATER/PF 6 MG/30 ML
0.2 PATIENT CONTROLLED ANALGESIA SYRINGE INTRAVENOUS EVERY 5 MIN PRN
Status: DISCONTINUED | OUTPATIENT
Start: 2022-01-26 | End: 2022-01-26 | Stop reason: HOSPADM

## 2022-01-26 RX ADMIN — LEVOFLOXACIN 500 MG: 5 INJECTION, SOLUTION INTRAVENOUS at 06:47

## 2022-01-26 RX ADMIN — KETAMINE HYDROCHLORIDE 50 MG: 10 INJECTION, SOLUTION INTRAMUSCULAR; INTRAVENOUS at 07:26

## 2022-01-26 RX ADMIN — SODIUM CHLORIDE, POTASSIUM CHLORIDE, SODIUM LACTATE AND CALCIUM CHLORIDE 250 ML: 600; 310; 30; 20 INJECTION, SOLUTION INTRAVENOUS at 06:44

## 2022-01-26 RX ADMIN — ROCURONIUM BROMIDE 50 MG: 50 INJECTION, SOLUTION INTRAVENOUS at 07:27

## 2022-01-26 RX ADMIN — SODIUM CHLORIDE, POTASSIUM CHLORIDE, SODIUM LACTATE AND CALCIUM CHLORIDE: 600; 310; 30; 20 INJECTION, SOLUTION INTRAVENOUS at 08:17

## 2022-01-26 RX ADMIN — LIDOCAINE HYDROCHLORIDE 60 MG: 20 INJECTION, SOLUTION INFILTRATION; PERINEURAL at 07:26

## 2022-01-26 RX ADMIN — FENTANYL CITRATE 100 MCG: 50 INJECTION, SOLUTION INTRAMUSCULAR; INTRAVENOUS at 07:36

## 2022-01-26 RX ADMIN — SUGAMMADEX 200 MG: 100 INJECTION, SOLUTION INTRAVENOUS at 08:13

## 2022-01-26 RX ADMIN — ACETAMINOPHEN 975 MG: 325 TABLET ORAL at 06:39

## 2022-01-26 RX ADMIN — PROPOFOL 150 MG: 10 INJECTION, EMULSION INTRAVENOUS at 07:26

## 2022-01-26 RX ADMIN — GABAPENTIN 300 MG: 300 CAPSULE ORAL at 06:39

## 2022-01-26 RX ADMIN — PHENYLEPHRINE HYDROCHLORIDE 100 MCG: 10 INJECTION INTRAVENOUS at 08:03

## 2022-01-26 RX ADMIN — ONDANSETRON 4 MG: 2 INJECTION INTRAMUSCULAR; INTRAVENOUS at 07:44

## 2022-01-26 RX ADMIN — DEXAMETHASONE SODIUM PHOSPHATE 10 MG: 10 INJECTION, SOLUTION INTRAMUSCULAR; INTRAVENOUS at 07:40

## 2022-01-26 RX ADMIN — PHENYLEPHRINE HYDROCHLORIDE 100 MCG: 10 INJECTION INTRAVENOUS at 07:57

## 2022-01-26 RX ADMIN — SODIUM CHLORIDE, POTASSIUM CHLORIDE, SODIUM LACTATE AND CALCIUM CHLORIDE: 600; 310; 30; 20 INJECTION, SOLUTION INTRAVENOUS at 07:07

## 2022-01-26 RX ADMIN — KETOROLAC TROMETHAMINE 15 MG: 30 INJECTION, SOLUTION INTRAMUSCULAR at 06:42

## 2022-01-26 RX ADMIN — PHENYLEPHRINE HYDROCHLORIDE 100 MCG: 10 INJECTION INTRAVENOUS at 07:49

## 2022-01-26 NOTE — ANESTHESIA PROCEDURE NOTES
Airway       Patient location during procedure: OR       Procedure Start/Stop Times: 1/26/2022 7:28 AM  Staff -        CRNA: Nick Denise APRN CRNA       Performed By: CRNA  Consent for Airway        Urgency: elective  Indications and Patient Condition       Indications for airway management: reed-procedural       Induction type:intravenous       Mask difficulty assessment: 1 - vent by mask    Final Airway Details       Final airway type: endotracheal airway       Successful airway: ETT - single  Endotracheal Airway Details        ETT size (mm): 7.5       Cuffed: yes       Successful intubation technique: direct laryngoscopy       DL Blade Type: Akers 2       Grade View of Cords: 1       Adjucts: stylet and tooth guard       Position: Right       Measured from: lips       Secured at (cm): 21       Bite block used: None    Post intubation assessment        Placement verified by: capnometry, equal breath sounds and chest rise        Number of attempts at approach: 1       Secured with: silk tape       Ease of procedure: easy       Dentition: Intact and Unchanged

## 2022-01-26 NOTE — ANESTHESIA POSTPROCEDURE EVALUATION
Patient: Zeynep Martinez    Procedure: Procedure(s):  CYSTOURETEROSCOPY, RETROGRADE  PYELOGRAM, DILATION URETERALSTRICTURE, LASER LITHOTRIPSY,  STONE EXTRACTION, URETERAL STENT INSERTION,       Diagnosis:Calculus of ureter [N20.1]  Diagnosis Additional Information: No value filed.    Anesthesia Type:  General    Note:  Disposition: Outpatient   Postop Pain Control: Uneventful            Sign Out: Well controlled pain   PONV: No   Neuro/Psych: Uneventful            Sign Out: Acceptable/Baseline neuro status   Airway/Respiratory: Uneventful            Sign Out: Acceptable/Baseline resp. status   CV/Hemodynamics: Uneventful            Sign Out: Acceptable CV status; No obvious hypovolemia; No obvious fluid overload   Other NRE: NONE   DID A NON-ROUTINE EVENT OCCUR? No           Last vitals:  Vitals Value Taken Time   /61 01/26/22 0915   Temp 36.1  C (96.9  F) 01/26/22 0830   Pulse 84 01/26/22 0919   Resp 21 01/26/22 0912   SpO2 96 % 01/26/22 0919   Vitals shown include unvalidated device data.    Electronically Signed By: Loretta Hall MD  January 26, 2022  12:48 PM

## 2022-01-26 NOTE — OR NURSING
Patient jewelry (ring),  fell from transport cart in operating room, ring placed back on patients right hand.

## 2022-01-26 NOTE — ANESTHESIA CARE TRANSFER NOTE
Patient: Zeynep Martinez    Procedure: Procedure(s):  CYSTOURETEROSCOPY, RETROGRADE  PYELOGRAM, DILATION URETERALSTRICTURE, LASER LITHOTRIPSY,  STONE EXTRACTION, URETERAL STENT INSERTION,       Diagnosis: Calculus of ureter [N20.1]  Diagnosis Additional Information: No value filed.    Anesthesia Type:   General     Note:    Oropharynx: oropharynx clear of all foreign objects and spontaneously breathing  Level of Consciousness: drowsy  Oxygen Supplementation: face mask  Level of Supplemental Oxygen (L/min / FiO2): 7  Independent Airway: airway patency satisfactory and stable  Dentition: dentition unchanged  Vital Signs Stable: post-procedure vital signs reviewed and stable  Report to RN Given: handoff report given  Patient transferred to: PACU    Handoff Report: Identifed the Patient, Identified the Reponsible Provider, Reviewed the pertinent medical history, Discussed the surgical course, Reviewed Intra-OP anesthesia mangement and issues during anesthesia, Set expectations for post-procedure period and Allowed opportunity for questions and acknowledgement of understanding      Vitals:  Vitals Value Taken Time   BP     Temp     Pulse     Resp     SpO2         Electronically Signed By: BENEDICTO Cohen CRNA  January 26, 2022  8:29 AM

## 2022-01-26 NOTE — OP NOTE
[facility]  Kidney Stone [place] Operative Note    Zeynep Martinez   January 26, 2022 1/26/2022   Trinh Bello     Procedure Performed  Procedure(s):  CYSTOURETEROSCOPY, RETROGRADE  PYELOGRAM, DILATION URETERALSTRICTURE, LASER LITHOTRIPSY,  STONE EXTRACTION, URETERAL STENT INSERTION,  1. Cystoscopy  2. Retrograde Pyelography - Right  3. Cystoscopy  4. Ureteroscopic Stone Extraction - Right Kidney    5. Ureteral Stent Insertion - Right      Pre-operative Diagnosis  Calculus of ureter [N20.1]    Post-operative Diagnosis  1. Stone Right Kidney    2. Proximal Ureteral Stricture - Right      Surgeon(s) and Role:     * Long Mansfield MD - Primary    Anesthesia Type  Not documented     Procedural Summary  Increased procedural complexity because of renal anomaly (malrotation) requiring 3 times expected time to perform procedure  Estimation of stone clearance: complete  Subjective stone composition: calcium  Renal papillae assessment: no plaques  Unanticipated event/findings: none  Post-operative plan: return to clinic in 2 weeks for stent removal    Narrative    Challenging clearance of two small stones from right, malrotated, kidney associated with persistent ESBL UTI. One stone was sent for stone analysis and the other was crushed and sent for stone culture. Recurrent focal proximal ureteral stricture opened well with balloon dilation.    Procedural Details    Patient is brought to the surgical suite where anesthesia is induced. she is prepped and draped in standard fashion in combined Trendelenberg and lithotomy position.    Cystoscopy: Rigid cystoscopy is performed. Introitus is exceptionally deep. Bladder is normal..     Retrograde Pyelography:  imaging fails to demonstrate radio-opaque renal stone consistent with pre-operative imaging. Right retrograde pyelogram demonstrates moderate hydronephrosis and malrotated collecting system.    Ureteral Access: Right ureteral  access is initiated with Bentson wire. Guide wire access to the kidney is assured. 8F dilator is inserted with minimal resistance. 10F dilator is inserted with minimal resistance. 12F ureteral access sheath obturator is inserted with minimal resistance. 14F 36 cm ureteral access sheath is inserted with minimal resistance.      Flexible Ureteroscopy: Flexible ureteroscope is passed to kidney. Just distal to the UPJ, a focal recurrent ureteral stricture is encountered. Unable to pass ureteroscope through this region.    Dilation Ureteral Stricture: Under fluoroscopic guidance, an 18F, 4 cm balloon dilator is expanded. Slight waist breaks down with minimal trauma.    Flexible Ureteroscopy: Flexible ureteroscope is passed to kidney. Careful surveillance within the complex collecting system initially fails to demonstrate stone evident on pre-op CT. A lower pole calyx has one region suspicious for submucosal stone and this is incised with holmium laser. No stone is revealed. Eventually in an inferior calyx of the upper pole group, two stones consistent with pre-op CT are observed. These are just small enough to extract intact.    Ureteral Stent Insertion: Right 7F 26 cm stent is inserted with good coil in kidney and bladder under fluoroscopic guidance.      The patient was then taken to the recovery room in good condition.     Past Medical History:   Diagnosis Date     Anxiety      Arthritis      Bladder infection      Calculus of kidney     multiple, uric acid and calcium     Cancer (H) Uterine 2/2001     Complication of anesthesia     slow to wake and anxiety     Depression      Diabetes mellitus (H)     type 2     Frequent UTI      Gout      Hyperlipidemia LDL goal <100      Hypertension      Kidney stone      Type 2 diabetes mellitus without complication, without long-term current use of insulin (H)      Ulcer     Ulcer in the anastomosis        Patient Active Problem List   Diagnosis     History of uterine cancer      Calculus of kidney     Trigeminal neuralgia     Gastroesophageal reflux disease without esophagitis     Recurrent UTI     Obesity, Class III, BMI 40-49.9 (morbid obesity) (H)     Type 2 diabetes mellitus with diabetic polyneuropathy, without long-term current use of insulin (H)     S/P gastric bypass     Acute pain of left knee     Chronic kidney disease, stage 3 (H)     Binge eating disorder     Chronic diarrhea     Moderate episode of recurrent major depressive disorder (H)     Insomnia, unspecified type     Anxiety     Complicated UTI (urinary tract infection)     Severe sepsis (H)     Bilateral renal cysts     Degeneration of lumbar or lumbosacral intervertebral disc     Spondylosis of lumbosacral region without myelopathy or radiculopathy     Depressive disorder     Diabetic neuropathy (H)     Essential hypertension     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     Hereditary and idiopathic peripheral neuropathy     Hyperlipidemia     Malignant neoplasm of uterus (H)     Right lumbar radiculopathy     Senile nuclear sclerosis     Spondylolisthesis of lumbosacral region     Status post LASIK surgery     Stricture or kinking of ureter     Vitreous floater        Estimated Blood Loss  .* No values recorded between 1/26/2022  7:41 AM and 1/26/2022  8:19 AM *     ID Type Source Tests Collected by Time Destination   A : kidney stone right Calculus/Stone Kidney, Right STONE ANALYSIS Long Mansfield MD 1/26/2022  8:11 AM    B : kidney stone right  Calculus/Stone Kidney, Right ANAEROBIC BACTERIAL CULTURE ROUTINE, GRAM STAIN, AEROBIC BACTERIAL CULTURE ROUTINE Long Mansfield MD 1/26/2022  8:16 AM

## 2022-01-27 ENCOUNTER — TELEPHONE (OUTPATIENT)
Dept: UROLOGY | Facility: CLINIC | Age: 77
End: 2022-01-27
Payer: COMMERCIAL

## 2022-01-27 DIAGNOSIS — N39.0 RECURRENT UTI: ICD-10-CM

## 2022-01-27 DIAGNOSIS — N20.0 CALCULUS OF KIDNEY: Primary | ICD-10-CM

## 2022-01-27 NOTE — CONFIDENTIAL NOTE
Message left with patient and her daughter to call back to set up her 2 week stent removal.  February 8th is the soonest or after.  Joy Horner RN

## 2022-01-28 ENCOUNTER — DOCUMENTATION ONLY (OUTPATIENT)
Dept: PHARMACY | Facility: CLINIC | Age: 77
End: 2022-01-28
Payer: COMMERCIAL

## 2022-01-28 NOTE — PROGRESS NOTES
Have not been able to connect with patient after multiple attempts.  No further attempts will be made at this time. Patient reported she has numerous specialty visits she needs to prioritize first.  I would be happy to assist in the care of this patient in the future if needed.    Carmella Abraham, PharmD  Medication Therapy Management Pharmacist  Pager#: [phone]

## 2022-01-29 LAB
APPEARANCE STONE: NORMAL
COMPN STONE: NORMAL
SPECIMEN WT: 5 MG

## 2022-01-30 LAB — BACTERIA SPEC CULT: ABNORMAL

## 2022-01-31 ENCOUNTER — HOSPITAL ENCOUNTER (OUTPATIENT)
Dept: CARDIOLOGY | Facility: CLINIC | Age: 77
Discharge: HOME | End: 2022-01-31
Attending: PHYSICIAN ASSISTANT | Admitting: PHYSICIAN ASSISTANT
Payer: COMMERCIAL

## 2022-01-31 DIAGNOSIS — I49.1 PAC (PREMATURE ATRIAL CONTRACTION): ICD-10-CM

## 2022-01-31 PROCEDURE — 93244 EXT ECG>48HR<7D REV&INTERPJ: CPT | Performed by: INTERNAL MEDICINE

## 2022-01-31 PROCEDURE — 93242 EXT ECG>48HR<7D RECORDING: CPT

## 2022-02-02 LAB — BACTERIA SPEC CULT: NORMAL

## 2022-02-08 ENCOUNTER — OFFICE VISIT (OUTPATIENT)
Dept: UROLOGY | Facility: CLINIC | Age: 77
End: 2022-02-08
Payer: COMMERCIAL

## 2022-02-08 ENCOUNTER — VIRTUAL VISIT (OUTPATIENT)
Dept: PSYCHOLOGY | Facility: CLINIC | Age: 77
End: 2022-02-08
Payer: COMMERCIAL

## 2022-02-08 VITALS — DIASTOLIC BLOOD PRESSURE: 80 MMHG | SYSTOLIC BLOOD PRESSURE: 147 MMHG | HEART RATE: 108 BPM | TEMPERATURE: 97 F

## 2022-02-08 DIAGNOSIS — N20.0 CALCULUS OF KIDNEY: Primary | ICD-10-CM

## 2022-02-08 DIAGNOSIS — F41.1 GAD (GENERALIZED ANXIETY DISORDER): ICD-10-CM

## 2022-02-08 DIAGNOSIS — F33.1 MAJOR DEPRESSIVE DISORDER, RECURRENT EPISODE, MODERATE WITH ANXIOUS DISTRESS (H): Primary | ICD-10-CM

## 2022-02-08 PROCEDURE — 52310 CYSTOSCOPY AND TREATMENT: CPT | Performed by: UROLOGY

## 2022-02-08 PROCEDURE — 90834 PSYTX W PT 45 MINUTES: CPT | Mod: 95 | Performed by: SOCIAL WORKER

## 2022-02-08 ASSESSMENT — PAIN SCALES - GENERAL: PAINLEVEL: NO PAIN (0)

## 2022-02-08 NOTE — PROGRESS NOTES
Progress Note    Patient Name: Zeynep Martinez  Date: 22         Service Type: Individual      Session Start Time: 2:01  Session End Time: 2:46     Session Length: 45    Session #: 8    Attendees: Client    Service Modality:  Video Visit:      Provider verified identity through the following two step process.  Patient provided:  Patient  and Patient address    Telemedicine Visit: The patient's condition can be safely assessed and treated via synchronous audio and visual telemedicine encounter.      Reason for Telemedicine Visit: Services only offered telehealth    Originating Site (Patient Location): Patient's home    Distant Site (Provider Location): Provider Remote Setting- Home Office    Consent:  The patient/guardian has verbally consented to: the potential risks and benefits of telemedicine (video visit) versus in person care; bill my insurance or make self-payment for services provided; and responsibility for payment of non-covered services.     Patient would like the video invitation sent by:  My Chart    Mode of Communication:  Video Conference via [employer]    As the provider I attest to compliance with applicable laws and regulations related to telemedicine.     Treatment Plan Last Reviewed: Started 21, 22  PHQ-9 / NELLIE-7 : Complete next session    DATA  Interactive Complexity: No  Crisis: No       Progress Since Last Session (Related to Symptoms / Goals / Homework):   Symptoms: Increased anxiety and depression symptoms this session    Homework: Partially completed; Previous sessions:  Put female bust next to her television so she can admire this on a more regular basis.  Created a positive affirmation picture and hung it by her TV also to remind herself of positives about self.  Feels better about herself and engaging with family and friends on a regular basis.Client feeling more isolated and alone this session and feeling more depressed.  We  "talked in session what she can engage in and how to improve her overall mood. Did have work friends over for a brunch which she enjoyed and also engaing in coloring with affrimations and putting this on her wall.   Client has been to many doctor appts.this last month and feeling better, she has an appointment with the [facility] schedule to address her eating disorder.  Has met a friend from [place] who she is getting close with but has serious health issues but this relationship is exciting to client and makes her feel good.  Family relationship issues has been positive and working on creative art projects that brings her belkys.  Client has been very sick and to the hospital due to a UTI and infection which occurred over Thanksgiving.  She is trying to heal up and get rid of her infection and possible surgery in the future.  Client is still connecting with her friend Mini and hoping and planning to move back to [facility] next Spring.  She is hoping that her new friend will join her there and she is hoping that she will be well enough to move back there. She wants to move \"Home\"  Which is home to her and she has very close friends who will be there for her if anything should come up for her.  This plan is motivating her to concentrate more on her health and improvement of her overall health. Client feeling overwhelmed with several things in her life.  She ended the relationship she had with a person that lived in [place] because she felt she ws not getting what she needed in the relationship.  Continues to struggles with her health issues and has upcoming doctor and surgery appointment which she is hopeful will fixed her UTI's and other issues she is dealing with which is difficult to deal with daily. She is still planning on moving back to [facility], which she is excited about. She is also concerned about the health of her cat and his health.  We processed thoughts and feelings about these issues in " session today and ways to connect to her strengths and positive strategies to improve her overall mood. Current session: Client upset with her family who is projecting there anger onto client because she is moving back to [place].  She is hurt by all of the terrible things they are saying to her and she is worried about how she will manage the move with minimal help from her family.  We processed her feelings in session and discussed plans to work on to feel positive and optimistic about her move. Her surgery went well and she is healing up and feeling better.       Episode of Care Goals: Satisfactory progress - MAINTENANCE (Working to maintain change, with risk of relapse); Intervened by continuing to positively reinforce healthy behavior choice      Current / Ongoing Stressors and Concerns:   Family stress, difficult childhood, ego despair issues     Treatment Objective(s) Addressed in This Session:   Connect to gratitude and ego integrity  Use GAP-stay grounded; attuned and present  Connect to positive affirmations and gratitude  Create meals and freeze them, save money and put in bank, start realistic exercise program, schedule doctor appointment to check on ulcer, skin issues and continue work with other rheumatologist  on wrist and tremor issues. Continue creative projects that she enjoys. Look into eating disorder group or program. Concentrate on thought stopping process and CBT skills.     Intervention:   Motivational Interviewing: OARS  Strength based therapy  CBT Therapy     ASSESSMENT: Current Emotional / Mental Status (status of significant symptoms):   Risk status (Self / Other harm or suicidal ideation)   Patient denies current fears or concerns for personal safety.   Patient denies current or recent suicidal ideation or behaviors.   Patient denies current or recent homicidal ideation or behaviors.   Patient denies current or recent self injurious behavior or ideation.   Patient denies other safety  concerns.   Patient reports there has been no change in risk factors since their last session.     Patient reports there has been no change in protective factors since their last session.     Recommended that patient call 911 or go to the local ED should there be a change in any of these risk factors.     Appearance:   Appropriate    Eye Contact:   Good    Psychomotor Behavior: Normal    Attitude:   Cooperative  Interested Pleasant   Orientation:   All   Speech    Rate / Production: Normal/ Responsive Normal     Volume:  Normal    Mood:    Angry  Anxious  Depressed  Expansive   Affect:    Appropriate  Bright    Thought Content:  Clear    Thought Form:  Coherent  Goal Directed  Logical    Insight:    Fair      Medication Review:   No changes to current psychiatric medication(s)     Medication Compliance:   Yes     Changes in Health Issues:   None reported     Chemical Use Review:   Substance Use: Chemical use reviewed, no active concerns identified      Tobacco Use: No current tobacco use.      Diagnosis:  1. Major depressive disorder, recurrent episode, moderate with anxious distress (H)    2. NELLIE (generalized anxiety disorder)        Collateral Reports Completed:   Not Applicable    PLAN: (Patient Tasks / Therapist Tasks / Other)  Previous sessions: Patient will bring her lady bust to her room so she can look at each morning when she get's up; stay in her GAP, Connect to positive thoughts affirmations and gratitude.   Follow rheumatologist recommendations for hands and wrists, contact PCP about ulcer and skin issues,  Start healthy meal plan and freeze meals that she can eat later, save money from meals and put in savings, continue outings with friends and family, join lunch group when she can, start realistic exercise program, and continue creative projects with limits.  Continue to connect to gratitude, positive affirmations and strengths. Set limits with granddaughter and continue to build on relationship when  appropriate. Ask PCP for referral for an eating disorder group or program, watch and try and control binge eating, continue coloring, connecting to strengths and positive affirmations on her wall daily.  Use thought stopping process and use FOff as her mantra.  Reframe thinking to positive thinking and possibilities in her life.  Continue building and outside activities, connect and get together with friends for brunch next month and support from family.  Feeling better health wise and seeing many doctors and feeling better has a referral for the [facility] program in January to address her eating issues, continue to connect with new friend Mini which brings her belkys and continue making her art hand towels and coloring and connection to family and friends for support.Client will try and get to gym in her facility and ride the elliptical bike to build up her core, attend her doctor appointments, connect with her friend Mini.  Connect to strengths and positive affirmations. Spend time with family over the Holidays.   Attend [place] appt. In January. Current session: Client will start [employer] program, continue with walking and exercise, use thought stopping process and CBT skills to improve her mood.  Use her GAP-stay grounded, attuned and in the present.  Meet with her astrologer, connect with family and friends, plan for move back to [place], engage in art projects and positive self care activities. Connect to strengths and positive affirmations daily. Apply self compassion to core hurts that are triggered, journal feelings, plan for move and concentrate on positive outcomes for the future.              Noe Meeks, LICSW                                                         ______________________________________________________________________    Treatment Plan    Patient's Name: Zeynep Martinez  YOB: 1945    Date: 8-19-21    DSM5 Diagnoses: 296.32 (F33.1) Major Depressive Disorder,  Recurrent Episode, Moderate _ and With anxious distress or 300.02 (F41.1) Generalized Anxiety Disorder  Psychosocial / Contextual Factors: Family stress, difficult childhood, ego despair issues    WHODAS: Completed first session    Referral / Collaboration:  Was/were discussed and client will pursue.  Patient stated that she would enroll in an eating disorder program if her binge eating persists.    Anticipated number of session or this episode of care: 12      MeasurableTreatment Goal(s) related to diagnosis / functional impairment(s)  Goal 1: Patient will decrease her depressive and anxious thoughts and return cordell normal daily living.     I will know I've met my goal when I know who I am authentically and act appropriately.      Objective #A (Patient Action)    Patient will work toward stronger ego intergrity vs despair.  Determine authentic self and feel good about this. .  Status: Continued - Date(s): 8-19-21, 2-08-22    Intervention(s)  Therapist will process origins of negative self image and esteem and concentrate on celebrating authenticity and improving overall self esteem and worth.    Objective #B  Patient will use cognitive strategies identified in therapy to challenge anxious thoughts.  Status: Continued - Date(s): 8-19-21, 2-08-22    Intervention(s)  Therapist will teach thought stopping process and CBT skills and practice skills as needed until they become automatic .    Objective #C  Patient will eat healthy and limit binge eating.  Status: Continued - Date(s): 8-19-21, 2-08-22    Intervention(s)  Therapist will check-in on eating behaior, determine reasons for binge eating and reinforce healthy eating habits and behavior.         Patient has reviewed and agreed to the above plan.      Noe Meeks [facility]  August 19, 2021

## 2022-02-08 NOTE — PROGRESS NOTES
Assessment/Plan:    Assessment & Plan   Zeynep was seen today for follow up.    Diagnoses and all orders for this visit:    Calculus of kidney  -     Urine Culture; Future  -     Patient Stated Goal: Prevent further stones        Stone Management Plan  Stone Management 11/29/2021 12/23/2021 2/8/2022   Urinary Tract Infection Suspected Infection Possible Infection No suspicion of infection   Renal Colic Well controlled symptoms Well controlled symptoms Well controlled symptoms   Renal Failure No suspicion of renal failure No suspicion of renal failure No suspicion of renal failure   Current CT date - 11/24/2021 -   Right sided stones? - Yes -   R Number of ureteral stones - No ureteral stones -   R Number of kidney stones  - 1 -   R GSD of kidney stones - 2 - 4 -   R Hydronephrosis - Moderate -   R Stone Event No current event No current event Established event   Diagnosis date - - -   Initial location of primary symptomatic stone - - -   Initial GSD of primary symptomatic stone - - -   R Post-op status - - Stent Removal   R Current Plan - Clear -   Clear rationale - Associated treated infection -   Observe rationale - - -   Left sided stones? - No -   L Stone Event No current event No current event No current event             RICH ARANA MD  [facility]    HPI  Ms. Zeynep Martinez is a 76 year old  female who returns to [facility] [place] for early postoperative follow up for anticipated stent removal.     She returns status post Right  ureteroscopic extraction for renal stone and dilation stenotic right proximal ureter. She has had no unanticipated post-operative events.    She has had no symptoms suspicious for infection and stent was mildly symptomatic with typical issues of flank discomfort and lower urinary tract irritation.     Flexible cystoscopy is performed and indwelling stent is removed without incident.    She will follow up  in the office in one month without imaging. She will provide a urine specimen for culture before next encounter.    ROS   Review of systems is negative except for HPI.    Past Medical History:   Diagnosis Date     Anxiety      Arthritis      Bladder infection      Calculus of kidney     multiple, uric acid and calcium     Cancer (H) Uterine 2/2001     Complication of anesthesia     slow to wake and anxiety     Depression      Diabetes mellitus (H)     type 2     Frequent UTI      Gout      Hyperlipidemia LDL goal <100      Hypertension      Kidney stone      Type 2 diabetes mellitus without complication, without long-term current use of insulin (H)      Ulcer     Ulcer in the anastomosis       Past Surgical History:   Procedure Laterality Date     ABDOMINOPLASTY       APPENDECTOMY       APPENDECTOMY       CYSTOURETEROSCOPY, WITH LITHOTRIPSY USING DAVE 120P LASER AND URETERAL STENT INSERTION Right 1/26/2022    Procedure: CYSTOURETEROSCOPY, RETROGRADE  PYELOGRAM, DILATION URETERALSTRICTURE, LASER LITHOTRIPSY,  STONE EXTRACTION, URETERAL STENT INSERTION,;  Surgeon: Long Mansfield MD;  Location: [facility]     EXTRACORPOREAL SHOCK WAVE LITHOTRIPSY (ESWL)       EXTRACORPOREAL SHOCK WAVE LITHOTRIPSY (ESWL)       galbaldder       GASTRIC BYPASS       HYSTERECTOMY       HYSTERECTOMY TOTAL ABDOMINAL, BILATERAL SALPINGO-OOPHORECTOMY, COMBINED      no cervix     IR MISCELLANEOUS PROCEDURE  3/18/2008     IR MISCELLANEOUS PROCEDURE  3/18/2008     IR MISCELLANEOUS PROCEDURE  6/10/2008     IR MISCELLANEOUS PROCEDURE  1/24/2012     IR URETER DILATION BILATERAL  1/24/2012     IR URETER DILATION BILATERAL  3/18/2008     PICC  11/4/2021          SC CYSTO/URETERO W/LITHOTRIPSY &INDWELL STENT INSRT Left 10/26/2018    Procedure:  CYSTOSCOPY, LEFT  URETEROSCOPY, LASER LITHOTRIPSY STENT INSERTION;  Surgeon: Long Mansfield MD;  Location: [facility];  Service: Urology     SC CYSTO/URETERO W/LITHOTRIPSY &INDWELL STENT  INSRT Right 5/28/2021    Procedure: CYSTOURETEROSCOPY, URETEROSCOPY, URETERAL DILATION WITH RETROGRADE PYELOGRAM, AND STENT INSERTION RIGHT;  Surgeon: Long Mansfield MD;  Location: [facility];  Service: Urology     IL ERCP W/BIOPSY SINGLE/MULTIPLE       REVISION CATHERINE-EN-Y       SKIN SURGERY  2013    15 lbs of skin/adipose removed     STOMACH SURGERY       URETEROSCOPY         Current Outpatient Medications   Medication Sig Dispense Refill     acetaminophen (TYLENOL) 500 MG tablet Take 500-1,000 mg by mouth every 6 hours as needed for mild pain       ALPRAZolam (XANAX) 0.5 MG tablet Take 1 tablet (0.5 mg) by mouth daily as needed for anxiety 15 tablet 1     blood glucose (ACCU-CHEK PASQUALE PLUS) test strip Use to test blood sugar 1-2 times daily or as directed. 200 strip 5     blood glucose (NO BRAND SPECIFIED) lancets standard Use to test blood sugar 1-2 times daily or as directed. 1 each 4     blood glucose (NO BRAND SPECIFIED) test strip Use to test blood sugar 1-2 times daily or as directed. 100 strip 4     blood glucose monitoring (NO BRAND SPECIFIED) meter device kit Use to test blood sugar 1-2 times daily or as directed. 1 kit 0     busPIRone (BUSPAR) 10 MG tablet Take 1 tablet (10 mg) by mouth 2 times daily 180 tablet 1     gabapentin (NEURONTIN) 800 MG tablet Take 1 tablet (800 mg) by mouth 2 times daily 40 tablet 0     glipiZIDE (GLUCOTROL XL) 5 MG 24 hr tablet Take 1 tablet (5 mg) by mouth 2 times daily 180 tablet 1     meloxicam (MOBIC) 7.5 MG tablet TAKE ONE TABLET BY MOUTH ONCE DAILY 90 tablet 0     metFORMIN (GLUCOPHAGE-XR) 500 MG 24 hr tablet Take 2 tablets (1,000 mg) by mouth 2 times daily (with meals) 360 tablet 1     STATIN NOT PRESCRIBED (INTENTIONAL) Please choose reason not prescribed from choices below.       traZODone (DESYREL) 50 MG tablet Take 0.5-1 tablets (25-50 mg) by mouth At Bedtime 90 tablet 1     venlafaxine (EFFEXOR-XR) 150 MG 24 hr capsule Take 1 capsule (150 mg) by mouth  daily 90 capsule 1     venlafaxine (EFFEXOR-XR) 75 MG 24 hr capsule Take 1 capsule (75 mg) by mouth daily 90 capsule 1     nitroFURantoin macrocrystal-monohydrate (MACROBID) 100 MG capsule Take 1 capsule (100 mg) by mouth 2 times daily (Patient not taking: Reported on 2022) 42 capsule 0       Allergies   Allergen Reactions     Adhesive Tape Rash     Paper tape is ok      Statin Drugs [Hmg-Coa-R Inhibitors] Other (See Comments)     confusion     Penicillins Rash       Social History     Socioeconomic History     Marital status:      Spouse name: Not on file     Number of children: Not on file     Years of education: Not on file     Highest education level: Not on file   Occupational History     Not on file   Tobacco Use     Smoking status: Former Smoker     Packs/day: 0.00     Years: 5.00     Pack years: 0.00     Types: Cigarettes     Quit date: 1970     Years since quittin.1     Smokeless tobacco: Never Used   Substance and Sexual Activity     Alcohol use: Yes     Comment: once per month      Drug use: Never     Sexual activity: Not Currently     Partners: Female     Birth control/protection: Post-menopausal   Other Topics Concern     Parent/sibling w/ CABG, MI or angioplasty before 65F 55M? No   Social History Narrative     Not on file     Social Determinants of Health     Financial Resource Strain: Not on file   Food Insecurity: Not on file   Transportation Needs: Not on file   Physical Activity: Not on file   Stress: Not on file   Social Connections: Not on file   Intimate Partner Violence: Not on file   Housing Stability: Not on file       Family History   Problem Relation Age of Onset     Multiple Sclerosis Mother      Cancer Maternal Grandmother         cervical     Diabetes Maternal Grandmother      Hypertension Maternal Grandmother      Heart Disease Maternal Grandmother      Multiple Sclerosis Cousin      Multiple Sclerosis Cousin      Diabetes Maternal Aunt      Depression Daughter       Depression Granddaughter        Objective:     Constitutional:   awake, alert, cooperative, no apparent distress, and appears stated age          Labs   Most Recent 3 CBC's:Recent Labs   Lab Test 11/27/21  0622 11/25/21  0754 11/24/21  0638 10/05/21  1033   WBC  --  4.7 13.8* 6.5   HGB  --  10.3* 11.8 11.8   MCV  --  95 93 93    226 301 273     Most Recent 3 BMP's:Recent Labs   Lab Test 01/26/22  0839 01/26/22  0538 01/19/22  1131 11/27/21  0931 11/27/21  0622 11/25/21  0836 11/25/21  0754 11/24/21  1811 11/24/21  1716 11/24/21  1307 11/24/21  0638   NA  --   --  137  --   --   --  141  --   --   --  139   POTASSIUM  --   --  5.2  --   --   --  4.6  --  5.4*  --  5.5*   CHLORIDE  --   --  108  --   --   --  109  --   --   --  105   CO2  --   --  21  --   --   --  28  --   --   --  22   BUN  --   --  28  --   --   --  27  --   --   --  36*   CR  --   --  0.96  --  0.87  --  0.93  --   --   --  0.92   ANIONGAP  --   --  8  --   --   --  4  --   --   --  12   SHAUN  --   --  9.1  --   --   --  8.4*  --   --   --  9.4   * 135* 165*   < >  --    < > 110*   < >  --    < > 192*    < > = values in this interval not displayed.

## 2022-02-08 NOTE — PATIENT INSTRUCTIONS
Patient Stated Goal: Prevent further stones  Cystoscopy with Stent Removal    Cystoscopy is used to help diagnose urinary problems, or to remove a ureteral stent.    During a cystoscopy, your doctor examines the inside of your bladder with an instrument called a cystoscope. A cystoscope is a long, thin flexible tube with a camera at the end.    Your doctor will insert the scope into your urethra allowing him to visualize and evaluate the inside of the bladder for possible abnormalities. The urethra is the tube that carries urine to the outside of your body.    How is the stent removed?    Your stent will be removed in the [facility] with a small telescope and a grasping tool.  It usually takes less than 1 minute to remove the stent.    What should I expect after the stent is removed?     You should feel normal by the next day.    Some patients find:      An increase in back pain about an hour after the stent is removed as the kidney fills up with urine before it starts to empty.  It can be as uncomfortable as your initial stone episode.  Taking pain medications before stent removal may be helpful, but you would need someone else to drive you to and from your appointment.    Bladder symptoms usually disappear by the next morning.    Small amounts of blood in the urine may be seen occasionally for up to a week.    At Home:      It is important to drink plenty of fluids after your procedure    You may continue to use your pain medications as prescribed    What symptoms should I watch for?    Fever     Chills    Increasing back pain that is not relieved with pain medications    Large amounts of blood in the urine or large clots    Leakage of urine (incontinence)     Are not able to urinate for 8 hours    These symptoms may mean you have a blockage or infection. Call the [facility] 24 hours a day at [phone] immediately.

## 2022-02-10 RX ORDER — MEROPENEM 500 MG/1
500 INJECTION, POWDER, FOR SOLUTION INTRAVENOUS EVERY 8 HOURS
Qty: 210 ML | Refills: 0 | COMMUNITY
Start: 2022-02-10

## 2022-02-10 NOTE — PROGRESS NOTES
75 yo F with recurrent ESBL e.coli previously requiring IV abx and surgical stone intervention s/p right URS with extraction of renal stone and dilation of stenotic proximal ureter on 1/26/22    Urine culture from 2/8/22 taken prior to stent extraction as below:  Culture 10,000-50,000 CFU/mL Escherichia coli ESBL Abnormal        <10,000 CFU/mL Urogenital miguel angel            Resulting Agency: [facility]       Susceptibility     Escherichia coli ESBL     RUSLAN     Ampicillin >=32.0 ug/mL Resistant     Cefazolin >=64.0 ug/mL Resistant 1     Cefepime  Resistant     Cefoxitin 8.0 ug/mL Susceptible     Ceftazidime  Resistant     Ceftriaxone  Resistant     Ciprofloxacin >=4.0 ug/mL Resistant     Gentamicin <=1.0 ug/mL Susceptible     Levofloxacin >=8.0 ug/mL Resistant     Meropenem <=0.25 ug/mL Susceptible 2     Nitrofurantoin 128.0 ug/mL Resistant     Piperacillin/Tazobactam <=4.0 ug/mL Susceptible     Tobramycin <=1.0 ug/mL Susceptible     Trimethoprim/Sulfamethoxazole >16/304 ug/mL Resistant                 Will treat with a 1 week course of meropenem on outpatient basis    Orders for antibiotic and PICC insertion by RN placed.

## 2022-02-11 ENCOUNTER — HOME INFUSION (PRE-WILLOW HOME INFUSION) (OUTPATIENT)
Dept: PHARMACY | Facility: CLINIC | Age: 77
End: 2022-02-11

## 2022-02-14 ENCOUNTER — ANCILLARY PROCEDURE (OUTPATIENT)
Dept: OTHER | Facility: HOSPITAL | Age: 77
End: 2022-02-14
Attending: PHYSICIAN ASSISTANT
Payer: COMMERCIAL

## 2022-02-14 ENCOUNTER — HOME INFUSION (PRE-WILLOW HOME INFUSION) (OUTPATIENT)
Dept: PHARMACY | Facility: CLINIC | Age: 77
End: 2022-02-14

## 2022-02-14 PROCEDURE — 272N000450 HC KIT 4FR POWER PICC SINGLE LUMEN

## 2022-02-14 PROCEDURE — 36569 INSJ PICC 5 YR+ W/O IMAGING: CPT

## 2022-02-14 NOTE — PROCEDURES
"PICC Line Insertion Procedure Note  Pt. Name: Zeynep Martinez  MRN:        [id number]    Procedure: Insertion of a  single Lumen  4 fr  Bard SOLO (valved) Power PICC, Lot number [id number]    Indications: Antibiotic    Contraindications : none    Procedure Details   Patient identified with 2 identifiers and \"Time Out\" conducted.  .     Central line insertion bundle followed: hand hygeine performed prior to procedure, site cleansed with cholraprep, hat, mask, sterile gloves,sterile gown worn, patient draped with maximum barrier head to toe drape, sterile field maintained.    The vein was assessed and found to be compressible and of adequate size. 1 ml 1% Lidocaine administered sq to the insertion site. A 4 Fr PICC was inserted into the basilic vein of the right arm with ultrasound guidance. 1 attempt(s) required to access vein.   Catheter threaded without difficulty. Good blood return noted.    Modified Seldinger Technique used for insertion.    The 8 sharps that are included in the PICC insertion kit were accounted for and disposed of in the sharps container prior to breakdown of the sterile field.    Catheter secured with Statlock, biopatch and Tegaderm dressing applied.    Findings:  Total catheter length  42 cm, with 0 cm exposed. Mid upper arm circumference is 42 cm. Catheter was flushed with 20 cc NS. Patient  tolerated procedure well.    Tip placement verified by 3CG technology . Tip placement in the SVC.    CLABSI prevention brochure left at bedside.    Patient's primary RN notified PICC is ready for use.    Comments:        Lesley gayle BSN,RN,[facility]        "

## 2022-02-16 ENCOUNTER — HOME INFUSION (PRE-WILLOW HOME INFUSION) (OUTPATIENT)
Dept: PHARMACY | Facility: CLINIC | Age: 77
End: 2022-02-16

## 2022-02-19 ENCOUNTER — HOME INFUSION (PRE-WILLOW HOME INFUSION) (OUTPATIENT)
Dept: PHARMACY | Facility: CLINIC | Age: 77
End: 2022-02-19
Payer: COMMERCIAL

## 2022-02-21 NOTE — PROGRESS NOTES
This is a recent snapshot of the patient's [facility] medical record.  For current drug dose and complete information and questions, call [phone] or In Basket pool, fv home infusion ([id number])  CSN Number:  [id number]

## 2022-02-22 ENCOUNTER — HOME INFUSION (PRE-WILLOW HOME INFUSION) (OUTPATIENT)
Dept: PHARMACY | Facility: CLINIC | Age: 77
End: 2022-02-22

## 2022-02-23 ENCOUNTER — TELEPHONE (OUTPATIENT)
Dept: UROLOGY | Facility: CLINIC | Age: 77
End: 2022-02-23
Payer: COMMERCIAL

## 2022-02-23 NOTE — TELEPHONE ENCOUNTER
Message left with [facility] regarding discontinuation of patient's PICC line.  Joy Horner RN    [facility]fusion will contact patient regarding PICC removal.  Patient aware of removal.  Joy Horner RN

## 2022-02-24 ENCOUNTER — HOME INFUSION (PRE-WILLOW HOME INFUSION) (OUTPATIENT)
Dept: PHARMACY | Facility: CLINIC | Age: 77
End: 2022-02-24
Payer: COMMERCIAL

## 2022-03-02 NOTE — PROGRESS NOTES
This is a recent snapshot of the patient's [facility] medical record.  For current drug dose and complete information and questions, call [phone] or In Basket pool, fv home infusion ([id number])  CSN Number:  [id number]

## 2022-03-07 NOTE — PROGRESS NOTES
This is a recent snapshot of the patient's [facility] medical record.  For current drug dose and complete information and questions, call [phone] or In Basket pool, fv home infusion ([id number])  CSN Number:  [id number]

## 2022-04-10 ENCOUNTER — HEALTH MAINTENANCE LETTER (OUTPATIENT)
Age: 77
End: 2022-04-10

## 2022-05-04 ENCOUNTER — TELEPHONE (OUTPATIENT)
Dept: UROLOGY | Facility: CLINIC | Age: 77
End: 2022-05-04
Payer: COMMERCIAL

## 2022-05-04 NOTE — TELEPHONE ENCOUNTER
[facility] Call Center    Phone Message    May a detailed message be left on voicemail: yes     Reason for Call: Other: Pt called and stated that she moved back to [place] and stated she has another UTI. Wanted to let provider know that he was in fact right that those stones he removed in Jan did not correlate to the UTI's! Pt just wanted to update provider with that information!    Action Taken: Message routed to:  Other: [facility]    Travel Screening: Not Applicable

## 2022-06-05 ENCOUNTER — HEALTH MAINTENANCE LETTER (OUTPATIENT)
Age: 77
End: 2022-06-05

## 2022-06-21 ENCOUNTER — TELEPHONE (OUTPATIENT)
Dept: UROLOGY | Facility: CLINIC | Age: 77
End: 2022-06-21
Payer: COMMERCIAL

## 2022-06-21 NOTE — TELEPHONE ENCOUNTER
[facility] Call Center    Phone Message    May a detailed message be left on voicemail: yes     Reason for Call: Other: Zeynep is wanting her records from Dr. Mansfield sent to [facility], Dr. Fer Clark Binge [address], [place] [address]. Phone [phone] fax [phone].     Action Taken: Other: [facility]    Travel Screening: Not Applicable

## 2022-06-21 NOTE — TELEPHONE ENCOUNTER
Left message to patient with medical record contact numbers for patient to start process with record transfers.

## 2022-07-26 NOTE — PROGRESS NOTES
This is a recent snapshot of the patient's [facility] medical record.  For current drug dose and complete information and questions, call [phone] or In Basket pool, fv home infusion ([id number])  CSN Number:  [id number]

## 2022-07-27 NOTE — PROGRESS NOTES
This is a recent snapshot of the patient's [facility] medical record.  For current drug dose and complete information and questions, call [phone] or In Basket pool, fv home infusion ([id number])  CSN Number:  [id number]

## 2022-08-03 NOTE — PROGRESS NOTES
This is a recent snapshot of the patient's [facility] medical record.  For current drug dose and complete information and questions, call [phone] or In Basket pool, fv home infusion ([id number])  CSN Number:  [id number]

## 2022-08-15 NOTE — PROGRESS NOTES
This is a recent snapshot of the patient's [facility] medical record.  For current drug dose and complete information and questions, call [phone] or In [facility] pool, fv home infusion ([id number])  CSN Number:  [id number]

## 2022-08-27 NOTE — ANESTHESIA PREPROCEDURE EVALUATION
Anesthesia Evaluation      Patient summary reviewed   History of anesthetic complications (Slow to awaken, anxious upon awakening)     Airway   Mallampati: II  Neck ROM: full   Pulmonary - normal exam    breath sounds clear to auscultation                         Cardiovascular - negative ROS and normal exam  Rhythm: regular  Rate: normal,         Neuro/Psych    (+) neuromuscular disease (Peripheral Neuropathy ),  depression, anxiety/panic attacks,     Comments: Trigeminal Neuralgia     Endo/Other    (+) diabetes mellitus type 2 poorly controlled, arthritis, obesity,      Comments: Gout  Hx of uterine cancer s/p hysterectomy     GI/Hepatic/Renal    (+)   chronic renal disease (Nephrolithiasis ),     Comments: Hx of gastric bypass surgery c/b gastric ulcer          Dental - normal exam                        Anesthesia Plan  Planned anesthetic: general LMA    ASA 3   Induction: intravenous   Anesthetic plan and risks discussed with: patient  Anesthesia plan special considerations: antiemetics,   Post-op plan: routine recovery           Pt here for daily dapto and rocephin ordered for osteomyelitis. Offers no new complaints today. Appeared to tolerate treatment well. No S/S of adverse rxn noted at this time. Discharged stable. Aware of future appointments. EOT:  9/19/22    ID f/u-has an appointment, but unaware of the date.

## 2022-10-15 ENCOUNTER — HEALTH MAINTENANCE LETTER (OUTPATIENT)
Age: 77
End: 2022-10-15

## 2022-12-03 ENCOUNTER — HEALTH MAINTENANCE LETTER (OUTPATIENT)
Age: 77
End: 2022-12-03

## 2023-03-26 ENCOUNTER — HEALTH MAINTENANCE LETTER (OUTPATIENT)
Age: 78
End: 2023-03-26

## 2023-08-20 ENCOUNTER — HEALTH MAINTENANCE LETTER (OUTPATIENT)
Age: 78
End: 2023-08-20

## 2024-01-07 ENCOUNTER — HEALTH MAINTENANCE LETTER (OUTPATIENT)
Age: 79
End: 2024-01-07

## 2024-05-26 ENCOUNTER — HEALTH MAINTENANCE LETTER (OUTPATIENT)
Age: 79
End: 2024-05-26

## 2024-10-13 ENCOUNTER — HEALTH MAINTENANCE LETTER (OUTPATIENT)
Age: 79
End: 2024-10-13

## 2025-01-25 ENCOUNTER — HEALTH MAINTENANCE LETTER (OUTPATIENT)
Age: 80
End: 2025-01-25

## (undated) DEVICE — SOL WATER IRRIG 3000ML BAG 2B7117

## (undated) DEVICE — SUCTION MANIFOLD NEPTUNE 2 SYS 1 PORT 702-025-000

## (undated) DEVICE — GUIDEWIRE BENTSON FLEX TIP 0.035"X150CM M0066201250

## (undated) DEVICE — CUSTOM PACK CYSTO PREFERRED SOT5BCYHEA

## (undated) DEVICE — BASKET NITINOL TIPLESS HALO  1.5FRX120CM 554120

## (undated) DEVICE — GOWN XLG DISP 9545

## (undated) DEVICE — SHEATH URETERAL ACCESS NAVIGATOR HD 12/14FRX36CM M0062502250

## (undated) DEVICE — PREP POVIDONE-IODINE 7.5% SCRUB 4OZ BOTTLE MDS093945

## (undated) DEVICE — SOL WATER IRRIG 1000ML BOTTLE 2F7114

## (undated) DEVICE — WIRE GUIDE 0.035"X145CM AMPLATZ SUPER STIFF STR M001465230

## (undated) DEVICE — GLOVE SURG PI ULTRA TOUCH M SZ 8 LF

## (undated) DEVICE — KIT ENDO FIRST STEP DISINFECTANT 200ML W/POUCH EP-4

## (undated) DEVICE — TUBING SET THERMEDX UROLOGY SGL USE LL0006

## (undated) DEVICE — TUBING SUCTION MEDI-VAC 1/4"X20' N620A - HE

## (undated) DEVICE — LASER FIBER HOLMIUM MOSES 200 D/F/L AC-10030100

## (undated) DEVICE — CATH BALLOON DILATATION UROMAX ULTRA 18FRX4CM M0062251020

## (undated) DEVICE — MAT FLOOR SURGICAL 40X38 0702140238

## (undated) RX ORDER — FUROSEMIDE 10 MG/ML
INJECTION INTRAMUSCULAR; INTRAVENOUS
Status: DISPENSED
Start: 2021-12-21